# Patient Record
Sex: MALE | Race: BLACK OR AFRICAN AMERICAN | NOT HISPANIC OR LATINO | Employment: UNEMPLOYED | URBAN - METROPOLITAN AREA
[De-identification: names, ages, dates, MRNs, and addresses within clinical notes are randomized per-mention and may not be internally consistent; named-entity substitution may affect disease eponyms.]

---

## 2024-01-19 ENCOUNTER — OFFICE VISIT (OUTPATIENT)
Dept: DERMATOLOGY | Facility: CLINIC | Age: 69
End: 2024-01-19
Payer: COMMERCIAL

## 2024-01-19 ENCOUNTER — SPECIALTY PHARMACY (OUTPATIENT)
Dept: PHARMACY | Facility: CLINIC | Age: 69
End: 2024-01-19

## 2024-01-19 DIAGNOSIS — L73.2 HIDRADENITIS SUPPURATIVA: Primary | ICD-10-CM

## 2024-01-19 PROCEDURE — 99214 OFFICE O/P EST MOD 30 MIN: CPT | Performed by: DERMATOLOGY

## 2024-01-19 PROCEDURE — 1159F MED LIST DOCD IN RCRD: CPT | Performed by: DERMATOLOGY

## 2024-01-19 RX ORDER — ADALIMUMAB 40MG/0.4ML
40 KIT SUBCUTANEOUS
Qty: 4 EACH | Refills: 11 | Status: SHIPPED | OUTPATIENT
Start: 2024-01-19 | End: 2024-06-01 | Stop reason: HOSPADM

## 2024-01-19 RX ORDER — ADALIMUMAB 80MG/0.8ML
KIT SUBCUTANEOUS
Qty: 3 EACH | Refills: 0 | Status: SHIPPED | OUTPATIENT
Start: 2024-01-19 | End: 2024-03-23

## 2024-01-19 RX ORDER — MINOCYCLINE HYDROCHLORIDE 100 MG/1
100 CAPSULE ORAL 2 TIMES DAILY
Qty: 60 CAPSULE | Refills: 2 | Status: SHIPPED | OUTPATIENT
Start: 2024-01-19 | End: 2024-05-15 | Stop reason: HOSPADM

## 2024-01-19 NOTE — PROGRESS NOTES
Subjective     Brian Rodriguez is a 68 y.o. male with DM  and GERD who presents for the following: Hidradenitis Suppurativa (Lives in nursing home - they do dressing changes daily. ).     Longstanding history of HS of the buttock and scrotal area.     Wound care with facility wound nurse including rinse with normal saline, wound packing, followed by alginate dressing and mepilex. Zinc oxide butt paste for early pressure injury in perianal gluteal fold.     He saw Dr. Cordon 9/2023 who started minocycline 100mg BID for him and recommended against surgery at the time.    Today, patient reports continued drainage and active disease. He does not report new areas.     Review of Systems:  No other skin or systemic complaints other than what is documented elsewhere in the note.    The following portions of the chart were reviewed this encounter and updated as appropriate:          Skin Cancer History  No skin cancer on file.      Specialty Problems    None       Objective   Well appearing patient in no apparent distress; mood and affect are within normal limits.    A focused skin examination was performed. All findings within normal limits unless otherwise noted below.    Assessment/Plan   1. Hidradenitis suppurativa  Left buttock is indurated with sinuses draining two of which are packed with malodor and serous drainage    Hidradenitis Suppurativa of the buttock - severe and flaring despite use of systemic antibiotics (doxy and minocycline) and not a candidate for surgery per last visit with Dr. Cordon 9/2023  - Given the significant disease, we discussed considering starting Humira pending labs  - Continue minocycline 100 mg twice daily   - Obtain labs: CBC w/diff, CMP, T-spot, Hepatitis C antibody and B (surface antigen, antibody, and core antibody) antibody screening  - Continue daily wound care and dressing changes per current recs at the nursing facility  - Follow up 6 months after starting Humira  - Written  instructions provided and patient is in agreement with this plan.    minocycline 100 mg capsule  Take 1 capsule (100 mg) by mouth 2 times a day.    Related Procedures  Hepatitis C Antibody  Hepatitis B Surface Antigen  Hepatitis B Core Antibody, Total  Hepatitis B Surface Antibody  T-SPOT (Tb)  CBC and Auto Differential  Comprehensive metabolic panel  HIV 1/2 Antigen/Antibody Screen with Reflex to Confirmation    Brenda Monroy DO- PGY-4    I saw and evaluated the patient. I personally obtained the key and critical portions of the history and physical exam or was physically present for key and critical portions performed by the resident/fellow. I reviewed the resident/fellow's documentation and discussed the patient with the resident/fellow. I agree with the resident/fellow's medical decision making as documented in the note.    Shaun Pablo MD PhD

## 2024-01-19 NOTE — PATIENT INSTRUCTIONS
Continue daily wound care and dressing changes    Obtain labs: CBC w/diff, CMP, T-spot, Hepatitis C antibody and B (surface antigen, antibody, and core antibody) antibody screening    Pending labs, plan to start Humira for hidradenitis suppurativa and follow up 6 months after start    Continue minocycline 100 mg twice daily

## 2024-02-01 ENCOUNTER — TELEPHONE (OUTPATIENT)
Dept: DERMATOLOGY | Facility: CLINIC | Age: 69
End: 2024-02-01

## 2024-02-01 NOTE — TELEPHONE ENCOUNTER
I spoke with Brad at [facility] regarding patient labs prior to starting Humira. She is going to fax them to us.   Their fax number is [phone] if I need to fax her an order to get more labs.   [phone] ext [phone]

## 2024-02-12 PROCEDURE — RXMED WILLOW AMBULATORY MEDICATION CHARGE

## 2024-02-23 ENCOUNTER — SPECIALTY PHARMACY (OUTPATIENT)
Dept: PHARMACY | Facility: CLINIC | Age: 69
End: 2024-02-23

## 2024-02-24 ENCOUNTER — PHARMACY VISIT (OUTPATIENT)
Dept: PHARMACY | Facility: CLINIC | Age: 69
End: 2024-02-24
Payer: COMMERCIAL

## 2024-02-28 ENCOUNTER — SPECIALTY PHARMACY (OUTPATIENT)
Dept: PHARMACY | Facility: CLINIC | Age: 69
End: 2024-02-28

## 2024-03-21 ENCOUNTER — SPECIALTY PHARMACY (OUTPATIENT)
Dept: PHARMACY | Facility: CLINIC | Age: 69
End: 2024-03-21

## 2024-03-21 PROCEDURE — RXMED WILLOW AMBULATORY MEDICATION CHARGE

## 2024-03-28 ENCOUNTER — PHARMACY VISIT (OUTPATIENT)
Dept: PHARMACY | Facility: CLINIC | Age: 69
End: 2024-03-28
Payer: COMMERCIAL

## 2024-04-17 ENCOUNTER — APPOINTMENT (OUTPATIENT)
Dept: RADIOLOGY | Facility: HOSPITAL | Age: 69
DRG: 871 | End: 2024-04-17
Payer: MEDICARE

## 2024-04-17 ENCOUNTER — HOSPITAL ENCOUNTER (INPATIENT)
Facility: HOSPITAL | Age: 69
LOS: 28 days | Discharge: NURSING, REHAB OR LONG-TERM CARE | DRG: 871 | End: 2024-05-15
Attending: EMERGENCY MEDICINE | Admitting: INTERNAL MEDICINE
Payer: MEDICARE

## 2024-04-17 ENCOUNTER — CLINICAL SUPPORT (OUTPATIENT)
Dept: EMERGENCY MEDICINE | Facility: HOSPITAL | Age: 69
DRG: 871 | End: 2024-04-17
Payer: MEDICARE

## 2024-04-17 DIAGNOSIS — I13.0 HYPERTENSIVE HEART AND CHRONIC KIDNEY DISEASE WITH HEART FAILURE AND STAGE 1 THROUGH STAGE 4 CHRONIC KIDNEY DISEASE, OR UNSPECIFIED CHRONIC KIDNEY DISEASE (MULTI): ICD-10-CM

## 2024-04-17 DIAGNOSIS — J18.9 PNEUMONIA DUE TO INFECTIOUS ORGANISM, UNSPECIFIED LATERALITY, UNSPECIFIED PART OF LUNG: Primary | ICD-10-CM

## 2024-04-17 DIAGNOSIS — M79.89 LEG SWELLING: ICD-10-CM

## 2024-04-17 DIAGNOSIS — A52.3: ICD-10-CM

## 2024-04-17 DIAGNOSIS — M79.89 ARM SWELLING: ICD-10-CM

## 2024-04-17 DIAGNOSIS — I48.91 ATRIAL FIBRILLATION, UNSPECIFIED TYPE (MULTI): ICD-10-CM

## 2024-04-17 DIAGNOSIS — E87.0 HYPERNATREMIA: ICD-10-CM

## 2024-04-17 DIAGNOSIS — N17.9 AKI (ACUTE KIDNEY INJURY) (CMS-HCC): ICD-10-CM

## 2024-04-17 DIAGNOSIS — Z13.6 ENCOUNTER FOR SCREENING FOR CARDIOVASCULAR DISORDERS: ICD-10-CM

## 2024-04-17 DIAGNOSIS — R60.0 LOCALIZED EDEMA: ICD-10-CM

## 2024-04-17 DIAGNOSIS — L73.2 HIDRADENITIS SUPPURATIVA: ICD-10-CM

## 2024-04-17 DIAGNOSIS — R13.10 DYSPHAGIA, UNSPECIFIED TYPE: ICD-10-CM

## 2024-04-17 DIAGNOSIS — G93.40 ENCEPHALOPATHY: ICD-10-CM

## 2024-04-17 LAB
ALBUMIN SERPL BCP-MCNC: 2.6 G/DL (ref 3.4–5)
ALP SERPL-CCNC: 107 U/L (ref 33–136)
ALT SERPL W P-5'-P-CCNC: <3 U/L (ref 10–52)
AMORPH CRY #/AREA UR COMP ASSIST: ABNORMAL /HPF
ANION GAP SERPL CALC-SCNC: 18 MMOL/L (ref 10–20)
APPEARANCE UR: ABNORMAL
AST SERPL W P-5'-P-CCNC: 9 U/L (ref 9–39)
ATRIAL RATE: 92 BPM
BASOPHILS # BLD AUTO: 0.02 X10*3/UL (ref 0–0.1)
BASOPHILS NFR BLD AUTO: 0.1 %
BILIRUB SERPL-MCNC: 0.3 MG/DL (ref 0–1.2)
BILIRUB UR STRIP.AUTO-MCNC: NEGATIVE MG/DL
BUN SERPL-MCNC: 73 MG/DL (ref 6–23)
CALCIUM SERPL-MCNC: 9.2 MG/DL (ref 8.6–10.6)
CHLORIDE SERPL-SCNC: 121 MMOL/L (ref 98–107)
CO2 SERPL-SCNC: 19 MMOL/L (ref 21–32)
COLOR UR: YELLOW
CREAT SERPL-MCNC: 3.38 MG/DL (ref 0.5–1.3)
EGFRCR SERPLBLD CKD-EPI 2021: 19 ML/MIN/1.73M*2
EOSINOPHIL # BLD AUTO: 0.17 X10*3/UL (ref 0–0.4)
EOSINOPHIL NFR BLD AUTO: 0.8 %
ERYTHROCYTE [DISTWIDTH] IN BLOOD BY AUTOMATED COUNT: 19.5 % (ref 11.5–14.5)
GLUCOSE SERPL-MCNC: 289 MG/DL (ref 74–99)
GLUCOSE UR STRIP.AUTO-MCNC: NORMAL MG/DL
HCT VFR BLD AUTO: 30 % (ref 41–52)
HGB BLD-MCNC: 9 G/DL (ref 13.5–17.5)
HYALINE CASTS #/AREA URNS AUTO: ABNORMAL /LPF
IMM GRANULOCYTES # BLD AUTO: 0.19 X10*3/UL (ref 0–0.5)
IMM GRANULOCYTES NFR BLD AUTO: 0.9 % (ref 0–0.9)
KETONES UR STRIP.AUTO-MCNC: NEGATIVE MG/DL
LACTATE SERPL-SCNC: 1.2 MMOL/L (ref 0.4–2)
LEUKOCYTE ESTERASE UR QL STRIP.AUTO: NEGATIVE
LYMPHOCYTES # BLD AUTO: 1.75 X10*3/UL (ref 0.8–3)
LYMPHOCYTES NFR BLD AUTO: 8.3 %
MAGNESIUM SERPL-MCNC: 2.29 MG/DL (ref 1.6–2.4)
MCH RBC QN AUTO: 22.7 PG (ref 26–34)
MCHC RBC AUTO-ENTMCNC: 30 G/DL (ref 32–36)
MCV RBC AUTO: 76 FL (ref 80–100)
MONOCYTES # BLD AUTO: 0.94 X10*3/UL (ref 0.05–0.8)
MONOCYTES NFR BLD AUTO: 4.4 %
MRSA DNA SPEC QL NAA+PROBE: DETECTED
MUCOUS THREADS #/AREA URNS AUTO: ABNORMAL /LPF
NEUTROPHILS # BLD AUTO: 18.12 X10*3/UL (ref 1.6–5.5)
NEUTROPHILS NFR BLD AUTO: 85.5 %
NITRITE UR QL STRIP.AUTO: NEGATIVE
NRBC BLD-RTO: 0 /100 WBCS (ref 0–0)
P AXIS: 29 DEGREES
P OFFSET: 211 MS
P ONSET: 163 MS
PH UR STRIP.AUTO: 5 [PH]
PHOSPHATE SERPL-MCNC: 5.2 MG/DL (ref 2.5–4.9)
PLATELET # BLD AUTO: 363 X10*3/UL (ref 150–450)
POTASSIUM SERPL-SCNC: 4.3 MMOL/L (ref 3.5–5.3)
PR INTERVAL: 134 MS
PROT SERPL-MCNC: 9.2 G/DL (ref 6.4–8.2)
PROT UR STRIP.AUTO-MCNC: ABNORMAL MG/DL
Q ONSET: 230 MS
QRS COUNT: 15 BEATS
QRS DURATION: 78 MS
QT INTERVAL: 378 MS
QTC CALCULATION(BAZETT): 467 MS
QTC FREDERICIA: 435 MS
R AXIS: 64 DEGREES
RBC # BLD AUTO: 3.96 X10*6/UL (ref 4.5–5.9)
RBC # UR STRIP.AUTO: NEGATIVE /UL
RBC #/AREA URNS AUTO: ABNORMAL /HPF
RBC MORPH BLD: NORMAL
SODIUM SERPL-SCNC: 154 MMOL/L (ref 136–145)
SP GR UR STRIP.AUTO: 1.02
T AXIS: 84 DEGREES
T OFFSET: 419 MS
TARGETS BLD QL SMEAR: NORMAL
UROBILINOGEN UR STRIP.AUTO-MCNC: NORMAL MG/DL
VENTRICULAR RATE: 92 BPM
WBC # BLD AUTO: 21.2 X10*3/UL (ref 4.4–11.3)
WBC #/AREA URNS AUTO: ABNORMAL /HPF

## 2024-04-17 PROCEDURE — 83605 ASSAY OF LACTIC ACID: CPT | Performed by: PHYSICIAN ASSISTANT

## 2024-04-17 PROCEDURE — 71046 X-RAY EXAM CHEST 2 VIEWS: CPT

## 2024-04-17 PROCEDURE — 80053 COMPREHEN METABOLIC PANEL: CPT | Performed by: PHYSICIAN ASSISTANT

## 2024-04-17 PROCEDURE — 96365 THER/PROPH/DIAG IV INF INIT: CPT

## 2024-04-17 PROCEDURE — 87040 BLOOD CULTURE FOR BACTERIA: CPT | Performed by: PHYSICIAN ASSISTANT

## 2024-04-17 PROCEDURE — 2500000004 HC RX 250 GENERAL PHARMACY W/ HCPCS (ALT 636 FOR OP/ED): Performed by: EMERGENCY MEDICINE

## 2024-04-17 PROCEDURE — 99285 EMERGENCY DEPT VISIT HI MDM: CPT | Performed by: PHYSICIAN ASSISTANT

## 2024-04-17 PROCEDURE — 51702 INSERT TEMP BLADDER CATH: CPT

## 2024-04-17 PROCEDURE — 2500000004 HC RX 250 GENERAL PHARMACY W/ HCPCS (ALT 636 FOR OP/ED): Performed by: PHYSICIAN ASSISTANT

## 2024-04-17 PROCEDURE — 96367 TX/PROPH/DG ADDL SEQ IV INF: CPT

## 2024-04-17 PROCEDURE — 99285 EMERGENCY DEPT VISIT HI MDM: CPT | Mod: 25

## 2024-04-17 PROCEDURE — 74176 CT ABD & PELVIS W/O CONTRAST: CPT | Performed by: RADIOLOGY

## 2024-04-17 PROCEDURE — 83735 ASSAY OF MAGNESIUM: CPT | Performed by: PHYSICIAN ASSISTANT

## 2024-04-17 PROCEDURE — 93010 ELECTROCARDIOGRAM REPORT: CPT | Performed by: PHYSICIAN ASSISTANT

## 2024-04-17 PROCEDURE — 71046 X-RAY EXAM CHEST 2 VIEWS: CPT | Performed by: STUDENT IN AN ORGANIZED HEALTH CARE EDUCATION/TRAINING PROGRAM

## 2024-04-17 PROCEDURE — 36415 COLL VENOUS BLD VENIPUNCTURE: CPT | Performed by: PHYSICIAN ASSISTANT

## 2024-04-17 PROCEDURE — 93005 ELECTROCARDIOGRAM TRACING: CPT

## 2024-04-17 PROCEDURE — 96375 TX/PRO/DX INJ NEW DRUG ADDON: CPT

## 2024-04-17 PROCEDURE — 71250 CT THORAX DX C-: CPT | Performed by: RADIOLOGY

## 2024-04-17 PROCEDURE — 85025 COMPLETE CBC W/AUTO DIFF WBC: CPT | Performed by: PHYSICIAN ASSISTANT

## 2024-04-17 PROCEDURE — P9612 CATHETERIZE FOR URINE SPEC: HCPCS

## 2024-04-17 PROCEDURE — 84100 ASSAY OF PHOSPHORUS: CPT | Performed by: PHYSICIAN ASSISTANT

## 2024-04-17 PROCEDURE — 70450 CT HEAD/BRAIN W/O DYE: CPT

## 2024-04-17 PROCEDURE — 96361 HYDRATE IV INFUSION ADD-ON: CPT

## 2024-04-17 PROCEDURE — 81001 URINALYSIS AUTO W/SCOPE: CPT | Performed by: PHYSICIAN ASSISTANT

## 2024-04-17 PROCEDURE — 70450 CT HEAD/BRAIN W/O DYE: CPT | Performed by: RADIOLOGY

## 2024-04-17 PROCEDURE — 74176 CT ABD & PELVIS W/O CONTRAST: CPT

## 2024-04-17 PROCEDURE — 87641 MR-STAPH DNA AMP PROBE: CPT | Performed by: PHYSICIAN ASSISTANT

## 2024-04-17 PROCEDURE — 2500000004 HC RX 250 GENERAL PHARMACY W/ HCPCS (ALT 636 FOR OP/ED)

## 2024-04-17 PROCEDURE — 1210000001 HC SEMI-PRIVATE ROOM DAILY

## 2024-04-17 RX ORDER — MORPHINE SULFATE 4 MG/ML
INJECTION INTRAVENOUS
Status: COMPLETED
Start: 2024-04-17 | End: 2024-04-17

## 2024-04-17 RX ORDER — VANCOMYCIN HYDROCHLORIDE 750 MG/150ML
1500 INJECTION, SOLUTION INTRAVENOUS ONCE
Status: COMPLETED | OUTPATIENT
Start: 2024-04-17 | End: 2024-04-17

## 2024-04-17 RX ORDER — MORPHINE SULFATE 4 MG/ML
4 INJECTION INTRAVENOUS ONCE
Status: COMPLETED | OUTPATIENT
Start: 2024-04-17 | End: 2024-04-17

## 2024-04-17 RX ORDER — VANCOMYCIN HYDROCHLORIDE 1 G/20ML
INJECTION, POWDER, LYOPHILIZED, FOR SOLUTION INTRAVENOUS DAILY PRN
Status: DISCONTINUED | OUTPATIENT
Start: 2024-04-17 | End: 2024-04-17

## 2024-04-17 RX ADMIN — SODIUM CHLORIDE, POTASSIUM CHLORIDE, SODIUM LACTATE AND CALCIUM CHLORIDE 1000 ML: 600; 310; 30; 20 INJECTION, SOLUTION INTRAVENOUS at 17:37

## 2024-04-17 RX ADMIN — MORPHINE SULFATE 4 MG: 4 INJECTION, SOLUTION INTRAMUSCULAR; INTRAVENOUS at 17:37

## 2024-04-17 RX ADMIN — MORPHINE SULFATE 4 MG: 4 INJECTION INTRAVENOUS at 17:37

## 2024-04-17 RX ADMIN — PIPERACILLIN SODIUM AND TAZOBACTAM SODIUM 4.5 G: 4; .5 INJECTION, SOLUTION INTRAVENOUS at 18:38

## 2024-04-17 RX ADMIN — VANCOMYCIN HYDROCHLORIDE 1500 MG: 750 INJECTION, SOLUTION INTRAVENOUS at 21:15

## 2024-04-17 ASSESSMENT — PAIN SCALES - GENERAL
PAINLEVEL_OUTOF10: 0 - NO PAIN
PAINLEVEL_OUTOF10: 0 - NO PAIN

## 2024-04-17 ASSESSMENT — LIFESTYLE VARIABLES
EVER HAD A DRINK FIRST THING IN THE MORNING TO STEADY YOUR NERVES TO GET RID OF A HANGOVER: NO
TOTAL SCORE: 0
HAVE YOU EVER FELT YOU SHOULD CUT DOWN ON YOUR DRINKING: NO
EVER FELT BAD OR GUILTY ABOUT YOUR DRINKING: NO
HAVE PEOPLE ANNOYED YOU BY CRITICIZING YOUR DRINKING: NO

## 2024-04-17 ASSESSMENT — COLUMBIA-SUICIDE SEVERITY RATING SCALE - C-SSRS
6. HAVE YOU EVER DONE ANYTHING, STARTED TO DO ANYTHING, OR PREPARED TO DO ANYTHING TO END YOUR LIFE?: NO
2. HAVE YOU ACTUALLY HAD ANY THOUGHTS OF KILLING YOURSELF?: NO
1. IN THE PAST MONTH, HAVE YOU WISHED YOU WERE DEAD OR WISHED YOU COULD GO TO SLEEP AND NOT WAKE UP?: NO

## 2024-04-17 ASSESSMENT — PAIN - FUNCTIONAL ASSESSMENT: PAIN_FUNCTIONAL_ASSESSMENT: 0-10

## 2024-04-17 NOTE — ED PROVIDER NOTES
HPI   Chief Complaint   Patient presents with    Wound Check       HPI: Patient is a 69-year-old male with a history of type 2 diabetes, hypertension, CKD, hyperlipidemia, hidradenitis, bipolar who presents to the ED for altered mental status and concern of infection.  Patient presents from his nursing home where he was noted to be more altered than normal.  Nursing staff noted that he is normally A&O x 2 and somewhat forgetful at his baseline.  Noted a elevated white count in the 20s so was sent to the ED for concern of sepsis.  Also noted that he was hypernatremic this morning.  He does have a history of chronic sacral wounds.  Also history of recurrent pneumonia.  Patient currently denying any pain.  Alert and oriented x 0 in the room.  History will be limited secondary to the patient's clinical condition.    ------------------------------------------------------------------------------------------------------------------------------------------  ROS: a ten point review of systems was performed and was negative except as per HPI.  ------------------------------------------------------------------------------------------------------------------------------------------  PMH / PSH: as per HPI, otherwise reviewed   MEDS: as per HPI, otherwise reviewed in EMR  ALLERGIES: as per HPI, otherwise reviewed in EMR  SocH:  as per HPI, otherwise reviewed in EMR  FH:  as per HPI, otherwise reviewed in EMR   ------------------------------------------------------------------------------------------------------------------------------------------  Physical Exam:  VS: As documented in the triage note and EMR flowsheet from this visit was reviewed  General: Well appearing. No acute distress.   Eyes:  Extraocular movements grossly intact. No scleral icterus.   Head: Atraumatic. Normocephalic.     Neck: No meningismus. No gross masses. Full movement through range of motion  ENT: Posterior oropharynx shows no erythema, exudate or  edema.  Uvula is midline without edema.  No stridor or trismus  CV: Regular rhythm. No murmurs, rubs, gallops appreciated.   Resp: Clear to auscultation bilaterally. No respiratory distress.    GI: Nontender. Soft. No masses. No rebound, rigidity or guarding.   MSK: Symmetric muscle bulk. No gross step offs or deformities.  Chronic sacral wound noted to the sacrum on exam without any obvious signs of superimposed infection.  There are new, bleeding ulcerations to the scrotum.  Left buttock has a newly packed wound that is draining some purulence but no surrounding erythema or edema.  Skin: Warm, dry. No rashes  Neuro: CN II-VII intact. A&O x3. Speech fluent. Alert. Moving all extremities. Ambulates with normal gait  Psych: Appropriate mood and affect for situation  ------------------------------------------------------------------------------------------------------------------------------------------  Hospital Course / Medical Decision Making:   Patient seen and discussed with Dr. Milo Farfan. Patient is a 69-year-old male who presents to the ED for concern of altered mental status and sepsis.  Was noted to have elevated white count and hypernatremia on labs this morning at his facility, but these lab values and results were unavailable to us.  Was noted to be more altered than his baseline by nursing home staff.      In the room, patient has stable vitals, noted to have several chronic wounds to the sacral region on exam though none that appear obviously acutely infected at this time.  Patient started on IV fluids. CBC was elevated at 21.2.  CMP noted hypernatremia at 154.  There is also noted to be a new TERRI with creatinine of 3.38.  CT of the chest abdomen and pelvis obtained and showed concern of multifocal pneumonia, therefore patient was started on vancomycin and Zosyn.  Also showed skin thickening of the left gluteal soft tissues with resolution of subcutaneous gas/fluid with no definitive associated  focal fluid collection.  There was also noted to be a moderate to severe distention of the urinary bladder therefore Barton catheter was placed.  Nursing staff noted that they did get about 700 cc of urine out.  Discussed patient's case with the [facility] for admission of the patient for multifocal pneumonia, TERRI and hyponatremia.  They have accepted the patient to the general medicine service.  Patient will be admitted in stable condition.                            No data recorded                   Patient History   No past medical history on file.  No past surgical history on file.  No family history on file.  Social History     Tobacco Use    Smoking status: Not on file    Smokeless tobacco: Not on file   Substance Use Topics    Alcohol use: Not on file    Drug use: Not on file       Physical Exam   ED Triage Vitals   Temp Pulse Resp BP   -- -- -- --      SpO2 Temp src Heart Rate Source Patient Position   -- -- -- --      BP Location FiO2 (%)     -- --       Physical Exam    ED Course & [facility]   ED Course as of 04/17/24 2237 Wed Apr 17, 2024   1850 EKG interpreted by ED attending: Ventricular rate of 92 bpm.  Normal sinus rhythm.  Normal axis.  Normal intervals.  No ST or T wave elevations or inversions consistent with acute ischemia. [JT]      ED Course User Index  [JT] Milo Farfan MD MPH         Diagnoses as of 04/17/24 2237   Pneumonia due to infectious organism, unspecified laterality, unspecified part of lung   TERRI (acute kidney injury) (CMS-[facility])   Hypernatremia       Medical Decision Making      Procedure  Procedures     Dori Zhou PA-C  04/17/24 2240      -------------------------------------------  This patient was seen by the NILO in a shared visit. I personally saw the patient and made/approved the management plan and take responsibility for the patient management. I reviewed and edited the above documentation where necessary.     I have looked at the EKG and agree with the  interpretation.    Milo Farfan MD  EM Attending Physician      Milo Farfan MD MPH  04/18/24 0037

## 2024-04-17 NOTE — ED TRIAGE NOTES
"Pt presents to ED from nursing facility for chief complaint of a sacral wound check. Pt recently found to have a WBC > 20 and according to nursing facility pt is having change in mental status. Upon arrival to ED pt is alert to self and place (not time or situation). Pt has a nonproductive cough and long hx of pneumonia. Pt arrives with a 22\" to the right A/C.  "

## 2024-04-18 LAB
ALBUMIN SERPL BCP-MCNC: 2 G/DL (ref 3.4–5)
ALBUMIN SERPL BCP-MCNC: 2.2 G/DL (ref 3.4–5)
ALBUMIN SERPL BCP-MCNC: 2.5 G/DL (ref 3.4–5)
ALP SERPL-CCNC: 87 U/L (ref 33–136)
ALT SERPL W P-5'-P-CCNC: 4 U/L (ref 10–52)
ANION GAP BLDV CALCULATED.4IONS-SCNC: 11 MMOL/L (ref 10–25)
ANION GAP SERPL CALC-SCNC: 14 MMOL/L (ref 10–20)
ANION GAP SERPL CALC-SCNC: 15 MMOL/L (ref 10–20)
ANION GAP SERPL CALC-SCNC: 16 MMOL/L (ref 10–20)
AST SERPL W P-5'-P-CCNC: 7 U/L (ref 9–39)
BACTERIA SPEC RESP CULT: ABNORMAL
BASE EXCESS BLDV CALC-SCNC: 0.5 MMOL/L (ref -2–3)
BILIRUB SERPL-MCNC: 0.3 MG/DL (ref 0–1.2)
BODY TEMPERATURE: 37 DEGREES CELSIUS
BUN SERPL-MCNC: 53 MG/DL (ref 6–23)
BUN SERPL-MCNC: 55 MG/DL (ref 6–23)
BUN SERPL-MCNC: 64 MG/DL (ref 6–23)
CA-I BLDV-SCNC: 1.24 MMOL/L (ref 1.1–1.33)
CALCIUM SERPL-MCNC: 8.6 MG/DL (ref 8.6–10.6)
CALCIUM SERPL-MCNC: 8.7 MG/DL (ref 8.6–10.6)
CALCIUM SERPL-MCNC: 9.2 MG/DL (ref 8.6–10.6)
CHLORIDE BLDV-SCNC: 123 MMOL/L (ref 98–107)
CHLORIDE SERPL-SCNC: 120 MMOL/L (ref 98–107)
CHLORIDE SERPL-SCNC: 121 MMOL/L (ref 98–107)
CHLORIDE SERPL-SCNC: 122 MMOL/L (ref 98–107)
CHLORIDE UR-SCNC: 49 MMOL/L
CHLORIDE/CREATININE (MMOL/G) IN URINE: 64 MMOL/G CREAT (ref 23–275)
CO2 SERPL-SCNC: 21 MMOL/L (ref 21–32)
CO2 SERPL-SCNC: 23 MMOL/L (ref 21–32)
CO2 SERPL-SCNC: 24 MMOL/L (ref 21–32)
CREAT SERPL-MCNC: 2.66 MG/DL (ref 0.5–1.3)
CREAT SERPL-MCNC: 2.8 MG/DL (ref 0.5–1.3)
CREAT SERPL-MCNC: 3.12 MG/DL (ref 0.5–1.3)
CREAT UR-MCNC: 76.4 MG/DL (ref 20–370)
EGFRCR SERPLBLD CKD-EPI 2021: 21 ML/MIN/1.73M*2
EGFRCR SERPLBLD CKD-EPI 2021: 23 ML/MIN/1.73M*2
EGFRCR SERPLBLD CKD-EPI 2021: 25 ML/MIN/1.73M*2
ERYTHROCYTE [DISTWIDTH] IN BLOOD BY AUTOMATED COUNT: 19.3 % (ref 11.5–14.5)
EST. AVERAGE GLUCOSE BLD GHB EST-MCNC: 197 MG/DL
FLUAV RNA RESP QL NAA+PROBE: NOT DETECTED
FLUBV RNA RESP QL NAA+PROBE: NOT DETECTED
GLUCOSE BLD MANUAL STRIP-MCNC: 167 MG/DL (ref 74–99)
GLUCOSE BLD MANUAL STRIP-MCNC: 168 MG/DL (ref 74–99)
GLUCOSE BLD MANUAL STRIP-MCNC: 172 MG/DL (ref 74–99)
GLUCOSE BLD MANUAL STRIP-MCNC: 238 MG/DL (ref 74–99)
GLUCOSE BLD MANUAL STRIP-MCNC: 293 MG/DL (ref 74–99)
GLUCOSE BLDV-MCNC: 309 MG/DL (ref 74–99)
GLUCOSE SERPL-MCNC: 186 MG/DL (ref 74–99)
GLUCOSE SERPL-MCNC: 234 MG/DL (ref 74–99)
GLUCOSE SERPL-MCNC: 320 MG/DL (ref 74–99)
GRAM STN SPEC: ABNORMAL
HBA1C MFR BLD: 8.5 %
HBV CORE AB SER QL: NONREACTIVE
HBV SURFACE AB SER-ACNC: <3.1 MIU/ML
HBV SURFACE AG SERPL QL IA: NONREACTIVE
HCO3 BLDV-SCNC: 25.4 MMOL/L (ref 22–26)
HCT VFR BLD AUTO: 28.8 % (ref 41–52)
HCT VFR BLD EST: 25 % (ref 41–52)
HCV AB SER QL: NONREACTIVE
HGB BLD-MCNC: 9.1 G/DL (ref 13.5–17.5)
HGB BLDV-MCNC: 8.3 G/DL (ref 13.5–17.5)
HIV 1+2 AB+HIV1 P24 AG SERPL QL IA: NONREACTIVE
HOLD SPECIMEN: NORMAL
INHALED O2 CONCENTRATION: 21 %
LACTATE BLDV-SCNC: 1.4 MMOL/L (ref 0.4–2)
MCH RBC QN AUTO: 23.2 PG (ref 26–34)
MCHC RBC AUTO-ENTMCNC: 31.6 G/DL (ref 32–36)
MCV RBC AUTO: 74 FL (ref 80–100)
NRBC BLD-RTO: 0 /100 WBCS (ref 0–0)
OXYHGB MFR BLDV: 26.5 % (ref 45–75)
PCO2 BLDV: 41 MM HG (ref 41–51)
PH BLDV: 7.4 PH (ref 7.33–7.43)
PHOSPHATE SERPL-MCNC: 3.1 MG/DL (ref 2.5–4.9)
PHOSPHATE SERPL-MCNC: 4.1 MG/DL (ref 2.5–4.9)
PLATELET # BLD AUTO: 349 X10*3/UL (ref 150–450)
PO2 BLDV: 27 MM HG (ref 35–45)
POTASSIUM BLDV-SCNC: 3.6 MMOL/L (ref 3.5–5.3)
POTASSIUM SERPL-SCNC: 3.3 MMOL/L (ref 3.5–5.3)
POTASSIUM SERPL-SCNC: 3.4 MMOL/L (ref 3.5–5.3)
POTASSIUM SERPL-SCNC: 3.5 MMOL/L (ref 3.5–5.3)
POTASSIUM UR-SCNC: 40 MMOL/L
POTASSIUM/CREAT UR-RTO: 52 MMOL/G CREAT
PROCALCITONIN SERPL-MCNC: 4.48 NG/ML
PROT SERPL-MCNC: 8.1 G/DL (ref 6.4–8.2)
RBC # BLD AUTO: 3.92 X10*6/UL (ref 4.5–5.9)
SAO2 % BLDV: 27 % (ref 45–75)
SARS-COV-2 RNA RESP QL NAA+PROBE: DETECTED
SODIUM BLDV-SCNC: 156 MMOL/L (ref 136–145)
SODIUM SERPL-SCNC: 155 MMOL/L (ref 136–145)
SODIUM SERPL-SCNC: 155 MMOL/L (ref 136–145)
SODIUM SERPL-SCNC: 156 MMOL/L (ref 136–145)
SODIUM UR-SCNC: 54 MMOL/L
SODIUM/CREAT UR-RTO: 71 MMOL/G CREAT
VANCOMYCIN TROUGH SERPL-MCNC: 11.4 UG/ML (ref 5–20)
WBC # BLD AUTO: 13.9 X10*3/UL (ref 4.4–11.3)

## 2024-04-18 PROCEDURE — 36415 COLL VENOUS BLD VENIPUNCTURE: CPT

## 2024-04-18 PROCEDURE — 1100000001 HC PRIVATE ROOM DAILY

## 2024-04-18 PROCEDURE — S4991 NICOTINE PATCH NONLEGEND: HCPCS

## 2024-04-18 PROCEDURE — 84075 ASSAY ALKALINE PHOSPHATASE: CPT

## 2024-04-18 PROCEDURE — 99221 1ST HOSP IP/OBS SF/LOW 40: CPT | Performed by: STUDENT IN AN ORGANIZED HEALTH CARE EDUCATION/TRAINING PROGRAM

## 2024-04-18 PROCEDURE — 86803 HEPATITIS C AB TEST: CPT

## 2024-04-18 PROCEDURE — 84145 PROCALCITONIN (PCT): CPT

## 2024-04-18 PROCEDURE — 86481 TB AG RESPONSE T-CELL SUSP: CPT

## 2024-04-18 PROCEDURE — 2500000002 HC RX 250 W HCPCS SELF ADMINISTERED DRUGS (ALT 637 FOR MEDICARE OP, ALT 636 FOR OP/ED)

## 2024-04-18 PROCEDURE — 87632 RESP VIRUS 6-11 TARGETS: CPT

## 2024-04-18 PROCEDURE — 2500000004 HC RX 250 GENERAL PHARMACY W/ HCPCS (ALT 636 FOR OP/ED)

## 2024-04-18 PROCEDURE — 82947 ASSAY GLUCOSE BLOOD QUANT: CPT

## 2024-04-18 PROCEDURE — 87340 HEPATITIS B SURFACE AG IA: CPT

## 2024-04-18 PROCEDURE — 82436 ASSAY OF URINE CHLORIDE: CPT

## 2024-04-18 PROCEDURE — 92610 EVALUATE SWALLOWING FUNCTION: CPT | Mod: GN

## 2024-04-18 PROCEDURE — 80202 ASSAY OF VANCOMYCIN: CPT

## 2024-04-18 PROCEDURE — 87899 AGENT NOS ASSAY W/OPTIC: CPT

## 2024-04-18 PROCEDURE — 83036 HEMOGLOBIN GLYCOSYLATED A1C: CPT

## 2024-04-18 PROCEDURE — 86706 HEP B SURFACE ANTIBODY: CPT

## 2024-04-18 PROCEDURE — 86704 HEP B CORE ANTIBODY TOTAL: CPT

## 2024-04-18 PROCEDURE — 84132 ASSAY OF SERUM POTASSIUM: CPT

## 2024-04-18 PROCEDURE — 87389 HIV-1 AG W/HIV-1&-2 AB AG IA: CPT

## 2024-04-18 PROCEDURE — 87636 SARSCOV2 & INF A&B AMP PRB: CPT

## 2024-04-18 PROCEDURE — 99223 1ST HOSP IP/OBS HIGH 75: CPT

## 2024-04-18 PROCEDURE — 87070 CULTURE OTHR SPECIMN AEROBIC: CPT

## 2024-04-18 PROCEDURE — 87205 SMEAR GRAM STAIN: CPT

## 2024-04-18 PROCEDURE — 2500000001 HC RX 250 WO HCPCS SELF ADMINISTERED DRUGS (ALT 637 FOR MEDICARE OP)

## 2024-04-18 PROCEDURE — 87449 NOS EACH ORGANISM AG IA: CPT

## 2024-04-18 PROCEDURE — 85027 COMPLETE CBC AUTOMATED: CPT

## 2024-04-18 PROCEDURE — 84443 ASSAY THYROID STIM HORMONE: CPT

## 2024-04-18 RX ORDER — INSULIN LISPRO 100 [IU]/ML
INJECTION, SOLUTION INTRAVENOUS; SUBCUTANEOUS
COMMUNITY
End: 2024-06-01 | Stop reason: HOSPADM

## 2024-04-18 RX ORDER — ZINC SULFATE 50(220)MG
50 CAPSULE ORAL EVERY MORNING
COMMUNITY
End: 2024-06-01 | Stop reason: HOSPADM

## 2024-04-18 RX ORDER — GALANTAMINE 4 MG/1
4 TABLET, FILM COATED ORAL 2 TIMES DAILY
COMMUNITY
End: 2024-06-01 | Stop reason: HOSPADM

## 2024-04-18 RX ORDER — ACETAMINOPHEN 160 MG/5ML
650 SOLUTION ORAL EVERY 4 HOURS PRN
Status: DISCONTINUED | OUTPATIENT
Start: 2024-04-18 | End: 2024-05-03

## 2024-04-18 RX ORDER — ATORVASTATIN CALCIUM 10 MG/1
10 TABLET, FILM COATED ORAL NIGHTLY
COMMUNITY
End: 2024-06-01 | Stop reason: HOSPADM

## 2024-04-18 RX ORDER — NICOTINE 7MG/24HR
1 PATCH, TRANSDERMAL 24 HOURS TRANSDERMAL DAILY
Status: DISCONTINUED | OUTPATIENT
Start: 2024-06-13 | End: 2024-04-25

## 2024-04-18 RX ORDER — FOLIC ACID 1 MG/1
1 TABLET ORAL EVERY MORNING
COMMUNITY
End: 2024-06-01 | Stop reason: HOSPADM

## 2024-04-18 RX ORDER — POTASSIUM CHLORIDE 14.9 MG/ML
20 INJECTION INTRAVENOUS
Status: COMPLETED | OUTPATIENT
Start: 2024-04-18 | End: 2024-04-19

## 2024-04-18 RX ORDER — BISMUTH SUBSALICYLATE 262 MG
1 TABLET,CHEWABLE ORAL EVERY MORNING
COMMUNITY
End: 2024-06-01 | Stop reason: HOSPADM

## 2024-04-18 RX ORDER — GUAIFENESIN 100 MG/5ML
100 SOLUTION ORAL EVERY 4 HOURS PRN
Status: DISCONTINUED | OUTPATIENT
Start: 2024-04-18 | End: 2024-05-15 | Stop reason: HOSPADM

## 2024-04-18 RX ORDER — BISACODYL 10 MG/1
10 SUPPOSITORY RECTAL DAILY
Status: DISCONTINUED | OUTPATIENT
Start: 2024-04-18 | End: 2024-04-21

## 2024-04-18 RX ORDER — FERROUS SULFATE 325(65) MG
325 TABLET ORAL 3 TIMES DAILY
COMMUNITY
End: 2024-06-01 | Stop reason: HOSPADM

## 2024-04-18 RX ORDER — DEXTROSE 50 % IN WATER (D50W) INTRAVENOUS SYRINGE
25
Status: DISCONTINUED | OUTPATIENT
Start: 2024-04-18 | End: 2024-05-03

## 2024-04-18 RX ORDER — POLYETHYLENE GLYCOL 3350 17 G/17G
17 POWDER, FOR SOLUTION ORAL DAILY
Status: DISCONTINUED | OUTPATIENT
Start: 2024-04-18 | End: 2024-04-18

## 2024-04-18 RX ORDER — ATORVASTATIN CALCIUM 10 MG/1
10 TABLET, FILM COATED ORAL NIGHTLY
Status: DISCONTINUED | OUTPATIENT
Start: 2024-04-18 | End: 2024-04-26

## 2024-04-18 RX ORDER — DEXTROSE MONOHYDRATE 50 MG/ML
160 INJECTION, SOLUTION INTRAVENOUS CONTINUOUS
Status: DISCONTINUED | OUTPATIENT
Start: 2024-04-18 | End: 2024-04-19

## 2024-04-18 RX ORDER — TALC
3 POWDER (GRAM) TOPICAL NIGHTLY PRN
Status: DISCONTINUED | OUTPATIENT
Start: 2024-04-18 | End: 2024-05-07

## 2024-04-18 RX ORDER — AMOXICILLIN 250 MG
2 CAPSULE ORAL 2 TIMES DAILY
Status: DISCONTINUED | OUTPATIENT
Start: 2024-04-18 | End: 2024-04-24

## 2024-04-18 RX ORDER — DEXTROSE MONOHYDRATE AND SODIUM CHLORIDE 5; .45 G/100ML; G/100ML
3000 INJECTION, SOLUTION INTRAVENOUS
COMMUNITY
End: 2024-06-01 | Stop reason: HOSPADM

## 2024-04-18 RX ORDER — GUAIFENESIN 100 MG/5ML
100 SOLUTION ORAL EVERY 4 HOURS PRN
COMMUNITY
End: 2024-06-01 | Stop reason: HOSPADM

## 2024-04-18 RX ORDER — HEPARIN SODIUM 5000 [USP'U]/ML
5000 INJECTION, SOLUTION INTRAVENOUS; SUBCUTANEOUS EVERY 8 HOURS SCHEDULED
Status: DISCONTINUED | OUTPATIENT
Start: 2024-04-18 | End: 2024-05-02

## 2024-04-18 RX ORDER — DEXTROSE 50 % IN WATER (D50W) INTRAVENOUS SYRINGE
12.5
Status: DISCONTINUED | OUTPATIENT
Start: 2024-04-18 | End: 2024-05-03

## 2024-04-18 RX ORDER — CHOLECALCIFEROL (VITAMIN D3) 25 MCG
1000 TABLET ORAL EVERY MORNING
COMMUNITY
End: 2024-06-01 | Stop reason: HOSPADM

## 2024-04-18 RX ORDER — INSULIN GLARGINE 100 [IU]/ML
10 INJECTION, SOLUTION SUBCUTANEOUS EVERY MORNING
COMMUNITY
End: 2024-06-01 | Stop reason: HOSPADM

## 2024-04-18 RX ORDER — ASCORBIC ACID 500 MG
500 TABLET ORAL 3 TIMES DAILY
COMMUNITY
End: 2024-06-01 | Stop reason: HOSPADM

## 2024-04-18 RX ORDER — SODIUM CHLORIDE, SODIUM LACTATE, POTASSIUM CHLORIDE, CALCIUM CHLORIDE 600; 310; 30; 20 MG/100ML; MG/100ML; MG/100ML; MG/100ML
100 INJECTION, SOLUTION INTRAVENOUS CONTINUOUS
Status: DISCONTINUED | OUTPATIENT
Start: 2024-04-18 | End: 2024-04-18

## 2024-04-18 RX ORDER — CHLORHEXIDINE GLUCONATE 40 MG/ML
SOLUTION TOPICAL DAILY
Status: DISCONTINUED | OUTPATIENT
Start: 2024-04-18 | End: 2024-05-06

## 2024-04-18 RX ORDER — DEXTROSE MONOHYDRATE 50 MG/ML
80 INJECTION, SOLUTION INTRAVENOUS CONTINUOUS
Status: DISCONTINUED | OUTPATIENT
Start: 2024-04-18 | End: 2024-04-18

## 2024-04-18 RX ORDER — INSULIN GLARGINE 100 [IU]/ML
10 INJECTION, SOLUTION SUBCUTANEOUS EVERY MORNING
Status: DISCONTINUED | OUTPATIENT
Start: 2024-04-18 | End: 2024-04-21

## 2024-04-18 RX ORDER — ERTAPENEM 1 G/1
500 INJECTION, POWDER, LYOPHILIZED, FOR SOLUTION INTRAMUSCULAR; INTRAVENOUS DAILY
COMMUNITY
End: 2024-06-01 | Stop reason: HOSPADM

## 2024-04-18 RX ORDER — TRAMADOL HYDROCHLORIDE 50 MG/1
50 TABLET ORAL DAILY PRN
COMMUNITY
End: 2024-06-01 | Stop reason: HOSPADM

## 2024-04-18 RX ORDER — PANTOPRAZOLE SODIUM 40 MG/1
40 TABLET, DELAYED RELEASE ORAL EVERY MORNING
COMMUNITY
End: 2024-06-01 | Stop reason: HOSPADM

## 2024-04-18 RX ORDER — IBUPROFEN 200 MG
1 TABLET ORAL DAILY
Status: DISCONTINUED | OUTPATIENT
Start: 2024-05-30 | End: 2024-04-25

## 2024-04-18 RX ORDER — BISMUTH SUBSALICYLATE 262 MG
1 TABLET,CHEWABLE ORAL EVERY MORNING
Status: DISCONTINUED | OUTPATIENT
Start: 2024-04-18 | End: 2024-04-18

## 2024-04-18 RX ORDER — AZITHROMYCIN 500 MG/1
500 TABLET, FILM COATED ORAL
Status: DISPENSED | OUTPATIENT
Start: 2024-04-18 | End: 2024-04-21

## 2024-04-18 RX ORDER — INSULIN LISPRO 100 [IU]/ML
0-10 INJECTION, SOLUTION INTRAVENOUS; SUBCUTANEOUS
Status: DISCONTINUED | OUTPATIENT
Start: 2024-04-18 | End: 2024-04-19

## 2024-04-18 RX ORDER — ACETAMINOPHEN 325 MG/1
650 TABLET ORAL EVERY 4 HOURS PRN
Status: DISCONTINUED | OUTPATIENT
Start: 2024-04-18 | End: 2024-05-07

## 2024-04-18 RX ORDER — VANCOMYCIN HYDROCHLORIDE 1 G/20ML
INJECTION, POWDER, LYOPHILIZED, FOR SOLUTION INTRAVENOUS DAILY PRN
Status: DISCONTINUED | OUTPATIENT
Start: 2024-04-18 | End: 2024-05-01

## 2024-04-18 RX ORDER — ACETAMINOPHEN 325 MG/1
650 TABLET ORAL EVERY 4 HOURS PRN
COMMUNITY
End: 2024-06-01 | Stop reason: HOSPADM

## 2024-04-18 RX ORDER — POTASSIUM CHLORIDE 20 MEQ/1
40 TABLET, EXTENDED RELEASE ORAL ONCE
Status: DISCONTINUED | OUTPATIENT
Start: 2024-04-18 | End: 2024-04-19

## 2024-04-18 RX ORDER — MIRTAZAPINE 7.5 MG/1
7.5 TABLET, FILM COATED ORAL NIGHTLY
COMMUNITY
End: 2024-06-01 | Stop reason: HOSPADM

## 2024-04-18 RX ORDER — DIPHENHYDRAMINE HCL 25 MG
4 CAPSULE ORAL EVERY 2 HOUR PRN
Status: DISCONTINUED | OUTPATIENT
Start: 2024-04-18 | End: 2024-04-25

## 2024-04-18 RX ORDER — MIRTAZAPINE 15 MG/1
7.5 TABLET, FILM COATED ORAL NIGHTLY
Status: DISCONTINUED | OUTPATIENT
Start: 2024-04-18 | End: 2024-04-29

## 2024-04-18 RX ORDER — IBUPROFEN 200 MG
1 TABLET ORAL DAILY
Status: DISCONTINUED | OUTPATIENT
Start: 2024-04-18 | End: 2024-04-25

## 2024-04-18 RX ORDER — INSULIN LISPRO 100 [IU]/ML
5 INJECTION, SOLUTION INTRAVENOUS; SUBCUTANEOUS ONCE
Status: COMPLETED | OUTPATIENT
Start: 2024-04-18 | End: 2024-04-18

## 2024-04-18 RX ORDER — PANTOPRAZOLE SODIUM 40 MG/1
40 TABLET, DELAYED RELEASE ORAL EVERY MORNING
Status: DISCONTINUED | OUTPATIENT
Start: 2024-04-18 | End: 2024-04-22

## 2024-04-18 RX ORDER — POLYETHYLENE GLYCOL 3350 17 G/17G
17 POWDER, FOR SOLUTION ORAL 2 TIMES DAILY
Status: DISCONTINUED | OUTPATIENT
Start: 2024-04-18 | End: 2024-04-21

## 2024-04-18 RX ORDER — TRAMADOL HYDROCHLORIDE 50 MG/1
50 TABLET ORAL DAILY PRN
Status: DISCONTINUED | OUTPATIENT
Start: 2024-04-18 | End: 2024-05-07

## 2024-04-18 RX ORDER — ACETAMINOPHEN 650 MG/1
650 SUPPOSITORY RECTAL EVERY 4 HOURS PRN
Status: DISCONTINUED | OUTPATIENT
Start: 2024-04-18 | End: 2024-05-03

## 2024-04-18 RX ORDER — ACETAMINOPHEN 325 MG/1
650 TABLET ORAL EVERY 4 HOURS PRN
Status: DISCONTINUED | OUTPATIENT
Start: 2024-04-18 | End: 2024-04-26

## 2024-04-18 RX ORDER — ASCORBIC ACID 500 MG
500 TABLET ORAL 3 TIMES DAILY
Status: DISCONTINUED | OUTPATIENT
Start: 2024-04-18 | End: 2024-04-26

## 2024-04-18 RX ORDER — MULTIVIT-MIN/IRON FUM/FOLIC AC 7.5 MG-4
1 TABLET ORAL DAILY
Status: DISCONTINUED | OUTPATIENT
Start: 2024-04-18 | End: 2024-05-07

## 2024-04-18 RX ADMIN — DEXTROSE MONOHYDRATE 80 ML/HR: 50 INJECTION, SOLUTION INTRAVENOUS at 09:16

## 2024-04-18 RX ADMIN — INSULIN LISPRO 2 UNITS: 100 INJECTION, SOLUTION INTRAVENOUS; SUBCUTANEOUS at 11:26

## 2024-04-18 RX ADMIN — PIPERACILLIN SODIUM AND TAZOBACTAM SODIUM 3.38 G: 3; .375 INJECTION, SOLUTION INTRAVENOUS at 03:04

## 2024-04-18 RX ADMIN — INSULIN LISPRO 5 UNITS: 100 INJECTION, SOLUTION INTRAVENOUS; SUBCUTANEOUS at 06:19

## 2024-04-18 RX ADMIN — HEPARIN SODIUM 5000 UNITS: 5000 INJECTION INTRAVENOUS; SUBCUTANEOUS at 23:30

## 2024-04-18 RX ADMIN — NICOTINE 1 PATCH: 21 PATCH, EXTENDED RELEASE TRANSDERMAL at 15:49

## 2024-04-18 RX ADMIN — SODIUM CHLORIDE, POTASSIUM CHLORIDE, SODIUM LACTATE AND CALCIUM CHLORIDE 100 ML/HR: 600; 310; 30; 20 INJECTION, SOLUTION INTRAVENOUS at 04:27

## 2024-04-18 RX ADMIN — PIPERACILLIN SODIUM AND TAZOBACTAM SODIUM 3.38 G: 3; .375 INJECTION, SOLUTION INTRAVENOUS at 08:46

## 2024-04-18 RX ADMIN — INSULIN LISPRO 4 UNITS: 100 INJECTION, SOLUTION INTRAVENOUS; SUBCUTANEOUS at 16:43

## 2024-04-18 RX ADMIN — PIPERACILLIN SODIUM AND TAZOBACTAM SODIUM 3.38 G: 3; .375 INJECTION, SOLUTION INTRAVENOUS at 16:02

## 2024-04-18 RX ADMIN — HEPARIN SODIUM 5000 UNITS: 5000 INJECTION INTRAVENOUS; SUBCUTANEOUS at 15:48

## 2024-04-18 RX ADMIN — INSULIN LISPRO 2 UNITS: 100 INJECTION, SOLUTION INTRAVENOUS; SUBCUTANEOUS at 09:14

## 2024-04-18 RX ADMIN — BISACODYL 10 MG: 10 SUPPOSITORY RECTAL at 15:49

## 2024-04-18 RX ADMIN — HEPARIN SODIUM 5000 UNITS: 5000 INJECTION INTRAVENOUS; SUBCUTANEOUS at 06:16

## 2024-04-18 RX ADMIN — SODIUM CHLORIDE, POTASSIUM CHLORIDE, SODIUM LACTATE AND CALCIUM CHLORIDE 500 ML: 600; 310; 30; 20 INJECTION, SOLUTION INTRAVENOUS at 06:40

## 2024-04-18 RX ADMIN — DEXTROSE MONOHYDRATE 125 ML/HR: 50 INJECTION, SOLUTION INTRAVENOUS at 17:06

## 2024-04-18 SDOH — SOCIAL STABILITY: SOCIAL INSECURITY: ABUSE: ADULT

## 2024-04-18 SDOH — SOCIAL STABILITY: SOCIAL INSECURITY: ARE THERE ANY APPARENT SIGNS OF INJURIES/BEHAVIORS THAT COULD BE RELATED TO ABUSE/NEGLECT?: UNABLE TO ASSESS

## 2024-04-18 SDOH — SOCIAL STABILITY: SOCIAL INSECURITY: WERE YOU ABLE TO COMPLETE ALL THE BEHAVIORAL HEALTH SCREENINGS?: NO

## 2024-04-18 SDOH — SOCIAL STABILITY: SOCIAL INSECURITY: HAS ANYONE EVER THREATENED TO HURT YOUR FAMILY OR YOUR PETS?: UNABLE TO ASSESS

## 2024-04-18 SDOH — SOCIAL STABILITY: SOCIAL INSECURITY: ARE YOU OR HAVE YOU BEEN THREATENED OR ABUSED PHYSICALLY, EMOTIONALLY, OR SEXUALLY BY ANYONE?: UNABLE TO ASSESS

## 2024-04-18 SDOH — SOCIAL STABILITY: SOCIAL INSECURITY: DO YOU FEEL UNSAFE GOING BACK TO THE PLACE WHERE YOU ARE LIVING?: UNABLE TO ASSESS

## 2024-04-18 SDOH — SOCIAL STABILITY: SOCIAL INSECURITY: HAVE YOU HAD ANY THOUGHTS OF HARMING ANYONE ELSE?: UNABLE TO ASSESS

## 2024-04-18 SDOH — SOCIAL STABILITY: SOCIAL INSECURITY: DOES ANYONE TRY TO KEEP YOU FROM HAVING/CONTACTING OTHER FRIENDS OR DOING THINGS OUTSIDE YOUR HOME?: UNABLE TO ASSESS

## 2024-04-18 SDOH — SOCIAL STABILITY: SOCIAL INSECURITY: HAVE YOU HAD THOUGHTS OF HARMING ANYONE ELSE?: UNABLE TO ASSESS

## 2024-04-18 SDOH — SOCIAL STABILITY: SOCIAL INSECURITY: DO YOU FEEL ANYONE HAS EXPLOITED OR TAKEN ADVANTAGE OF YOU FINANCIALLY OR OF YOUR PERSONAL PROPERTY?: UNABLE TO ASSESS

## 2024-04-18 ASSESSMENT — LIFESTYLE VARIABLES
SUBSTANCE_ABUSE_PAST_12_MONTHS: NO
HOW OFTEN DO YOU HAVE A DRINK CONTAINING ALCOHOL: PATIENT UNABLE TO ANSWER
SKIP TO QUESTIONS 9-10: 0
PRESCIPTION_ABUSE_PAST_12_MONTHS: NO
HOW MANY STANDARD DRINKS CONTAINING ALCOHOL DO YOU HAVE ON A TYPICAL DAY: PATIENT UNABLE TO ANSWER
HOW OFTEN DO YOU HAVE SIX OR MORE DRINKS ON ONE OCCASION: PATIENT UNABLE TO ANSWER
AUDIT-C TOTAL SCORE: -1

## 2024-04-18 ASSESSMENT — COGNITIVE AND FUNCTIONAL STATUS - GENERAL
WALKING IN HOSPITAL ROOM: A LITTLE
PATIENT BASELINE BEDBOUND: UNABLE TO ASSESS AT THIS TIME
DRESSING REGULAR UPPER BODY CLOTHING: A LOT
TURNING FROM BACK TO SIDE WHILE IN FLAT BAD: A LITTLE
STANDING UP FROM CHAIR USING ARMS: A LITTLE
HELP NEEDED FOR BATHING: A LOT
TOILETING: A LOT
MOBILITY SCORE: 18
DAILY ACTIVITIY SCORE: 12
MOVING TO AND FROM BED TO CHAIR: A LITTLE
PERSONAL GROOMING: TOTAL
CLIMB 3 TO 5 STEPS WITH RAILING: A LOT
DRESSING REGULAR LOWER BODY CLOTHING: A LOT
EATING MEALS: A LITTLE

## 2024-04-18 ASSESSMENT — ACTIVITIES OF DAILY LIVING (ADL)
GROOMING: UNABLE TO ASSESS
WALKS IN HOME: UNABLE TO ASSESS
BATHING: UNABLE TO ASSESS
ADEQUATE_TO_COMPLETE_ADL: YES
JUDGMENT_ADEQUATE_SAFELY_COMPLETE_DAILY_ACTIVITIES: NO
HEARING - LEFT EAR: UNABLE TO ASSESS
PATIENT'S MEMORY ADEQUATE TO SAFELY COMPLETE DAILY ACTIVITIES?: UNABLE TO ASSESS
TOILETING: UNABLE TO ASSESS
LACK_OF_TRANSPORTATION: PATIENT UNABLE TO ANSWER
DRESSING YOURSELF: UNABLE TO ASSESS
HEARING - RIGHT EAR: UNABLE TO ASSESS
FEEDING YOURSELF: UNABLE TO ASSESS

## 2024-04-18 ASSESSMENT — PAIN - FUNCTIONAL ASSESSMENT
PAIN_FUNCTIONAL_ASSESSMENT: 0-10
PAIN_FUNCTIONAL_ASSESSMENT: 0-10

## 2024-04-18 ASSESSMENT — PAIN SCALES - GENERAL
PAINLEVEL_OUTOF10: 0 - NO PAIN

## 2024-04-18 ASSESSMENT — PATIENT HEALTH QUESTIONNAIRE - PHQ9
SUM OF ALL RESPONSES TO PHQ9 QUESTIONS 1 & 2: 0
2. FEELING DOWN, DEPRESSED OR HOPELESS: NOT AT ALL
1. LITTLE INTEREST OR PLEASURE IN DOING THINGS: NOT AT ALL

## 2024-04-18 NOTE — PROGRESS NOTES
"Pharmacy Medication History Review    Brian Rodriguez is a 69 y.o. male admitted for Pneumonia due to infectious organism, unspecified laterality, unspecified part of lung. Pharmacy reviewed the patient's esifn-mo-kvppohehq medications and allergies for accuracy.      PTA Medication List has been updated as appears on \"Medications\" Table from [facility] Continuity of Care Document (4/17/24).       The list below reflects the updated PTA list.   Prior to Admission Medications   Prescriptions  Facility Reported?   Lactobacillus acidophilus (ACIDOPHILUS ORAL)  Yes   Sig: Take 1 tablet by mouth 2 times a day.   0.5 mg (100 million cell) tablet   acetaminophen (Tylenol) 325 mg tablet  Yes   Sig: Take 2 tablets (650 mg) by mouth every 4 hours if needed   adalimumab (Humira,CF, Pen) 40 mg/0.4 mL pen injector kit pen-injector  Yes   Sig: Inject 1 Pen (40 mg) under the skin 1 (one) time per week.  --> ON FRIDAYS, last dose 4/12/24   amino acids/protein hydrolys (PRO-STAT AWC ORAL)  Yes   Sig: Take 30 mL by mouth 2 times a day.   ascorbic acid (Vitamin C) 500 mg tablet  Yes   Sig: Take 1 tablet (500 mg) by mouth 3 times a day.   atorvastatin (Lipitor) 10 mg tablet  Yes   Sig: Take 1 tablet (10 mg) by mouth once daily at bedtime.   cholecalciferol (Vitamin D-3) 25 MCG (1000 UT) tablet  Yes   Sig: Take 1 tablet (1,000 Units) by mouth once daily in the morning.   dextrose 5 %-0.45 % sod chlord (dextrose 5%-0.45 % sodium chloride) infusion  Yes   Sig: Infuse 3,000 mL into a venous catheter.   Every shift, Start 4/17/24 --> No last dose listed on [facility] paperwork   ertapenem (INVanz) IV  Yes   Sig: Infuse 25 mL (500 mg) into a venous catheter once daily bedtime  Start 4/17/24, End 4/21/24 --> No last dose listed on [facility] paperwork   ferrous sulfate, 325 mg ferrous sulfate, tablet  Yes   Sig: Take 1 tablet by mouth 3 times a day.   folic acid (Folvite) 1 mg tablet  Yes   Sig: Take 1 tablet (1 mg) by mouth once daily in " the morning.   galantamine (Razadyne) 4 mg tablet  Yes   Sig: Take 1 tablet (4 mg) by mouth 2 times a day.   glucagon (Glucagen) 1 mg injection  Yes   Sig: Inject under the skin 1 time if needed (every hour as needed).   guaiFENesin (Robitussin) 100 mg/5 mL syrup  Yes   Sig: Take 5 mL (100 mg) by mouth every 4 hours if needed for cough.   insulin glargine (Lantus U-100 Insulin) 100 unit/mL injection  Yes   Sig: Inject 10 Units under the skin once daily in the morning.   insulin lispro (HumaLOG) 100 unit/mL injection  Yes   Sig: Inject under the skin 3 times a day before meals.   Per Sliding Scale: 111-150=1; 151-200=3; 201-250=6;   251-300=9; 301-350=12; 351-400=15; 401 or greater call MD.   minocycline 100 mg capsule  Yes   Sig: Take 1 capsule (100 mg) by mouth 2 times a day.   mirtazapine (Remeron) 7.5 mg tablet  Yes   Sig: Take 1 tablet (7.5 mg) by mouth once daily at bedtime.   multivitamin tablet  Yes   Sig: Take 1 tablet by mouth once daily in the morning.   pantoprazole (ProtoNix) 40 mg EC tablet  Yes   Sig: Take 1 tablet (40 mg) by mouth once daily in the morning.    traMADol (Ultram) 50 mg tablet  Yes   Sig: Take 1 tablet (50 mg) by mouth once daily as needed   (30 min prior to dressing changes).   zinc sulfate (Zincate) 220 (50 Zn) MG capsule  Yes   Sig: Take 1 capsule (50 mg of elemental zinc) by mouth   once daily in the morning.      Facility-Administered Medications: None        The list below reflects the updated allergy list. Please review each documented allergy for additional clarification and justification.  Allergies  Reviewed by Luis Miguel Vidal [facility] on 4/18/2024   No Known Allergies       Additional Comments:  History of amlodipine 5 mg bedtime; end date 4/16/24 per SNF List.  Lantus decreased from 20 units to 10 units daily (start date 4/16/24)  History of sertraline 50 mg daily; end date 4/16/24 per SNF List.    ------------------------------  Luis Miguel Vidal, PharmD, [facility]  Transitions of  Care Pharmacist  Medication reconciliation complete  Please reach out via [employer] Secure Chat for questions,   or if no response call [employer] or [facility].  [facility]

## 2024-04-18 NOTE — PROGRESS NOTES
"Speech-Language Pathology  Adult Inpatient Clinical Bedside Swallow Evaluation    Patient Name: Brian Rodriguez  MRN: [id number]  Today's Date: 4/18/2024   Start Time: 1245  Stop Time: 1310  Time Calculation (min): 25 minutes    History of Present Illness:   Per EMR: \"Brian Rodriguez is a 69 y.o. male with a history of type 2 diabetes, hypertension, CKD, hyperlipidemia, hidradenitis, bipolar who presents to the ED for altered mental status and concern of infection.  Patient presents from his nursing home where he was noted to be more altered than normal.  Nursing staff noted that he is normally A&O x 2 and somewhat forgetful at his baseline.  Noted a elevated white count in the 20s so was sent to the ED for concern of sepsis.  Also noted that he was hypernatremic this morning.  He does have a history of chronic sacral wounds.  Also history of recurrent pneumonia.  Patient currently denying any pain.  Alert and oriented x 0 in the room.\"    Assessment:   Clinical bedside swallow evaluation completed. Pt received awake/alert, reclined in bed. Confused w/ nonsensical speech at times. Dysarthria noted e/b reduced articulation/slurred speech, resulting in poor speech intelligibility at times. A&O x2 - self and place. OME significant for mild lingual deviation to R as well as reduced lingual ROM and strength (R>L). Spoke w/ RN, ? If consistent w/ baseline, SLP to notify MD. Pt given and tolerated ice chips; however, c/o cold temperature on teeth. Thus, remainder of assessment, pt given room temperature water. Tolerated tsp sips of water x3. When given single straw sips of water, pt demonstrated consistent coughing/choking that was concerning for a pharyngeal dysphagia and aspiration (x2/2 trials). PO trials ceased. SLP questioned pt if known h/o dysphagia. Pt reported no coughing/choking prior to admission; however, given mentation, ? If good historian. SLP educ pt, RN and MD re: completion of MBSS to determine PO safety. " MBSS to be completed dependent upon Radiology's schedule.      Recommendations:  NPO  Frequent, aggressive oral care is strongly recommended to improve infection control as well as reduce dental plaque and bacteria on oropharyngeal surfaces which may increase the risk nosocomial infections, including pneumonia.  OK for small amounts of ice chips (3-5/hr) after aggressive oral care is completed.  MBSS to further assess oropharyngeal swallow function and determine safest oral diet    Goal:   Pt will tolerate least restrictive diet and recall/utilize safe swallow guidelines independently with no clinical s/s of aspiration 100% of time        Plan:  SLP Services Indicated: Yes  Frequency: 2x week  Discussed POC with patient  SLP - OK to Discharge    Pain:   0-10  0 = No pain.     Inpatient Education:  Extensive education provided to patient regarding current swallow function, recommendations/results, and POC.      Consultations/Referrals/Coordination of Services:   Neuro?

## 2024-04-18 NOTE — CONSULTS
"Vancomycin Dosing by Pharmacy- INITIAL    Brian Rodriguez is a 69 y.o. year old male who Pharmacy has been consulted for vancomycin dosing for pneumonia. Based on the patient's indication and renal status this patient will be dosed based on a goal trough/random level of 15-20.     Renal function is currently declining.    Visit Vitals  /75   Pulse 90   Temp 36.9 °C (98.4 °F) (Tympanic)   Resp 20        Lab Results   Component Value Date    CREATININE 3.38 (H) 04/17/2024    CREATININE 1.71 (H) 03/11/2023    CREATININE 2.19 (H) 03/10/2023    CREATININE 1.51 (H) 03/09/2023    CREATININE 1.86 (H) 03/08/2023        Patient weight is No results found for: \"PTWEIGHT\"    No results found for: \"CULTURE\"     No intake/output data recorded.  [unfilled]    Lab Results   Component Value Date    PATIENTTEMP 37.0 03/08/2023    PATIENTTEMP 37.0 12/21/2022          Assessment/Plan     Patient has already been given a loading dose of 1500 mg.  Will initiate vancomycin maintenance, once trough comes back.  Follow-up level will be ordered on 4/18 at 2000 unless clinically indicated sooner.  Will continue to monitor renal function daily while on vancomycin and order serum creatinine at least every 48 hours if not already ordered.  Follow for continued vancomycin needs, clinical response, and signs/symptoms of toxicity.       LOPEZ SMITH, PharmD       "

## 2024-04-18 NOTE — PROGRESS NOTES
"  Subjective   Mr Rodriguez states that he \"feels okay\". He denies any fever, chest pain, abdominal pain. He endorsed dysuria     Objective     Physical Exam    General: thin appearing, NAD  Eyes:  Extraocular movements grossly intact. No scleral icterus.   Head: Atraumatic. Normocephalic.     Neck: No meningismus. No gross masses. Full movement through range of motion  ENT: Posterior oropharynx shows no erythema, exudate or edema.  Uvula is midline without edema.  No stridor or trismus  CV: Regular rhythm. No murmurs, rubs, gallops appreciated.   Resp: Clear to auscultation bilaterally. No respiratory distress.    GI: Nontender. Soft. No masses. No rebound, rigidity or guarding.   MSK: Symmetric muscle bulk. No gross step offs or deformities.  Chronic sacral wound noted to the sacrum on exam without any obvious signs of superimposed infection.  There are new, bleeding ulcerations to the scrotum.  Left buttock has a newly packed wound that is draining some purulence but no surrounding erythema or edema.  Skin: chronic sacral wounds with purulent drainage  Neuro: CN II-VII intact. A&O 2. Poor Speech  Alert. Moving all extremities. Ambulates with normal gait  Psych: Appropriate mood and affect for situation       Last Recorded Vitals  Blood pressure 99/67, pulse 87, temperature 36.9 °C (98.4 °F), temperature source Tympanic, resp. rate 14, height 1.778 m (5' 10\"), weight 79.4 kg (175 lb), SpO2 96%.  Intake/Output last 3 Shifts:  I/O last 3 completed shifts:  In: - (0 mL/kg)   Out: 700 (8.8 mL/kg) [Urine:700 (0.2 mL/kg/hr)]  Dosing Weight: 79.4 kg     Relevant Results    ECG 12 lead    Result Date: 4/17/2024  Normal sinus rhythm Normal ECG When compared with ECG of 21-DEC-2022 17:06, Previous ECG has undetermined rhythm, needs review See ED provider note for full interpretation and clinical correlation Confirmed by Dori Wills ([id number]) on 4/17/2024 9:32:51 PM    CT chest abdomen pelvis wo IV contrast    Result Date: " 4/17/2024  Interpreted By:  Chester Chinchilla, STUDY: CT CHEST ABDOMEN PELVIS WO CONTRAST;  4/17/2024 8:13 pm   INDICATION: Signs/Symptoms:extend to mid thigh to rule out scrotal and sacral pathology   COMPARISON: 03/08/2023   ACCESSION NUMBER(S): [id number]   ORDERING CLINICIAN: TRAY FONTENOT   TECHNIQUE: CT of the chest, abdomen, and pelvis was performed. Contiguous axial images were obtained through the chest, abdomen and pelvis. Coronal and sagittal reconstructions were performed. No intravenous contrast.     FINDINGS: CHEST:   LOWER NECK AND CHEST WALL:  Within normal limits.   MEDIASTINUM/SUNSHINE:No lymphadenopathy. Esophagus is unremarkable.   CARDIOVASCULAR:  Cardiac chamber size within normal limits. No pericardial effusion. Ascending thoracic aortic caliber is borderline ectatic measuring 4 cm. Normal caliber of main pulmonary artery. Multivessel coronary atherosclerosis.   LUNGS, AIRWAYS, AND PLEURA:  Peribronchovascular patchy ground-glass/consolidative opacities throughout the right lung and to a lesser degree within the dependent left lower lobe there is scattered endobronchial debris most pronounced within the right lower lobe with associated bronchial wall thickening. There is debris within the distal trachea and right mainstem bronchus.   MUSCULOSKELETAL: No acute osseous abnormality or suspicious osseous lesions. Deformity of the right sternoclavicular joint may be secondary to degenerative change or remote prior trauma/fracture. Bilateral C7 cervical ribs. Minimal height loss of T8 and T10 which is unchanged from 03/08/2023.     ABDOMEN:   LIVER: Within normal limits.   BILE DUCTS: Normal caliber.   GALLBLADDER: Cholelithiasis.   PANCREAS: Coarse calcifications within region of the pancreatic head.   SPLEEN: Within normal limits.   ADRENALS: Within normal limits.   KIDNEYS, URETERS, and BLADDER: No hydronephrosis or renal calculi.Ureters are non-dilated. There is moderate to severe distention of  the urinary bladder.   REPRODUCTIVE: No pelvic masses. Regions of focal ovoid hypoattenuation within the right inferior scrotum as seen on images 236-239.   VESSELS: Moderate atherosclerosis. No abdominal aortic aneurysm.   RETROPERITONEUM and LYMPH NODES: No lymphadenopathy.   BOWEL: The stomach is unremarkable. Small bowel is non-dilated. Normal appendix. No colonic wall thickening or dilation. Large rectal stool volume.   PERITONEUM: No ascites or free air, no fluid collection.   BODY WALL: There is extensive skin thickening of the left gluteal soft tissues which appears slightly more pronounced when compared to 03/08/2023, though with resolution of subcutaneous gas/fluid seen on the prior exam. No definitive associated focal fluid collection, however evaluation is limited in the absence of IV contrast. The skin thickening/fat stranding extends to the sacrococcygeal bone surface, possibly reflecting decubitus ulceration.   MUSCULOSKELETAL: No acute osseous abnormality or suspicious osseous lesions.  Moderate multilevel spinal degenerative change. Inferior L3 endplate irregularities appears similar to 323 and are likely degenerative.         1. Peribronchovascular patchy ground-glass/consolidative opacities throughout the right lung and to a lesser degree within the dependent left lower lobe compatible with multifocal pneumonia. Scattered endobronchial debris most pronounced within the right lower lobe with associated bronchial wall thickening and debris within the distal trachea and right mainstem bronchus concerning for aspiration. 2. Extensive skin thickening of the left gluteal soft tissues which appears slightly more pronounced when compared to 03/08/2023, though with resolution of subcutaneous gas/fluid seen on the prior exam. No definitive associated focal fluid collection, however evaluation is limited in the absence of IV contrast.  The skin thickening/fat stranding extends to the sacrococcygeal bone  surface, possibly reflecting decubitus ulceration. 3. Regions of focal ovoid hypoattenuation within the right inferior scrotum as seen on images 236-239. The findings are indeterminate and may be chronic (similar findings were seen on the 03/08/2023 examination). Scrotum is suboptimally evaluated on noncontrast CT and scrotal ultrasound may be considered for further evaluation. 4. Large rectal stool volume. Please correlate for fecal impaction. 5. There is moderate to severe distention of the urinary bladder. Please correlate for urinary retention. 6. Coarse pancreatic calcifications compatible with chronic pancreatitis. 7. Bilateral C7 cervical ribs. 8. Cholelithiasis.   Signed by: Chester Chinchilla 4/17/2024 8:33 PM Dictation workstation:   [id number]    CT head wo IV contrast    Result Date: 4/17/2024  Interpreted By:  Case Elena and Hofer Lindsay STUDY: CT HEAD WO IV CONTRAST;  4/17/2024 6:13 pm   INDICATION: Signs/Symptoms:altered mental status.   COMPARISON: CT brain attack 07/08/2020   ACCESSION NUMBER(S): [id number]   ORDERING CLINICIAN: TRAY FONTENOT   TECHNIQUE: Noncontrast axial CT scan of head was performed. Angled reformats in brain and bone windows were generated. The images were reviewed in bone, brain, blood and soft tissue windows.   FINDINGS: CSF Spaces: The ventricles, sulci and basal cisterns are prominent scratch the there is no extraaxial fluid collection.   Parenchyma: The grey-white differentiation is intact. There is no mass effect or midline shift.  There is no intracranial hemorrhage.   Calvarium: The calvarium is unremarkable.   Paranasal sinuses and mastoids: Completely opacified left frontal sinus. Paranasal sinuses otherwise clear. The mastoid air cells are clear.       1. There is no evidence of acute hemorrhage, mass lesion or acute infarction.     I personally reviewed the images/study and resident's interpretation and I agree with the findings as stated by Britany  Cruz DE SOUZA (resident radiologist). This study was analyzed and interpreted at [place], [place].   MACRO: None   Signed by: Case Elena 4/17/2024 6:41 PM Dictation workstation:   [id number]    XR chest 2 views    Result Date: 4/17/2024  Interpreted By:  Nicholas Bingham and Hofer Lindsay STUDY: XR CHEST 2 VIEWS;  4/17/2024 5:59 pm   INDICATION: Signs/Symptoms:concern sepsis.   COMPARISON: Chest radiograph 02/12/2012   ACCESSION NUMBER(S): [id number]   ORDERING CLINICIAN: TRAY FONTENOT   FINDINGS: PA and lateral radiographs of the chest were provided.     CARDIOMEDIASTINAL SILHOUETTE: Cardiomediastinal silhouette is normal in size and configuration.   LUNGS: Patchy opacities throughout the right mid and lower lung zone. No pleural effusion or pneumothorax.   ABDOMEN: No remarkable upper abdominal findings.   BONES: No acute osseous abnormality.       1.  Patchy opacities throughout the right mid and lower lung zones concerning for pneumonia.   I personally reviewed the images/study and resident's interpretation and I agree with the findings as stated by Britany Arroyo MD (resident radiologist). This study was analyzed and interpreted at [place], [place].   MACRO: None   Signed by: Nicholas Bingham 4/17/2024 6:28 PM Dictation workstation:   [id number]      Scheduled medications  ascorbic acid, 500 mg, oral, TID  atorvastatin, 10 mg, oral, Nightly  azithromycin, 500 mg, oral, q24h RANDAL  bisacodyl, 10 mg, rectal, Daily  chlorhexidine, , Topical, Daily  heparin (porcine), 5,000 Units, subcutaneous, q8h RANDAL  insulin glargine, 10 Units, subcutaneous, q AM  insulin lispro, 0-10 Units, subcutaneous, TID with meals  mirtazapine, 7.5 mg, oral, Nightly  multivitamin with minerals, 1 tablet, oral, Daily  nicotine, 1 patch, transdermal, Daily   Followed by  [START ON 5/30/2024] nicotine, 1 patch, transdermal, Daily   Followed by  [START  ON 6/13/2024] nicotine, 1 patch, transdermal, Daily  pantoprazole, 40 mg, oral, q AM  piperacillin-tazobactam, 3.375 g, intravenous, q6h  polyethylene glycol, 17 g, oral, BID  sennosides-docusate sodium, 2 tablet, oral, BID      Continuous medications  dextrose 5 % in water (D5W), 80 mL/hr, Last Rate: 80 mL/hr (04/18/24 0916)      PRN medications  PRN medications: acetaminophen **OR** acetaminophen **OR** acetaminophen, acetaminophen, dextrose, dextrose, glucagon, glucagon, guaiFENesin, melatonin, nicotine polacrilex, traMADol, vancomycin                 This patient has a urinary catheter   Reason for the urinary catheter remaining today? urinary retention/bladder outlet obstruction, acute or chronic         Assessment/Plan     68 yo with CKD, DM, HTN, sacral wound, bipolar referred from nursing home with concern of altered mental status. Patient presentation could be attributed to multiple causes, infection on top with CT showing multi focal pneumonia and previous multiple admissions for pneumonia however the patient is not having any cough or fevers. Other source of infection\is the sacral wound, however the wound looks clean. Other causes of AMS include urinary retention with bladder scan showing 700cc, fecal impaction, dehydration, or hypernatremia (155 on admission)      Updates 4/18  - NA of 154 on admission, 155 at 2am following 1L LR, 155 at 3pm  - LR discontinued D5W started  - evening RFP ordered, most recent :  if Na has not changed or increased-> increase to 160mL/hour, if Na decreases by more than 4, decrease D5W to 80mL/hour, if Na decrease is between 1 and 4 at 8pm-> keep at 120 mL/hr  - fecal impaction seen on CT  - Miralax BID, bisacodyl suppository then tap water enema if no bowel movement by EOD   - Urinary retention: urinary output 700mL 4/17 and 400mL 4/18 am following bunch placement  -blood culture pending  - strep, legionella pending  - MRSA positive  - abx regimen for multifocal  pneumonia: Vanc, Zosyn, azithromycin  - purulent drainage from chronic sacral wounds from hidradenitis suppurativa  - Derm recommending humira and minocycline 100 BID, patient will need labs to confirm eligibility for these medications  - evaluated by SLP->concerning for pharyngeal dysphagia and aspiration (x2/2) trials, recommending MBBS to determine PO safety  - NPO     #Multifocal pneumonia  :: Lactate 1.2 4/17  :: MRSA positive  :: Vancomycin, Zosyn and azithromycin  :: CT: Peribronchovascular patchy ground-glass/consolidative opacities  throughout the right lung and to a lesser degree within the dependent  left lower lobe compatible with multifocal pneumonia.  Skin thickening of the left gluteal soft tissues with resolution of subcutaneous gas/fluid with no definitive associated focal fluid collection.  :: strep, legionella pending  :: Vanc, Zosyn, azithromycin (4/7-)  :: wound team following  :: evaluated by SLP->concerning for pharyngeal dysphagia and aspiration (x2/2) trials, recommending MBBS to determine PO safety  - NPO until results of MBBS    #hypernatremia  :: NA of 154 on admission, 155 at 2am following 1L LR, 155 at 3pm 4/18  :: LR discontinued D5W started  :: evening RFP ordered, most recent     - if Na has not changed or increased-> increase to 160mL/hour, if Na decreases by more than 4, decrease D5W to 80mL/hour, if Na decrease is between 1 and 4 at 8pm-> keep at 120 mL/hr      #hidradenitis suppurativa  :: purulent drainage from chronic sacral wounds from hidradenitis suppurativa  :: Derm recommending humira and minocycline 100 BID, patient will need labs to confirm eligibility for these medications  - hepatitis C antibody, hepatitis B surface antigen, hepatitis B core antibody, total, hepatitis B surface antibody, T-spot, hIV 1/2 antigen/antibody screen with reflex to confirmation ordered  - will need to follow up outpatient with dermatology within 2 weeks of discharge.  - doxycycline 100  Bid outpatient after abx course for pneumonia is completed    #Urinary retention  ::  Urinary retention: urinary output 700mL 4/17 and 400mL 4/18 am following bunch placement    # Fecal impaction  :: fecal impaction seen on CT  - Miralax BID, bisacodyl suppository then tap water enema if no bowel movement by EOD     # CKD 3  -Strict I&Os  -Avoid nephrotoxic agents  -Continue to monitor         #T2DM  #Hyperglycemia  ::Last Hba1c 9.1 March 2023  -Continue 10 units glargine and SSI  -Diabetic diet        #HTN  BP at lower side  Hold home amlodipine     #HLD  -Continue home atorvastatin 10      #Bipolar disorder  -No current medications     F RL 100ml\hr for 1L  E Hypernatremia correction  N Diabetic diet  A PIV,      DVT prophylaxis: heparin             Sha Issa

## 2024-04-18 NOTE — H&P
History Of Present Illness  Brian Rodriguez is a 69 y.o. male with a history of type 2 diabetes, hypertension, CKD, hyperlipidemia, hidradenitis, bipolar who presents to the ED for altered mental status and concern of infection.  Patient presents from his nursing home where he was noted to be more altered than normal.  Nursing staff noted that he is normally A&O x 2 and somewhat forgetful at his baseline.  Noted a elevated white count in the 20s so was sent to the ED for concern of sepsis.  Also noted that he was hypernatremic this morning.  He does have a history of chronic sacral wounds.  Also history of recurrent pneumonia.  Patient currently denying any pain.  Alert and oriented x 0 in the room.       ED Course:  VS: 110/70, 21, 80, 95% on RA, 36.7  PE: Old poorly communication, NAD  Chest: GAEB, no wheezes  LL: no edema    Labs: BUN 73, Cr 3.38, Na 154, K4.3, Bicarb 24. Mg 2.29, Glu 289, Lactate 1.2  WBC 21, HGB 9,   UA Turbid    Imging:  CT Abdomen and Pelvis: Peribronchovascular patchy ground-glass/consolidative opacities  throughout the right lung and to a lesser degree within the dependent  left lower lobe compatible with multifocal pneumonia.  Skin thickening of the left gluteal soft tissues with resolution of subcutaneous gas/fluid with no definitive associated focal fluid collection.  Large rectal stool volume. Please correlate for fecal impaction.There is moderate to severe distention of the urinary bladder.    Interventions:  Bladder scan showed 700cc, and bunch was inserted.  Started on IV fluids , Vancomycin and zosyn.    At time patient seen he was MCGRATH x3 , able to provide his name place and date, but denied any complaint (Kept on saying I am alright) although he seemed fragile. Denied any cough SOB or chest pain. Denied pain at any site. Provide poor history about what is going on.     Past Medical History  DM  CKD  HTN  HLD  Hidradinitis  Bipolar    Surgical History  No past surgical history on  file.     Social History  He has no history on file for tobacco use, alcohol use, and drug use.    Family History  No family history on file.     Allergies  Patient has no known allergies.    Review of Systems   12 points review of systems negative except above    Physical Exam   General: Old thin. No acute distress.   Eyes:  Extraocular movements grossly intact. No scleral icterus.   Head: Atraumatic. Normocephalic.     Neck: No meningismus. No gross masses. Full movement through range of motion  ENT: Posterior oropharynx shows no erythema, exudate or edema.  Uvula is midline without edema.  No stridor or trismus  CV: Regular rhythm. No murmurs, rubs, gallops appreciated.   Resp: Clear to auscultation bilaterally. No respiratory distress.    GI: Nontender. Soft. No masses. No rebound, rigidity or guarding.   MSK: Symmetric muscle bulk. No gross step offs or deformities.  Chronic sacral wound noted to the sacrum on exam without any obvious signs of superimposed infection.  There are new, bleeding ulcerations to the scrotum.  Left buttock has a newly packed wound that is draining some purulence but no surrounding erythema or edema.  Skin: Warm, dry. No rashes  Neuro: CN II-VII intact. A&O 2. Poor Speech  Alert. Moving all extremities. Ambulates with normal gait  Psych: Appropriate mood and affect for situation    Last Recorded Vitals  Blood pressure 111/64, pulse 90, temperature 36.9 °C (98.4 °F), temperature source Tympanic, resp. rate 19, weight 79.4 kg (175 lb), SpO2 94%.    Relevant Results  Scheduled medications  ascorbic acid, 500 mg, oral, TID  atorvastatin, 10 mg, oral, Nightly  heparin (porcine), 5,000 Units, subcutaneous, q8h RANDAL  insulin glargine, 10 Units, subcutaneous, q AM  mirtazapine, 7.5 mg, oral, Nightly  multivitamin, 1 tablet, oral, q AM  pantoprazole, 40 mg, oral, q AM  piperacillin-tazobactam, 3.375 g, intravenous, q6h  polyethylene glycol, 17 g, oral, Daily      Continuous medications     PRN  medications  PRN medications: acetaminophen **OR** acetaminophen **OR** acetaminophen, acetaminophen, dextrose, dextrose, glucagon, glucagon, guaiFENesin, melatonin, traMADol, vancomycin       heparin (porcine), 5,000 Units, subcutaneous, q8h RANDAL  piperacillin-tazobactam, 3.375 g, intravenous, q6h  polyethylene glycol, 17 g, oral, Daily      Continuous medications     PRN medications  PRN medications: acetaminophen **OR** acetaminophen **OR** acetaminophen, melatonin, vancomycin     Results for orders placed or performed during the hospital encounter of 04/17/24 (from the past 24 hour(s))   CBC and Auto Differential   Result Value Ref Range    WBC 21.2 (H) 4.4 - 11.3 x10*3/uL    nRBC 0.0 0.0 - 0.0 /100 WBCs    RBC 3.96 (L) 4.50 - 5.90 x10*6/uL    Hemoglobin 9.0 (L) 13.5 - 17.5 g/dL    Hematocrit 30.0 (L) 41.0 - 52.0 %    MCV 76 (L) 80 - 100 fL    MCH 22.7 (L) 26.0 - 34.0 pg    MCHC 30.0 (L) 32.0 - 36.0 g/dL    RDW 19.5 (H) 11.5 - 14.5 %    Platelets 363 150 - 450 x10*3/uL    Neutrophils % 85.5 40.0 - 80.0 %    Immature Granulocytes %, Automated 0.9 0.0 - 0.9 %    Lymphocytes % 8.3 13.0 - 44.0 %    Monocytes % 4.4 2.0 - 10.0 %    Eosinophils % 0.8 0.0 - 6.0 %    Basophils % 0.1 0.0 - 2.0 %    Neutrophils Absolute 18.12 (H) 1.60 - 5.50 x10*3/uL    Immature Granulocytes Absolute, Automated 0.19 0.00 - 0.50 x10*3/uL    Lymphocytes Absolute 1.75 0.80 - 3.00 x10*3/uL    Monocytes Absolute 0.94 (H) 0.05 - 0.80 x10*3/uL    Eosinophils Absolute 0.17 0.00 - 0.40 x10*3/uL    Basophils Absolute 0.02 0.00 - 0.10 x10*3/uL   Comprehensive Metabolic Panel   Result Value Ref Range    Glucose 289 (H) 74 - 99 mg/dL    Sodium 154 (H) 136 - 145 mmol/L    Potassium 4.3 3.5 - 5.3 mmol/L    Chloride 121 (H) 98 - 107 mmol/L    Bicarbonate 19 (L) 21 - 32 mmol/L    Anion Gap 18 10 - 20 mmol/L    Urea Nitrogen 73 (H) 6 - 23 mg/dL    Creatinine 3.38 (H) 0.50 - 1.30 mg/dL    eGFR 19 (L) >60 mL/min/1.73m*2    Calcium 9.2 8.6 - 10.6 mg/dL     Albumin 2.6 (L) 3.4 - 5.0 g/dL    Alkaline Phosphatase 107 33 - 136 U/L    Total Protein 9.2 (H) 6.4 - 8.2 g/dL    AST 9 9 - 39 U/L    Bilirubin, Total 0.3 0.0 - 1.2 mg/dL    ALT <3 (L) 10 - 52 U/L   Blood Culture    Specimen: Peripheral Venipuncture; Blood culture   Result Value Ref Range    Blood Culture Loaded on Instrument - Culture in progress    Blood Culture    Specimen: Peripheral Venipuncture; Blood culture   Result Value Ref Range    Blood Culture Loaded on Instrument - Culture in progress    Phosphorus   Result Value Ref Range    Phosphorus 5.2 (H) 2.5 - 4.9 mg/dL   Magnesium   Result Value Ref Range    Magnesium 2.29 1.60 - 2.40 mg/dL   Morphology   Result Value Ref Range    RBC Morphology See Below     Target Cells Few    MRSA Surveillance for Vancomycin De-escalation, PCR    Specimen: Anterior Nares; Swab   Result Value Ref Range    MRSA PCR Detected (A) Not Detected   ECG 12 lead   Result Value Ref Range    Ventricular Rate 92 BPM    Atrial Rate 92 BPM    MI Interval 134 ms    QRS Duration 78 ms    QT Interval 378 ms    QTC Calculation(Bazett) 467 ms    P Axis 29 degrees    R Axis 64 degrees    T Axis 84 degrees    QRS Count 15 beats    Q Onset 230 ms    P Onset 163 ms    P Offset 211 ms    T Offset 419 ms    QTC Fredericia 435 ms   Lactate   Result Value Ref Range    Lactate 1.2 0.4 - 2.0 mmol/L   Urinalysis with Reflex Culture and Microscopic   Result Value Ref Range    Color, Urine Yellow Light-Yellow, Yellow, Dark-Yellow    Appearance, Urine Turbid (N) Clear    Specific Gravity, Urine 1.020 1.005 - 1.035    pH, Urine 5.0 5.0, 5.5, 6.0, 6.5, 7.0, 7.5, 8.0    Protein, Urine 50 (1+) (A) NEGATIVE, 10 (TRACE), 20 (TRACE) mg/dL    Glucose, Urine Normal Normal mg/dL    Blood, Urine NEGATIVE NEGATIVE    Ketones, Urine NEGATIVE NEGATIVE mg/dL    Bilirubin, Urine NEGATIVE NEGATIVE    Urobilinogen, Urine Normal Normal mg/dL    Nitrite, Urine NEGATIVE NEGATIVE    Leukocyte Esterase, Urine NEGATIVE NEGATIVE    Urinalysis Microscopic   Result Value Ref Range    WBC, Urine 1-5 1-5, NONE /HPF    RBC, Urine 3-5 NONE, 1-2, 3-5 /HPF    Mucus, Urine FEW Reference range not established. /LPF    Hyaline Casts, Urine 2+ (A) NONE /LPF    Amorphous Crystals, Urine 1+ NONE, 1+, 2+ /HPF   CBC   Result Value Ref Range    WBC 13.9 (H) 4.4 - 11.3 x10*3/uL    nRBC 0.0 0.0 - 0.0 /100 WBCs    RBC 3.92 (L) 4.50 - 5.90 x10*6/uL    Hemoglobin 9.1 (L) 13.5 - 17.5 g/dL    Hematocrit 28.8 (L) 41.0 - 52.0 %    MCV 74 (L) 80 - 100 fL    MCH 23.2 (L) 26.0 - 34.0 pg    MCHC 31.6 (L) 32.0 - 36.0 g/dL    RDW 19.3 (H) 11.5 - 14.5 %    Platelets 349 150 - 450 x10*3/uL      ECG 12 lead    Result Date: 4/17/2024  Normal sinus rhythm Normal ECG When compared with ECG of 21-DEC-2022 17:06, Previous ECG has undetermined rhythm, needs review See ED provider note for full interpretation and clinical correlation Confirmed by Dori Wills ([id number]) on 4/17/2024 9:32:51 PM    CT chest abdomen pelvis wo IV contrast    Result Date: 4/17/2024  Interpreted By:  Chester Chinchilla, STUDY: CT CHEST ABDOMEN PELVIS WO CONTRAST;  4/17/2024 8:13 pm   INDICATION: Signs/Symptoms:extend to mid thigh to rule out scrotal and sacral pathology   COMPARISON: 03/08/2023   ACCESSION NUMBER(S): [id number]   ORDERING CLINICIAN: DORI FONTENOT   TECHNIQUE: CT of the chest, abdomen, and pelvis was performed. Contiguous axial images were obtained through the chest, abdomen and pelvis. Coronal and sagittal reconstructions were performed. No intravenous contrast.     FINDINGS: CHEST:   LOWER NECK AND CHEST WALL:  Within normal limits.   MEDIASTINUM/SUNSHINE:No lymphadenopathy. Esophagus is unremarkable.   CARDIOVASCULAR:  Cardiac chamber size within normal limits. No pericardial effusion. Ascending thoracic aortic caliber is borderline ectatic measuring 4 cm. Normal caliber of main pulmonary artery. Multivessel coronary atherosclerosis.   LUNGS, AIRWAYS, AND PLEURA:  Peribronchovascular  patchy ground-glass/consolidative opacities throughout the right lung and to a lesser degree within the dependent left lower lobe there is scattered endobronchial debris most pronounced within the right lower lobe with associated bronchial wall thickening. There is debris within the distal trachea and right mainstem bronchus.   MUSCULOSKELETAL: No acute osseous abnormality or suspicious osseous lesions. Deformity of the right sternoclavicular joint may be secondary to degenerative change or remote prior trauma/fracture. Bilateral C7 cervical ribs. Minimal height loss of T8 and T10 which is unchanged from 03/08/2023.     ABDOMEN:   LIVER: Within normal limits.   BILE DUCTS: Normal caliber.   GALLBLADDER: Cholelithiasis.   PANCREAS: Coarse calcifications within region of the pancreatic head.   SPLEEN: Within normal limits.   ADRENALS: Within normal limits.   KIDNEYS, URETERS, and BLADDER: No hydronephrosis or renal calculi.Ureters are non-dilated. There is moderate to severe distention of the urinary bladder.   REPRODUCTIVE: No pelvic masses. Regions of focal ovoid hypoattenuation within the right inferior scrotum as seen on images 236-239.   VESSELS: Moderate atherosclerosis. No abdominal aortic aneurysm.   RETROPERITONEUM and LYMPH NODES: No lymphadenopathy.   BOWEL: The stomach is unremarkable. Small bowel is non-dilated. Normal appendix. No colonic wall thickening or dilation. Large rectal stool volume.   PERITONEUM: No ascites or free air, no fluid collection.   BODY WALL: There is extensive skin thickening of the left gluteal soft tissues which appears slightly more pronounced when compared to 03/08/2023, though with resolution of subcutaneous gas/fluid seen on the prior exam. No definitive associated focal fluid collection, however evaluation is limited in the absence of IV contrast. The skin thickening/fat stranding extends to the sacrococcygeal bone surface, possibly reflecting decubitus ulceration.    MUSCULOSKELETAL: No acute osseous abnormality or suspicious osseous lesions.  Moderate multilevel spinal degenerative change. Inferior L3 endplate irregularities appears similar to 323 and are likely degenerative.         1. Peribronchovascular patchy ground-glass/consolidative opacities throughout the right lung and to a lesser degree within the dependent left lower lobe compatible with multifocal pneumonia. Scattered endobronchial debris most pronounced within the right lower lobe with associated bronchial wall thickening and debris within the distal trachea and right mainstem bronchus concerning for aspiration. 2. Extensive skin thickening of the left gluteal soft tissues which appears slightly more pronounced when compared to 03/08/2023, though with resolution of subcutaneous gas/fluid seen on the prior exam. No definitive associated focal fluid collection, however evaluation is limited in the absence of IV contrast.  The skin thickening/fat stranding extends to the sacrococcygeal bone surface, possibly reflecting decubitus ulceration. 3. Regions of focal ovoid hypoattenuation within the right inferior scrotum as seen on images 236-239. The findings are indeterminate and may be chronic (similar findings were seen on the 03/08/2023 examination). Scrotum is suboptimally evaluated on noncontrast CT and scrotal ultrasound may be considered for further evaluation. 4. Large rectal stool volume. Please correlate for fecal impaction. 5. There is moderate to severe distention of the urinary bladder. Please correlate for urinary retention. 6. Coarse pancreatic calcifications compatible with chronic pancreatitis. 7. Bilateral C7 cervical ribs. 8. Cholelithiasis.   Signed by: Chester Chinchilla 4/17/2024 8:33 PM Dictation workstation:   [id number]    CT head wo IV contrast    Result Date: 4/17/2024  Interpreted By:  Case Elena and Hofer Lindsay STUDY: CT HEAD WO IV CONTRAST;  4/17/2024 6:13 pm   INDICATION:  Signs/Symptoms:altered mental status.   COMPARISON: CT brain attack 07/08/2020   ACCESSION NUMBER(S): [id number]   ORDERING CLINICIAN: TRAY FONTENOT   TECHNIQUE: Noncontrast axial CT scan of head was performed. Angled reformats in brain and bone windows were generated. The images were reviewed in bone, brain, blood and soft tissue windows.   FINDINGS: CSF Spaces: The ventricles, sulci and basal cisterns are prominent scratch the there is no extraaxial fluid collection.   Parenchyma: The grey-white differentiation is intact. There is no mass effect or midline shift.  There is no intracranial hemorrhage.   Calvarium: The calvarium is unremarkable.   Paranasal sinuses and mastoids: Completely opacified left frontal sinus. Paranasal sinuses otherwise clear. The mastoid air cells are clear.       1. There is no evidence of acute hemorrhage, mass lesion or acute infarction.     I personally reviewed the images/study and resident's interpretation and I agree with the findings as stated by Britany Arroyo MD (resident radiologist). This study was analyzed and interpreted at [place], [place].   MACRO: None   Signed by: Case Elena 4/17/2024 6:41 PM Dictation workstation:   [id number]    XR chest 2 views    Result Date: 4/17/2024  Interpreted By:  Nicholas Bingham,  Maulik Garg STUDY: XR CHEST 2 VIEWS;  4/17/2024 5:59 pm   INDICATION: Signs/Symptoms:concern sepsis.   COMPARISON: Chest radiograph 02/12/2012   ACCESSION NUMBER(S): [id number]   ORDERING CLINICIAN: TRAY FONTENOT   FINDINGS: PA and lateral radiographs of the chest were provided.     CARDIOMEDIASTINAL SILHOUETTE: Cardiomediastinal silhouette is normal in size and configuration.   LUNGS: Patchy opacities throughout the right mid and lower lung zone. No pleural effusion or pneumothorax.   ABDOMEN: No remarkable upper abdominal findings.   BONES: No acute osseous abnormality.       1.  Patchy opacities  throughout the right mid and lower lung zones concerning for pneumonia.   I personally reviewed the images/study and resident's interpretation and I agree with the findings as stated by Britany Arroyo MD (resident radiologist). This study was analyzed and interpreted at [facility], [place], [place].   MACRO: None   Signed by: Nicholas Jungjennifer 4/17/2024 6:28 PM Dictation workstation:   [id number]        Assessment/Plan   Principal Problem:    Pneumonia due to infectious organism, unspecified laterality, unspecified part of lung      70 yo with CKD, DM, HTN, sacral wound, bipolar referred from nursing home with concern of altered mental status. Patient presentation could be attributed to multiple causes, infection on top with CT showing multi focal pneumonia and previous multiple admissions for pneumonia however the patient is not having any cough or fevers. Other source of infection\is the sacral wound, however the wound looks clean. Other causes of AMS include urinary retention with bladder scan showing 700cc, and fecal impaction which showed on CT.   Also electrolytes imbalance and dehydration with Hypernatremia can attribute to Altered mentation.    #Sepsis\Infection  #Multifocal pneumonia  #?Wound infection  :: Lactate 1.2  :: VS WNL , no fever  ::+MRSA  :: Started on Vancomycin and zosyn at ED  ::CT: Peribronchovascular patchy ground-glass/consolidative opacities  throughout the right lung and to a lesser degree within the dependent  left lower lobe compatible with multifocal pneumonia.  Skin thickening of the left gluteal soft tissues with resolution of subcutaneous gas/fluid with no definitive associated focal fluid collection.  - 2 sets blood culture  - Pro calcitonin  - pneumonia w\up: Legionella Ag, Respiratory viral panel  - Continue Vancomycin and Zosyn  - Start Azithromycin  - ID consult  - Wound team consult  - SLP assessment of swallowing , r\o aspiration    #Urinary  retention  # Fecal impaction  - Keep bunch  - Voiding trial  - Urology input  - Laxatives and bowel regimen, escalate till patient achieve bowel motion  - To be discharged on laxatives  - PT consult    # CKD 3  -Strict I&Os  -Avoid nephrotoxic agents  -Continue to monitor      #Hypernatremia  - 1L RL 200ml'hr  - Repeat Na level BID , target 6mEq q 24hrs    #T2DM  #Hyperglycemia  ::Last Hba1c 9.1 March 2023  -SSI  hypoglycemic protocols  - Repeat hba1c  -Continue 10 units glargine  -Diabetic diet         #HTN  BP at lower side  Hold home amlodipine     #HLD  -Continue home atorvastatin 10      #Bipolar disorder  -No current medications     F RL 100ml\hr for 1L  E Hypernatremia correction  N Diabetic diet  A PIV,     DVT prophylaxis: heparin  Heather Villalta MD

## 2024-04-18 NOTE — CONSULTS
Reason For Consult  Hidradenitis Suppurativa Management    History Of Present Illness  Brian Rodriguez is a 69 y.o. male with a history of hidradenitis (chronic sacral wounds s/p I&D for abscesses 3/2023), type 2 diabetes, hypertension, CKD, hyperlipidemia, and bipolar disorder presenting from his nursing home for altered mental status and concern of multifocal pneumonia. Dermatology was consulted for management of his hidradenitis suppurativa (HS).     Patient was seen by Dermatology (Dr. Pablo) on 1/2024 for HS and recommended to start Humira and continue minocycline 100 BID. Per chart review, it appears that patient never completed the labs to confirm eligibility. He has also been seen by General Surgery (Dr. Cordon) in 9/2023 who said that he was not a candidate for surgery.     Upon interview, patient is altered and cannot remember when he was diagnosed with HS. He states that the nurses where he lives do wash the buttock area and apply something to it daily. Patient also asked for a cigarette and stated he does smoke regularly. He is unable to recall the medications he takes daily.     ED course: Patient was started on Vancomycin, Zosyn, and Azithromycin for concern of multifocal pneumonia given elevated WBC (13.2) and CXR findings of patchy opacities throughout the right mid and lower lung zones.     Past Medical History  He has no past medical history on file.    Surgical History  He has no past surgical history on file.     Social History  He has no history on file for tobacco use, alcohol use, and drug use.    Family History  No family history on file.     Allergies  Patient has no known allergies.    Home Medications  Prior to Admission medications    Medication Sig Start Date End Date Taking? Authorizing Provider   acetaminophen (Tylenol) 325 mg tablet Take 2 tablets (650 mg) by mouth every 4 hours if needed (pain/fever).   Yes Historical Provider, MD   adalimumab (Humira,CF, Pen) 40 mg/0.4 mL pen  injector kit pen-injector Inject 1 Pen (40 mg) under the skin 1 (one) time per week. 1/19/24  Yes Brenda Monroy DO   amino acids/protein hydrolys (PRO-STAT AWC ORAL) Take 30 mL by mouth 2 times a day.   Yes Historical Provider, MD   ascorbic acid (Vitamin C) 500 mg tablet Take 1 tablet (500 mg) by mouth 3 times a day.   Yes Historical Provider, MD   atorvastatin (Lipitor) 10 mg tablet Take 1 tablet (10 mg) by mouth once daily at bedtime.   Yes Historical Provider, MD   cholecalciferol (Vitamin D-3) 25 MCG (1000 UT) tablet Take 1 tablet (1,000 Units) by mouth once daily in the morning.   Yes Historical Provider, MD   ferrous sulfate, 325 mg ferrous sulfate, tablet Take 1 tablet by mouth 3 times a day.   Yes Historical Provider, MD   folic acid (Folvite) 1 mg tablet Take 1 tablet (1 mg) by mouth once daily in the morning.   Yes Historical Provider, MD   galantamine (Razadyne) 4 mg tablet Take 1 tablet (4 mg) by mouth 2 times a day.   Yes Historical Provider, MD   insulin glargine (Lantus U-100 Insulin) 100 unit/mL injection Inject 10 Units under the skin once daily in the morning.   Yes Historical Provider, MD   insulin lispro (HumaLOG) 100 unit/mL injection Inject under the skin 3 times a day before meals. Per Sliding Scale: 111-150=1; 151-200=3; 201-250=6; 251-300=9; 301-350=12; 351-400=15; 401 or greater call MD.   Yes Historical Provider, MD   Lactobacillus acidophilus (ACIDOPHILUS ORAL) Take 1 tablet by mouth 2 times a day. 0.5 mg (100 million cell) tablet   Yes Historical Provider, MD   minocycline 100 mg capsule Take 1 capsule (100 mg) by mouth 2 times a day. 1/19/24 4/18/24 Yes Shaun Pbalo MD PhD   mirtazapine (Remeron) 7.5 mg tablet Take 1 tablet (7.5 mg) by mouth once daily at bedtime.   Yes Historical Provider, MD   multivitamin tablet Take 1 tablet by mouth once daily in the morning.   Yes Historical Provider, MD   pantoprazole (ProtoNix) 40 mg EC tablet Take 1 tablet (40 mg) by mouth once daily in the  "morning. Do not crush, chew, or split.   Yes Historical Provider, MD   zinc sulfate (Zincate) 220 (50 Zn) MG capsule Take 1 capsule (50 mg of elemental zinc) by mouth once daily in the morning.   Yes Historical Provider, MD   dextrose 5 %-0.45 % sod chlord (dextrose 5%-0.45 % sodium chloride) infusion Infuse 3,000 mL into a venous catheter. Every shift, Start 4/17/24    Historical Provider, MD   ertapenem (INVanz) IV Infuse 25 mL (500 mg) into a venous catheter once daily. Start 4/17/24, End 4/21/24    Historical Provider, MD   glucagon (Glucagen) 1 mg injection Inject under the skin 1 time if needed (every hour as needed).    Historical Provider, MD   guaiFENesin (Robitussin) 100 mg/5 mL syrup Take 5 mL (100 mg) by mouth every 4 hours if needed for cough.    Historical Provider, MD   traMADol (Ultram) 50 mg tablet Take 1 tablet (50 mg) by mouth once daily as needed (30 min prior to dressing changes).    Historical Provider, MD       Review of Systems  Denies symptoms of fever     Physical Exam  GEN: alert, altered mental status  NEURO: moving all extremities   EYES: conjunctiva and eyelids normal. No conjunctival injection or erosions appreciated  NECK: normal and symmetric.   EXTREMITIES: no distal digital clubbing, cyanosis, petechiae   SKIN:  - Multiple interconnected tunnels and draining sinus tracts present on his sacral area, left buttock, and posterior scrotal region   - Serosanguinous drainage noted on wound padding but no active purulent drainage from tracts was appreciated      Last Recorded Vitals  Blood pressure 104/59, pulse 86, temperature 36.9 °C (98.4 °F), temperature source Tympanic, resp. rate 15, height 1.778 m (5' 10\"), weight 79.4 kg (175 lb), SpO2 95%.    Relevant Results        Scheduled medications  ascorbic acid, 500 mg, oral, TID  atorvastatin, 10 mg, oral, Nightly  azithromycin, 500 mg, oral, q24h RANDAL  heparin (porcine), 5,000 Units, subcutaneous, q8h RANDAL  insulin glargine, 10 Units, " subcutaneous, q AM  insulin lispro, 0-10 Units, subcutaneous, TID with meals  mirtazapine, 7.5 mg, oral, Nightly  multivitamin with minerals, 1 tablet, oral, Daily  pantoprazole, 40 mg, oral, q AM  piperacillin-tazobactam, 3.375 g, intravenous, q6h  polyethylene glycol, 17 g, oral, BID  sennosides-docusate sodium, 2 tablet, oral, BID      Continuous medications  dextrose 5 % in water (D5W), 80 mL/hr, Last Rate: 80 mL/hr (04/18/24 0916)      PRN medications  PRN medications: acetaminophen **OR** acetaminophen **OR** acetaminophen, acetaminophen, dextrose, dextrose, glucagon, glucagon, guaiFENesin, melatonin, traMADol, vancomycin       Assessment/Plan   Assessment:   Brian Rodriguez is a 69 y.o. male with a history of hidradenitis (chronic sacral wounds s/p I&D for abscesses), type 2 diabetes, hypertension, CKD, hyperlipidemia, and bipolar presenting from his nursing home for altered mental status and concern of multifocal pneumonia. Dermatology was consulted for management of hidradenitis suppurativa (HS).     Diagnosis: Chronic Hidradenitis Suppurativa     Impression: Given the appearance of multiple interconnected tunnels and draining sinus tracts on physical exam, the patient's Hidradenitis Suppurativa (HS) is considered severe at Tsang Stage III. Per chart review, the patient has been on oral antibiotics (doxycycline, and most recently minocycline) for over 3 months and has been receiving daily topicals and wound dressing changes at the nursing home with minimal improvement. Because of the patient's severe and chronic disease refractory to the previously mentioned therapies, we recommend starting Humira (adalimumab, a TNF-alpha inhibitor) outpatient. To initiate this process we recommend obtaining labs inpatient to confirm eligibility (see below for full list).     Recommendations:  - Start Hibiclens (chlorhexidine) wash daily to involved areas   - Agree with involvement of the wound care team for dressing  changes  - Recommend nicotine replacement inpatient to be continued outpatient as cigarette use is associated with worse outcomes of HS as well as worse response to treatment  - Please obtain the following labs for Humira eligibility:  Hepatitis C Antibody  Hepatitis B Surface Antigen  Hepatitis B Core Antibody, Total  Hepatitis B Surface Antibody  T-SPOT (Tb)  HIV 1/2 Antigen/Antibody Screen with Reflex to Confirmation   - Follow up outpatient with Dermatology (Dr. Hdz) within 2 weeks of discharge   - Start doxycycline 100 BID outpatient after the antibiotic course for pneumonia is completed     The patient was seen and discussed with attending physician Dr. Hdz and resident physician Dr. Escalante. The assessment and plan was communicated to the care team.     Thank you for the consultation and for the opportunity to contribute to the care of this patient.    Letty Pierre, MS3    I have reviewed the medical student documentation and verified the findings in the note as written with additions or exceptions as stated in the body of the note.     Kaylie Escalante, DO   PGY2, Dermatology  Epic chat (preferred)  Team pager [phone]    I, or a resident under my supervision, was present with the medical student who participated in the documentation of this note.  I have personally seen and examined the patient and performed the medical decision-making components. I have reviewed the medical student documentation and/or resident documentation and verified the findings in the note as written with additions or exceptions as stated in the body of the note.    Vida Hdz MD

## 2024-04-18 NOTE — PROGRESS NOTES
Wound Care Progress Note     Visit Date: 4/18/2024      Patient Name: Brian Rodriguez         MRN: [id number]                Reason for Visit: buttocks and scrotal wounds        Wound Assessment:  Number of days: 0     Wound 04/18/24 Sacrum (Active)   Wound Image   04/18/24 1415       Wound 04/18/24 Moisture Associated Skin Damage Scrotum (Active)   Wound Image   04/18/24 1416     Wound Team Plan: Buttocks and scrotal wounds both cleansed with bath wipes and Vashe wash.  Patient has a history of hydradenitis, therefore these are chronic wounds.  Buttocks with purulent oozing from perianal openings; covered with aquacel AG and mepilex border dressings.   Triad wound ointment dressing applied to scrotal wound.  Triad can be applied directly from the tube or by using a gloved finger. Gently spread Triad evenly over the area of application to the thickness of a dime. To remove, Use pH-balanced wound cleanser to soften Triad. Gently wipe to remove without scrubbing. Triad can stay in place for 5-7 days, with higher exudate levels requiring more frequent re-applications. In the perineal area, reapply Triad after each episode of incontinence.      Change dressings daily and reapply Triad as needed.    Migdalia Cleveland RN  4/18/2024  3:52 PM

## 2024-04-18 NOTE — CARE PLAN
The patient's goals for the shift include      The clinical goals for the shift include Pt. will remain free from injury/falls throughout shift    Over the shift, the patient did not make progress toward the following goals. Barriers to progression include. Recommendations to address these barriers include.    Problem: Safety  Goal: Patient will be injury free during hospitalization  Outcome: Progressing  Goal: I will remain free of falls  Outcome: Progressing     Problem: Fall/Injury  Goal: Not fall by end of shift  Outcome: Progressing  Goal: Be free from injury by end of the shift  Outcome: Progressing  Goal: Verbalize understanding of personal risk factors for fall in the hospital  Outcome: Progressing  Goal: Verbalize understanding of risk factor reduction measures to prevent injury from fall in the home  Outcome: Progressing  Goal: Use assistive devices by end of the shift  Outcome: Progressing  Goal: Pace activities to prevent fatigue by end of the shift  Outcome: Progressing     Problem: Psychosocial Needs  Goal: Demonstrates ability to cope with hospitalization/illness  Outcome: Progressing  Goal: Collaborate with me, my family, and caregiver to identify my specific goals  Outcome: Progressing

## 2024-04-19 ENCOUNTER — APPOINTMENT (OUTPATIENT)
Dept: RADIOLOGY | Facility: HOSPITAL | Age: 69
DRG: 871 | End: 2024-04-19
Payer: MEDICARE

## 2024-04-19 LAB
ALBUMIN SERPL BCP-MCNC: 2.1 G/DL (ref 3.4–5)
ALBUMIN SERPL BCP-MCNC: 2.3 G/DL (ref 3.4–5)
ANION GAP SERPL CALC-SCNC: 17 MMOL/L (ref 10–20)
ANION GAP SERPL CALC-SCNC: 17 MMOL/L (ref 10–20)
BASOPHILS # BLD MANUAL: 0 X10*3/UL (ref 0–0.1)
BASOPHILS NFR BLD MANUAL: 0 %
BUN SERPL-MCNC: 45 MG/DL (ref 6–23)
BUN SERPL-MCNC: 46 MG/DL (ref 6–23)
BURR CELLS BLD QL SMEAR: ABNORMAL
CALCIUM SERPL-MCNC: 8.6 MG/DL (ref 8.6–10.6)
CALCIUM SERPL-MCNC: 9.2 MG/DL (ref 8.6–10.6)
CHLORIDE SERPL-SCNC: 117 MMOL/L (ref 98–107)
CHLORIDE SERPL-SCNC: 120 MMOL/L (ref 98–107)
CO2 SERPL-SCNC: 20 MMOL/L (ref 21–32)
CO2 SERPL-SCNC: 20 MMOL/L (ref 21–32)
CREAT SERPL-MCNC: 2.67 MG/DL (ref 0.5–1.3)
CREAT SERPL-MCNC: 2.74 MG/DL (ref 0.5–1.3)
EGFRCR SERPLBLD CKD-EPI 2021: 24 ML/MIN/1.73M*2
EGFRCR SERPLBLD CKD-EPI 2021: 25 ML/MIN/1.73M*2
EOSINOPHIL # BLD MANUAL: 0.12 X10*3/UL (ref 0–0.4)
EOSINOPHIL NFR BLD MANUAL: 0.8 %
ERYTHROCYTE [DISTWIDTH] IN BLOOD BY AUTOMATED COUNT: 19.9 % (ref 11.5–14.5)
GLUCOSE BLD MANUAL STRIP-MCNC: 103 MG/DL (ref 74–99)
GLUCOSE BLD MANUAL STRIP-MCNC: 167 MG/DL (ref 74–99)
GLUCOSE BLD MANUAL STRIP-MCNC: 262 MG/DL (ref 74–99)
GLUCOSE BLD MANUAL STRIP-MCNC: 300 MG/DL (ref 74–99)
GLUCOSE BLD MANUAL STRIP-MCNC: 352 MG/DL (ref 74–99)
GLUCOSE BLD MANUAL STRIP-MCNC: 412 MG/DL (ref 74–99)
GLUCOSE BLD MANUAL STRIP-MCNC: 419 MG/DL (ref 74–99)
GLUCOSE BLD MANUAL STRIP-MCNC: 97 MG/DL (ref 74–99)
GLUCOSE SERPL-MCNC: 500 MG/DL (ref 74–99)
GLUCOSE SERPL-MCNC: 77 MG/DL (ref 74–99)
HCT VFR BLD AUTO: 27 % (ref 41–52)
HGB BLD-MCNC: 7.9 G/DL (ref 13.5–17.5)
IMM GRANULOCYTES # BLD AUTO: 0.06 X10*3/UL (ref 0–0.5)
IMM GRANULOCYTES NFR BLD AUTO: 0.4 % (ref 0–0.9)
LEGIONELLA AG UR QL: NEGATIVE
LYMPHOCYTES # BLD MANUAL: 1.99 X10*3/UL (ref 0.8–3)
LYMPHOCYTES NFR BLD MANUAL: 13.8 %
MCH RBC QN AUTO: 23.2 PG (ref 26–34)
MCHC RBC AUTO-ENTMCNC: 29.3 G/DL (ref 32–36)
MCV RBC AUTO: 79 FL (ref 80–100)
MONOCYTES # BLD MANUAL: 0.13 X10*3/UL (ref 0.05–0.8)
MONOCYTES NFR BLD MANUAL: 0.9 %
NEUTROPHILS # BLD MANUAL: 12.17 X10*3/UL (ref 1.6–5.5)
NEUTS BAND # BLD MANUAL: 2.36 X10*3/UL (ref 0–0.5)
NEUTS BAND NFR BLD MANUAL: 16.4 %
NEUTS SEG # BLD MANUAL: 9.81 X10*3/UL (ref 1.6–5)
NEUTS SEG NFR BLD MANUAL: 68.1 %
NRBC BLD-RTO: 0 /100 WBCS (ref 0–0)
PHOSPHATE SERPL-MCNC: 3.3 MG/DL (ref 2.5–4.9)
PHOSPHATE SERPL-MCNC: 3.6 MG/DL (ref 2.5–4.9)
PLATELET # BLD AUTO: 369 X10*3/UL (ref 150–450)
POLYCHROMASIA BLD QL SMEAR: ABNORMAL
POTASSIUM SERPL-SCNC: 3.3 MMOL/L (ref 3.5–5.3)
POTASSIUM SERPL-SCNC: 3.6 MMOL/L (ref 3.5–5.3)
RBC # BLD AUTO: 3.41 X10*6/UL (ref 4.5–5.9)
RBC MORPH BLD: ABNORMAL
S PNEUM AG UR QL: NEGATIVE
SODIUM SERPL-SCNC: 150 MMOL/L (ref 136–145)
SODIUM SERPL-SCNC: 154 MMOL/L (ref 136–145)
TOTAL CELLS COUNTED BLD: 116
WBC # BLD AUTO: 14.4 X10*3/UL (ref 4.4–11.3)

## 2024-04-19 PROCEDURE — 36415 COLL VENOUS BLD VENIPUNCTURE: CPT

## 2024-04-19 PROCEDURE — 85027 COMPLETE CBC AUTOMATED: CPT

## 2024-04-19 PROCEDURE — 85007 BL SMEAR W/DIFF WBC COUNT: CPT

## 2024-04-19 PROCEDURE — 80069 RENAL FUNCTION PANEL: CPT

## 2024-04-19 PROCEDURE — 2500000004 HC RX 250 GENERAL PHARMACY W/ HCPCS (ALT 636 FOR OP/ED)

## 2024-04-19 PROCEDURE — 2500000005 HC RX 250 GENERAL PHARMACY W/O HCPCS: Mod: JZ

## 2024-04-19 PROCEDURE — 97530 THERAPEUTIC ACTIVITIES: CPT | Mod: GP

## 2024-04-19 PROCEDURE — 1100000001 HC PRIVATE ROOM DAILY

## 2024-04-19 PROCEDURE — 74018 RADEX ABDOMEN 1 VIEW: CPT | Performed by: RADIOLOGY

## 2024-04-19 PROCEDURE — 2500000002 HC RX 250 W HCPCS SELF ADMINISTERED DRUGS (ALT 637 FOR MEDICARE OP, ALT 636 FOR OP/ED)

## 2024-04-19 PROCEDURE — XW033E5 INTRODUCTION OF REMDESIVIR ANTI-INFECTIVE INTO PERIPHERAL VEIN, PERCUTANEOUS APPROACH, NEW TECHNOLOGY GROUP 5: ICD-10-PCS | Performed by: INTERNAL MEDICINE

## 2024-04-19 PROCEDURE — 97161 PT EVAL LOW COMPLEX 20 MIN: CPT | Mod: GP

## 2024-04-19 PROCEDURE — A4217 STERILE WATER/SALINE, 500 ML: HCPCS | Performed by: PHARMACIST

## 2024-04-19 PROCEDURE — 74230 X-RAY XM SWLNG FUNCJ C+: CPT | Performed by: RADIOLOGY

## 2024-04-19 PROCEDURE — 92611 MOTION FLUOROSCOPY/SWALLOW: CPT | Mod: GN

## 2024-04-19 PROCEDURE — 80069 RENAL FUNCTION PANEL: CPT | Mod: MUE

## 2024-04-19 PROCEDURE — 2500000001 HC RX 250 WO HCPCS SELF ADMINISTERED DRUGS (ALT 637 FOR MEDICARE OP)

## 2024-04-19 PROCEDURE — 2500000004 HC RX 250 GENERAL PHARMACY W/ HCPCS (ALT 636 FOR OP/ED): Performed by: PHARMACIST

## 2024-04-19 PROCEDURE — 99223 1ST HOSP IP/OBS HIGH 75: CPT | Performed by: STUDENT IN AN ORGANIZED HEALTH CARE EDUCATION/TRAINING PROGRAM

## 2024-04-19 PROCEDURE — 82947 ASSAY GLUCOSE BLOOD QUANT: CPT

## 2024-04-19 PROCEDURE — 74018 RADEX ABDOMEN 1 VIEW: CPT

## 2024-04-19 PROCEDURE — 2500000001 HC RX 250 WO HCPCS SELF ADMINISTERED DRUGS (ALT 637 FOR MEDICARE OP): Performed by: STUDENT IN AN ORGANIZED HEALTH CARE EDUCATION/TRAINING PROGRAM

## 2024-04-19 PROCEDURE — 74230 X-RAY XM SWLNG FUNCJ C+: CPT

## 2024-04-19 RX ORDER — INSULIN LISPRO 100 [IU]/ML
0-10 INJECTION, SOLUTION INTRAVENOUS; SUBCUTANEOUS EVERY 4 HOURS
Status: DISCONTINUED | OUTPATIENT
Start: 2024-04-19 | End: 2024-04-22

## 2024-04-19 RX ORDER — HALOPERIDOL 5 MG/ML
5 INJECTION INTRAMUSCULAR ONCE
Status: COMPLETED | OUTPATIENT
Start: 2024-04-19 | End: 2024-04-19

## 2024-04-19 RX ORDER — POTASSIUM CHLORIDE 1.5 G/1.58G
40 POWDER, FOR SOLUTION ORAL ONCE
Status: COMPLETED | OUTPATIENT
Start: 2024-04-19 | End: 2024-04-19

## 2024-04-19 RX ADMIN — PIPERACILLIN SODIUM AND TAZOBACTAM SODIUM 3.38 G: 3; .375 INJECTION, SOLUTION INTRAVENOUS at 17:18

## 2024-04-19 RX ADMIN — BARIUM SULFATE 5 ML: 0.81 POWDER, FOR SUSPENSION ORAL at 08:34

## 2024-04-19 RX ADMIN — ATORVASTATIN CALCIUM 10 MG: 20 TABLET, FILM COATED ORAL at 22:42

## 2024-04-19 RX ADMIN — PIPERACILLIN SODIUM AND TAZOBACTAM SODIUM 3.38 G: 3; .375 INJECTION, SOLUTION INTRAVENOUS at 12:01

## 2024-04-19 RX ADMIN — HEPARIN SODIUM 5000 UNITS: 5000 INJECTION INTRAVENOUS; SUBCUTANEOUS at 05:44

## 2024-04-19 RX ADMIN — HEPARIN SODIUM 5000 UNITS: 5000 INJECTION INTRAVENOUS; SUBCUTANEOUS at 14:50

## 2024-04-19 RX ADMIN — INSULIN LISPRO 10 UNITS: 100 INJECTION, SOLUTION INTRAVENOUS; SUBCUTANEOUS at 09:03

## 2024-04-19 RX ADMIN — INSULIN GLARGINE 10 UNITS: 100 INJECTION, SOLUTION SUBCUTANEOUS at 09:03

## 2024-04-19 RX ADMIN — HALOPERIDOL LACTATE 5 MG: 5 INJECTION, SOLUTION INTRAMUSCULAR at 06:50

## 2024-04-19 RX ADMIN — MELATONIN 3 MG: 3 TAB ORAL at 22:42

## 2024-04-19 RX ADMIN — HEPARIN SODIUM 5000 UNITS: 5000 INJECTION INTRAVENOUS; SUBCUTANEOUS at 22:42

## 2024-04-19 RX ADMIN — POTASSIUM CHLORIDE 40 MEQ: 1.5 POWDER, FOR SOLUTION ORAL at 23:43

## 2024-04-19 RX ADMIN — PIPERACILLIN SODIUM AND TAZOBACTAM SODIUM 3.38 G: 3; .375 INJECTION, SOLUTION INTRAVENOUS at 00:17

## 2024-04-19 RX ADMIN — OXYCODONE HYDROCHLORIDE AND ACETAMINOPHEN 500 MG: 500 TABLET ORAL at 22:42

## 2024-04-19 RX ADMIN — DEXTROSE MONOHYDRATE 160 ML/HR: 50 INJECTION, SOLUTION INTRAVENOUS at 02:52

## 2024-04-19 RX ADMIN — INSULIN LISPRO 6 UNITS: 100 INJECTION, SOLUTION INTRAVENOUS; SUBCUTANEOUS at 18:08

## 2024-04-19 RX ADMIN — REMDESIVIR 100 MG: 100 INJECTION, POWDER, LYOPHILIZED, FOR SOLUTION INTRAVENOUS at 17:18

## 2024-04-19 RX ADMIN — PIPERACILLIN SODIUM AND TAZOBACTAM SODIUM 3.38 G: 3; .375 INJECTION, SOLUTION INTRAVENOUS at 23:43

## 2024-04-19 RX ADMIN — PIPERACILLIN SODIUM AND TAZOBACTAM SODIUM 3.38 G: 3; .375 INJECTION, SOLUTION INTRAVENOUS at 05:44

## 2024-04-19 RX ADMIN — POTASSIUM CHLORIDE 20 MEQ: 14.9 INJECTION, SOLUTION INTRAVENOUS at 00:57

## 2024-04-19 RX ADMIN — REMDESIVIR 200 MG: 100 INJECTION, POWDER, LYOPHILIZED, FOR SOLUTION INTRAVENOUS at 00:50

## 2024-04-19 RX ADMIN — POTASSIUM CHLORIDE 20 MEQ: 14.9 INJECTION, SOLUTION INTRAVENOUS at 03:12

## 2024-04-19 RX ADMIN — MIRTAZAPINE 7.5 MG: 15 TABLET, FILM COATED ORAL at 22:42

## 2024-04-19 RX ADMIN — VANCOMYCIN HYDROCHLORIDE 1500 MG: 5 INJECTION, POWDER, LYOPHILIZED, FOR SOLUTION INTRAVENOUS at 14:49

## 2024-04-19 RX ADMIN — BARIUM SULFATE 5 ML: 400 SUSPENSION ORAL at 08:33

## 2024-04-19 ASSESSMENT — PAIN - FUNCTIONAL ASSESSMENT
PAIN_FUNCTIONAL_ASSESSMENT: 0-10
PAIN_FUNCTIONAL_ASSESSMENT: 0-10

## 2024-04-19 ASSESSMENT — COGNITIVE AND FUNCTIONAL STATUS - GENERAL
MOVING TO AND FROM BED TO CHAIR: A LOT
CLIMB 3 TO 5 STEPS WITH RAILING: TOTAL
TURNING FROM BACK TO SIDE WHILE IN FLAT BAD: A LOT
MOVING FROM LYING ON BACK TO SITTING ON SIDE OF FLAT BED WITH BEDRAILS: A LOT
STANDING UP FROM CHAIR USING ARMS: A LOT
WALKING IN HOSPITAL ROOM: A LOT
MOBILITY SCORE: 11

## 2024-04-19 ASSESSMENT — PAIN SCALES - GENERAL
PAINLEVEL_OUTOF10: 0 - NO PAIN
PAINLEVEL_OUTOF10: 0 - NO PAIN

## 2024-04-19 NOTE — PROGRESS NOTES
Physical Therapy    Physical Therapy Evaluation    Patient Name: Brian Rodriguez  MRN: [id number]  Today's Date: 4/19/2024   Time Calculation  Start Time: 1045  Stop Time: 1110  Time Calculation (min): 25 min    Assessment/Plan   PT Assessment  PT Assessment Results: Decreased strength, Decreased endurance, Impaired balance, Decreased mobility, Decreased coordination, Decreased cognition, Impaired judgement, Decreased safety awareness  Rehab Prognosis: Good  Barriers to Discharge: none  Evaluation/Treatment Tolerance: Treatment limited secondary to medical complications (Comment) (Impaired mentation/altered mental status)  End of Session Communication: Bedside nurse  Assessment Comment: 69 y.o. M admitted for AMS now demonstrating impaired mentation, strength, and function. Will benefit from continued PT while in house to further assess and progress mobility as pt tolerates.  End of Session Patient Position: Bed, 3 rail up, Alarm on (bilat wrist restraints donned)  IP OR SWING BED PT PLAN  Inpatient or Swing Bed: Inpatient  PT Plan  Treatment/Interventions: Bed mobility, Transfer training, Gait training, Balance training, Strengthening, Endurance training, Therapeutic exercise, Therapeutic activity  PT Plan: Skilled PT  PT Frequency: 2 times per week  PT Discharge Recommendations: Moderate intensity level of continued care (vs return to previous level of care pending improved mental status)  PT Recommended Transfer Status: Total assist, Assist x2  PT - OK to Discharge: Yes      Subjective   General Visit Information:  General  Reason for Referral: AMS  Past Medical History Relevant to Rehab: 69 y.o. male with a history of type 2 diabetes, hypertension, CKD, hyperlipidemia, hidradenitis, bipolar who presents to the ED for altered mental status and concern of infection. Pt reportedly admitted from nursing facility though unsure of level of care at facility.  Family/Caregiver Present: Yes  Caregiver Feedback: Vickie  "present outside of room as pt Covid+. Pt reportedly had been combative overnight though now has calmed though remains confused.  Prior to Session Communication: Bedside nurse  Patient Position Received: Bed, 3 rail up, Alarm on (bilat wrist restraints)  Preferred Learning Style: verbal  General Comment: Pt resting in bed upon entry. Pleasantly confused. Pt with limited command following capability due to confusion, though non-combative towards therapist. Pt receptive to therapist assist and cuing.  Home Living:  Home Living  Type of Home:  (Unsure of patient's previous level of care. Per MD notes patient from \"Nursing home\" however unsure if SNF, Long term care, memory care? Pt unable to provide subjective info.)  Home Living Comments: Unsure of patients accurate living arrangements  Prior Level of Function:  Prior Function Per Pt/Caregiver Report  Level of [place]: Unable to asses at this time  Prior Function Comments: Pt unable to provide subjective info due to impaired mentation. Per RN patient had required heavy assist of 2 from nurses to aid patient in ambulating from transport cart to bed (approximately 15ft) overnight. Pt then also reportedly became combative overnight and kicking at nurses--demonstrating good strength. Anticipate patient had been ambulatory at baseline though unsure if w assist or independently.  Precautions:  Precautions  Medical Precautions: Fall precautions, Infection precautions  Precautions Comment: Covid +    Objective   Pain:  Pain Assessment  Pain Assessment: 0-10  Pain Score: 0 - No pain  Cognition:  Cognition  Overall Cognitive Status: Impaired  Orientation Level: Disoriented to situation, Disoriented to time, Disoriented to place (Aware of name and birthday.)  Attention: Exceptions to WFL  Other (Comment): Pt appearing awake and alert though often smiling then laughing for most responses.  Insight: Severe    General Assessments:    Activity Tolerance  Endurance: Decreased " tolerance for upright activites    Strength  Strength Comments: Grossly >4-,4/5 based on functional mobility.  Strength  Strength Comments: Grossly >4-,4/5 based on functional mobility.    Perception  Perseveration:  (Pt fixated on fixing bedsheets throughout duration of visit.)    Postural Control  Postural Control: Within Functional Limits  Posture Comment: Demonstrated steady upright posture.    Static Sitting Balance  Static Sitting-Balance Support: Bilateral upper extremity supported, Feet supported  Static Sitting-Level of Assistance: Close supervision  Static Sitting-Comment/Number of Minutes: Approximately 15 minutes total.    Static Standing Balance  Static Standing-Comment/Number of Minutes: Attempted multiple times with max encouragement, verbal and tactile cues to attempt standing. However patient with no active attempts to stand. Pt with otherwise flat affect and occasional light laugh when therapist would attempt.  Functional Assessments:    Bed Mobility  Bed Mobility: Yes  Bed Mobility 1  Bed Mobility 1: Supine to sitting, Sitting to supine  Level of Assistance 1: Maximum assistance, Maximum verbal cues, Maximum tactile cues    Transfers  Transfer: No (Pt not receptive to attempts. Anticipate woudl require mod/max assist at this time.)    Ambulation/Gait Training  Ambulation/Gait Training Performed: No    Outcome Measures:  [facility] Basic Mobility  Turning from your back to your side while in a flat bed without using bedrails: A lot  Moving from lying on your back to sitting on the side of a flat bed without using bedrails: A lot  Moving to and from bed to chair (including a wheelchair): A lot  Standing up from a chair using your arms (e.g. wheelchair or bedside chair): A lot  To walk in hospital room: A lot  Climbing 3-5 steps with railing: Total  Basic Mobility - Total Score: 11    Encounter Problems       Encounter Problems (Active)       Mobility       STG - Patient will ambulate >15ft, least  restrictive device, min assist       Start:  04/19/24    Expected End:  05/03/24               PT Transfers       STG - Patient to transfer to and from sit to supine min assist       Start:  04/19/24    Expected End:  05/03/24            STG - Patient will transfer sit to and from stand min assist       Start:  04/19/24    Expected End:  05/03/24                   Education Documentation  Precautions, taught by Bill Kingston, PT at 4/19/2024 11:50 AM.  Learner: Patient  Readiness: Nonacceptance  Method: Explanation  Response: Needs Reinforcement, No Evidence of Learning    Mobility Training, taught by Bill Kingston, PT at 4/19/2024 11:50 AM.  Learner: Patient  Readiness: Nonacceptance  Method: Explanation  Response: Needs Reinforcement, No Evidence of Learning    Education Comments  No comments found.

## 2024-04-19 NOTE — PROGRESS NOTES
"Overnight events: Mr. Rodriguez ripped out his IV and bunch overnight. He was aggressive and trying to hit staff in the morning and was placed on restraints and given Haldol 5mg IV.     Subjective   Mr. Rodriguez was upset about being placed on restraints. He stated that he had to pee and endorsed some lower abdominal pain.      Objective     Physical Exam    General: thin appearing, NAD  Eyes:  Extraocular movements grossly intact. No scleral icterus.   Head: Atraumatic. Normocephalic.     Neck: No meningismus. No gross masses. Full movement through range of motion  ENT: Posterior oropharynx shows no erythema, exudate or edema.  Uvula is midline without edema.  No stridor or trismus  CV: Regular rhythm. No murmurs, rubs, gallops appreciated.   Resp: Clear to auscultation bilaterally. No respiratory distress.    GI: Nontender. Soft. No masses. No rebound, rigidity or guarding.   MSK: Symmetric muscle bulk. No gross step offs or deformities.  Chronic sacral wound noted to the sacrum on exam without any obvious signs of superimposed infection.  There are new, bleeding ulcerations to the scrotum.  Left buttock has a newly packed wound that is draining some purulence but no surrounding erythema or edema.  Skin: chronic sacral wounds with purulent drainage  Neuro: CN II-VII intact. A&O 2. Poor Speech  Alert. Moving all extremities. Ambulates with normal gait  Psych: Appropriate mood and affect for situation       Last Recorded Vitals  Blood pressure 101/60, pulse 85, temperature 36.6 °C (97.9 °F), resp. rate 18, height 1.778 m (5' 10\"), weight 55.3 kg (122 lb), SpO2 100%.  Intake/Output last 3 Shifts:  I/O last 3 completed shifts:  In: 350 (4.4 mL/kg) [IV Piggyback:350]  Out: 1950 (24.6 mL/kg) [Urine:1950 (0.7 mL/kg/hr)]  Dosing Weight: 79.4 kg     Relevant Results    ECG 12 lead    Result Date: 4/17/2024  Normal sinus rhythm Normal ECG When compared with ECG of 21-DEC-2022 17:06, Previous ECG has undetermined rhythm, needs " review See ED provider note for full interpretation and clinical correlation Confirmed by Dori Wills ([id number]) on 4/17/2024 9:32:51 PM    CT chest abdomen pelvis wo IV contrast    Result Date: 4/17/2024  Interpreted By:  Chester Chinchilla, STUDY: CT CHEST ABDOMEN PELVIS WO CONTRAST;  4/17/2024 8:13 pm   INDICATION: Signs/Symptoms:extend to mid thigh to rule out scrotal and sacral pathology   COMPARISON: 03/08/2023   ACCESSION NUMBER(S): [id number]   ORDERING CLINICIAN: DORI FONTENOT   TECHNIQUE: CT of the chest, abdomen, and pelvis was performed. Contiguous axial images were obtained through the chest, abdomen and pelvis. Coronal and sagittal reconstructions were performed. No intravenous contrast.     FINDINGS: CHEST:   LOWER NECK AND CHEST WALL:  Within normal limits.   MEDIASTINUM/SUNSHINE:No lymphadenopathy. Esophagus is unremarkable.   CARDIOVASCULAR:  Cardiac chamber size within normal limits. No pericardial effusion. Ascending thoracic aortic caliber is borderline ectatic measuring 4 cm. Normal caliber of main pulmonary artery. Multivessel coronary atherosclerosis.   LUNGS, AIRWAYS, AND PLEURA:  Peribronchovascular patchy ground-glass/consolidative opacities throughout the right lung and to a lesser degree within the dependent left lower lobe there is scattered endobronchial debris most pronounced within the right lower lobe with associated bronchial wall thickening. There is debris within the distal trachea and right mainstem bronchus.   MUSCULOSKELETAL: No acute osseous abnormality or suspicious osseous lesions. Deformity of the right sternoclavicular joint may be secondary to degenerative change or remote prior trauma/fracture. Bilateral C7 cervical ribs. Minimal height loss of T8 and T10 which is unchanged from 03/08/2023.     ABDOMEN:   LIVER: Within normal limits.   BILE DUCTS: Normal caliber.   GALLBLADDER: Cholelithiasis.   PANCREAS: Coarse calcifications within region of the pancreatic head.    SPLEEN: Within normal limits.   ADRENALS: Within normal limits.   KIDNEYS, URETERS, and BLADDER: No hydronephrosis or renal calculi.Ureters are non-dilated. There is moderate to severe distention of the urinary bladder.   REPRODUCTIVE: No pelvic masses. Regions of focal ovoid hypoattenuation within the right inferior scrotum as seen on images 236-239.   VESSELS: Moderate atherosclerosis. No abdominal aortic aneurysm.   RETROPERITONEUM and LYMPH NODES: No lymphadenopathy.   BOWEL: The stomach is unremarkable. Small bowel is non-dilated. Normal appendix. No colonic wall thickening or dilation. Large rectal stool volume.   PERITONEUM: No ascites or free air, no fluid collection.   BODY WALL: There is extensive skin thickening of the left gluteal soft tissues which appears slightly more pronounced when compared to 03/08/2023, though with resolution of subcutaneous gas/fluid seen on the prior exam. No definitive associated focal fluid collection, however evaluation is limited in the absence of IV contrast. The skin thickening/fat stranding extends to the sacrococcygeal bone surface, possibly reflecting decubitus ulceration.   MUSCULOSKELETAL: No acute osseous abnormality or suspicious osseous lesions.  Moderate multilevel spinal degenerative change. Inferior L3 endplate irregularities appears similar to 323 and are likely degenerative.         1. Peribronchovascular patchy ground-glass/consolidative opacities throughout the right lung and to a lesser degree within the dependent left lower lobe compatible with multifocal pneumonia. Scattered endobronchial debris most pronounced within the right lower lobe with associated bronchial wall thickening and debris within the distal trachea and right mainstem bronchus concerning for aspiration. 2. Extensive skin thickening of the left gluteal soft tissues which appears slightly more pronounced when compared to 03/08/2023, though with resolution of subcutaneous gas/fluid seen on  the prior exam. No definitive associated focal fluid collection, however evaluation is limited in the absence of IV contrast.  The skin thickening/fat stranding extends to the sacrococcygeal bone surface, possibly reflecting decubitus ulceration. 3. Regions of focal ovoid hypoattenuation within the right inferior scrotum as seen on images 236-239. The findings are indeterminate and may be chronic (similar findings were seen on the 03/08/2023 examination). Scrotum is suboptimally evaluated on noncontrast CT and scrotal ultrasound may be considered for further evaluation. 4. Large rectal stool volume. Please correlate for fecal impaction. 5. There is moderate to severe distention of the urinary bladder. Please correlate for urinary retention. 6. Coarse pancreatic calcifications compatible with chronic pancreatitis. 7. Bilateral C7 cervical ribs. 8. Cholelithiasis.   Signed by: Chester Chinchilla 4/17/2024 8:33 PM Dictation workstation:   [id number]    CT head wo IV contrast    Result Date: 4/17/2024  Interpreted By:  Case Elena and Hofer Lindsay STUDY: CT HEAD WO IV CONTRAST;  4/17/2024 6:13 pm   INDICATION: Signs/Symptoms:altered mental status.   COMPARISON: CT brain attack 07/08/2020   ACCESSION NUMBER(S): [id number]   ORDERING CLINICIAN: TRAY FONTENOT   TECHNIQUE: Noncontrast axial CT scan of head was performed. Angled reformats in brain and bone windows were generated. The images were reviewed in bone, brain, blood and soft tissue windows.   FINDINGS: CSF Spaces: The ventricles, sulci and basal cisterns are prominent scratch the there is no extraaxial fluid collection.   Parenchyma: The grey-white differentiation is intact. There is no mass effect or midline shift.  There is no intracranial hemorrhage.   Calvarium: The calvarium is unremarkable.   Paranasal sinuses and mastoids: Completely opacified left frontal sinus. Paranasal sinuses otherwise clear. The mastoid air cells are clear.       1. There  is no evidence of acute hemorrhage, mass lesion or acute infarction.     I personally reviewed the images/study and resident's interpretation and I agree with the findings as stated by Britany Arroyo MD (resident radiologist). This study was analyzed and interpreted at [place], [place].   MACRO: None   Signed by: Case Elena 4/17/2024 6:41 PM Dictation workstation:   [id number]    XR chest 2 views    Result Date: 4/17/2024  Interpreted By:  Nicholas Bingham and Hofer Lindsay STUDY: XR CHEST 2 VIEWS;  4/17/2024 5:59 pm   INDICATION: Signs/Symptoms:concern sepsis.   COMPARISON: Chest radiograph 02/12/2012   ACCESSION NUMBER(S): [id number]   ORDERING CLINICIAN: TRAY FONTENOT   FINDINGS: PA and lateral radiographs of the chest were provided.     CARDIOMEDIASTINAL SILHOUETTE: Cardiomediastinal silhouette is normal in size and configuration.   LUNGS: Patchy opacities throughout the right mid and lower lung zone. No pleural effusion or pneumothorax.   ABDOMEN: No remarkable upper abdominal findings.   BONES: No acute osseous abnormality.       1.  Patchy opacities throughout the right mid and lower lung zones concerning for pneumonia.   I personally reviewed the images/study and resident's interpretation and I agree with the findings as stated by Britany Arroyo MD (resident radiologist). This study was analyzed and interpreted at [place], [place].   MACRO: None   Signed by: Nicholas Bingham 4/17/2024 6:28 PM Dictation workstation:   [id number]      Scheduled medications  ascorbic acid, 500 mg, oral, TID  atorvastatin, 10 mg, oral, Nightly  azithromycin, 500 mg, oral, q24h RANDAL  bisacodyl, 10 mg, rectal, Daily  chlorhexidine, , Topical, Daily  heparin (porcine), 5,000 Units, subcutaneous, q8h RANDAL  insulin glargine, 10 Units, subcutaneous, q AM  insulin lispro, 0-10 Units, subcutaneous, q4h  mirtazapine, 7.5 mg, oral,  Nightly  multivitamin with minerals, 1 tablet, oral, Daily  nicotine, 1 patch, transdermal, Daily   Followed by  [START ON 5/30/2024] nicotine, 1 patch, transdermal, Daily   Followed by  [START ON 6/13/2024] nicotine, 1 patch, transdermal, Daily  pantoprazole, 40 mg, oral, q AM  piperacillin-tazobactam, 3.375 g, intravenous, q6h  polyethylene glycol, 17 g, oral, BID  potassium chloride CR, 40 mEq, oral, Once  remdesivir, 100 mg, intravenous, q24h  sennosides-docusate sodium, 2 tablet, oral, BID  vancomycin, 1,500 mg, intravenous, Once      Continuous medications       PRN medications  PRN medications: acetaminophen **OR** acetaminophen **OR** acetaminophen, acetaminophen, dextrose, dextrose, glucagon, glucagon, guaiFENesin, melatonin, nicotine polacrilex, traMADol, vancomycin                 This patient has a urinary catheter   Reason for the urinary catheter remaining today? urinary retention/bladder outlet obstruction, acute or chronic         Assessment/Plan     68 yo with CKD, DM, HTN, sacral wound, bipolar referred from nursing home with concern for altered mental status. Patient is A&O x2 at baseline, but nursing home staff reported that he seemed more confused and disoriented than normal. CT scan in the ED 4/17 displayed multifocal pneumonia, patient denies any cough, fevers or chest pain. Patient found to be hypernatremic (Na: 155) on admission, with urinary retention (bladder scan 700cc), and fecal impaction on CT.  Patient failed SLP on 4/18 and MBS 4/19. Strict NPO given poor swallowing mechanics, gross aspiration before and during swallow, inconsistently sensated aspiration events, and poor clearance. Dobhoff placement pending given ENT approval due to abnormal anatomy identified on MBS      Updates 4/19  - Na of 150 am 4/19, down 5 units from 155 24 hours after admission  - MBS 4/19: severe swallowing dysfunction, profoundly impaired oropharyngeal swallow, limited-no initiation in swallowing  - Blood  glucose 500 in the morning following D5W, will need to correct water deficit through Dobhoff tube  - Dobhoff to be placed, Dobhoff nurse uncomfortable placing tube without ENT approval given abnormal anatomy on barium swallow, ENT consulted and will evaluate  - Blood glucose 500 in morning, patient was uncooperative with staff and did not receive glargine 10 units dose 4/18, 10 units were given 4/19 and glucose was 352 on recheck.  - sliding scale updated from with meals to q4  - patient removed bunch overnight and it was replaced, patient reporting urinary retention and mild lower abdominal pain, bladder scan pending  - strep pneumo negative, legionella negative, urine culture negative  - abx regimen for multifocal pneumonia: Vanc, Zosyn, azithromycin  - COVID positive, remdesivir 100mg in NaCl 0.9% mL IV  - MBS 4/19: severe swallowing dysfunction, profoundly impaired oropharyngeal swallow, limited-no initiation in swallowing  - strict NPO for now      #Multifocal pneumonia  :: Lactate 1.2 4/17  :: MRSA positive, COVID positive  :: Vancomycin, Zosyn and azithromycin (4/18-)  :: CT: Peribronchovascular patchy ground-glass/consolidative opacities  throughout the right lung and to a lesser degree within the dependent  left lower lobe compatible with multifocal pneumonia.  Skin thickening of the left gluteal soft tissues with resolution of subcutaneous gas/fluid with no definitive associated focal fluid collection.  :: strep and legionella negative  :: Vanc, Zosyn, azithromycin (4/7-)  :: evaluated by SLP->concerning for pharyngeal dysphagia and aspiration (x2/2) trials, recommending MBBS to determine PO safety  :: MBS 4/19: severe swallowing dysfunction, profoundly impaired oropharyngeal swallow, limited-no initiation in swallowing      #hypernatremia  :: Na of 150 am 4/19, down 5 units from 155 24 hours after admission  :: was on 120mL/hr D5W until 8pm 4/18, sodium 156 at 8pm-> D5W increased to 160mL/hr, Na of 150 in  am 4/19  :: Blood glucose 500 in the morning following D5W, will need to correct water deficit through Dobhoff tube  :: Dobhoff to be placed, Dobhoff nurse uncomfortable placing tube without ENT approval given abnormal anatomy on barium swallow, ENT consulted and will evaluate  -evening RFP    #hidradenitis suppurativa  :: purulent drainage from chronic sacral wounds from hidradenitis suppurativa  :: Derm recommending humira and minocycline 100 BID, patient will need labs to confirm eligibility for these medications  :: hepatitis C antibody negative, hepatitis B surface antigen, hepatitis B core antibody negative, total, hepatitis B surface antibody negative, T-spot pending, hIV 1/2 antigen/antibody screen negative -> to determine Humira eligibility  :: wound team following, daily dressing changes  - will need to follow up outpatient with dermatology within 2 weeks of discharge.  - doxycycline 100 Bid outpatient after abx course for pneumonia is completed    #Urinary retention  ::  Urinary retention: urinary output 700mL 4/17 and 400mL 4/18 am following bunch placement  :: urinary output 400mL today    # Fecal impaction  :: fecal impaction seen on CT  - Miralax BID,     # CKD 3  -Strict I&Os  -Avoid nephrotoxic agents  -Continue to monitor         #T2DM  #Hyperglycemia  ::Last Hba1c 9.1 March 2023  -Continue 10 units glargine   - SSI updated from with meals to q4  -Diabetic diet        #HTN  BP at lower side  Hold home amlodipine     #HLD  -Continue home atorvastatin 10      #Bipolar disorder  -No current medications     F RL 100ml\hr for 1L  E Hypernatremia correction  N Diabetic diet  A PIV,      DVT prophylaxis: heparin             Sha Maegan

## 2024-04-19 NOTE — PROGRESS NOTES
"Speech-Language Pathology  Adult Inpatient Modified Barium Swallow Study (MBSS)    Patient Name: Brian Rodriguez  MRN: [id number]  Today's Date: 4/19/2024   Start Time: 0800  Stop Time: 0830  Time Calculation (min): 30 minutes    Initial MBSS: Yes    Respiratory Status:  Room air, WFL    History of Present Illness:   Per EMR: \"Brian Rodriguez is a 69 y.o. male with a history of type 2 diabetes, hypertension, CKD, hyperlipidemia, hidradenitis, bipolar who presents to the ED for altered mental status and concern of infection.  Patient presents from his nursing home where he was noted to be more altered than normal.  Nursing staff noted that he is normally A&O x 2 and somewhat forgetful at his baseline.  Noted a elevated white count in the 20s so was sent to the ED for concern of sepsis.  Also noted that he was hypernatremic this morning.  He does have a history of chronic sacral wounds.  Also history of recurrent pneumonia.  Patient currently denying any pain.  Alert and oriented x 0 in the room.\"     Impression:   Modified Barium Swallow Study completed. Pt received awake/alert, positioned upright in Hausted chair. Tested + for COVID - All appropriate PPE donned and doffed for session. Wrist restraints in place. Pt continued to present confused; however, participated well and followed simple, 1-step commands. When cued, pt protruded tongue w/ ongoing mild lingual deviation to R and noted continued dysarthric speech.   Informed verbal consent obtained prior to completion of exam. X2 tsp trials were given of thin liquids and nectar thick liquids. Testing D/C'd 2/2 severity of swallow function and safety. Pt w/ profoundly impaired oropharyngeal swallow as described below:  Upon fluoroing, noted ? Edematous epiglottis and arytenoids. Recommend ENT consult. Orally, pt w/ lingual pumping, reduced A-P transit w/ slowed/weakened propulsion, premature pharyngeal entry and pooling in pharynx prior to swallow onset. Delay and " limited-no initiation in swallow onset resulted in gross aspiration before and during the swallow w/ thin and nectar thick liquids. During attempts made to initiate a swallow, pt w/ limited to absent BOT retraction, hyolaryngeal elevation/excursion, epiglottic deflection, pharyngeal squeeze and UES opening. MAX amounts of pharyngeal residue remained that resulted in additional gross aspiration of thin and nectar thick liquids after the swallow attempts. Cues to provide effortful swallows were provided by SLP in attempt to clear residuals; however, not effective (in addition to biofeedback). Pt inconsistently sensated aspiration events and produced ineffective cough in clearing material as pt continued to re-aspirate expectorated material from subglottis during inhalation. Cued cough was also not productive.   Given severity of deficits, recommend STRICT NPO. ? Deficits related to brainstem/cranial nerve involvement given poor mechanics of the swallow as well as reduced sensation. Recommend NEUROLOGY CONSULT. Additionally, recommend ENT CONSULT 2/2 ? Anatomical abnormalities noted during exam. Lastly, recommend consideration of an alternate form of nutrition/hydration as well as ? Need for long-term means of nutrition/hydration pending overall etiology of dysphagia. MD notified.      Rolando:  Thin: 8 - Material enters airway, passes below the folds, no effort to eject (no cough).  Nectar: 8 - Material enters airway, passes below the folds, no effort to eject (no cough).    Recommendations:  STRICT NPO  Frequent, aggressive oral care is strongly recommended to improve infection control as well as reduce dental plaque and bacteria on oropharyngeal surfaces which may increase the risk nosocomial infections, including pneumonia.  Recommend consideration of an alternate form of nutrition/hydration.  RECOMMEND NEUROLOGY/ENT CONSULT TO DETERMINE ETIOLOGY OF DYSPHAGIA.  SLP to follow-up s/p work-up on dysphagia etiology.      Goal:   Pt will tolerate least restrictive diet and recall/utilize safe swallow guidelines independently with no clinical s/s of aspiration 100% of time        Plan:  SLP Services Indicated: Yes  Frequency: 2x week  Discussed POC with patient  SLP - OK to Discharge    Pain:   0-10  0 = No pain.     Inpatient Education:  Extensive education provided to patient regarding current swallow function, recommendations/results, and POC.      Consultations/Referrals/Coordination of Services:   Neuro and ENT consults

## 2024-04-19 NOTE — NURSING NOTE
4/19/24 [id number] IV Team contacted per Bedside RN to place feeding tube, stating that the patient failed his swallow test. Upon review of pt's chart, it was revealed to this RN that said patient has a severe swallowing dysfunction and that there is concern for the safety of this patient when swallowing. It was also revealed in patient's chart that his tongue is swollen and protruded during the MBSS, and that this patient has gross aspiration. It is also indicated in the note from Speech Therapy that they recommend an ENT consult. In order to place a Cortrak feeding tube, the patient has to be able to swallow on command to allow the tube to pass down the patient's esophagus. If the pt cannot swallow, or has difficulty swallowing, the Cortrak cannot be placed. Forcing Cortrak placement when a patient cannot swallow  or has difficulty swallowing can result in the tube entering the patients lungs, and can cause harm to the patient. The IV Team is trained to place Cortrak feeding tubes in patients who are able to swallow on command and not on patients who have extensive dysfunctions and malformations or have gross aspiration issues.

## 2024-04-19 NOTE — CARE PLAN
The patient's goals for the shift include Pt will be free from falls  use call light  bed alarm remians on    The clinical goals for the shift include Pt will remain free from falls or injury by the end of this shift.    Over the shift, the patient did make progress toward the following goals. Barriers to progression include patient being confused and requiring redirection. Recommendations to address these barriers include continue to redirect patient and provide safety from falls with sitter and bed alarm. Pt failed his swallow eval today and had a feeding tube placed. Awaiting on placement to be verified. He tolerated his IV antibiotics and Remdesivir well with no reaction noted.

## 2024-04-19 NOTE — PROGRESS NOTES
"Overnight events: Mr. Rodriguez ripped out his IV and bunch overnight. He was aggressive and trying to hit staff in the morning and was placed on restraints and given Haldol 5mg IV.     Subjective      Mr. Rodriguez was upset about being placed on restraints. He stated that he had to pee and endorsed some lower abdominal pain.      Objective     Physical Exam    General: thin appearing, NAD  Eyes:  Extraocular movements grossly intact. No scleral icterus.   Head: Atraumatic. Normocephalic.     Neck: No meningismus. No gross masses. Full movement through range of motion  ENT: Posterior oropharynx shows no erythema, exudate or edema.  Uvula is midline without edema.  No stridor or trismus  CV: Regular rhythm. No murmurs, rubs, gallops appreciated.   Resp: Clear to auscultation bilaterally. No respiratory distress.    GI: Lower abdominal tenderness to palpation. Soft. No masses.   MSK: Symmetric muscle bulk. No gross step offs or deformities.  Chronic sacral wound noted to the sacrum on exam without any obvious signs of superimposed infection.  There are new, bleeding ulcerations to the scrotum.  Left buttock has a newly packed wound that is draining some purulence but no surrounding erythema or edema.  Skin: chronic sacral wounds with purulent drainage  Neuro: CN II-VII intact. A&O 2. Poor Speech  Alert. Moving all extremities. Ambulates with normal gait  Psych: Appropriate mood and affect for situation       Last Recorded Vitals  Blood pressure 101/60, pulse 85, temperature 36.6 °C (97.9 °F), resp. rate 18, height 1.778 m (5' 10\"), weight 55.3 kg (122 lb), SpO2 100%.  Intake/Output last 3 Shifts:  I/O last 3 completed shifts:  In: 350 (4.4 mL/kg) [IV Piggyback:350]  Out: 1950 (24.6 mL/kg) [Urine:1950 (0.7 mL/kg/hr)]  Dosing Weight: 79.4 kg     Relevant Results    ECG 12 lead    Result Date: 4/17/2024  Normal sinus rhythm Normal ECG When compared with ECG of 21-DEC-2022 17:06, Previous ECG has undetermined rhythm, needs " review See ED provider note for full interpretation and clinical correlation Confirmed by Dori Wills ([id number]) on 4/17/2024 9:32:51 PM    CT chest abdomen pelvis wo IV contrast    Result Date: 4/17/2024  Interpreted By:  Chester Chinchilla, STUDY: CT CHEST ABDOMEN PELVIS WO CONTRAST;  4/17/2024 8:13 pm   INDICATION: Signs/Symptoms:extend to mid thigh to rule out scrotal and sacral pathology   COMPARISON: 03/08/2023   ACCESSION NUMBER(S): [id number]   ORDERING CLINICIAN: DORI FONTENOT   TECHNIQUE: CT of the chest, abdomen, and pelvis was performed. Contiguous axial images were obtained through the chest, abdomen and pelvis. Coronal and sagittal reconstructions were performed. No intravenous contrast.     FINDINGS: CHEST:   LOWER NECK AND CHEST WALL:  Within normal limits.   MEDIASTINUM/SUNSHINE:No lymphadenopathy. Esophagus is unremarkable.   CARDIOVASCULAR:  Cardiac chamber size within normal limits. No pericardial effusion. Ascending thoracic aortic caliber is borderline ectatic measuring 4 cm. Normal caliber of main pulmonary artery. Multivessel coronary atherosclerosis.   LUNGS, AIRWAYS, AND PLEURA:  Peribronchovascular patchy ground-glass/consolidative opacities throughout the right lung and to a lesser degree within the dependent left lower lobe there is scattered endobronchial debris most pronounced within the right lower lobe with associated bronchial wall thickening. There is debris within the distal trachea and right mainstem bronchus.   MUSCULOSKELETAL: No acute osseous abnormality or suspicious osseous lesions. Deformity of the right sternoclavicular joint may be secondary to degenerative change or remote prior trauma/fracture. Bilateral C7 cervical ribs. Minimal height loss of T8 and T10 which is unchanged from 03/08/2023.     ABDOMEN:   LIVER: Within normal limits.   BILE DUCTS: Normal caliber.   GALLBLADDER: Cholelithiasis.   PANCREAS: Coarse calcifications within region of the pancreatic head.    SPLEEN: Within normal limits.   ADRENALS: Within normal limits.   KIDNEYS, URETERS, and BLADDER: No hydronephrosis or renal calculi.Ureters are non-dilated. There is moderate to severe distention of the urinary bladder.   REPRODUCTIVE: No pelvic masses. Regions of focal ovoid hypoattenuation within the right inferior scrotum as seen on images 236-239.   VESSELS: Moderate atherosclerosis. No abdominal aortic aneurysm.   RETROPERITONEUM and LYMPH NODES: No lymphadenopathy.   BOWEL: The stomach is unremarkable. Small bowel is non-dilated. Normal appendix. No colonic wall thickening or dilation. Large rectal stool volume.   PERITONEUM: No ascites or free air, no fluid collection.   BODY WALL: There is extensive skin thickening of the left gluteal soft tissues which appears slightly more pronounced when compared to 03/08/2023, though with resolution of subcutaneous gas/fluid seen on the prior exam. No definitive associated focal fluid collection, however evaluation is limited in the absence of IV contrast. The skin thickening/fat stranding extends to the sacrococcygeal bone surface, possibly reflecting decubitus ulceration.   MUSCULOSKELETAL: No acute osseous abnormality or suspicious osseous lesions.  Moderate multilevel spinal degenerative change. Inferior L3 endplate irregularities appears similar to 323 and are likely degenerative.         1. Peribronchovascular patchy ground-glass/consolidative opacities throughout the right lung and to a lesser degree within the dependent left lower lobe compatible with multifocal pneumonia. Scattered endobronchial debris most pronounced within the right lower lobe with associated bronchial wall thickening and debris within the distal trachea and right mainstem bronchus concerning for aspiration. 2. Extensive skin thickening of the left gluteal soft tissues which appears slightly more pronounced when compared to 03/08/2023, though with resolution of subcutaneous gas/fluid seen on  the prior exam. No definitive associated focal fluid collection, however evaluation is limited in the absence of IV contrast.  The skin thickening/fat stranding extends to the sacrococcygeal bone surface, possibly reflecting decubitus ulceration. 3. Regions of focal ovoid hypoattenuation within the right inferior scrotum as seen on images 236-239. The findings are indeterminate and may be chronic (similar findings were seen on the 03/08/2023 examination). Scrotum is suboptimally evaluated on noncontrast CT and scrotal ultrasound may be considered for further evaluation. 4. Large rectal stool volume. Please correlate for fecal impaction. 5. There is moderate to severe distention of the urinary bladder. Please correlate for urinary retention. 6. Coarse pancreatic calcifications compatible with chronic pancreatitis. 7. Bilateral C7 cervical ribs. 8. Cholelithiasis.   Signed by: Chester Chinchilla 4/17/2024 8:33 PM Dictation workstation:   [id number]    CT head wo IV contrast    Result Date: 4/17/2024  Interpreted By:  Case Elena and Hofer Lindsay STUDY: CT HEAD WO IV CONTRAST;  4/17/2024 6:13 pm   INDICATION: Signs/Symptoms:altered mental status.   COMPARISON: CT brain attack 07/08/2020   ACCESSION NUMBER(S): [id number]   ORDERING CLINICIAN: TRAY FONTENOT   TECHNIQUE: Noncontrast axial CT scan of head was performed. Angled reformats in brain and bone windows were generated. The images were reviewed in bone, brain, blood and soft tissue windows.   FINDINGS: CSF Spaces: The ventricles, sulci and basal cisterns are prominent scratch the there is no extraaxial fluid collection.   Parenchyma: The grey-white differentiation is intact. There is no mass effect or midline shift.  There is no intracranial hemorrhage.   Calvarium: The calvarium is unremarkable.   Paranasal sinuses and mastoids: Completely opacified left frontal sinus. Paranasal sinuses otherwise clear. The mastoid air cells are clear.       1. There  is no evidence of acute hemorrhage, mass lesion or acute infarction.     I personally reviewed the images/study and resident's interpretation and I agree with the findings as stated by Britany Arroyo MD (resident radiologist). This study was analyzed and interpreted at [place], [place].   MACRO: None   Signed by: Case Elena 4/17/2024 6:41 PM Dictation workstation:   [id number]    XR chest 2 views    Result Date: 4/17/2024  Interpreted By:  Nicholas Bingham and Hofer Lindsay STUDY: XR CHEST 2 VIEWS;  4/17/2024 5:59 pm   INDICATION: Signs/Symptoms:concern sepsis.   COMPARISON: Chest radiograph 02/12/2012   ACCESSION NUMBER(S): [id number]   ORDERING CLINICIAN: TRAY FONTENOT   FINDINGS: PA and lateral radiographs of the chest were provided.     CARDIOMEDIASTINAL SILHOUETTE: Cardiomediastinal silhouette is normal in size and configuration.   LUNGS: Patchy opacities throughout the right mid and lower lung zone. No pleural effusion or pneumothorax.   ABDOMEN: No remarkable upper abdominal findings.   BONES: No acute osseous abnormality.       1.  Patchy opacities throughout the right mid and lower lung zones concerning for pneumonia.   I personally reviewed the images/study and resident's interpretation and I agree with the findings as stated by Britany Arroyo MD (resident radiologist). This study was analyzed and interpreted at [place], [place].   MACRO: None   Signed by: Nicholas Bingham 4/17/2024 6:28 PM Dictation workstation:   [id number]      Scheduled medications  ascorbic acid, 500 mg, oral, TID  atorvastatin, 10 mg, oral, Nightly  azithromycin, 500 mg, oral, q24h RANDAL  bisacodyl, 10 mg, rectal, Daily  chlorhexidine, , Topical, Daily  heparin (porcine), 5,000 Units, subcutaneous, q8h RANDAL  insulin glargine, 10 Units, subcutaneous, q AM  insulin lispro, 0-10 Units, subcutaneous, q4h  mirtazapine, 7.5 mg, oral,  Nightly  multivitamin with minerals, 1 tablet, oral, Daily  nicotine, 1 patch, transdermal, Daily   Followed by  [START ON 5/30/2024] nicotine, 1 patch, transdermal, Daily   Followed by  [START ON 6/13/2024] nicotine, 1 patch, transdermal, Daily  pantoprazole, 40 mg, oral, q AM  piperacillin-tazobactam, 3.375 g, intravenous, q6h  polyethylene glycol, 17 g, oral, BID  potassium chloride CR, 40 mEq, oral, Once  remdesivir, 100 mg, intravenous, q24h  sennosides-docusate sodium, 2 tablet, oral, BID  vancomycin, 1,500 mg, intravenous, Once      Continuous medications       PRN medications  PRN medications: acetaminophen **OR** acetaminophen **OR** acetaminophen, acetaminophen, dextrose, dextrose, glucagon, glucagon, guaiFENesin, melatonin, nicotine polacrilex, traMADol, vancomycin   {If you would like to pull in Medications, type .meds     If you would like to pull in Lab results for the last 24 hours, type .[id number]    If you would like to pull in Imaging results, type .imgrslt :99}    {Link to Stroke Scoring tools - Link :99}          This patient has a urinary catheter   Reason for the urinary catheter remaining today? urinary retention/bladder outlet obstruction, acute or chronic         Assessment/Plan     68 yo with CKD, DM, HTN, sacral wound, bipolar referred from nursing home with concern for altered mental status. Patient is A&O x2 at baseline, but nursing home staff reported that he seemed more confused and disoriented than normal. CT scan in the ED 4/17 displayed multifocal pneumonia, patient denies any cough, fevers or chest pain. Patient found to be hypernatremic (Na: 155) on admission, with urinary retention (bladder scan 700cc), and fecal impaction on CT.  Patient failed SLP on 4/18 and MBS 4/19. Strict NPO given poor swallowing mechanics    Patient presentation could be attributed to multiple causes, infection on top with CT showing multi focal pneumonia and previous multiple admissions for pneumonia  however the patient is not having any cough or fevers. Other source of infection\is the sacral wound, however the wound looks clean. Other causes of AMS include urinary retention with bladder scan showing 700cc, fecal impaction, dehydration, or hypernatremia (155 on admission)      Updates 4/18  - NA of 154 on admission, 155 at 2am following 1L LR, 155 at 3pm  - LR discontinued D5W started  - evening RFP ordered, most recent :  if Na has not changed or increased-> increase to 160mL/hour, if Na decreases by more than 4, decrease D5W to 80mL/hour, if Na decrease is between 1 and 4 at 8pm-> keep at 120 mL/hr  - fecal impaction seen on CT  - Miralax BID, bisacodyl suppository then tap water enema if no bowel movement by EOD   - Urinary retention: urinary output 700mL 4/17 and 400mL 4/18 am following bunch placement  -blood culture pending  - strep, legionella pending  - MRSA positive  - abx regimen for multifocal pneumonia: Vanc, Zosyn, azithromycin  - purulent drainage from chronic sacral wounds from hidradenitis suppurativa  - Derm recommending humira and minocycline 100 BID, patient will need labs to confirm eligibility for these medications  - evaluated by SLP->concerning for pharyngeal dysphagia and aspiration (x2/2) trials, recommending MBBS to determine PO safety  - NPO     #Multifocal pneumonia  :: Lactate 1.2 4/17  :: MRSA positive  :: Vancomycin, Zosyn and azithromycin  :: CT: Peribronchovascular patchy ground-glass/consolidative opacities  throughout the right lung and to a lesser degree within the dependent  left lower lobe compatible with multifocal pneumonia.  Skin thickening of the left gluteal soft tissues with resolution of subcutaneous gas/fluid with no definitive associated focal fluid collection.  :: strep, legionella pending  :: Vanc, Zosyn, azithromycin (4/7-)  :: wound team following  :: evaluated by SLP->concerning for pharyngeal dysphagia and aspiration (x2/2) trials, recommending MBBS to  determine PO safety  - NPO until results of MBBS    #hypernatremia  :: NA of 154 on admission, 155 at 2am following 1L LR, 155 at 3pm 4/18  :: LR discontinued D5W started  :: evening RFP ordered, most recent     - if Na has not changed or increased-> increase to 160mL/hour, if Na decreases by more than 4, decrease D5W to 80mL/hour, if Na decrease is between 1 and 4 at 8pm-> keep at 120 mL/hr      #hidradenitis suppurativa  :: purulent drainage from chronic sacral wounds from hidradenitis suppurativa  :: Derm recommending humira and minocycline 100 BID, patient will need labs to confirm eligibility for these medications  - hepatitis C antibody, hepatitis B surface antigen, hepatitis B core antibody, total, hepatitis B surface antibody, T-spot, hIV 1/2 antigen/antibody screen with reflex to confirmation ordered  - will need to follow up outpatient with dermatology within 2 weeks of discharge.  - doxycycline 100 Bid outpatient after abx course for pneumonia is completed    #Urinary retention  ::  Urinary retention: urinary output 700mL 4/17 and 400mL 4/18 am following bunch placement    # Fecal impaction  :: fecal impaction seen on CT  - Miralax BID, bisacodyl suppository then tap water enema if no bowel movement by EOD     # CKD 3  -Strict I&Os  -Avoid nephrotoxic agents  -Continue to monitor         #T2DM  #Hyperglycemia  ::Last Hba1c 9.1 March 2023  -Continue 10 units glargine and SSI  -Diabetic diet        #HTN  BP at lower side  Hold home amlodipine     #HLD  -Continue home atorvastatin 10      #Bipolar disorder  -No current medications     F RL 100ml\hr for 1L  E Hypernatremia correction  N Diabetic diet  A PIV,      DVT prophylaxis: heparin       {This patient does not have an ACP note on file for this encounter, please fill one out - Advance Care Planning Activity :99}      Sha Issa

## 2024-04-19 NOTE — NURSING NOTE
Small bore feeding tube placement    Small bore feeding tube placed via [employer] monitoring system.  Patient educated on use of dobhoff and procedure.  Patient attempting to follow directions with swallowing.  Tube inserted via left nares to 75cm.  No bleeding noted upon insertion.  Tube bridled.  Stylet removed.  RN updated on successful tube placement.  Awaiting KUB for confirmation prior to use.  Please contact Nutrition Support for any further questions/concerns.

## 2024-04-19 NOTE — PROGRESS NOTES
Brian Rodriguez is a 69 y.o. male on day 2 of admission presenting with Pneumonia due to infectious organism, unspecified laterality, unspecified part of lung.    Subjective   Chart reviewed; noted that patient was admitted from his LTC facility ([facility]). Return referral built and sent in [facility] and received confirmation that he is a bed hold. Patient will not require a new authorization to return. Patient is currently in restraints and has sitter at bedside; will need to be 24 hours free of both prior to returning.    Call placed to patients brothmadalyn Franklin ([phone]); he confirmed that he is in agreement for patient to return at discharge.    TCC updated.    -Jacki GUSMAN, MA, LSW  [phone] or [facility] Secure Chat  Care Transitions

## 2024-04-19 NOTE — CONSULTS
"Nutrition Initial Assessment:   Nutrition Assessment    Reason for Assessment: Admission nursing screening    Patient is a 69 y.o. male presenting from SNF with AMS, concern for infection > found to be +MRSA & +COVID. Now in droplet precautions. IVF's for hypernatremia Of note pt with chronic sacral wound (hidradentitis suppurativa). Also with urinary retention & fecal impaction on admit. Speech therapy consulted for c/f swallowing impairment.    PMHx T2DM (A1C 8.5% 4/2024), hypertension, CKD, hyperlipidemia, hidradenitis, bipolar     Nutrition History:  Food and Nutrient History: Per EMR -- pt failed MBS this morning. SLP rec's referral to ENT & Neuro services as well as ?need for NG placement for nutrition/hyrdation.  Food Allergies/Intolerances:  None  GI Symptoms: Constipation  Oral Problems: Swallowing difficulty       Anthropometrics:  Height: 177.8 cm (5' 10\")   4/17 & 4/18 wt 79.4 kgs used for calculations   BMI = 25.1   IBW/kg (Dietitian Calculated): 75.5 kg  Percent of IBW: 105 %     Date/Time Weight Dosing Weight   04/18/24 1845 55.3 kg (122 lb) --  suspect inaccurate    04/18/24 0900 79.4 kg (175 lb) --   04/17/24 1829 79.4 kg (175 lb) 79.4 kg (175 lb)     Weight History:   03/30/23 85.3 kg (188 lb)   12/22/22         80 kg via bed scale  07/22/20         75.7 kg  07/17/20         80.3 kg     Weight Change %:  Weight History / % Weight Change: 5.9 kg loss over past year  Significant Weight Loss: No    Nutrition Focused Physical Exam Findings:  defer: droplet precautions for +COVID  Edema:  Edema: none  Physical Findings:  Skin: Positive (chronic sacral wounds -- not pressure injuries)    Nutrition Significant Labs:  CBC Trend:   Results from last 7 days   Lab Units 04/19/24  0725 04/18/24  0249 04/17/24  1737   WBC AUTO x10*3/uL 14.4* 13.9* 21.2*   RBC AUTO x10*6/uL 3.41* 3.92* 3.96*   HEMOGLOBIN g/dL 7.9* 9.1* 9.0*   HEMATOCRIT % 27.0* 28.8* 30.0*   MCV fL 79* 74* 76*   PLATELETS AUTO x10*3/uL 369 349 " 363    , A1C:  Lab Results   Component Value Date    HGBA1C 8.5 (H) 04/18/2024   , BG POCT trend:   Results from last 7 days   Lab Units 04/19/24  1003 04/19/24  0736 04/19/24  0526 04/19/24  0035 04/18/24  1622   POCT GLUCOSE mg/dL 352* 412* 419* 300* 238*    , Liver Function Trend:   Results from last 7 days   Lab Units 04/18/24  0249 04/17/24  1737   ALK PHOS U/L 87 107   AST U/L 7* 9   ALT U/L 4* <3*   BILIRUBIN TOTAL mg/dL 0.3 0.3    , Renal Lab Trend:   Results from last 7 days   Lab Units 04/19/24  0726 04/18/24  1401 04/18/24  0249 04/18/24  0211 04/17/24  1737   POTASSIUM mmol/L 3.6 3.5 3.4* 3.3* 4.3   PHOSPHORUS mg/dL 3.6 3.1  --  4.1 5.2*   SODIUM mmol/L 150* 155* 155* 156* 154*   MAGNESIUM mg/dL  --   --   --   --  2.29   EGFR mL/min/1.73m*2 25* 23* 21* 25* 19*   BUN mg/dL 46* 55* 64* 53* 73*   CREATININE mg/dL 2.67* 2.80* 3.12* 2.66* 3.38*      Nutrition Specific Medications:  Scheduled medications  ascorbic acid, 500 mg, oral, TID  atorvastatin, 10 mg, oral, Nightly  azithromycin, 500 mg, oral, q24h RANDAL  bisacodyl, 10 mg, rectal, Daily  chlorhexidine, , Topical, Daily  heparin (porcine), 5,000 Units, subcutaneous, q8h RANDAL  insulin glargine, 10 Units, subcutaneous, q AM  insulin lispro, 0-10 Units, subcutaneous, TID with meals  mirtazapine, 7.5 mg, oral, Nightly  multivitamin with minerals, 1 tablet, oral, Daily  nicotine, 1 patch, transdermal, Daily   Followed by  [START ON 5/30/2024] nicotine, 1 patch, transdermal, Daily   Followed by  [START ON 6/13/2024] nicotine, 1 patch, transdermal, Daily  pantoprazole, 40 mg, oral, q AM  piperacillin-tazobactam, 3.375 g, intravenous, q6h  polyethylene glycol, 17 g, oral, BID  potassium chloride CR, 40 mEq, oral, Once  remdesivir, 100 mg, intravenous, q24h  sennosides-docusate sodium, 2 tablet, oral, BID      Continuous medications     PRN medications  PRN medications: acetaminophen **OR** acetaminophen **OR** acetaminophen, acetaminophen, dextrose, dextrose,  glucagon, glucagon, guaiFENesin, melatonin, nicotine polacrilex, traMADol, vancomycin      I/O:   Last BM Date: 04/19/24; Stool Appearance: Soft (04/19/24 0600)    Dietary Orders (From admission, onward)       Start     Ordered    04/18/24 1349  NPO Diet; Effective now  Diet effective now         04/18/24 1348                     Estimated Needs:   Total Energy Estimated Needs (kCal): 2000 kCal  Method for Estimating Needs: 25 kcals/kg using 79.4 kgs/actual BW  Total Protein Estimated Needs (g): 95 g  Method for Estimating Needs: 1.2 gm/kg using 79.4 kgs/actual BW  Total Fluid Estimated Needs (mL): 2400 mL (or per MD)  Method for Estimating Needs: 30mls/kcal/kg using 79.4 kgs/actual BW        Nutrition Diagnosis   Malnutrition Diagnosis  Patient has Malnutrition Diagnosis: No (based on available info at this point in time)    Nutrition Diagnosis  Additional Nutrition Diagnosis: Diagnosis 2  Diagnosis Status (2): New  Nutrition Diagnosis 2: Swallowing difficulity  Related to (2): acute vs chronic illness vs unknown etiology  As Evidenced by (2): failed MBS with NPO status per SLP rec's       Nutrition Interventions/Recommendations         Nutrition Prescription:  1) if in line with pt's goals of care, place small bore NG tube for meds/nutrition & hydration      2) tube feed recs as follows =  Glucerna 1.5 goal @ 55mls/hr continuous  Begin @ 15mls/hr, increase by 10mls every 6-8 hours as tolerated   Additional FWF per MD/NILO (goal TF contains ~1L/d)         Nutrition Interventions:   Interventions: Enteral intake  Goal: Glucerna 1.5@ 55 = 1980 kcals, 109gm protein, 1002mls free water         Nutrition Monitoring and Evaluation   Food/Nutrient Related History Monitoring  Monitoring and Evaluation Plan: Enteral and parenteral nutrition intake  Criteria: diet per speech &/or physician pending goals of care  Enteral and Parenteral Nutrition Intake: Enteral nutrition formula/solution  Criteria: if NG placed initiate &  titrate TF's to goal    Body Composition/Growth/Weight History  Monitoring and Evaluation Plan: Weight  Weight: Measured weight  Criteria: stable    Biochemical Data, Medical Tests and Procedures  Monitoring and Evaluation Plan: Electrolyte/renal panel, Glucose/endocrine profile  Criteria: lytes WNL  Criteria: BG control  mg/dL            Time Spent/Follow-up Reminder:   Time Spent (min): 60 minutes  Last Date of Nutrition Visit: 04/19/24  Nutrition Follow-Up Needed?: Dietitian to reassess per policy  Follow up Comment: failed MBS > TF rec's left

## 2024-04-19 NOTE — PROGRESS NOTES
"Vancomycin Dosing by Pharmacy- FOLLOW UP    Brian Rodriguez is a 69 y.o. year old male who Pharmacy has been consulted for vancomycin dosing for pneumonia. Based on the patient's indication and renal status this patient is being dosed based on a goal trough/random level of 15-20.     Renal function is currently stable.    Current vancomycin dose: 1500 mg given  4/17 @ 21:15     Most recent random level: 11.4 mcg/mL taken 4/18 @ 02:11    Visit Vitals  /63   Pulse 57   Temp 36.5 °C (97.7 °F) (Temporal)   Resp 19        Lab Results   Component Value Date    CREATININE 2.67 (H) 04/19/2024    CREATININE 2.80 (H) 04/18/2024    CREATININE 3.12 (H) 04/18/2024    CREATININE 2.66 (H) 04/18/2024        Patient weight is No results found for: \"PTWEIGHT\"    No results found for: \"CULTURE\"     I/O last 3 completed shifts:  In: 100 (1.3 mL/kg) [IV Piggyback:100]  Out: 1950 (24.6 mL/kg) [Urine:1950 (0.7 mL/kg/hr)]  Dosing Weight: 79.4 kg   [unfilled]    Lab Results   Component Value Date    PATIENTTEMP 37.0 04/18/2024    PATIENTTEMP 37.0 03/08/2023    PATIENTTEMP 37.0 12/21/2022        Assessment/Plan    Above goal random/trough level. Orders placed for new vancomycin regimen of 1500mg every once hours to begin at 4/19 @ 12:00 .      The next level will be obtained on 4/20 at 12:00. May be obtained sooner if clinically indicated.   Will continue to monitor renal function daily while on vancomycin and order serum creatinine at least every 48 hours if not already ordered.  Follow for continued vancomycin needs, clinical response, and signs/symptoms of toxicity.       Brenda Adler [facility]           "

## 2024-04-20 LAB
ADENOVIRUS RVP, VIRC: NOT DETECTED
ALBUMIN SERPL BCP-MCNC: 1.9 G/DL (ref 3.4–5)
ALBUMIN SERPL BCP-MCNC: 2.2 G/DL (ref 3.4–5)
ANION GAP SERPL CALC-SCNC: 15 MMOL/L (ref 10–20)
ANION GAP SERPL CALC-SCNC: 16 MMOL/L (ref 10–20)
BASOPHILS # BLD MANUAL: 0 X10*3/UL (ref 0–0.1)
BASOPHILS NFR BLD MANUAL: 0 %
BUN SERPL-MCNC: 41 MG/DL (ref 6–23)
BUN SERPL-MCNC: 43 MG/DL (ref 6–23)
BURR CELLS BLD QL SMEAR: ABNORMAL
CALCIUM SERPL-MCNC: 8.7 MG/DL (ref 8.6–10.6)
CALCIUM SERPL-MCNC: 9.1 MG/DL (ref 8.6–10.6)
CHLORIDE SERPL-SCNC: 121 MMOL/L (ref 98–107)
CHLORIDE SERPL-SCNC: 122 MMOL/L (ref 98–107)
CO2 SERPL-SCNC: 19 MMOL/L (ref 21–32)
CO2 SERPL-SCNC: 20 MMOL/L (ref 21–32)
CREAT SERPL-MCNC: 2.68 MG/DL (ref 0.5–1.3)
CREAT SERPL-MCNC: 2.91 MG/DL (ref 0.5–1.3)
EGFRCR SERPLBLD CKD-EPI 2021: 22 ML/MIN/1.73M*2
EGFRCR SERPLBLD CKD-EPI 2021: 25 ML/MIN/1.73M*2
ENTEROVIRUS/RHINOVIRUS RVP, VIRC: NOT DETECTED
EOSINOPHIL # BLD MANUAL: 0.43 X10*3/UL (ref 0–0.4)
EOSINOPHIL NFR BLD MANUAL: 4.4 %
ERYTHROCYTE [DISTWIDTH] IN BLOOD BY AUTOMATED COUNT: 20.3 % (ref 11.5–14.5)
GLUCOSE BLD MANUAL STRIP-MCNC: 118 MG/DL (ref 74–99)
GLUCOSE BLD MANUAL STRIP-MCNC: 135 MG/DL (ref 74–99)
GLUCOSE BLD MANUAL STRIP-MCNC: 142 MG/DL (ref 74–99)
GLUCOSE BLD MANUAL STRIP-MCNC: 155 MG/DL (ref 74–99)
GLUCOSE BLD MANUAL STRIP-MCNC: 178 MG/DL (ref 74–99)
GLUCOSE BLD MANUAL STRIP-MCNC: 192 MG/DL (ref 74–99)
GLUCOSE BLD MANUAL STRIP-MCNC: 285 MG/DL (ref 74–99)
GLUCOSE BLD MANUAL STRIP-MCNC: 71 MG/DL (ref 74–99)
GLUCOSE BLD MANUAL STRIP-MCNC: 95 MG/DL (ref 74–99)
GLUCOSE BLD MANUAL STRIP-MCNC: 97 MG/DL (ref 74–99)
GLUCOSE SERPL-MCNC: 115 MG/DL (ref 74–99)
GLUCOSE SERPL-MCNC: 222 MG/DL (ref 74–99)
HCT VFR BLD AUTO: 31.5 % (ref 41–52)
HGB BLD-MCNC: 9.2 G/DL (ref 13.5–17.5)
HUMAN BOCAVIRUS RVP, VIRC: NOT DETECTED
HUMAN CORONAVIRUS RVP, VIRC: NOT DETECTED
IMM GRANULOCYTES # BLD AUTO: 0.07 X10*3/UL (ref 0–0.5)
IMM GRANULOCYTES NFR BLD AUTO: 0.7 % (ref 0–0.9)
INFLUENZA A , VIRC: NOT DETECTED
INFLUENZA A H1N1-09 , VIRC: NOT DETECTED
INFLUENZA B PCR, VIRC: NOT DETECTED
LYMPHOCYTES # BLD MANUAL: 1.3 X10*3/UL (ref 0.8–3)
LYMPHOCYTES NFR BLD MANUAL: 13.3 %
MAGNESIUM SERPL-MCNC: 2.02 MG/DL (ref 1.6–2.4)
MCH RBC QN AUTO: 23.1 PG (ref 26–34)
MCHC RBC AUTO-ENTMCNC: 29.2 G/DL (ref 32–36)
MCV RBC AUTO: 79 FL (ref 80–100)
METAPNEUMOVIRUS , VIRC: NOT DETECTED
MONOCYTES # BLD MANUAL: 0.09 X10*3/UL (ref 0.05–0.8)
MONOCYTES NFR BLD MANUAL: 0.9 %
NEUTROPHILS # BLD MANUAL: 7.89 X10*3/UL (ref 1.6–5.5)
NEUTS BAND # BLD MANUAL: 0.52 X10*3/UL (ref 0–0.5)
NEUTS BAND NFR BLD MANUAL: 5.3 %
NEUTS SEG # BLD MANUAL: 7.37 X10*3/UL (ref 1.6–5)
NEUTS SEG NFR BLD MANUAL: 75.2 %
NIL(NEG) CONTROL SPOT COUNT: NORMAL
NRBC BLD-RTO: 0 /100 WBCS (ref 0–0)
PANEL A SPOT COUNT: 1
PANEL B SPOT COUNT: 0
PARAINFLUENZA PCR, VIRC: NOT DETECTED
PHOSPHATE SERPL-MCNC: 2.5 MG/DL (ref 2.5–4.9)
PHOSPHATE SERPL-MCNC: 3.6 MG/DL (ref 2.5–4.9)
PLATELET # BLD AUTO: 393 X10*3/UL (ref 150–450)
POS CONTROL SPOT COUNT: NORMAL
POTASSIUM SERPL-SCNC: 3 MMOL/L (ref 3.5–5.3)
POTASSIUM SERPL-SCNC: 3.8 MMOL/L (ref 3.5–5.3)
RBC # BLD AUTO: 3.99 X10*6/UL (ref 4.5–5.9)
RBC MORPH BLD: ABNORMAL
RSV PCR, RVP, VIRC: NOT DETECTED
SODIUM SERPL-SCNC: 152 MMOL/L (ref 136–145)
SODIUM SERPL-SCNC: 154 MMOL/L (ref 136–145)
T-SPOT. TB INTERPRETATION: NEGATIVE
TOTAL CELLS COUNTED BLD: 113
VANCOMYCIN SERPL-MCNC: 21 UG/ML (ref 5–20)
VARIANT LYMPHS # BLD MANUAL: 0.09 X10*3/UL (ref 0–0.3)
VARIANT LYMPHS NFR BLD: 0.9 %
WBC # BLD AUTO: 9.8 X10*3/UL (ref 4.4–11.3)

## 2024-04-20 PROCEDURE — S4991 NICOTINE PATCH NONLEGEND: HCPCS

## 2024-04-20 PROCEDURE — 2500000004 HC RX 250 GENERAL PHARMACY W/ HCPCS (ALT 636 FOR OP/ED)

## 2024-04-20 PROCEDURE — 99204 OFFICE O/P NEW MOD 45 MIN: CPT | Performed by: STUDENT IN AN ORGANIZED HEALTH CARE EDUCATION/TRAINING PROGRAM

## 2024-04-20 PROCEDURE — 36415 COLL VENOUS BLD VENIPUNCTURE: CPT | Performed by: PHARMACIST

## 2024-04-20 PROCEDURE — 99214 OFFICE O/P EST MOD 30 MIN: CPT | Performed by: STUDENT IN AN ORGANIZED HEALTH CARE EDUCATION/TRAINING PROGRAM

## 2024-04-20 PROCEDURE — 1100000001 HC PRIVATE ROOM DAILY

## 2024-04-20 PROCEDURE — 82947 ASSAY GLUCOSE BLOOD QUANT: CPT

## 2024-04-20 PROCEDURE — 80202 ASSAY OF VANCOMYCIN: CPT | Performed by: PHARMACIST

## 2024-04-20 PROCEDURE — 80069 RENAL FUNCTION PANEL: CPT | Mod: MUE

## 2024-04-20 PROCEDURE — 80069 RENAL FUNCTION PANEL: CPT

## 2024-04-20 PROCEDURE — 3E0G76Z INTRODUCTION OF NUTRITIONAL SUBSTANCE INTO UPPER GI, VIA NATURAL OR ARTIFICIAL OPENING: ICD-10-PCS | Performed by: INTERNAL MEDICINE

## 2024-04-20 PROCEDURE — 2500000005 HC RX 250 GENERAL PHARMACY W/O HCPCS: Mod: JZ

## 2024-04-20 PROCEDURE — 2500000002 HC RX 250 W HCPCS SELF ADMINISTERED DRUGS (ALT 637 FOR MEDICARE OP, ALT 636 FOR OP/ED)

## 2024-04-20 PROCEDURE — 83735 ASSAY OF MAGNESIUM: CPT

## 2024-04-20 PROCEDURE — 99223 1ST HOSP IP/OBS HIGH 75: CPT

## 2024-04-20 PROCEDURE — 3E033XZ INTRODUCTION OF VASOPRESSOR INTO PERIPHERAL VEIN, PERCUTANEOUS APPROACH: ICD-10-PCS | Performed by: INTERNAL MEDICINE

## 2024-04-20 PROCEDURE — 85007 BL SMEAR W/DIFF WBC COUNT: CPT

## 2024-04-20 PROCEDURE — 85027 COMPLETE CBC AUTOMATED: CPT

## 2024-04-20 PROCEDURE — 36415 COLL VENOUS BLD VENIPUNCTURE: CPT

## 2024-04-20 PROCEDURE — 0CJS8ZZ INSPECTION OF LARYNX, VIA NATURAL OR ARTIFICIAL OPENING ENDOSCOPIC: ICD-10-PCS | Performed by: INTERNAL MEDICINE

## 2024-04-20 PROCEDURE — 2500000001 HC RX 250 WO HCPCS SELF ADMINISTERED DRUGS (ALT 637 FOR MEDICARE OP)

## 2024-04-20 PROCEDURE — 76937 US GUIDE VASCULAR ACCESS: CPT

## 2024-04-20 PROCEDURE — 2500000005 HC RX 250 GENERAL PHARMACY W/O HCPCS

## 2024-04-20 RX ORDER — POTASSIUM CHLORIDE 14.9 MG/ML
20 INJECTION INTRAVENOUS
Status: COMPLETED | OUTPATIENT
Start: 2024-04-20 | End: 2024-04-21

## 2024-04-20 RX ORDER — DEXTROSE MONOHYDRATE 100 MG/ML
100 INJECTION, SOLUTION INTRAVENOUS CONTINUOUS
Status: DISPENSED | OUTPATIENT
Start: 2024-04-20 | End: 2024-04-20

## 2024-04-20 RX ORDER — DEXTROSE 50 % IN WATER (D50W) INTRAVENOUS SYRINGE
25 ONCE
Status: DISCONTINUED | OUTPATIENT
Start: 2024-04-20 | End: 2024-04-20

## 2024-04-20 RX ADMIN — Medication 1 TABLET: at 08:06

## 2024-04-20 RX ADMIN — HEPARIN SODIUM 5000 UNITS: 5000 INJECTION INTRAVENOUS; SUBCUTANEOUS at 14:09

## 2024-04-20 RX ADMIN — PIPERACILLIN SODIUM AND TAZOBACTAM SODIUM 3.38 G: 3; .375 INJECTION, SOLUTION INTRAVENOUS at 06:30

## 2024-04-20 RX ADMIN — POTASSIUM CHLORIDE 20 MEQ: 14.9 INJECTION, SOLUTION INTRAVENOUS at 21:11

## 2024-04-20 RX ADMIN — INSULIN LISPRO 2 UNITS: 100 INJECTION, SOLUTION INTRAVENOUS; SUBCUTANEOUS at 20:38

## 2024-04-20 RX ADMIN — DEXTROSE MONOHYDRATE 25 G: 25 INJECTION, SOLUTION INTRAVENOUS at 09:50

## 2024-04-20 RX ADMIN — MELATONIN 3 MG: 3 TAB ORAL at 20:13

## 2024-04-20 RX ADMIN — HEPARIN SODIUM 5000 UNITS: 5000 INJECTION INTRAVENOUS; SUBCUTANEOUS at 06:30

## 2024-04-20 RX ADMIN — DEXTROSE MONOHYDRATE 100 ML/HR: 100 INJECTION, SOLUTION INTRAVENOUS at 09:56

## 2024-04-20 RX ADMIN — DEXTROSE MONOHYDRATE 12.5 G: 25 INJECTION, SOLUTION INTRAVENOUS at 12:28

## 2024-04-20 RX ADMIN — POLYETHYLENE GLYCOL 3350 17 G: 17 POWDER, FOR SOLUTION ORAL at 08:06

## 2024-04-20 RX ADMIN — HEPARIN SODIUM 5000 UNITS: 5000 INJECTION INTRAVENOUS; SUBCUTANEOUS at 22:24

## 2024-04-20 RX ADMIN — OXYCODONE HYDROCHLORIDE AND ACETAMINOPHEN 500 MG: 500 TABLET ORAL at 08:05

## 2024-04-20 RX ADMIN — PIPERACILLIN SODIUM AND TAZOBACTAM SODIUM 3.38 G: 3; .375 INJECTION, SOLUTION INTRAVENOUS at 17:15

## 2024-04-20 RX ADMIN — AZITHROMYCIN DIHYDRATE 500 MG: 500 TABLET ORAL at 08:05

## 2024-04-20 RX ADMIN — NICOTINE 1 PATCH: 21 PATCH, EXTENDED RELEASE TRANSDERMAL at 08:06

## 2024-04-20 RX ADMIN — ATORVASTATIN CALCIUM 10 MG: 20 TABLET, FILM COATED ORAL at 20:07

## 2024-04-20 RX ADMIN — INSULIN LISPRO 6 UNITS: 100 INJECTION, SOLUTION INTRAVENOUS; SUBCUTANEOUS at 16:39

## 2024-04-20 RX ADMIN — MIRTAZAPINE 7.5 MG: 15 TABLET, FILM COATED ORAL at 20:07

## 2024-04-20 RX ADMIN — REMDESIVIR 100 MG: 100 INJECTION, POWDER, LYOPHILIZED, FOR SOLUTION INTRAVENOUS at 16:25

## 2024-04-20 RX ADMIN — PIPERACILLIN SODIUM AND TAZOBACTAM SODIUM 3.38 G: 3; .375 INJECTION, SOLUTION INTRAVENOUS at 12:08

## 2024-04-20 RX ADMIN — OXYCODONE HYDROCHLORIDE AND ACETAMINOPHEN 500 MG: 500 TABLET ORAL at 14:09

## 2024-04-20 RX ADMIN — POTASSIUM CHLORIDE 20 MEQ: 14.9 INJECTION, SOLUTION INTRAVENOUS at 23:08

## 2024-04-20 RX ADMIN — OXYCODONE HYDROCHLORIDE AND ACETAMINOPHEN 500 MG: 500 TABLET ORAL at 20:07

## 2024-04-20 RX ADMIN — PANTOPRAZOLE SODIUM 40 MG: 40 TABLET, DELAYED RELEASE ORAL at 08:06

## 2024-04-20 RX ADMIN — INSULIN LISPRO 10 UNITS: 100 INJECTION, SOLUTION INTRAVENOUS; SUBCUTANEOUS at 09:15

## 2024-04-20 ASSESSMENT — COGNITIVE AND FUNCTIONAL STATUS - GENERAL
WALKING IN HOSPITAL ROOM: TOTAL
MOVING FROM LYING ON BACK TO SITTING ON SIDE OF FLAT BED WITH BEDRAILS: TOTAL
CLIMB 3 TO 5 STEPS WITH RAILING: TOTAL
PERSONAL GROOMING: TOTAL
HELP NEEDED FOR BATHING: TOTAL
CLIMB 3 TO 5 STEPS WITH RAILING: TOTAL
DRESSING REGULAR LOWER BODY CLOTHING: TOTAL
MOBILITY SCORE: 6
TOILETING: TOTAL
STANDING UP FROM CHAIR USING ARMS: TOTAL
DRESSING REGULAR LOWER BODY CLOTHING: TOTAL
MOVING TO AND FROM BED TO CHAIR: TOTAL
EATING MEALS: TOTAL
DRESSING REGULAR UPPER BODY CLOTHING: TOTAL
WALKING IN HOSPITAL ROOM: TOTAL
TOILETING: TOTAL
MOVING FROM LYING ON BACK TO SITTING ON SIDE OF FLAT BED WITH BEDRAILS: TOTAL
DAILY ACTIVITIY SCORE: 6
EATING MEALS: TOTAL
DRESSING REGULAR UPPER BODY CLOTHING: TOTAL
MOBILITY SCORE: 6
HELP NEEDED FOR BATHING: TOTAL
PERSONAL GROOMING: TOTAL
DAILY ACTIVITIY SCORE: 6
TURNING FROM BACK TO SIDE WHILE IN FLAT BAD: TOTAL
TURNING FROM BACK TO SIDE WHILE IN FLAT BAD: TOTAL
STANDING UP FROM CHAIR USING ARMS: TOTAL
MOVING TO AND FROM BED TO CHAIR: TOTAL

## 2024-04-20 ASSESSMENT — PAIN SCALES - PAIN ASSESSMENT IN ADVANCED DEMENTIA (PAINAD)
BREATHING: NORMAL
CONSOLABILITY: NO NEED TO CONSOLE
TOTALSCORE: 0
BODYLANGUAGE: RELAXED
FACIALEXPRESSION: SMILING OR INEXPRESSIVE

## 2024-04-20 ASSESSMENT — PAIN SCALES - WONG BAKER: WONGBAKER_NUMERICALRESPONSE: NO HURT

## 2024-04-20 ASSESSMENT — PAIN - FUNCTIONAL ASSESSMENT: PAIN_FUNCTIONAL_ASSESSMENT: WONG-BAKER FACES

## 2024-04-20 NOTE — CONSULTS
" ENT DEPARTMENT CONSULTATION NOTE  Name: Brian Rodriguez  MRN: [id number]  : 1955  Consulting Team: Maricel Vanegas   Reason for Consult: SLP recommend ENT consultation to \"determine etiology of dysphagia\"    History of Present Illness  The patient is a 69 y.o. male with a past medical history significant for type 2 diabetes, hypertension, CKD, hyperlipidemia, hidradenitis, bipolar orginally presented on 23 secondary to ED for altered mental status and concern on infection.     ENT was consulted for:  - SLP recommend ENT consultation to \"determine etiology of dysphagia\"  - Radiologist noted enlarged arytenoids/ epiglottis   - Concerns for anatomic abnormalities preventing the [facility] nursing team from placing a DHT.    Review of Systems  14 point review of systems completed and all negative except as noted in HPI.    Past Medical History  History reviewed. No pertinent past medical history.    Past Surgical History  History reviewed. No pertinent surgical history.    Allergies  No Known Allergies    Medications    Current Facility-Administered Medications:     acetaminophen (Tylenol) tablet 650 mg, 650 mg, oral, q4h PRN **OR** acetaminophen (Tylenol) oral liquid 650 mg, 650 mg, oral, q4h PRN **OR** acetaminophen (Tylenol) suppository 650 mg, 650 mg, rectal, q4h PRN, Heather Villalta MD    acetaminophen (Tylenol) tablet 650 mg, 650 mg, oral, q4h PRN, Heather Villalta MD    ascorbic acid (Vitamin C) tablet 500 mg, 500 mg, oral, TID, Heather Villalta MD, 500 mg at 24 0805    atorvastatin (Lipitor) tablet 10 mg, 10 mg, oral, Nightly, Heather Villalta MD, 10 mg at 24 2242    azithromycin (Zithromax) tablet 500 mg, 500 mg, oral, q24h RANDAL, Heather Villalta MD, 500 mg at 24 0805    bisacodyl (Dulcolax) suppository 10 mg, 10 mg, rectal, Daily, Josh Iniguez MD, 10 mg at 24 1549    chlorhexidine (Hibiclens) 4 % liquid, , Topical, Daily, Josh Iniguez MD    [Held by provider] dextrose 10 % in water (D10W) " infusion, 100 mL/hr, intravenous, Continuous, Ernst Mcneal MD, Stopped at 04/20/24 1058    dextrose 50 % injection 12.5 g, 12.5 g, intravenous, q15 min PRN, Heather Villalta MD    dextrose 50 % injection 25 g, 25 g, intravenous, q15 min PRN, Heather Villalta MD, 25 g at 04/20/24 0950    glucagon (Glucagen) injection 1 mg, 1 mg, intramuscular, q15 min PRN, Heather Villalta MD    glucagon (Glucagen) injection 1 mg, 1 mg, intramuscular, q15 min PRN, Heather Villalta MD    guaiFENesin (Robitussin) 100 mg/5 mL syrup 100 mg, 100 mg, oral, q4h PRN, Heather Villalta MD    heparin (porcine) injection 5,000 Units, 5,000 Units, subcutaneous, q8h RANDAL, Heather Villalta MD, 5,000 Units at 04/20/24 0630    insulin glargine (Lantus) injection 10 Units, 10 Units, subcutaneous, q AM, Heather Villalta MD, 10 Units at 04/19/24 0903    insulin lispro (HumaLOG) injection 0-10 Units, 0-10 Units, subcutaneous, q4h, Josh Iniguez MD, 10 Units at 04/20/24 0915    melatonin tablet 3 mg, 3 mg, oral, Nightly PRN, Heather Villalta MD, 3 mg at 04/19/24 2242    mirtazapine (Remeron) tablet 7.5 mg, 7.5 mg, oral, Nightly, Heather Villalta MD, 7.5 mg at 04/19/24 2242    multivitamin with minerals 1 tablet, 1 tablet, oral, Daily, Heather Villalta MD, 1 tablet at 04/20/24 0806    nicotine (Nicoderm CQ) 21 mg/24 hr patch 1 patch, 1 patch, transdermal, Daily, 1 patch at 04/20/24 0806 **FOLLOWED BY** [START ON 5/30/2024] nicotine (Nicoderm CQ) 14 mg/24 hr patch 1 patch, 1 patch, transdermal, Daily **FOLLOWED BY** [START ON 6/13/2024] nicotine (Nicoderm CQ) 7 mg/24 hr patch 1 patch, 1 patch, transdermal, Daily, Josh Iniguez MD    nicotine polacrilex (Nicorette) gum 4 mg, 4 mg, Mouth/Throat, q2h PRN, Josh Iniguez MD    pantoprazole (ProtoNix) EC tablet 40 mg, 40 mg, oral, q AM, Heather Villalta MD, 40 mg at 04/20/24 0806    piperacillin-tazobactam-dextrose (Zosyn) IV 3.375 g, 3.375 g, intravenous, q6h, Heather Villalta MD, Stopped at 04/20/24 0700    polyethylene glycol (Glycolax, Miralax)  packet 17 g, 17 g, oral, BID, Josh Iniguez MD, 17 g at 04/20/24 0806    [COMPLETED] remdesivir (Veklury) 200 mg in sodium chloride 0.9% 250 mL IV, 200 mg, intravenous, Once, Stopped at 04/19/24 0120 **FOLLOWED BY** remdesivir (Veklury) 100 mg in sodium chloride 0.9% 250 mL IV, 100 mg, intravenous, q24h, Josh Iniguez MD, Stopped at 04/19/24 1748    sennosides-docusate sodium (Annia-Colace) 8.6-50 mg per tablet 2 tablet, 2 tablet, oral, BID, Josh Iniguez MD    traMADol (Ultram) tablet 50 mg, 50 mg, oral, Daily PRN, Heather Villalta MD    vancomycin (Vancocin) pharmacy to dose - pharmacy monitoring, , miscellaneous, Daily PRN, Heather Villalta MD    Family History  No family history on file.    Social History  Social History     Socioeconomic History    Marital status: Single     Spouse name: Not on file    Number of children: Not on file    Years of education: Not on file    Highest education level: Not on file   Occupational History    Not on file   Tobacco Use    Smoking status: Never     Passive exposure: Never    Smokeless tobacco: Not on file   Vaping Use    Vaping status: Unknown   Substance and Sexual Activity    Alcohol use: Not Currently    Drug use: Defer    Sexual activity: Defer   Other Topics Concern    Not on file   Social History Narrative    Not on file     Social Determinants of Health     Financial Resource Strain: Patient Unable To Answer (4/18/2024)    Overall Financial Resource Strain (CARDIA)     Difficulty of Paying Living Expenses: Patient unable to answer   Food Insecurity: Unknown (7/13/2020)    Received from [facility]    Hunger Vital Sign     Worried About Running Out of Food in the Last Year: Patient declined     Ran Out of Food in the Last Year: Patient declined   Transportation Needs: Patient Unable To Answer (4/18/2024)    PRAPARE - Transportation     Lack of Transportation (Medical): Patient unable to answer     Lack of Transportation (Non-Medical): Patient unable to answer    Physical Activity: Not on file   Stress: Not on file   Social Connections: Not on file   Intimate Partner Violence: Not on file   Housing Stability: Patient Unable To Answer (4/18/2024)    Housing Stability Vital Sign     Unable to Pay for Housing in the Last Year: Patient unable to answer     Number of Places Lived in the Last Year: 1     Unstable Housing in the Last Year: Patient unable to answer       Vital Signs  Vitals:    04/20/24 0416   BP: 120/73   Pulse: 88   Resp: 18   Temp: 36.1 °C (97 °F)   SpO2: 97%       Physical Examination  GEN: in no acute distress.  In restraints.  RESP: Unlabored on room air with no audible stertor or stridor  CV: Clinically well perfused   EYES: EOM grossly intact with no scleral icterus  HEAD: Scalp is normocephalic and atraumatic  FACE: No abrasions or lacerations, no maxillary or mandibular stepoffs  EARS: Normal external ears  NOSE: External nose appears normal  OC: Dry mucosal membranes   OP: Poor dentition, normal pharyngeal walls, normal soft palate  NECK: Trachea midline, no significant lymphadenopathy    Laboratory and Data  Results for orders placed or performed during the hospital encounter of 04/17/24 (from the past 24 hour(s))   POCT GLUCOSE   Result Value Ref Range    POCT Glucose 167 (H) 74 - 99 mg/dL   POCT GLUCOSE   Result Value Ref Range    POCT Glucose 262 (H) 74 - 99 mg/dL   POCT GLUCOSE   Result Value Ref Range    POCT Glucose 97 74 - 99 mg/dL   Renal function panel   Result Value Ref Range    Glucose 77 74 - 99 mg/dL    Sodium 154 (H) 136 - 145 mmol/L    Potassium 3.3 (L) 3.5 - 5.3 mmol/L    Chloride 120 (H) 98 - 107 mmol/L    Bicarbonate 20 (L) 21 - 32 mmol/L    Anion Gap 17 10 - 20 mmol/L    Urea Nitrogen 45 (H) 6 - 23 mg/dL    Creatinine 2.74 (H) 0.50 - 1.30 mg/dL    eGFR 24 (L) >60 mL/min/1.73m*2    Calcium 9.2 8.6 - 10.6 mg/dL    Phosphorus 3.3 2.5 - 4.9 mg/dL    Albumin 2.3 (L) 3.4 - 5.0 g/dL   POCT GLUCOSE   Result Value Ref Range    POCT Glucose  103 (H) 74 - 99 mg/dL   POCT GLUCOSE   Result Value Ref Range    POCT Glucose 97 74 - 99 mg/dL   POCT GLUCOSE   Result Value Ref Range    POCT Glucose 155 (H) 74 - 99 mg/dL   CBC and Auto Differential   Result Value Ref Range    WBC 9.8 4.4 - 11.3 x10*3/uL    nRBC 0.0 0.0 - 0.0 /100 WBCs    RBC 3.99 (L) 4.50 - 5.90 x10*6/uL    Hemoglobin 9.2 (L) 13.5 - 17.5 g/dL    Hematocrit 31.5 (L) 41.0 - 52.0 %    MCV 79 (L) 80 - 100 fL    MCH 23.1 (L) 26.0 - 34.0 pg    MCHC 29.2 (L) 32.0 - 36.0 g/dL    RDW 20.3 (H) 11.5 - 14.5 %    Platelets 393 150 - 450 x10*3/uL    Immature Granulocytes %, Automated 0.7 0.0 - 0.9 %    Immature Granulocytes Absolute, Automated 0.07 0.00 - 0.50 x10*3/uL   Renal function panel   Result Value Ref Range    Glucose 115 (H) 74 - 99 mg/dL    Sodium 154 (H) 136 - 145 mmol/L    Potassium 3.8 3.5 - 5.3 mmol/L    Chloride 122 (H) 98 - 107 mmol/L    Bicarbonate 20 (L) 21 - 32 mmol/L    Anion Gap 16 10 - 20 mmol/L    Urea Nitrogen 43 (H) 6 - 23 mg/dL    Creatinine 2.91 (H) 0.50 - 1.30 mg/dL    eGFR 22 (L) >60 mL/min/1.73m*2    Calcium 9.1 8.6 - 10.6 mg/dL    Phosphorus 3.6 2.5 - 4.9 mg/dL    Albumin 2.2 (L) 3.4 - 5.0 g/dL   Manual Differential   Result Value Ref Range    Neutrophils %, Manual 75.2 40.0 - 80.0 %    Bands %, Manual 5.3 0.0 - 5.0 %    Lymphocytes %, Manual 13.3 13.0 - 44.0 %    Monocytes %, Manual 0.9 2.0 - 10.0 %    Eosinophils %, Manual 4.4 0.0 - 6.0 %    Basophils %, Manual 0.0 0.0 - 2.0 %    Atypical Lymphocytes %, Manual 0.9 0.0 - 2.0 %    Seg Neutrophils Absolute, Manual 7.37 (H) 1.60 - 5.00 x10*3/uL    Bands Absolute, Manual 0.52 (H) 0.00 - 0.50 x10*3/uL    Lymphocytes Absolute, Manual 1.30 0.80 - 3.00 x10*3/uL    Monocytes Absolute, Manual 0.09 0.05 - 0.80 x10*3/uL    Eosinophils Absolute, Manual 0.43 (H) 0.00 - 0.40 x10*3/uL    Basophils Absolute, Manual 0.00 0.00 - 0.10 x10*3/uL    Atypical Lymphs Absolute, Manual 0.09 0.00 - 0.30 x10*3/uL    Total Cells Counted 113     Neutrophils  "Absolute, Manual 7.89 (H) 1.60 - 5.50 x10*3/uL    RBC Morphology See Below     Funmilayo Cells Few    POCT GLUCOSE   Result Value Ref Range    POCT Glucose 118 (H) 74 - 99 mg/dL   POCT GLUCOSE   Result Value Ref Range    POCT Glucose 95 74 - 99 mg/dL   POCT GLUCOSE   Result Value Ref Range    POCT Glucose 178 (H) 74 - 99 mg/dL   POCT GLUCOSE   Result Value Ref Range    POCT Glucose 135 (H) 74 - 99 mg/dL       Radiology Reviewed  I have personally reviewed the Modified barium swallow        PROCEDURE NOTE:  Nasopharyngolaryngoscopy    For better visualization because of concerns of a large epiglottis and arytenoids on modified barium swallow, a flexible fiberoptic nasopharyngolaryngoscopy was performed. Patient was correctly identified and verbal consent obtained.  After topical anesthesia with atomized 4% Lidocaine with Afrin, the flexible scope was introduced into the left naris.    The following areas were visualized:  Nasal septum, turbinates, nasopharynx, oropharynx, hypopharynx, soft palate, base of tongue, epiglottis, and larynx.    These structures were found to be normal with the following notable findings:     -No masses, ulcers, lesions noted  - No evidence of any airway edema   -No evidence of abnormalities noted in the epiglottis  - No evidence of abnormalities noted from the arytenoids  - No evidence of any airway anatomic abnormality  - Bilateral vocal cord mobility  - Evidence of food particles in bilateral vallecula suspected be from MBS performed prior day    Assessment  Brian Rodriguez is a 69 y.o. male who is ENT was consulted on after SLP recommend ENT consultation to \"determine etiology of dysphagia\", Radiologist noted enlarged arytenoids/ epiglottis and concerns for anatomic abnormalities preventing the [facility] nursing team from placing a DHT.  Bedside nasopharyngeal laryngoscopy was performed.  See procedure note above.    Recommendations  - No ENT intervention  - No anatomic defects noted to be " the cause of dysphagia  - Further SLP clinical evaluation  - If primary team pursues DHT placement, no contraindications to being placed by the nursing team.          Tylor Swenson, DO PGY-2  Otolaryngology - Head & Neck Surgery  [facility]  ENT Consult pager: [phone]  Please Page if Urgent       Staffing Update 4/20:  Patient seen with Dr. Sal. Agree with plan above.

## 2024-04-20 NOTE — CARE PLAN
The patient's goals for the shift include Pt will be free from falls  use call light  bed alarm remians on    The clinical goals for the shift include Patient will remain free from falls, by the end of this shift.    Patient remained free from injuring during this shift.     Patient is NPO, in bed, call light within reach.    Plan of care ongoing.

## 2024-04-20 NOTE — PROGRESS NOTES
Shaylee Rodas PharmD 4/20/2024 16:24      Vancomycin Dosing by Pharmacy- FOLLOW UP     Brian Rodriguez is a 69 y.o. year old male who Pharmacy has been consulted for vancomycin dosing for pneumonia. Based on the patient's indication and renal status this patient is being dosed based on a goal trough/random level of 15-20.      Renal function is currently unstable/declining.      Current vancomycin dose: Dose by level - Last dose of 1500mg given 4/19 @1500.     Most recent random level: 21 mcg/mL     Visit Vitals  /64 (Patient Position: Lying)   Pulse 87   Temp 36.2 °C (97.2 °F) (Temporal)   Resp 17               Lab Results   Component Value Date     CREATININE 2.91 (H) 04/20/2024     CREATININE 2.74 (H) 04/19/2024     CREATININE 2.67 (H) 04/19/2024     CREATININE 2.80 (H) 04/18/2024               Lab Results   Component Value Date     PATIENTTEMP 37.0 04/18/2024     PATIENTTEMP 37.0 03/08/2023     PATIENTTEMP 37.0 12/21/2022         Assessment/Plan     Most recent level is 21, above goal of 15-20. Level went from 11.4 to 21.0, after one dose of 1500mg. Holding dose, repeating level for 4/21 @0100.  Will continue to monitor renal function daily while on vancomycin and order serum creatinine at least every 48 hours if not already ordered.  Follow for continued vancomycin needs, clinical response, and signs/symptoms of toxicity.         Shaylee Rodas, AdriánD

## 2024-04-20 NOTE — PROGRESS NOTES
"    Subjective   Got dobhoff placed last night. Aox1-2 today.      Objective     Physical Exam    General: thin appearing, NAD  Eyes:  Extraocular movements grossly intact. No scleral icterus.   Head: Atraumatic. Normocephalic.     Neck: No meningismus. No gross masses. Full movement through range of motion  ENT: Posterior oropharynx shows no erythema, exudate or edema.  Uvula is midline without edema.  No stridor or trismus  CV: Regular rhythm. No murmurs, rubs, gallops appreciated.   Resp: Clear to auscultation bilaterally. No respiratory distress.    GI: Nontender. Soft. No masses. No rebound, rigidity or guarding.   MSK: Chronic sacral wound noted to the sacrum   Skin: chronic sacral wounds with purulent drainage  Neuro: A&O 1-2. Poor Speech  Alert. Moving all extremities.   Psych: Appropriate mood and affect for situation       Last Recorded Vitals  Blood pressure 101/64, pulse 87, temperature 36.2 °C (97.2 °F), temperature source Temporal, resp. rate 17, height 1.778 m (5' 10\"), weight 55.3 kg (122 lb), SpO2 96%.  Intake/Output last 3 Shifts:  I/O last 3 completed shifts:  In: 750 (9.4 mL/kg) [IV Piggyback:750]  Out: 1625 (20.5 mL/kg) [Urine:1625 (0.6 mL/kg/hr)]  Dosing Weight: 79.4 kg     Scheduled medications  ascorbic acid, 500 mg, oral, TID  atorvastatin, 10 mg, oral, Nightly  azithromycin, 500 mg, oral, q24h RANDAL  bisacodyl, 10 mg, rectal, Daily  chlorhexidine, , Topical, Daily  heparin (porcine), 5,000 Units, subcutaneous, q8h RANDAL  insulin glargine, 10 Units, subcutaneous, q AM  insulin lispro, 0-10 Units, subcutaneous, q4h  mirtazapine, 7.5 mg, oral, Nightly  multivitamin with minerals, 1 tablet, oral, Daily  nicotine, 1 patch, transdermal, Daily   Followed by  [START ON 5/30/2024] nicotine, 1 patch, transdermal, Daily   Followed by  [START ON 6/13/2024] nicotine, 1 patch, transdermal, Daily  pantoprazole, 40 mg, oral, q AM  piperacillin-tazobactam, 3.375 g, intravenous, q6h  polyethylene glycol, 17 g, " oral, BID  remdesivir, 100 mg, intravenous, q24h  sennosides-docusate sodium, 2 tablet, oral, BID      Continuous medications       PRN medications  PRN medications: acetaminophen **OR** acetaminophen **OR** acetaminophen, acetaminophen, dextrose, dextrose, glucagon, glucagon, guaiFENesin, melatonin, nicotine polacrilex, traMADol, vancomycin                 This patient has a urinary catheter   Reason for the urinary catheter remaining today? urinary retention/bladder outlet obstruction, acute or chronic         Assessment/Plan     68 yo with CKD, DM, HTN, sacral wound, bipolar referred from nursing home with concern for altered mental status. Patient is A&O x2 at baseline, but nursing home staff reported that he seemed more confused and disoriented than normal. CT scan in the ED 4/17 displayed multifocal pneumonia, patient denies any cough, fevers or chest pain. Patient found to be hypernatremic (Na: 155) on admission, with urinary retention (bladder scan 700cc), and fecal impaction on CT.  Patient failed SLP on 4/18 and MBS 4/19. Strict NPO given poor swallowing mechanics, gross aspiration before and during swallow, inconsistently sensated aspiration events, and poor clearance.     Updates 4/20  -started tube feeds  -T spot negative  -increased FWF to 400 q6h  -given 10u lispro instead of 10u lantus, blood glucoses serially monitored and was given 25 g dextrose, followed by 12.5 dextrose as well as 250 ml of d10 gtt at 100cc/hr. Lantus held for today. Will check q4 POCT and administer sliding scale    #Multifocal pneumonia  #COVID pneumonia  :: MRSA positive, COVID positive  :: Vancomycin, Zosyn and azithromycin (4/18-)  :: CT: Peribronchovascular patchy ground-glass/consolidative opacitiesthroughout the right lung and to a lesser degree within the dependent left lower lobe compatible with multifocal pneumonia.  Skin thickening of the left gluteal soft tissues with resolution of subcutaneous gas/fluid with no  definitive associated focal fluid collection.  :: strep and legionella negative  :: evaluated by SLP->concerning for pharyngeal dysphagia and aspiration (x2/2) trials, recommending MBBS to determine PO safety  :: MBS 4/19: severe swallowing dysfunction, profoundly impaired oropharyngeal swallow, limited-no initiation in swallowing  -continuing abx and remdesivir. Veronica completed    #hypernatremia  :: Na of 150 am 4/19, down 5 units from 155 24 hours after admission  :: was on 120mL/hr D5W until 8pm 4/18, sodium 156 at 8pm-> D5W increased to 160mL/hr, Na of 150 in am 4/19  -FWF    #hidradenitis suppurativa  :: purulent drainage from chronic sacral wounds from hidradenitis suppurativa  :: Derm recommending humira and minocycline 100 BID, patient will need labs to confirm eligibility for these medications  :: hepatitis C antibody negative, hepatitis B surface antigen, hepatitis B core antibody negative, total, hepatitis B surface antibody negative, T-spot pending, hIV 1/2 antigen/antibody screen negative -> to determine Humira eligibility  :: wound team following, daily dressing changes  - will need to follow up outpatient with dermatology within 2 weeks of discharge.  - doxycycline 100 Bid outpatient after abx course for pneumonia is completed    #Urinary retention  ::  Urinary retention: urinary output 700mL 4/17 and 400mL 4/18 am following bunch placement  -continuing bunch  [ ] pending TOV and nasrin recommended tamsulosin if fails TOV    # Fecal impaction  :: fecal impaction seen on CT  - bowel regimen     # CKD 3  -Strict I&Os  -Avoid nephrotoxic agents  -Continue to monitor     #T2DM  #Hyperglycemia  ::Last Hba1c 9.1 March 2023  -Continue 10 units glargine   - SSI updated from with meals to q4  -Diabetic diet     #HTN  BP at lower side  Hold home amlodipine     #HLD  -Continue home atorvastatin 10      #Bipolar disorder  -No current medications     F FWF for hyperNa  E Hypernatremia correction  N Diabetic  diet  A PIV     DVT prophylaxis: heparin             Ernst Mcneal MD

## 2024-04-20 NOTE — CARE PLAN
Problem: Safety - Medical Restraint  Goal: Remains free of injury from restraints (Restraint for Interference with Medical Device)  Outcome: Progressing  Goal: Free from restraint(s) (Restraint for Interference with Medical Device)  Outcome: Progressing   The patient's goals for the shift include Pt will be free from falls  use call light  bed alarm remians on    The clinical goals for the shift include Pt will remain free from falls or injury by the end of this shift.    Over the shift, the patient did not make progress toward the following goals.

## 2024-04-21 ENCOUNTER — APPOINTMENT (OUTPATIENT)
Dept: CARDIOLOGY | Facility: HOSPITAL | Age: 69
DRG: 871 | End: 2024-04-21
Payer: MEDICARE

## 2024-04-21 ENCOUNTER — APPOINTMENT (OUTPATIENT)
Dept: RADIOLOGY | Facility: HOSPITAL | Age: 69
DRG: 871 | End: 2024-04-21
Payer: MEDICARE

## 2024-04-21 LAB
ALBUMIN SERPL BCP-MCNC: 1.8 G/DL (ref 3.4–5)
ALBUMIN SERPL BCP-MCNC: 1.9 G/DL (ref 3.4–5)
ANION GAP BLDA CALCULATED.4IONS-SCNC: 13 MMO/L (ref 10–25)
ANION GAP SERPL CALC-SCNC: 14 MMOL/L (ref 10–20)
ANION GAP SERPL CALC-SCNC: 16 MMOL/L (ref 10–20)
ARTERIAL PATENCY WRIST A: NEGATIVE
BACTERIA BLD CULT: NORMAL
BACTERIA BLD CULT: NORMAL
BASE EXCESS BLDA CALC-SCNC: -6.7 MMOL/L (ref -2–3)
BASOPHILS # BLD MANUAL: 0 X10*3/UL (ref 0–0.1)
BASOPHILS NFR BLD MANUAL: 0 %
BODY TEMPERATURE: 37 DEGREES CELSIUS
BUN SERPL-MCNC: 44 MG/DL (ref 6–23)
BUN SERPL-MCNC: 46 MG/DL (ref 6–23)
BURR CELLS BLD QL SMEAR: ABNORMAL
CA-I BLDA-SCNC: 1.32 MMOL/L (ref 1.1–1.33)
CALCIUM SERPL-MCNC: 8.6 MG/DL (ref 8.6–10.6)
CALCIUM SERPL-MCNC: 8.7 MG/DL (ref 8.6–10.6)
CHLORIDE BLDA-SCNC: 122 MMOL/L (ref 98–107)
CHLORIDE SERPL-SCNC: 118 MMOL/L (ref 98–107)
CHLORIDE SERPL-SCNC: 119 MMOL/L (ref 98–107)
CO2 SERPL-SCNC: 18 MMOL/L (ref 21–32)
CO2 SERPL-SCNC: 19 MMOL/L (ref 21–32)
CREAT SERPL-MCNC: 2.84 MG/DL (ref 0.5–1.3)
CREAT SERPL-MCNC: 2.88 MG/DL (ref 0.5–1.3)
EGFRCR SERPLBLD CKD-EPI 2021: 23 ML/MIN/1.73M*2
EGFRCR SERPLBLD CKD-EPI 2021: 23 ML/MIN/1.73M*2
EOSINOPHIL # BLD MANUAL: 0.26 X10*3/UL (ref 0–0.4)
EOSINOPHIL NFR BLD MANUAL: 1.8 %
ERYTHROCYTE [DISTWIDTH] IN BLOOD BY AUTOMATED COUNT: 19.7 % (ref 11.5–14.5)
GLUCOSE BLD MANUAL STRIP-MCNC: 225 MG/DL (ref 74–99)
GLUCOSE BLD MANUAL STRIP-MCNC: 229 MG/DL (ref 74–99)
GLUCOSE BLD MANUAL STRIP-MCNC: 242 MG/DL (ref 74–99)
GLUCOSE BLD MANUAL STRIP-MCNC: 276 MG/DL (ref 74–99)
GLUCOSE BLD MANUAL STRIP-MCNC: 281 MG/DL (ref 74–99)
GLUCOSE BLD MANUAL STRIP-MCNC: 306 MG/DL (ref 74–99)
GLUCOSE BLDA-MCNC: 357 MG/DL (ref 74–99)
GLUCOSE SERPL-MCNC: 254 MG/DL (ref 74–99)
GLUCOSE SERPL-MCNC: 263 MG/DL (ref 74–99)
HCO3 BLDA-SCNC: 16.3 MMOL/L (ref 22–26)
HCT VFR BLD AUTO: 25.3 % (ref 41–52)
HCT VFR BLD EST: 28 % (ref 41–52)
HGB BLD-MCNC: 8.2 G/DL (ref 13.5–17.5)
HGB BLDA-MCNC: 9.3 G/DL (ref 13.5–17.5)
IMM GRANULOCYTES # BLD AUTO: 0.17 X10*3/UL (ref 0–0.5)
IMM GRANULOCYTES NFR BLD AUTO: 1.2 % (ref 0–0.9)
INHALED O2 CONCENTRATION: 60 %
LACTATE BLDA-SCNC: 1.8 MMOL/L (ref 0.4–2)
LYMPHOCYTES # BLD MANUAL: 1.54 X10*3/UL (ref 0.8–3)
LYMPHOCYTES NFR BLD MANUAL: 10.6 %
MAGNESIUM SERPL-MCNC: 2.23 MG/DL (ref 1.6–2.4)
MCH RBC QN AUTO: 23.3 PG (ref 26–34)
MCHC RBC AUTO-ENTMCNC: 32.4 G/DL (ref 32–36)
MCV RBC AUTO: 72 FL (ref 80–100)
MONOCYTES # BLD MANUAL: 0.77 X10*3/UL (ref 0.05–0.8)
MONOCYTES NFR BLD MANUAL: 5.3 %
NEUTS SEG # BLD MANUAL: 11.3 X10*3/UL (ref 1.6–5)
NEUTS SEG NFR BLD MANUAL: 77.9 %
NRBC BLD-RTO: 0 /100 WBCS (ref 0–0)
OXYHGB MFR BLDA: 91.5 % (ref 94–98)
PCO2 BLDA: 24 MM HG (ref 38–42)
PH BLDA: 7.44 PH (ref 7.38–7.42)
PHOSPHATE SERPL-MCNC: 2.6 MG/DL (ref 2.5–4.9)
PHOSPHATE SERPL-MCNC: 3.2 MG/DL (ref 2.5–4.9)
PLATELET # BLD AUTO: 343 X10*3/UL (ref 150–450)
PO2 BLDA: 68 MM HG (ref 85–95)
POTASSIUM BLDA-SCNC: 3 MMOL/L (ref 3.5–5.3)
POTASSIUM SERPL-SCNC: 3.2 MMOL/L (ref 3.5–5.3)
POTASSIUM SERPL-SCNC: 3.3 MMOL/L (ref 3.5–5.3)
RBC # BLD AUTO: 3.52 X10*6/UL (ref 4.5–5.9)
RBC MORPH BLD: ABNORMAL
SAO2 % BLDA: 94 % (ref 94–100)
SODIUM BLDA-SCNC: 148 MMOL/L (ref 136–145)
SODIUM SERPL-SCNC: 149 MMOL/L (ref 136–145)
SODIUM SERPL-SCNC: 149 MMOL/L (ref 136–145)
SPECIMEN DRAWN FROM PATIENT: ABNORMAL
TOTAL CELLS COUNTED BLD: 113
VANCOMYCIN TROUGH SERPL-MCNC: 17.7 UG/ML (ref 5–20)
VARIANT LYMPHS # BLD MANUAL: 0.64 X10*3/UL (ref 0–0.3)
VARIANT LYMPHS NFR BLD: 4.4 %
WBC # BLD AUTO: 14.5 X10*3/UL (ref 4.4–11.3)

## 2024-04-21 PROCEDURE — S4991 NICOTINE PATCH NONLEGEND: HCPCS

## 2024-04-21 PROCEDURE — 2500000002 HC RX 250 W HCPCS SELF ADMINISTERED DRUGS (ALT 637 FOR MEDICARE OP, ALT 636 FOR OP/ED): Mod: MUE

## 2024-04-21 PROCEDURE — 2500000005 HC RX 250 GENERAL PHARMACY W/O HCPCS

## 2024-04-21 PROCEDURE — 2500000002 HC RX 250 W HCPCS SELF ADMINISTERED DRUGS (ALT 637 FOR MEDICARE OP, ALT 636 FOR OP/ED)

## 2024-04-21 PROCEDURE — 84145 PROCALCITONIN (PCT): CPT

## 2024-04-21 PROCEDURE — 2500000005 HC RX 250 GENERAL PHARMACY W/O HCPCS: Performed by: STUDENT IN AN ORGANIZED HEALTH CARE EDUCATION/TRAINING PROGRAM

## 2024-04-21 PROCEDURE — 93010 ELECTROCARDIOGRAM REPORT: CPT | Performed by: INTERNAL MEDICINE

## 2024-04-21 PROCEDURE — 85007 BL SMEAR W/DIFF WBC COUNT: CPT

## 2024-04-21 PROCEDURE — 36415 COLL VENOUS BLD VENIPUNCTURE: CPT

## 2024-04-21 PROCEDURE — 2500000004 HC RX 250 GENERAL PHARMACY W/ HCPCS (ALT 636 FOR OP/ED)

## 2024-04-21 PROCEDURE — 71045 X-RAY EXAM CHEST 1 VIEW: CPT | Performed by: RADIOLOGY

## 2024-04-21 PROCEDURE — 80202 ASSAY OF VANCOMYCIN: CPT

## 2024-04-21 PROCEDURE — 80069 RENAL FUNCTION PANEL: CPT

## 2024-04-21 PROCEDURE — 84132 ASSAY OF SERUM POTASSIUM: CPT

## 2024-04-21 PROCEDURE — 93005 ELECTROCARDIOGRAM TRACING: CPT

## 2024-04-21 PROCEDURE — 1100000001 HC PRIVATE ROOM DAILY

## 2024-04-21 PROCEDURE — 85027 COMPLETE CBC AUTOMATED: CPT

## 2024-04-21 PROCEDURE — 2500000004 HC RX 250 GENERAL PHARMACY W/ HCPCS (ALT 636 FOR OP/ED): Performed by: PATHOLOGY

## 2024-04-21 PROCEDURE — 94640 AIRWAY INHALATION TREATMENT: CPT

## 2024-04-21 PROCEDURE — 82947 ASSAY GLUCOSE BLOOD QUANT: CPT

## 2024-04-21 PROCEDURE — 2500000005 HC RX 250 GENERAL PHARMACY W/O HCPCS: Mod: JZ

## 2024-04-21 PROCEDURE — 2500000001 HC RX 250 WO HCPCS SELF ADMINISTERED DRUGS (ALT 637 FOR MEDICARE OP)

## 2024-04-21 PROCEDURE — 71045 X-RAY EXAM CHEST 1 VIEW: CPT

## 2024-04-21 PROCEDURE — 82947 ASSAY GLUCOSE BLOOD QUANT: CPT | Mod: MUE

## 2024-04-21 PROCEDURE — 36600 WITHDRAWAL OF ARTERIAL BLOOD: CPT

## 2024-04-21 PROCEDURE — 83735 ASSAY OF MAGNESIUM: CPT

## 2024-04-21 PROCEDURE — 99223 1ST HOSP IP/OBS HIGH 75: CPT | Performed by: STUDENT IN AN ORGANIZED HEALTH CARE EDUCATION/TRAINING PROGRAM

## 2024-04-21 RX ORDER — POLYETHYLENE GLYCOL 3350 17 G/17G
17 POWDER, FOR SOLUTION ORAL DAILY PRN
Status: DISCONTINUED | OUTPATIENT
Start: 2024-04-21 | End: 2024-05-07

## 2024-04-21 RX ORDER — POTASSIUM CHLORIDE 1.5 G/1.58G
40 POWDER, FOR SOLUTION ORAL ONCE
Status: COMPLETED | OUTPATIENT
Start: 2024-04-21 | End: 2024-04-21

## 2024-04-21 RX ORDER — POTASSIUM CHLORIDE 1.5 G/1.58G
40 POWDER, FOR SOLUTION ORAL ONCE
Status: DISCONTINUED | OUTPATIENT
Start: 2024-04-21 | End: 2024-04-23

## 2024-04-21 RX ORDER — INSULIN GLARGINE 100 [IU]/ML
15 INJECTION, SOLUTION SUBCUTANEOUS EVERY MORNING
Status: DISCONTINUED | OUTPATIENT
Start: 2024-04-22 | End: 2024-04-26

## 2024-04-21 RX ORDER — MAGNESIUM SULFATE HEPTAHYDRATE 40 MG/ML
2 INJECTION, SOLUTION INTRAVENOUS ONCE
Status: COMPLETED | OUTPATIENT
Start: 2024-04-21 | End: 2024-04-21

## 2024-04-21 RX ORDER — POTASSIUM CHLORIDE 20 MEQ/1
40 TABLET, EXTENDED RELEASE ORAL ONCE
Status: DISCONTINUED | OUTPATIENT
Start: 2024-04-21 | End: 2024-04-21

## 2024-04-21 RX ORDER — VANCOMYCIN HYDROCHLORIDE 500 MG/100ML
500 INJECTION, SOLUTION INTRAVENOUS ONCE
Status: COMPLETED | OUTPATIENT
Start: 2024-04-21 | End: 2024-04-21

## 2024-04-21 RX ORDER — IPRATROPIUM BROMIDE AND ALBUTEROL SULFATE 2.5; .5 MG/3ML; MG/3ML
3 SOLUTION RESPIRATORY (INHALATION)
Status: DISCONTINUED | OUTPATIENT
Start: 2024-04-21 | End: 2024-04-22

## 2024-04-21 RX ORDER — DEXAMETHASONE 6 MG/1
6 TABLET ORAL DAILY
Status: DISCONTINUED | OUTPATIENT
Start: 2024-04-21 | End: 2024-04-23

## 2024-04-21 RX ADMIN — Medication 1 TABLET: at 09:12

## 2024-04-21 RX ADMIN — Medication 45 L/MIN: at 21:35

## 2024-04-21 RX ADMIN — INSULIN LISPRO 4 UNITS: 100 INJECTION, SOLUTION INTRAVENOUS; SUBCUTANEOUS at 00:48

## 2024-04-21 RX ADMIN — REMDESIVIR 100 MG: 100 INJECTION, POWDER, LYOPHILIZED, FOR SOLUTION INTRAVENOUS at 15:08

## 2024-04-21 RX ADMIN — PIPERACILLIN SODIUM AND TAZOBACTAM SODIUM 3.38 G: 3; .375 INJECTION, SOLUTION INTRAVENOUS at 13:19

## 2024-04-21 RX ADMIN — PIPERACILLIN SODIUM AND TAZOBACTAM SODIUM 3.38 G: 3; .375 INJECTION, SOLUTION INTRAVENOUS at 00:43

## 2024-04-21 RX ADMIN — HEPARIN SODIUM 5000 UNITS: 5000 INJECTION INTRAVENOUS; SUBCUTANEOUS at 06:24

## 2024-04-21 RX ADMIN — Medication 10 L/MIN: at 21:00

## 2024-04-21 RX ADMIN — OXYCODONE HYDROCHLORIDE AND ACETAMINOPHEN 500 MG: 500 TABLET ORAL at 15:08

## 2024-04-21 RX ADMIN — DEXAMETHASONE 6 MG: 6 TABLET ORAL at 22:24

## 2024-04-21 RX ADMIN — HEPARIN SODIUM 5000 UNITS: 5000 INJECTION INTRAVENOUS; SUBCUTANEOUS at 13:19

## 2024-04-21 RX ADMIN — PIPERACILLIN SODIUM AND TAZOBACTAM SODIUM 3.38 G: 3; .375 INJECTION, SOLUTION INTRAVENOUS at 06:24

## 2024-04-21 RX ADMIN — SENNOSIDES AND DOCUSATE SODIUM 2 TABLET: 8.6; 5 TABLET ORAL at 09:12

## 2024-04-21 RX ADMIN — NICOTINE 1 PATCH: 21 PATCH, EXTENDED RELEASE TRANSDERMAL at 09:12

## 2024-04-21 RX ADMIN — PIPERACILLIN SODIUM AND TAZOBACTAM SODIUM 3.38 G: 3; .375 INJECTION, SOLUTION INTRAVENOUS at 17:00

## 2024-04-21 RX ADMIN — INSULIN LISPRO 6 UNITS: 100 INJECTION, SOLUTION INTRAVENOUS; SUBCUTANEOUS at 09:13

## 2024-04-21 RX ADMIN — INSULIN LISPRO 6 UNITS: 100 INJECTION, SOLUTION INTRAVENOUS; SUBCUTANEOUS at 13:19

## 2024-04-21 RX ADMIN — OXYCODONE HYDROCHLORIDE AND ACETAMINOPHEN 500 MG: 500 TABLET ORAL at 09:13

## 2024-04-21 RX ADMIN — HEPARIN SODIUM 5000 UNITS: 5000 INJECTION INTRAVENOUS; SUBCUTANEOUS at 21:33

## 2024-04-21 RX ADMIN — POTASSIUM PHOSPHATE, MONOBASIC POTASSIUM PHOSPHATE, DIBASIC 21 MMOL: 224; 236 INJECTION, SOLUTION, CONCENTRATE INTRAVENOUS at 22:24

## 2024-04-21 RX ADMIN — VANCOMYCIN HYDROCHLORIDE 500 MG: 500 INJECTION, SOLUTION INTRAVENOUS at 08:45

## 2024-04-21 RX ADMIN — INSULIN LISPRO 4 UNITS: 100 INJECTION, SOLUTION INTRAVENOUS; SUBCUTANEOUS at 17:00

## 2024-04-21 RX ADMIN — POTASSIUM CHLORIDE 40 MEQ: 1.5 POWDER, FOR SOLUTION ORAL at 09:12

## 2024-04-21 RX ADMIN — PANTOPRAZOLE SODIUM 40 MG: 40 TABLET, DELAYED RELEASE ORAL at 09:12

## 2024-04-21 RX ADMIN — INSULIN LISPRO 8 UNITS: 100 INJECTION, SOLUTION INTRAVENOUS; SUBCUTANEOUS at 20:33

## 2024-04-21 RX ADMIN — IPRATROPIUM BROMIDE AND ALBUTEROL SULFATE 3 ML: .5; 3 SOLUTION RESPIRATORY (INHALATION) at 20:30

## 2024-04-21 RX ADMIN — INSULIN LISPRO 4 UNITS: 100 INJECTION, SOLUTION INTRAVENOUS; SUBCUTANEOUS at 04:32

## 2024-04-21 RX ADMIN — MAGNESIUM SULFATE HEPTAHYDRATE 2 G: 40 INJECTION, SOLUTION INTRAVENOUS at 02:21

## 2024-04-21 RX ADMIN — INSULIN GLARGINE 10 UNITS: 100 INJECTION, SOLUTION SUBCUTANEOUS at 09:13

## 2024-04-21 ASSESSMENT — COGNITIVE AND FUNCTIONAL STATUS - GENERAL
DRESSING REGULAR UPPER BODY CLOTHING: TOTAL
PERSONAL GROOMING: TOTAL
CLIMB 3 TO 5 STEPS WITH RAILING: TOTAL
MOBILITY SCORE: 6
MOVING TO AND FROM BED TO CHAIR: TOTAL
DAILY ACTIVITIY SCORE: 6
STANDING UP FROM CHAIR USING ARMS: TOTAL
DRESSING REGULAR LOWER BODY CLOTHING: TOTAL
MOVING FROM LYING ON BACK TO SITTING ON SIDE OF FLAT BED WITH BEDRAILS: TOTAL
WALKING IN HOSPITAL ROOM: TOTAL
TURNING FROM BACK TO SIDE WHILE IN FLAT BAD: TOTAL
TOILETING: TOTAL
EATING MEALS: TOTAL
HELP NEEDED FOR BATHING: TOTAL

## 2024-04-21 ASSESSMENT — PAIN SCALES - GENERAL: PAINLEVEL_OUTOF10: 0 - NO PAIN

## 2024-04-21 NOTE — PROGRESS NOTES
"    Subjective   AxO x 1 today. Denying any dysuria or abdominal pain     Objective     Physical Exam    General: thin appearing, NAD  Eyes:  Extraocular movements grossly intact. No scleral icterus.   Head: Atraumatic. Normocephalic.     Neck: No meningismus. No gross masses. Full movement through range of motion  ENT: Posterior oropharynx shows no erythema, exudate or edema.  Uvula is midline without edema.  No stridor or trismus  CV: Regular rhythm. No murmurs, rubs, gallops appreciated.   Resp: Clear to auscultation bilaterally. No respiratory distress.    GI: Nontender. Soft. No masses. No rebound, rigidity or guarding.   MSK: Chronic sacral wound noted to the sacrum   Skin: chronic sacral wounds with purulent drainage  Neuro: A&O 1-2. Poor Speech  Alert. Moving all extremities.   Psych: Appropriate mood and affect for situation       Last Recorded Vitals  Blood pressure 100/53, pulse 89, temperature 36.2 °C (97.2 °F), temperature source Tympanic, resp. rate 20, height 1.778 m (5' 10\"), weight 55.3 kg (122 lb), SpO2 97%.  Intake/Output last 3 Shifts:  I/O last 3 completed shifts:  In: 2120 (26.7 mL/kg) [I.V.:340 (4.3 mL/kg); NG/GT:1680; IV Piggyback:100]  Out: 1600 (20.2 mL/kg) [Urine:1600 (0.6 mL/kg/hr)]  Dosing Weight: 79.4 kg     Scheduled medications  ascorbic acid, 500 mg, oral, TID  atorvastatin, 10 mg, oral, Nightly  chlorhexidine, , Topical, Daily  heparin (porcine), 5,000 Units, subcutaneous, q8h RANDAL  insulin glargine, 10 Units, subcutaneous, q AM  insulin lispro, 0-10 Units, subcutaneous, q4h  mirtazapine, 7.5 mg, oral, Nightly  multivitamin with minerals, 1 tablet, oral, Daily  nicotine, 1 patch, transdermal, Daily   Followed by  [START ON 5/30/2024] nicotine, 1 patch, transdermal, Daily   Followed by  [START ON 6/13/2024] nicotine, 1 patch, transdermal, Daily  pantoprazole, 40 mg, oral, q AM  piperacillin-tazobactam, 3.375 g, intravenous, q6h  remdesivir, 100 mg, intravenous, " q24h  sennosides-docusate sodium, 2 tablet, oral, BID      Continuous medications       PRN medications  PRN medications: acetaminophen **OR** acetaminophen **OR** acetaminophen, acetaminophen, dextrose, dextrose, glucagon, glucagon, guaiFENesin, melatonin, nicotine polacrilex, polyethylene glycol, traMADol, vancomycin                 This patient has a urinary catheter   Reason for the urinary catheter remaining today? urinary retention/bladder outlet obstruction, acute or chronic         Assessment/Plan     68 yo with CKD, DM, HTN, sacral wound, bipolar referred from nursing home with concern for altered mental status. Patient is A&O x2 at baseline, but nursing home staff reported that he seemed more confused and disoriented than normal. CT scan in the ED 4/17 displayed multifocal pneumonia, patient denies any cough, fevers or chest pain. Patient found to be hypernatremic (Na: 155) on admission, with urinary retention (bladder scan 700cc), and fecal impaction on CT.  Patient failed SLP on 4/18 and MBS 4/19. Strict NPO given poor swallowing mechanics, gross aspiration before and during swallow, inconsistently sensated aspiration events, and poor clearance.     Updates 4/21  -tube feeds at 45/hour, goal is 55/hour  - patient had congestion with rhonchi on exam, consult to Respiratory care for bronchopulmonary hygiene placed  - Free water flush 400 q6h  - Na trending down to 149 today, 152 4/20  - blood sugars 254, 222 most recently, sliding scale updated to moderate  - azithromycin 3 day course completed, continuing Vanc and Zosyn    #Multifocal pneumonia  #COVID pneumonia  :: MRSA positive, COVID positive  :: Vancomycin, Zosyn (4/18) and azithromycin (4/18-4/20)  :: CT: Peribronchovascular patchy ground-glass/consolidative opacities throughout the right lung and to a lesser degree within the dependent left lower lobe compatible with multifocal pneumonia.  Skin thickening of the left gluteal soft tissues with  resolution of subcutaneous gas/fluid with no definitive associated focal fluid collection.  :: strep and legionella negative  :: evaluated by SLP->concerning for pharyngeal dysphagia and aspiration (x2/2) trials, recommending MBBS to determine PO safety  :: MBS 4/19: severe swallowing dysfunction, profoundly impaired oropharyngeal swallow, limited-no initiation in swallowing  :: tube feeds at 45/hour, goal is 55/hour  - continuing Vanc, Zosyn and remdesivir. Azithro course completed 4/20  - patient had congestion with rhonchi on exam, consult to Respiratory care for bronchopulmonary hygiene placed    #hypernatremia  :: Na trending down to 149 today, 152 4/20  -Free water flush 400 q6h    #hidradenitis suppurativa  :: purulent drainage from chronic sacral wounds from hidradenitis suppurativa  :: Derm recommending humira and minocycline 100 BID, patient will need labs to confirm eligibility for these medications  :: hepatitis C antibody negative, hepatitis B surface antigen, hepatitis B core antibody negative, total, hepatitis B surface antibody negative, T-spot pending, hIV 1/2 antigen/antibody screen negative -> to determine Humira eligibility  :: wound team following, daily dressing changes  - will need to follow up outpatient with dermatology within 2 weeks of discharge.  - doxycycline 100 Bid outpatient after abx course for pneumonia is completed    #Urinary retention  ::  Urinary retention: urinary output 700mL 4/17 and 400mL 4/18 am following bunch placement  -continuing bunch  [ ] pending TOV and nasrin recommended tamsulosin if fails TOV    # Fecal impaction  :: fecal impaction seen on CT  - bowel regimen     # CKD 3  -Strict I&Os  -Avoid nephrotoxic agents  -Continue to monitor     #T2DM  #Hyperglycemia  ::Last Hba1c 9.1 March 2023  -Continue 10 units glargine   - SSI updated from with meals to q4  -Diabetic diet     #HTN  BP at lower side  Hold home amlodipine     #HLD  -Continue home atorvastatin 10       #Bipolar disorder  -No current medications     F [facility] for hyperNa  E Hypernatremia correction  N Diabetic diet  A PIV     DVT prophylaxis: heparin             Sha Issa

## 2024-04-21 NOTE — PROGRESS NOTES
"Vancomycin Dosing by Pharmacy- FOLLOW UP    Brian Rodriguez is a 69 y.o. year old male who Pharmacy has been consulted for vancomycin dosing for pneumonia. Based on the patient's indication and renal status this patient is being dosed based on a goal trough/random level of 15-20.     Renal function is currently Unstable declining. Dosing by Levels for now.     Current vancomycin dose: Dosing by Levels  last dose given 4/19.   Will re-dose with 500mg. based on today's level = 17.7    Most recent random level: 17.7 mcg/mL    Visit Vitals  /53 (Patient Position: Lying)   Pulse 89   Temp 36.2 °C (97.2 °F) (Tympanic)   Resp 20        Lab Results   Component Value Date    CREATININE 2.68 (H) 04/20/2024    CREATININE 2.91 (H) 04/20/2024    CREATININE 2.74 (H) 04/19/2024    CREATININE 2.67 (H) 04/19/2024        Patient weight is No results found for: \"PTWEIGHT\"    No results found for: \"CULTURE\"     I/O last 3 completed shifts:  In: 1790 (22.6 mL/kg) [I.V.:340 (4.3 mL/kg); NG/GT:800; IV Piggyback:650]  Out: 1725 (21.7 mL/kg) [Urine:1725 (0.6 mL/kg/hr)]  Dosing Weight: 79.4 kg   [unfilled]    Lab Results   Component Value Date    PATIENTTEMP 37.0 04/18/2024    PATIENTTEMP 37.0 03/08/2023    PATIENTTEMP 37.0 12/21/2022        Assessment/Plan    Within goal random/trough level (17.7) will re-dose with 500 mg X1 and follow up tomorrow AM.      The next level will be obtained on 4/22 at am lab draw. May be obtained sooner if clinically indicated.   Will continue to monitor renal function daily while on vancomycin and order serum creatinine at least every 48 hours if not already ordered.  Follow for continued vancomycin needs, clinical response, and signs/symptoms of toxicity.       Quintin Campbell, PharmD           "

## 2024-04-21 NOTE — CARE PLAN
The patient's goals for the shift include Pt will be free from falls  use call light  bed alarm remians on    The clinical goals for the shift include pt will remain safe during shift      Problem: Pain  Goal: My pain/discomfort is manageable  Outcome: Progressing     Problem: Safety  Goal: Patient will be injury free during hospitalization  Outcome: Progressing  Goal: I will remain free of falls  Outcome: Progressing     Problem: Daily Care  Goal: Daily care needs are met  Outcome: Progressing     Problem: Psychosocial Needs  Goal: Demonstrates ability to cope with hospitalization/illness  Outcome: Progressing  Goal: Collaborate with me, my family, and caregiver to identify my specific goals  Outcome: Progressing     Problem: Discharge Barriers  Goal: My discharge needs are met  Outcome: Progressing     Problem: Fall/Injury  Goal: Not fall by end of shift  Outcome: Progressing  Goal: Be free from injury by end of the shift  Outcome: Progressing  Goal: Verbalize understanding of personal risk factors for fall in the hospital  Outcome: Progressing  Goal: Verbalize understanding of risk factor reduction measures to prevent injury from fall in the home  Outcome: Progressing  Goal: Use assistive devices by end of the shift  Outcome: Progressing  Goal: Pace activities to prevent fatigue by end of the shift  Outcome: Progressing     Problem: Pain  Goal: Takes deep breaths with improved pain control throughout the shift  Outcome: Progressing  Goal: Turns in bed with improved pain control throughout the shift  Outcome: Progressing  Goal: Walks with improved pain control throughout the shift  Outcome: Progressing  Goal: Performs ADL's with improved pain control throughout shift  Outcome: Progressing  Goal: Participates in PT with improved pain control throughout the shift  Outcome: Progressing  Goal: Free from opioid side effects throughout the shift  Outcome: Progressing  Goal: Free from acute confusion related to pain meds  throughout the shift  Outcome: Progressing     Problem: Safety - Medical Restraint  Goal: Remains free of injury from restraints (Restraint for Interference with Medical Device)  Outcome: Progressing  Goal: Free from restraint(s) (Restraint for Interference with Medical Device)  Outcome: Progressing     Problem: Skin  Goal: Decreased wound size/increased tissue granulation at next dressing change  Outcome: Progressing  Goal: Participates in plan/prevention/treatment measures  Outcome: Progressing  Goal: Prevent/manage excess moisture  Outcome: Progressing  Goal: Prevent/minimize sheer/friction injuries  Outcome: Progressing  Goal: Promote/optimize nutrition  Outcome: Progressing  Goal: Promote skin healing  Outcome: Progressing

## 2024-04-22 ENCOUNTER — APPOINTMENT (OUTPATIENT)
Dept: RADIOLOGY | Facility: HOSPITAL | Age: 69
DRG: 871 | End: 2024-04-22
Payer: MEDICARE

## 2024-04-22 ENCOUNTER — APPOINTMENT (OUTPATIENT)
Dept: CARDIOLOGY | Facility: HOSPITAL | Age: 69
DRG: 871 | End: 2024-04-22
Payer: MEDICARE

## 2024-04-22 LAB
ACANTHOCYTES BLD QL SMEAR: ABNORMAL
ALBUMIN SERPL BCP-MCNC: 1.5 G/DL (ref 3.4–5)
ALBUMIN SERPL BCP-MCNC: <1.5 G/DL (ref 3.4–5)
ALP SERPL-CCNC: 81 U/L (ref 33–136)
ALT SERPL W P-5'-P-CCNC: 7 U/L (ref 10–52)
ANION GAP BLDA CALCULATED.4IONS-SCNC: 11 MMO/L (ref 10–25)
ANION GAP BLDV CALCULATED.4IONS-SCNC: 13 MMOL/L (ref 10–25)
ANION GAP SERPL CALC-SCNC: 12 MMOL/L (ref 10–20)
ANION GAP SERPL CALC-SCNC: 15 MMOL/L (ref 10–20)
APPEARANCE UR: ABNORMAL
AST SERPL W P-5'-P-CCNC: 9 U/L (ref 9–39)
ATRIAL RATE: 109 BPM
BACTERIA SPEC RESP CULT: ABNORMAL
BASE EXCESS BLDA CALC-SCNC: -6 MMOL/L (ref -2–3)
BASE EXCESS BLDV CALC-SCNC: -6.2 MMOL/L (ref -2–3)
BASOPHILS # BLD MANUAL: 0.13 X10*3/UL (ref 0–0.1)
BASOPHILS NFR BLD MANUAL: 0.8 %
BILIRUB SERPL-MCNC: 0.3 MG/DL (ref 0–1.2)
BILIRUB UR STRIP.AUTO-MCNC: NEGATIVE MG/DL
BNP SERPL-MCNC: 95 PG/ML (ref 0–99)
BODY TEMPERATURE: 37 DEGREES CELSIUS
BODY TEMPERATURE: 37 DEGREES CELSIUS
BUN SERPL-MCNC: 38 MG/DL (ref 6–23)
BUN SERPL-MCNC: 45 MG/DL (ref 6–23)
CA-I BLDA-SCNC: 1.36 MMOL/L (ref 1.1–1.33)
CA-I BLDV-SCNC: 1.29 MMOL/L (ref 1.1–1.33)
CALCIUM SERPL-MCNC: 7.4 MG/DL (ref 8.6–10.6)
CALCIUM SERPL-MCNC: 8.1 MG/DL (ref 8.6–10.6)
CHLORIDE BLDA-SCNC: 122 MMOL/L (ref 98–107)
CHLORIDE BLDV-SCNC: 122 MMOL/L (ref 98–107)
CHLORIDE SERPL-SCNC: 116 MMOL/L (ref 98–107)
CHLORIDE SERPL-SCNC: 120 MMOL/L (ref 98–107)
CO2 SERPL-SCNC: 20 MMOL/L (ref 21–32)
CO2 SERPL-SCNC: 20 MMOL/L (ref 21–32)
COLOR UR: YELLOW
CREAT SERPL-MCNC: 2.71 MG/DL (ref 0.5–1.3)
CREAT SERPL-MCNC: 2.88 MG/DL (ref 0.5–1.3)
EGFRCR SERPLBLD CKD-EPI 2021: 23 ML/MIN/1.73M*2
EGFRCR SERPLBLD CKD-EPI 2021: 24 ML/MIN/1.73M*2
EOSINOPHIL # BLD MANUAL: 0.28 X10*3/UL (ref 0–0.4)
EOSINOPHIL NFR BLD MANUAL: 1.7 %
ERYTHROCYTE [DISTWIDTH] IN BLOOD BY AUTOMATED COUNT: 20.5 % (ref 11.5–14.5)
GLUCOSE BLD MANUAL STRIP-MCNC: 153 MG/DL (ref 74–99)
GLUCOSE BLD MANUAL STRIP-MCNC: 189 MG/DL (ref 74–99)
GLUCOSE BLD MANUAL STRIP-MCNC: 195 MG/DL (ref 74–99)
GLUCOSE BLD MANUAL STRIP-MCNC: 236 MG/DL (ref 74–99)
GLUCOSE BLD MANUAL STRIP-MCNC: 260 MG/DL (ref 74–99)
GLUCOSE BLD MANUAL STRIP-MCNC: 272 MG/DL (ref 74–99)
GLUCOSE BLD MANUAL STRIP-MCNC: 301 MG/DL (ref 74–99)
GLUCOSE BLD MANUAL STRIP-MCNC: 307 MG/DL (ref 74–99)
GLUCOSE BLDA-MCNC: 268 MG/DL (ref 74–99)
GLUCOSE BLDV-MCNC: 251 MG/DL (ref 74–99)
GLUCOSE SERPL-MCNC: 153 MG/DL (ref 74–99)
GLUCOSE SERPL-MCNC: 235 MG/DL (ref 74–99)
GLUCOSE UR STRIP.AUTO-MCNC: NORMAL MG/DL
GRAM STN SPEC: ABNORMAL
HCO3 BLDA-SCNC: 16.7 MMOL/L (ref 22–26)
HCO3 BLDV-SCNC: 17.7 MMOL/L (ref 22–26)
HCT VFR BLD AUTO: 24.4 % (ref 41–52)
HCT VFR BLD EST: 23 % (ref 41–52)
HCT VFR BLD EST: 24 % (ref 41–52)
HGB BLD-MCNC: 7.4 G/DL (ref 13.5–17.5)
HGB BLDA-MCNC: 7.9 G/DL (ref 13.5–17.5)
HGB BLDV-MCNC: 7.8 G/DL (ref 13.5–17.5)
HOLD SPECIMEN: NORMAL
IMM GRANULOCYTES # BLD AUTO: 0.12 X10*3/UL (ref 0–0.5)
IMM GRANULOCYTES NFR BLD AUTO: 0.7 % (ref 0–0.9)
INHALED O2 CONCENTRATION: 55 %
INHALED O2 CONCENTRATION: 58 %
KETONES UR STRIP.AUTO-MCNC: ABNORMAL MG/DL
LACTATE BLDA-SCNC: 2.6 MMOL/L (ref 0.4–2)
LACTATE BLDV-SCNC: 3 MMOL/L (ref 0.4–2)
LACTATE BLDV-SCNC: 3 MMOL/L (ref 0.4–2)
LACTATE SERPL-SCNC: 2.2 MMOL/L (ref 0.4–2)
LEUKOCYTE ESTERASE UR QL STRIP.AUTO: NEGATIVE
LYMPHOCYTES # BLD MANUAL: 0.68 X10*3/UL (ref 0.8–3)
LYMPHOCYTES NFR BLD MANUAL: 4.1 %
MAGNESIUM SERPL-MCNC: 1.93 MG/DL (ref 1.6–2.4)
MAGNESIUM SERPL-MCNC: 2.26 MG/DL (ref 1.6–2.4)
MCH RBC QN AUTO: 22.9 PG (ref 26–34)
MCHC RBC AUTO-ENTMCNC: 30.3 G/DL (ref 32–36)
MCV RBC AUTO: 76 FL (ref 80–100)
MONOCYTES # BLD MANUAL: 0.42 X10*3/UL (ref 0.05–0.8)
MONOCYTES NFR BLD MANUAL: 2.5 %
NEUTROPHILS # BLD MANUAL: 14.96 X10*3/UL (ref 1.6–5.5)
NEUTS BAND # BLD MANUAL: 2.89 X10*3/UL (ref 0–0.5)
NEUTS BAND NFR BLD MANUAL: 17.4 %
NEUTS SEG # BLD MANUAL: 12.07 X10*3/UL (ref 1.6–5)
NEUTS SEG NFR BLD MANUAL: 72.7 %
NITRITE UR QL STRIP.AUTO: NEGATIVE
NRBC BLD-RTO: 0.1 /100 WBCS (ref 0–0)
OXYHGB MFR BLDA: 96.8 % (ref 94–98)
OXYHGB MFR BLDV: 84 % (ref 45–75)
PCO2 BLDA: 23 MM HG (ref 38–42)
PCO2 BLDV: 28 MM HG (ref 41–51)
PH BLDA: 7.47 PH (ref 7.38–7.42)
PH BLDV: 7.41 PH (ref 7.33–7.43)
PH UR STRIP.AUTO: 5.5 [PH]
PHOSPHATE SERPL-MCNC: 3.4 MG/DL (ref 2.5–4.9)
PHOSPHATE SERPL-MCNC: 4.4 MG/DL (ref 2.5–4.9)
PLATELET # BLD AUTO: 347 X10*3/UL (ref 150–450)
PO2 BLDA: 84 MM HG (ref 85–95)
PO2 BLDV: 57 MM HG (ref 35–45)
POTASSIUM BLDA-SCNC: 3.2 MMOL/L (ref 3.5–5.3)
POTASSIUM BLDV-SCNC: 3.7 MMOL/L (ref 3.5–5.3)
POTASSIUM SERPL-SCNC: 3.1 MMOL/L (ref 3.5–5.3)
POTASSIUM SERPL-SCNC: 3.6 MMOL/L (ref 3.5–5.3)
PROCALCITONIN SERPL-MCNC: 4.13 NG/ML
PROT SERPL-MCNC: 5.9 G/DL (ref 6.4–8.2)
PROT UR STRIP.AUTO-MCNC: ABNORMAL MG/DL
Q ONSET: 223 MS
QRS COUNT: 17 BEATS
QRS DURATION: 72 MS
QT INTERVAL: 456 MS
QTC CALCULATION(BAZETT): 614 MS
QTC FREDERICIA: 556 MS
R AXIS: 54 DEGREES
RBC # BLD AUTO: 3.23 X10*6/UL (ref 4.5–5.9)
RBC # UR STRIP.AUTO: ABNORMAL /UL
RBC #/AREA URNS AUTO: >20 /HPF
RBC MORPH BLD: ABNORMAL
SAO2 % BLDA: 99 % (ref 94–100)
SAO2 % BLDV: 86 % (ref 45–75)
SODIUM BLDA-SCNC: 146 MMOL/L (ref 136–145)
SODIUM BLDV-SCNC: 149 MMOL/L (ref 136–145)
SODIUM SERPL-SCNC: 145 MMOL/L (ref 136–145)
SODIUM SERPL-SCNC: 151 MMOL/L (ref 136–145)
SP GR UR STRIP.AUTO: 1.02
T AXIS: 76 DEGREES
T OFFSET: 451 MS
TOTAL CELLS COUNTED BLD: 121
URATE CRY #/AREA UR COMP ASSIST: ABNORMAL /HPF
UROBILINOGEN UR STRIP.AUTO-MCNC: NORMAL MG/DL
VANCOMYCIN SERPL-MCNC: 16.7 UG/ML (ref 5–20)
VARIANT LYMPHS # BLD MANUAL: 0.13 X10*3/UL (ref 0–0.3)
VARIANT LYMPHS NFR BLD: 0.8 %
VENTRICULAR RATE: 109 BPM
WBC # BLD AUTO: 16.6 X10*3/UL (ref 4.4–11.3)
WBC #/AREA URNS AUTO: ABNORMAL /HPF
YEAST BUDDING #/AREA UR COMP ASSIST: PRESENT /HPF

## 2024-04-22 PROCEDURE — 2500000004 HC RX 250 GENERAL PHARMACY W/ HCPCS (ALT 636 FOR OP/ED)

## 2024-04-22 PROCEDURE — 94667 MNPJ CHEST WALL 1ST: CPT

## 2024-04-22 PROCEDURE — 81001 URINALYSIS AUTO W/SCOPE: CPT

## 2024-04-22 PROCEDURE — 82947 ASSAY GLUCOSE BLOOD QUANT: CPT

## 2024-04-22 PROCEDURE — 80069 RENAL FUNCTION PANEL: CPT | Mod: CCI

## 2024-04-22 PROCEDURE — 82607 VITAMIN B-12: CPT

## 2024-04-22 PROCEDURE — 36415 COLL VENOUS BLD VENIPUNCTURE: CPT

## 2024-04-22 PROCEDURE — 36415 COLL VENOUS BLD VENIPUNCTURE: CPT | Performed by: STUDENT IN AN ORGANIZED HEALTH CARE EDUCATION/TRAINING PROGRAM

## 2024-04-22 PROCEDURE — 94640 AIRWAY INHALATION TREATMENT: CPT

## 2024-04-22 PROCEDURE — 83540 ASSAY OF IRON: CPT

## 2024-04-22 PROCEDURE — 80202 ASSAY OF VANCOMYCIN: CPT | Performed by: PATHOLOGY

## 2024-04-22 PROCEDURE — 2020000001 HC ICU ROOM DAILY

## 2024-04-22 PROCEDURE — 84132 ASSAY OF SERUM POTASSIUM: CPT

## 2024-04-22 PROCEDURE — 2500000001 HC RX 250 WO HCPCS SELF ADMINISTERED DRUGS (ALT 637 FOR MEDICARE OP)

## 2024-04-22 PROCEDURE — 74018 RADEX ABDOMEN 1 VIEW: CPT | Performed by: RADIOLOGY

## 2024-04-22 PROCEDURE — S4991 NICOTINE PATCH NONLEGEND: HCPCS

## 2024-04-22 PROCEDURE — 83735 ASSAY OF MAGNESIUM: CPT

## 2024-04-22 PROCEDURE — 94660 CPAP INITIATION&MGMT: CPT

## 2024-04-22 PROCEDURE — 83605 ASSAY OF LACTIC ACID: CPT | Mod: MUE

## 2024-04-22 PROCEDURE — 85007 BL SMEAR W/DIFF WBC COUNT: CPT

## 2024-04-22 PROCEDURE — 93005 ELECTROCARDIOGRAM TRACING: CPT

## 2024-04-22 PROCEDURE — 2500000002 HC RX 250 W HCPCS SELF ADMINISTERED DRUGS (ALT 637 FOR MEDICARE OP, ALT 636 FOR OP/ED)

## 2024-04-22 PROCEDURE — C9113 INJ PANTOPRAZOLE SODIUM, VIA: HCPCS

## 2024-04-22 PROCEDURE — 31720 CLEARANCE OF AIRWAYS: CPT

## 2024-04-22 PROCEDURE — 93306 TTE W/DOPPLER COMPLETE: CPT

## 2024-04-22 PROCEDURE — 82947 ASSAY GLUCOSE BLOOD QUANT: CPT | Mod: MUE

## 2024-04-22 PROCEDURE — 2500000002 HC RX 250 W HCPCS SELF ADMINISTERED DRUGS (ALT 637 FOR MEDICARE OP, ALT 636 FOR OP/ED): Mod: MUE

## 2024-04-22 PROCEDURE — 82746 ASSAY OF FOLIC ACID SERUM: CPT

## 2024-04-22 PROCEDURE — 97166 OT EVAL MOD COMPLEX 45 MIN: CPT | Mod: GO

## 2024-04-22 PROCEDURE — 84100 ASSAY OF PHOSPHORUS: CPT

## 2024-04-22 PROCEDURE — 2500000005 HC RX 250 GENERAL PHARMACY W/O HCPCS: Performed by: INTERNAL MEDICINE

## 2024-04-22 PROCEDURE — 83880 ASSAY OF NATRIURETIC PEPTIDE: CPT

## 2024-04-22 PROCEDURE — 99291 CRITICAL CARE FIRST HOUR: CPT

## 2024-04-22 PROCEDURE — 84132 ASSAY OF SERUM POTASSIUM: CPT | Performed by: STUDENT IN AN ORGANIZED HEALTH CARE EDUCATION/TRAINING PROGRAM

## 2024-04-22 PROCEDURE — 87205 SMEAR GRAM STAIN: CPT

## 2024-04-22 PROCEDURE — 74018 RADEX ABDOMEN 1 VIEW: CPT

## 2024-04-22 PROCEDURE — 82436 ASSAY OF URINE CHLORIDE: CPT

## 2024-04-22 PROCEDURE — 87040 BLOOD CULTURE FOR BACTERIA: CPT

## 2024-04-22 PROCEDURE — 2500000004 HC RX 250 GENERAL PHARMACY W/ HCPCS (ALT 636 FOR OP/ED): Performed by: STUDENT IN AN ORGANIZED HEALTH CARE EDUCATION/TRAINING PROGRAM

## 2024-04-22 PROCEDURE — 83605 ASSAY OF LACTIC ACID: CPT | Mod: MUE | Performed by: STUDENT IN AN ORGANIZED HEALTH CARE EDUCATION/TRAINING PROGRAM

## 2024-04-22 PROCEDURE — 97530 THERAPEUTIC ACTIVITIES: CPT | Mod: GO

## 2024-04-22 PROCEDURE — 85027 COMPLETE CBC AUTOMATED: CPT

## 2024-04-22 PROCEDURE — 36600 WITHDRAWAL OF ARTERIAL BLOOD: CPT

## 2024-04-22 PROCEDURE — 2500000005 HC RX 250 GENERAL PHARMACY W/O HCPCS: Mod: JZ

## 2024-04-22 PROCEDURE — 93306 TTE W/DOPPLER COMPLETE: CPT | Performed by: INTERNAL MEDICINE

## 2024-04-22 RX ORDER — IPRATROPIUM BROMIDE AND ALBUTEROL SULFATE 2.5; .5 MG/3ML; MG/3ML
3 SOLUTION RESPIRATORY (INHALATION) EVERY 6 HOURS PRN
Status: DISCONTINUED | OUTPATIENT
Start: 2024-04-22 | End: 2024-04-26

## 2024-04-22 RX ORDER — SODIUM BICARBONATE 650 MG/1
650 TABLET ORAL 2 TIMES DAILY
Status: DISCONTINUED | OUTPATIENT
Start: 2024-04-22 | End: 2024-05-07

## 2024-04-22 RX ORDER — SODIUM BICARBONATE 1 MEQ/ML
50 SYRINGE (ML) INTRAVENOUS ONCE
Status: DISCONTINUED | OUTPATIENT
Start: 2024-04-22 | End: 2024-04-22

## 2024-04-22 RX ORDER — INSULIN LISPRO 100 [IU]/ML
0-20 INJECTION, SOLUTION INTRAVENOUS; SUBCUTANEOUS EVERY 4 HOURS
Status: DISCONTINUED | OUTPATIENT
Start: 2024-04-22 | End: 2024-04-25

## 2024-04-22 RX ORDER — PANTOPRAZOLE SODIUM 40 MG/10ML
40 INJECTION, POWDER, LYOPHILIZED, FOR SOLUTION INTRAVENOUS DAILY
Status: DISCONTINUED | OUTPATIENT
Start: 2024-04-22 | End: 2024-05-09

## 2024-04-22 RX ORDER — VANCOMYCIN HYDROCHLORIDE 500 MG/100ML
500 INJECTION, SOLUTION INTRAVENOUS ONCE
Status: COMPLETED | OUTPATIENT
Start: 2024-04-22 | End: 2024-04-22

## 2024-04-22 RX ORDER — DEXTROSE MONOHYDRATE 50 MG/ML
160 INJECTION, SOLUTION INTRAVENOUS CONTINUOUS
Status: DISCONTINUED | OUTPATIENT
Start: 2024-04-22 | End: 2024-04-22

## 2024-04-22 RX ORDER — POLYETHYLENE GLYCOL 3350 17 G/17G
17 POWDER, FOR SOLUTION ORAL 2 TIMES DAILY
Status: DISCONTINUED | OUTPATIENT
Start: 2024-04-22 | End: 2024-04-24

## 2024-04-22 RX ORDER — POTASSIUM CHLORIDE 14.9 MG/ML
20 INJECTION INTRAVENOUS ONCE
Status: COMPLETED | OUTPATIENT
Start: 2024-04-22 | End: 2024-04-22

## 2024-04-22 RX ADMIN — OXYCODONE HYDROCHLORIDE AND ACETAMINOPHEN 500 MG: 500 TABLET ORAL at 14:18

## 2024-04-22 RX ADMIN — Medication: at 08:00

## 2024-04-22 RX ADMIN — IPRATROPIUM BROMIDE AND ALBUTEROL SULFATE 3 ML: .5; 3 SOLUTION RESPIRATORY (INHALATION) at 01:22

## 2024-04-22 RX ADMIN — SENNOSIDES AND DOCUSATE SODIUM 2 TABLET: 8.6; 5 TABLET ORAL at 09:02

## 2024-04-22 RX ADMIN — DEXTROSE MONOHYDRATE 160 ML/HR: 50 INJECTION, SOLUTION INTRAVENOUS at 06:00

## 2024-04-22 RX ADMIN — INSULIN LISPRO 12 UNITS: 100 INJECTION, SOLUTION INTRAVENOUS; SUBCUTANEOUS at 11:45

## 2024-04-22 RX ADMIN — MIRTAZAPINE 7.5 MG: 15 TABLET, FILM COATED ORAL at 21:00

## 2024-04-22 RX ADMIN — INSULIN LISPRO 16 UNITS: 100 INJECTION, SOLUTION INTRAVENOUS; SUBCUTANEOUS at 08:57

## 2024-04-22 RX ADMIN — HEPARIN SODIUM 5000 UNITS: 5000 INJECTION INTRAVENOUS; SUBCUTANEOUS at 06:00

## 2024-04-22 RX ADMIN — PIPERACILLIN SODIUM AND TAZOBACTAM SODIUM 3.38 G: 3; .375 INJECTION, SOLUTION INTRAVENOUS at 12:00

## 2024-04-22 RX ADMIN — INSULIN LISPRO 8 UNITS: 100 INJECTION, SOLUTION INTRAVENOUS; SUBCUTANEOUS at 00:02

## 2024-04-22 RX ADMIN — SODIUM BICARBONATE 650 MG: 650 TABLET ORAL at 03:29

## 2024-04-22 RX ADMIN — POTASSIUM CHLORIDE 20 MEQ: 14.9 INJECTION, SOLUTION INTRAVENOUS at 18:12

## 2024-04-22 RX ADMIN — SODIUM CHLORIDE, POTASSIUM CHLORIDE, SODIUM LACTATE AND CALCIUM CHLORIDE 1000 ML: 600; 310; 30; 20 INJECTION, SOLUTION INTRAVENOUS at 21:26

## 2024-04-22 RX ADMIN — INSULIN LISPRO 4 UNITS: 100 INJECTION, SOLUTION INTRAVENOUS; SUBCUTANEOUS at 03:04

## 2024-04-22 RX ADMIN — PIPERACILLIN SODIUM AND TAZOBACTAM SODIUM 3.38 G: 3; .375 INJECTION, SOLUTION INTRAVENOUS at 23:58

## 2024-04-22 RX ADMIN — OXYCODONE HYDROCHLORIDE AND ACETAMINOPHEN 500 MG: 500 TABLET ORAL at 21:00

## 2024-04-22 RX ADMIN — ATORVASTATIN CALCIUM 10 MG: 20 TABLET, FILM COATED ORAL at 21:26

## 2024-04-22 RX ADMIN — REMDESIVIR 100 MG: 100 INJECTION, POWDER, LYOPHILIZED, FOR SOLUTION INTRAVENOUS at 15:43

## 2024-04-22 RX ADMIN — SODIUM CHLORIDE, SODIUM LACTATE, POTASSIUM CHLORIDE, AND CALCIUM CHLORIDE 1000 ML: 600; 310; 30; 20 INJECTION, SOLUTION INTRAVENOUS at 04:00

## 2024-04-22 RX ADMIN — Medication 1 TABLET: at 08:57

## 2024-04-22 RX ADMIN — PIPERACILLIN SODIUM AND TAZOBACTAM SODIUM 3.38 G: 3; .375 INJECTION, SOLUTION INTRAVENOUS at 06:00

## 2024-04-22 RX ADMIN — SODIUM CHLORIDE, POTASSIUM CHLORIDE, SODIUM LACTATE AND CALCIUM CHLORIDE 500 ML: 600; 310; 30; 20 INJECTION, SOLUTION INTRAVENOUS at 00:57

## 2024-04-22 RX ADMIN — PANTOPRAZOLE SODIUM 40 MG: 40 INJECTION, POWDER, FOR SOLUTION INTRAVENOUS at 09:00

## 2024-04-22 RX ADMIN — SODIUM BICARBONATE 650 MG: 650 TABLET ORAL at 08:57

## 2024-04-22 RX ADMIN — SODIUM CHLORIDE, POTASSIUM CHLORIDE, SODIUM LACTATE AND CALCIUM CHLORIDE 1000 ML: 600; 310; 30; 20 INJECTION, SOLUTION INTRAVENOUS at 09:52

## 2024-04-22 RX ADMIN — POLYETHYLENE GLYCOL 3350 17 G: 17 POWDER, FOR SOLUTION ORAL at 08:57

## 2024-04-22 RX ADMIN — OXYCODONE HYDROCHLORIDE AND ACETAMINOPHEN 500 MG: 500 TABLET ORAL at 09:01

## 2024-04-22 RX ADMIN — PIPERACILLIN SODIUM AND TAZOBACTAM SODIUM 3.38 G: 3; .375 INJECTION, SOLUTION INTRAVENOUS at 00:01

## 2024-04-22 RX ADMIN — INSULIN LISPRO 4 UNITS: 100 INJECTION, SOLUTION INTRAVENOUS; SUBCUTANEOUS at 15:49

## 2024-04-22 RX ADMIN — INSULIN LISPRO 4 UNITS: 100 INJECTION, SOLUTION INTRAVENOUS; SUBCUTANEOUS at 23:59

## 2024-04-22 RX ADMIN — PIPERACILLIN SODIUM AND TAZOBACTAM SODIUM 3.38 G: 3; .375 INJECTION, SOLUTION INTRAVENOUS at 17:33

## 2024-04-22 RX ADMIN — IPRATROPIUM BROMIDE AND ALBUTEROL SULFATE 3 ML: .5; 3 SOLUTION RESPIRATORY (INHALATION) at 08:15

## 2024-04-22 RX ADMIN — SODIUM BICARBONATE 650 MG: 650 TABLET ORAL at 21:00

## 2024-04-22 RX ADMIN — INSULIN LISPRO 4 UNITS: 100 INJECTION, SOLUTION INTRAVENOUS; SUBCUTANEOUS at 21:27

## 2024-04-22 RX ADMIN — VANCOMYCIN HYDROCHLORIDE 500 MG: 500 INJECTION, SOLUTION INTRAVENOUS at 08:56

## 2024-04-22 RX ADMIN — HEPARIN SODIUM 5000 UNITS: 5000 INJECTION INTRAVENOUS; SUBCUTANEOUS at 21:00

## 2024-04-22 RX ADMIN — NICOTINE 1 PATCH: 21 PATCH, EXTENDED RELEASE TRANSDERMAL at 08:57

## 2024-04-22 RX ADMIN — INSULIN GLARGINE 15 UNITS: 100 INJECTION, SOLUTION SUBCUTANEOUS at 08:58

## 2024-04-22 RX ADMIN — HEPARIN SODIUM 5000 UNITS: 5000 INJECTION INTRAVENOUS; SUBCUTANEOUS at 14:18

## 2024-04-22 ASSESSMENT — PAIN - FUNCTIONAL ASSESSMENT
PAIN_FUNCTIONAL_ASSESSMENT: CPOT (CRITICAL CARE PAIN OBSERVATION TOOL)
PAIN_FUNCTIONAL_ASSESSMENT: 0-10
PAIN_FUNCTIONAL_ASSESSMENT: CPOT (CRITICAL CARE PAIN OBSERVATION TOOL)
PAIN_FUNCTIONAL_ASSESSMENT: PAINAD (PAIN ASSESSMENT IN ADVANCED DEMENTIA SCALE)

## 2024-04-22 ASSESSMENT — PAIN SCALES - PAIN ASSESSMENT IN ADVANCED DEMENTIA (PAINAD)
NEGVOCALIZATION: OCCASIONAL MOAN/GROAN, LOW SPEECH, NEGATIVE/DISAPPROVING QUALITY
BREATHING: NORMAL
BODYLANGUAGE: RELAXED
FACIALEXPRESSION: SMILING OR INEXPRESSIVE
TOTALSCORE: 1
CONSOLABILITY: NO NEED TO CONSOLE

## 2024-04-22 ASSESSMENT — COGNITIVE AND FUNCTIONAL STATUS - GENERAL
PERSONAL GROOMING: A LOT
DRESSING REGULAR LOWER BODY CLOTHING: TOTAL
EATING MEALS: A LOT
DAILY ACTIVITIY SCORE: 10
DRESSING REGULAR UPPER BODY CLOTHING: A LOT
TOILETING: TOTAL
HELP NEEDED FOR BATHING: A LOT

## 2024-04-22 ASSESSMENT — ACTIVITIES OF DAILY LIVING (ADL): BATHING_ASSISTANCE: MAXIMAL

## 2024-04-22 NOTE — PROGRESS NOTES
"Vancomycin Dosing by Pharmacy- FOLLOW UP    Brian Rodriguez is a 69 y.o. year old male who Pharmacy has been consulted for vancomycin dosing for pneumonia. Based on the patient's indication and renal status this patient is being dosed based on a goal trough/random level of 15-20.     Renal function is currently declining.    Current vancomycin dose: 500 mg given every 24 hours x 1 dose    Most recent trough level: 16.7 mcg/mL    Visit Vitals  /63   Pulse (!) 121   Temp 37.5 °C (99.5 °F) (Temporal)   Resp 17        Lab Results   Component Value Date    CREATININE 2.88 (H) 04/22/2024    CREATININE 2.84 (H) 04/21/2024    CREATININE 2.88 (H) 04/21/2024    CREATININE 2.68 (H) 04/20/2024        Patient weight is No results found for: \"PTWEIGHT\"    No results found for: \"CULTURE\"     I/O last 3 completed shifts:  In: 3087 (38.9 mL/kg) [NG/GT:1280; IV Piggyback:1807]  Out: 525 (6.6 mL/kg) [Urine:525 (0.2 mL/kg/hr)]  Dosing Weight: 79.4 kg   [unfilled]    Lab Results   Component Value Date    PATIENTTEMP 37.0 04/22/2024    PATIENTTEMP 37.0 04/22/2024    PATIENTTEMP 37.0 04/21/2024        Assessment/Plan    Within goal random/trough level    This dosing regimen is predicted by [employer] to result in the following pharmacokinetic parameters:  Pt has lorena/ckd dosing by levels  .Will order 500mg x1 today    The next level will be obtained on 4/23 at 0500. May be obtained sooner if clinically indicated.   Will continue to monitor renal function daily while on vancomycin and order serum creatinine at least every 48 hours if not already ordered.  Follow for continued vancomycin needs, clinical response, and signs/symptoms of toxicity.       Maisha Cordova, PharmD           "

## 2024-04-22 NOTE — PROGRESS NOTES
Occupational Therapy    Evaluation/Treatment    Patient Name: Brian Rodriguez  MRN: [id number]  : 1955  Today's Date: 24  Time Calculation  Start Time: 1122  Stop Time: 1150  Time Calculation (min): 28 min       Assessment:  OT Assessment: impaired ADLs/transfers  Prognosis: Fair  End of Session Communication: Bedside nurse  End of Session Patient Position: Bed, 3 rail up, Alarm on  OT Assessment Results: Decreased ADL status, Decreased upper extremity strength, Decreased cognition, Decreased endurance, Decreased functional mobility  Prognosis: Fair  Plan:  Treatment Interventions: ADL retraining, Functional transfer training, UE strengthening/ROM, Endurance training, Cognitive reorientation, Patient/family training, Equipment evaluation/education, Neuromuscular reeducation, Compensatory technique education  OT Frequency: 2 times per week  OT Discharge Recommendations: Moderate intensity level of continued care  OT - OK to Discharge: Yes  Treatment Interventions: ADL retraining, Functional transfer training, UE strengthening/ROM, Endurance training, Cognitive reorientation, Patient/family training, Equipment evaluation/education, Neuromuscular reeducation, Compensatory technique education    Subjective   General:   OT Received On: 24  General  Reason for Referral: AMS, to MICU for hypoxic RF and sepsis; Covid (+)(  Past Medical History Relevant to Rehab: type 2 diabetes, hypertension, CKD, hyperlipidemia, hidradenitis, bipolar  Family/Caregiver Present: No  Prior to Session Communication: Bedside nurse  Patient Position Received: Bed, 3 rail up, Alarm on  General Comment: patient awake and alert, confused however cooperative, unintelligible speech throughout session; tele, dobhoff, AirVo 40L/45%, bunch  Precautions:  Hearing/Visual Limitations: difficult to formally assess as patient with altered mental status, appears grossly WFL  Medical Precautions: Fall precautions, Oxygen therapy device and  "L/min, Infection precautions  Precautions Comment: all appropriate PPE donned/doffed for session  Vital Signs:  Heart Rate:  (pre 106, post 112)  SpO2:  (pre 97%, post 99%)  BP:  (pre 96/57, post 106/50)  MAP (mmHg):  (>65 throughout session)  Pain:  Pain Assessment  Pain Assessment: 0-10  Pain Score:  (patient stated \"no\" when asked if he had pain)    Objective   Cognition:  Overall Cognitive Status: Impaired  Arousal/Alertness: Delayed responses to stimuli  Orientation Level:  (oriented to his name and month/day of birthday, unintelligble response to choices for place and month; oriented to year with choices)  Following Commands:  (followed <25% of one step commands with increased time and repetition)     Home Living:  Home Living Comments: patient unable to provide information; per EMR patient is from LTC at [facility]  Prior Function:  Prior Function Comments: Patient unable to provide PLOF information; anticipate patient requires assistance with ADLs/transfers at LTC facility     ADL:  Eating Assistance: Moderate  Eating Deficit:  (anticipated)  Grooming Assistance: Moderate  Grooming Deficit:  (patient is able to reach up to face however did not follow commands for using yankauer)  Bathing Assistance: Maximal  Bathing Deficit:  (anticipated)  UE Dressing Assistance: Maximal  UE Dressing Deficit:  (to manage gown)  LE Dressing Assistance: Total  LE Dressing Deficit: Don/doff R sock, Don/doff L sock  Toileting Assistance with Device: Total  Toileting Deficit:  (anticipated)    Activity Tolerance:  Early Mobility/Exercise Safety Screen: Proceed with mobilization - No exclusion criteria met     Bed Mobility/Transfers: Bed Mobility  Bed Mobility: Yes  Bed Mobility 1  Bed Mobility 1: Supine to sitting, Sitting to supine  Level of Assistance 1: Maximum assistance, Maximum verbal cues, Moderate tactile cues  Bed Mobility Comments 1: HOB elevated for supine to sit; use of draw sheet    Transfers  Transfer: No " "(patient's HR elevated to 130s-140s sitting EOB, patient with limited command following and began attempting to return to self to supine after sitting)    Sitting Balance:  Static Sitting Balance  Static Sitting-Comment/Number of Minutes: CGA-SBA  Dynamic Sitting Balance  Dynamic Sitting-Comments: min A     Therapy/Activity: Therapeutic Activity  Therapeutic Activity Performed: Yes  Therapeutic Activity 1: Patient sat EOB x10 minutes with mostly CGA-SBA for balance. Patient using x1-2 UE support on bed for balance. Min A when engaging in dynamic activities. While patient sitting EOB, provided various one step motor commands, patient followed < 25% of commands, was able to clearly state \"yes\" when asked if he wanted to get back in to bed. Mod-max A for rolling (B) in bed for hygiene/linen change.     Vision:Vision - Basic Assessment  Current Vision:  (patient did not report if he uses corrective lenses)  Sensation:  Sensation Comment: patient did not report  Strength:  Strength Comments: patient with difficulty following commands for MMT; (B)  grossly 3+/5, (B) shoulders/elbows >/= 3-/5     Extremities: RUE   RUE :  (AA/PROM WFL) and NABOR CHACONE:  (AA/PROM WFL)    Outcome Measures: [facility] Daily Activity  Putting on and taking off regular lower body clothing: Total  Bathing (including washing, rinsing, drying): A lot  Putting on and taking off regular upper body clothing: A lot  Toileting, which includes using toilet, bedpan or urinal: Total  Taking care of personal grooming such as brushing teeth: A lot  Eating Meals: A lot  Daily Activity - Total Score: 10    , Confusion Assessment Method-ICU (CAM-ICU)  Feature 1: Acute Onset or Fluctuating Course: Positive  Feature 2: Inattention: Positive  Feature 4: Disorganized Thinking: Positive  Overall CAM-ICU: Positive  , and E = Exercise and Early Mobility  Early Mobility/Exercise Safety Screen: Proceed with mobilization - No exclusion criteria met  Current Activity: " Sitting at edge of bed    Education Documentation  Body Mechanics, taught by Madison Delgado OT at 4/22/2024  2:57 PM.  Learner: Patient  Readiness: Nonacceptance  Method: Explanation  Response: No Evidence of Learning, Needs Reinforcement    ADL Training, taught by Madison Delgado OT at 4/22/2024  2:57 PM.  Learner: Patient  Readiness: Nonacceptance  Method: Explanation  Response: No Evidence of Learning, Needs Reinforcement    Education Comments  No comments found.       Goals:  Encounter Problems       Encounter Problems (Active)       ADLs       Patient with complete upper body dressing with minimal assist  level of assistance donning and doffing all UE clothes with PRN adaptive equipment. (Progressing)       Start:  04/22/24    Expected End:  05/06/24            Patient will complete daily grooming tasks with stand by assist level of assistance and PRN adaptive equipment. (Progressing)       Start:  04/22/24    Expected End:  05/06/24            Patient will complete toileting including hygiene clothing management/hygiene with moderate assist level of assistance. (Progressing)       Start:  04/22/24    Expected End:  05/06/24               BALANCE       Pt will maintain dynamic sitting balance during ADL task with stand by assist level of assistance in order to demonstrate decreased risk of falling and improved postural control. (Progressing)       Start:  04/22/24    Expected End:  05/06/24               COGNITION/SAFETY       Patient will follow >/= 50% Simple commands to allow improved ADL performance. (Progressing)       Start:  04/22/24    Expected End:  05/06/24               EXERCISE/STRENGTHENING       Patient will complete BUE exercises in order to improve strength and activity tolerance for ADL performance.  (Progressing)       Start:  04/22/24    Expected End:  05/06/24               TRANSFERS       Patient will perform bed mobility moderate assist level of assistance in order to improve safety  and independence with mobility (Progressing)       Start:  04/22/24    Expected End:  05/06/24            Patient will complete functional transfers with least restrictive device with moderate assist x2 level of assistance. (Progressing)       Start:  04/22/24    Expected End:  05/06/24               Madison Delgado OTR/L  Inpatient Occupational Therapist   Rehab Office: [phone]

## 2024-04-22 NOTE — H&P
History Of Present Illness     68 yo male with CKD, DM, HTN, sacral wound, bipolar disorder initially admitted from a nursing home for altered mental status, and now transferred to the MICU form the floor for hypoxic respiratory failure and sepsis.      Pt first presented to the floor with confusion and multifocal pneumonia on 4/17. Patient was found porfirio hypernatremic, with severe swallowing dysfunction and found to be COVID positive. Patient was stated on Vanc, Zosyn, and azithromycin for a pneumonia and remdesevir for COVID and free water flushes for hypernatremia.     Two rapid responses were called on 4/21 pm and 4/22 am. Intital rapid for increase O2 requirement to 12 L. CXR with worsening RLL consolidation. Pt placedon Airvo at the time. ABG notable for pH 7.44, pO2 68. Dexamethasone started. Follow up ABG 1 hour later with pH 7.47/CO2 23, lactate 2.9 on Airvo 55%, 50 L. Thought to be 2/2 compensation for metabolic acidosis. Pt also bladder scanned, with only 22 cc residual, given 500 cc fluids and started on NaHCO3 tabs.     Rapid called again at 1:30 am for BP 86/46. Pt given additional 500 cc fluid for total of 1 L and patient transferred to MICU.     On arrival to the MICU, patient is A and O times one, states that he is short of breath. POCUSED and IVC collapsible, given additional 1 L stat.      Past Medical History  History reviewed. No pertinent past medical history.    Surgical History  History reviewed. No pertinent surgical history.     Social History  He reports that he has never smoked. He has never been exposed to tobacco smoke. He does not have any smokeless tobacco history on file. He reports that he does not currently use alcohol. Drug use questions deferred to the physician.    Family History  No family history on file.     Allergies  Patient has no known allergies.    Review of Systems     Physical Exam  General; Lying in bed in NAD  Cardiac: RRR, no murmurs  Respiratory: CTAB  Abdomen:  "Soft and nondistended  Neurological: Alert and oriented times one. Not answering questions. Moving all extremities.    Last Recorded Vitals  Blood pressure 103/56, pulse 107, temperature 37.5 °C (99.5 °F), temperature source Temporal, resp. rate 26, height 1.778 m (5' 10\"), weight 59.2 kg (130 lb 8.2 oz), SpO2 100%.    Relevant Results             Assessment/Plan   Principal Problem:    Pneumonia due to infectious organism, unspecified laterality, unspecified part of lung      Assessment & Plan  70 yo male with CKD, DM, HTN, sacral wound, bipolar, here with sepsis, likely 2/2 aspiration PNA. Will continue with fluid resuscitation giving additional liter to get 30 ml/kg. Will continue with broad spectrum abx for sepsis and Airvo for respiratory support.       NEUROLOGIC  #AMS  - Suspect 2/2 sepsis and hypernatremia  - Continue with fluid resuscitation for sepsis  - D5W for hypernatremia    #Depression  - Mirtazapine 7.5 mg nightly    CARDIOVASCULAR  #HTN  - Home home amlodipine in setting of sepsis    #HLD  - Continue home Atorvastatin 10 mg    #Sepsis  - IVC collapsible, given additional 1 L  - s/p 2 L fluids total    PULMONARY  #Multifocal PNA  #Aspiratoin PNA  #COVID PNA  - s/p Azithromycin (4/18-4/20)  - CT with GGO throughout right lung and to lesser degree within dependent left lower lobe  - Continue with Vanc and Zosyn  - Urine strep/legionella negative, MRSA nares negative  - Continue with Remdesevir (last dose 4/22)  - Continue dexamethasone 6 mg daily for ten day course (4/22 - )  - Frequent suctioning for BP hygiene  - Continue with Airvo    RENAL/  #CKD Stage III  - New baseline appears to be 2.5    #Hypernatremia  - Free water deficit approx 2.5 L, running D5W at 160 ml/hr over 12 hours    GASTROINTESTINAL  #Fecal impaction on CT  - Doc/senna and Miralax BID ordered  - Enema ordered    #Nutrition  - NPO while concern for aspiration      ENDOCRINE  #T2DM  #Hyperglycemia  ::Last Hba1c 9.1 March " 2023  -Continue 10 units glargine and SSI  - Q4 POCT checks while NPO         HEME/ONC  #No active issues    INFECTIOUS DISEASE  #Hidranetitis uppurative  - purulent drainage from chronic sacral wounds from hidradenitis suppurativa  :: Derm recommending humira and minocycline 100 BID, patient will need labs to confirm eligibility for these medications  :: hepatitis C antibody negative, hepatitis B surface antigen, hepatitis B core antibody negative, total, hepatitis B surface antibody negative, T-spot pending, hIV 1/2 antigen/antibody screen negative -> to determine Humira eligibility  :: wound team following, daily dressing changes  - will need to follow up outpatient with dermatology within 2 weeks of discharge.  - doxycycline 100 Bid outpatient after abx course for pneumonia is completed    #Sacral wound  - Wound care consult    F: replete as needed  E: Replete PRN  N: NPO, aspiratoin risk and increased O2 requirment  A: PIV x 2  DVT: Heparin subq  GI: Pantoprazole  Surrogate Decision Maker: Jeff Meléndez (brother) [phone]  Code Status: Full Code (Pt altered and unable to get a hold of NOK listed in chart), will assume full code till able to get a hold of family           Andie Dennis MD

## 2024-04-22 NOTE — SIGNIFICANT EVENT
Rapid Response RN Note     04/21/24 2033   Onset Documentation   Rapid Response Initiated By Radar auto page   Location/Room [facility]   Pager Time 2033   Arrival Time 2040   Event End Time 2145   Level II Called Yes   Primary Reason for Call Radar auto page  (Score 8)       Rapid response RN at bedside for RADAR score 8 due to the following VS: T 36.3 °Celsius;  ; RR 32; /58; SPO2 92%.     Reviewed above VS with bedside RN. Patient desaturating to 88% on room air. Patient initially started on 2 L NC but quickly increased to 10 L high flow nasal cannula by respiratory therapist. Breathing treatment administered with little improvement. Primary team Dr. Cooper and NACR Dr. Alvarez at bedside.  No interventions by rapid response team indicated at this time. Chest x-ray ordered and completed. ABG ordered and sent. EKG done. Patient placed on airvo 50% FiO2. MICU fellow called and notified of patient situation. Collaborative plan between primary team, NACR and MICU to recheck ABG in one hour after placed on airvo.    Staff to page rapid response for any concerns or acute change in condition/VS.

## 2024-04-22 NOTE — SIGNIFICANT EVENT
Rapid Response RN Note     04/22/24 0042   Onset Documentation   Rapid Response Initiated By Radar auto page   Location/Room [facility]   Pager Time 0042   Arrival Time 0042   Event End Time 0115   Level II Called No   Primary Reason for Call Radar auto page  (Score 6)       Rapid response RN at bedside for RADAR score 6 due to the following VS: T 36.8 °Celsius;  ; RR 30; /48; SPO2 94%.     Reviewed above VS with bedside RN. Primary team Dr. Cooper and NACR Dr. Alvarez notified and aware of most recent vitals. MICU fellow notified of new ABG results as well. 500 ml bolus of LR ordered.  Bladder scan and UA ordered. Plan to repeat ABG in 4 hours. No interventions by rapid response team indicated at this time.      Staff to page rapid response for any concerns or acute change in condition/VS.

## 2024-04-22 NOTE — PROGRESS NOTES
SOCIAL WORK NOTE   Patient not currently able to participate. Per notes, pateint is from [facility]. VM left for brother. Social work to follow. Brother returned call, agreeable to return.     UPDATE: Updates provided to Pito German NP and attached in [facility].   RYLEY Pena, LISW-S ([id number])

## 2024-04-22 NOTE — PROGRESS NOTES
Subjective   Brian Rodriguez is a 69 y.o. male presenting for hypotension concern for shock. Responding to fluid resuscitation. Pt tracks, does not respond to commands. Does not identify pain.    Objective   Physical Exam  Constitutional:       General: He is not in acute distress.     Appearance: He is cachectic. He is ill-appearing.   HENT:      Head: Normocephalic and atraumatic.      Nose: Nose normal.      Mouth/Throat:      Mouth: Mucous membranes are dry.   Cardiovascular:      Rate and Rhythm: Regular rhythm. Tachycardia present.      Heart sounds: Normal heart sounds.   Pulmonary:      Effort: Pulmonary effort is normal.      Breath sounds: Normal breath sounds.   Abdominal:      General: Abdomen is flat. Bowel sounds are normal.      Palpations: Abdomen is soft.   Musculoskeletal:         General: Normal range of motion.      Right lower leg: No edema.      Left lower leg: No edema.   Skin:     General: Skin is warm and dry.      Capillary Refill: Capillary refill takes 2 to 3 seconds.   Neurological:      Mental Status: He is lethargic and disoriented.      Cranial Nerves: No cranial nerve deficit.      Motor: Weakness present.          Temp:  [36.8 °C (98.2 °F)-37.7 °C (99.9 °F)] 37.7 °C (99.9 °F)  Heart Rate:  [] 109  Resp:  [16-38] 23  BP: ()/(46-64) 95/48  FiO2 (%):  [40 %-60 %] 40 %      Intake/Output Summary (Last 24 hours) at 4/22/2024 1747  Last data filed at 4/22/2024 1613  Gross per 24 hour   Intake 4967 ml   Output 900 ml   Net 4067 ml       Vitals:    04/22/24 0258   Weight: 59.2 kg (130 lb 8.2 oz)       FiO2 (%):  [40 %-60 %] 40 %     I/Os    Intake/Output Summary (Last 24 hours) at 4/22/2024 1747  Last data filed at 4/22/2024 1613  Gross per 24 hour   Intake 4967 ml   Output 900 ml   Net 4067 ml       Labs:   Results from last 72 hours   Lab Units 04/22/24  1543 04/22/24  0312 04/21/24  1627   SODIUM mmol/L 145 151* 149*   POTASSIUM mmol/L 3.1* 3.6 3.3*   CHLORIDE mmol/L 116*  "120* 119*   CO2 mmol/L 20* 20* 19*   BUN mg/dL 38* 45* 46*   CREATININE mg/dL 2.71* 2.88* 2.84*   GLUCOSE mg/dL 153* 235* 263*   CALCIUM mg/dL 7.4* 8.1* 8.7   ANION GAP mmol/L 12 15 14   EGFR mL/min/1.73m*2 24* 23* 23*   PHOSPHORUS mg/dL 3.4 4.4 2.6      Results from last 72 hours   Lab Units 04/22/24  0312 04/21/24  0808 04/20/24  0916   WBC AUTO x10*3/uL 16.6* 14.5* 9.8   HEMOGLOBIN g/dL 7.4* 8.2* 9.2*   HEMATOCRIT % 24.4* 25.3* 31.5*   PLATELETS AUTO x10*3/uL 347 343 393   LYMPHO PCT MAN % 4.1 10.6 13.3   MONO PCT MAN % 2.5 5.3 0.9   EOSINO PCT MAN % 1.7 1.8 4.4      Results from last 72 hours   Lab Units 04/22/24 0312   POCT PH, VENOUS pH 7.41   POCT PCO2, VENOUS mm Hg 28*   POCT SO2, VENOUS % 86*   POCT OXY HEMOGLOBIN, VENOUS % 84.0*   POCT HEMATOCRIT CALCULATED, VENOUS % 23.0*   POCT SODIUM, VENOUS mmol/L 149*   POCT POTASSIUM, VENOUS mmol/L 3.7   POCT CHLORIDE, VENOUS mmol/L 122*   POCT IONIZED CALCIUM, VENOUS mmol/L 1.29   POCT GLUCOSE, VENOUS mg/dL 251*   POCT LACTATE, VENOUS mmol/L 3.0*  3.0*   POCT BASE EXCESS, VENOUS mmol/L -6.2*   POCT HCO3 CALCULATED, VENOUS mmol/L 17.7*   POCT HEMOGLOBIN, VENOUS g/dL 7.8*   FIO2 % 58      Lab Results   Component Value Date    CALCIUM 7.4 (L) 04/22/2024    PHOS 3.4 04/22/2024      No results found for: \"CRP\"   [unfilled]     Micro/ID:   No results found for the last 90 days.    Results from last 7 days   Lab Units 04/18/24  1401   HIV 1X2  Nonreactive   HEP B C TOTAL AB  Nonreactive   HEPATITIS B S AB mIU/mL <3.1   HEP B S AG  Nonreactive   HEP C AB  Nonreactive   T-SPOT. TB INTERPRETATION  Negative   PANEL A SPOT COUNT  1   PANEL B SPOT COUNT  0   NIL(NEG) CONTROL SPOT COUNT  Passed   POS CONTROL SPOT COUNT  Passed                No lab exists for component: \"AGALPCRNB\"   Lab Results   Component Value Date    BLOODCULT Loaded on Instrument - Culture in progress 04/22/2024    BLOODCULT Loaded on Instrument - Culture in progress 04/22/2024 "       Images:  Electrocardiogram, 12-lead PRN ACS symptoms  Atrial fibrillation with rapid ventricular response  Prolonged QT  Abnormal ECG  When compared with ECG of 17-APR-2024 18:48,  Atrial fibrillation has replaced Sinus rhythm  ST now depressed in Anterior leads  QT has lengthened  Confirmed by Fermin Corado ([id number]) on 4/22/2024 10:29:28 AM  XR abdomen 1 view  Narrative: Interpreted By:  Harish Welch,   STUDY:  XR ABDOMEN 1 VIEW;  4/22/2024 8:10 am      INDICATION:  Signs/Symptoms:dobhoff retracted and advanced.      COMPARISON:  Exam dated 04/19/2024      ACCESSION NUMBER(S):  [id number]      ORDERING CLINICIAN:  JHONY SY      FINDINGS:  Enteric tube has been repositioned with the tip now projecting over  the midline, likely in the distal gastric body/gastric antrum.  Visualized bowel-gas pattern is nonobstructive. No definite free air  is seen below the diaphragm on this semi-upright radiograph..  Relative lucency projecting over the right lower quadrant is favored  to be artifactual on this semi upright image.      Streaky bibasilar opacities.      Osseous structures demonstrate no acute bony changes.      Impression: 1.  Interval repositioning of the enteric tube with the tip now  projecting over the distal gastric body/gastric antrum.  2. Nonobstructive bowel-gas pattern.      MACRO:  None      Signed by: Harish Welch 4/22/2024 9:38 AM  Dictation workstation:   [id number]  XR chest 1 view  Narrative: Interpreted By:  Harish Welch and Barbat Antonio   STUDY:  XR CHEST 1 VIEW;  4/21/2024 11:06 pm      INDICATION:  Signs/Symptoms:Eval tachypnea.      COMPARISON:  Chest radiograph dated 04/17/2024.      ACCESSION NUMBER(S):  [id number]      ORDERING CLINICIAN:  ESAU SILVA      FINDINGS:  AP radiograph of the chest was provided.      Enteric tube is visualized with the distal tip curving and projecting  over the expected region of the gastric fundus.      CARDIOMEDIASTINAL  SILHOUETTE:  Cardiomediastinal silhouette is stable in size and configuration.      LUNGS:  Mild central pulmonary vascular congestion. Slight interval worsening  of the previously described multifocal patchy airspace opacities in  the right lung with interval increased confluent opacification of the  right lower lung. No pleural effusion. No pneumothorax.      ABDOMEN:  No remarkable upper abdominal findings.      BONES:  No acute osseous changes.      Impression: 1. Mild central pulmonary vascular congestion.  2. Slight interval worsening of the previously described multifocal  patchy airspace opacities in the right lung with interval increase in  confluent opacification of the right lower lung. These findings may  be seen in the setting of pulmonary edema, however unable to exclude  an atypical infectious process or aspiration pneumonia/pneumonitis,  in the correct clinical context.  3.  Enteric tube is visualized with the distal tip curving and  projecting over the expected region of the gastric fundus. Consider  retraction followed by advancement, as clinically indicated.      The above findings were communicated to ESAU SILVA by  Karthik Guzman MD on 04/22/2024 at about 4:18 a.m. via EPIC secure  chat with read receipt by the provider.      I personally reviewed the images/study and I agree with the findings  as stated by Karthik Guzman MD. This study was interpreted at  [facility], [place], [place]      MACRO:  None      Signed by: Harish Welch 4/22/2024 9:35 AM  Dictation workstation:   [id number]       Meds:  Scheduled medications  ascorbic acid, 500 mg, oral, TID  atorvastatin, 10 mg, oral, Nightly  chlorhexidine, , Topical, Daily  dexAMETHasone, 6 mg, oral, Daily  heparin (porcine), 5,000 Units, subcutaneous, q8h RANDAL  insulin glargine, 15 Units, subcutaneous, q AM  insulin lispro, 0-20 Units, subcutaneous, q4h  lactated Ringer's, 500 mL, intravenous,  Once  mirtazapine, 7.5 mg, oral, Nightly  multivitamin with minerals, 1 tablet, oral, Daily  nicotine, 1 patch, transdermal, Daily   Followed by  [START ON 5/30/2024] nicotine, 1 patch, transdermal, Daily   Followed by  [START ON 6/13/2024] nicotine, 1 patch, transdermal, Daily  pantoprazole, 40 mg, intravenous, Daily  piperacillin-tazobactam, 3.375 g, intravenous, q6h  polyethylene glycol, 17 g, oral, BID  potassium chloride, 40 mEq, oral, Once  sennosides-docusate sodium, 2 tablet, oral, BID  sodium bicarbonate, 650 mg, oral, BID      Continuous medications  dextrose 5 % in water (D5W), 160 mL/hr, Last Rate: 160 mL/hr (04/22/24 1600)  oxygen,       PRN medications  PRN medications: acetaminophen **OR** acetaminophen **OR** acetaminophen, acetaminophen, dextrose, dextrose, glucagon, glucagon, guaiFENesin, ipratropium-albuteroL, melatonin, nicotine polacrilex, oxygen, polyethylene glycol, traMADol, vancomycin     Assessment   Brian Rodriguez is a 69 y.o. male presenting for with CKD, DM, HTN, sacral wound, bipolar, here with sepsis, likely 2/2 aspiration PNA. Responding to fluid resuscitation, continue pending pressure stabilization. Will continue with broad spectrum abx for sepsis and Airvo for respiratory support.     Neuro  #AMS  :: Baseline oriented x2  - Suspect 2/2 sepsis and hypernatremia  - Continue with fluid resuscitation for sepsis  - D5W for hypernatremia     #Depression  - Mirtazapine 7.5 mg nightly    Pulm  #Multifocal PNA  #Aspiratoin PNA  #COVID PNA  - s/p Azithromycin (4/18-4/20)  - CT with GGO throughout right lung and to lesser degree within dependent left lower lobe  - Continue with Vanc and Zosyn  - Urine strep/legionella negative, MRSA nares negative  - Continue with Remdesevir (last dose 4/22)  - Continue dexamethasone 6 mg daily for ten day course (4/22 - )  - Frequent suctioning for BP hygiene  - Continue with Airvo     Cardiovascular  #HTN  - Home home amlodipine in setting of sepsis      #HLD  - Continue home Atorvastatin 10 mg     #Sepsis  - IVC collapsible, given additional 2 L  - s/p 3.5 L fluids total    GI  #Fecal impaction on CT  - Doc/senna and Miralax BID ordered  - Enema ordered     #Nutrition  - NPO while concern for aspiration  - Trickle tube feeds  - Diet consulted    Renal/  # TERRI on CKD Stage III  :: Suspect 2/2 hypovolemia  - Baseline Cr 1.7  - Monitor BNP and RFP    # Barton with low output and bloody return  - Consult urology    #Hypernatremia - improved  - Free water deficit approx 2.5 L, dc D5W at 160 ml/hr    Endo  #T2DM  #Hyperglycemia  ::Last Hba1c 9.1 March 2023  -Continue 15 units glargine and SSI  - Q4 POCT checks while NPO    ID  :: purulent drainage from chronic sacral wounds from hidradenitis suppurativa  :: Derm recommending humira and minocycline 100 BID, patient will need labs to confirm eligibility for these medications  :: hepatitis C antibody negative, hepatitis B surface antigen, hepatitis B core antibody negative, total, hepatitis B surface antibody negative, T-spot pending, hIV 1/2 antigen/antibody screen negative -> to determine Humira eligibility  :: wound team following, daily dressing changes  - will need to follow up outpatient with dermatology within 2 weeks of discharge.  - doxycycline 100 Bid outpatient after abx course for pneumonia is completed     #Sacral wound  - Wound care consult  Heme  No acute    MSK/Rheum  No acute issues    Derm  No acute issues    Psych  No acute issues    F Fluid bolus  E Replete as needed  N Trickle feeds tube NPO  G PEG 17g  DVT: SubQ heparin  Abx: Zosyn  Drips: None  Pressors: None  Code status: Full Code  NoK:  Primary Emergency Contact: DEEPAK RANGEL, Home Phone: [phone]    Dispo: 69M from LTC with PNA and encephalopathy. MICU for hypotension c/f sepsis. Not requiring pressors. OK for floors pending stability with fluid resuscitation.

## 2024-04-22 NOTE — SIGNIFICANT EVENT
Rapid Response    A RR was called by Nursing for low desat 85%, was subsequently put on NC and increased to 12L  just to maintain a saturation of 92%. Rest of vitals were:  , RR 32, /59, Temp 36. Lung on exam sounded rhonhus, which was not new in comparison to Day team's assessment. Patient was being treated for PNA and Covid and was on Vanc/Zosyn and Remdesivir.  Repeat CXR showed worsening consolidation on the RLL,  when compared to prior CXR.    ABG: pH 7.44, pCO2 24, pO2 68 - on 12L of Oxygen high flow. Lactate 1.8 on ABG.   MICU was alerted on the patient and it was decided to put the patient on Airvo for now and recheck ABG after 1hr    Interval  Intervention: Added Dex 6mg, and increased Lantus to 15units. Was placed on Airvo    ABG after 1hr: pH 7.47, pCO2 23, pO2 73, lactate 2.9  - on Airvo FiO2 55% 40L  Vitals: Temp 36.8, RR 30, /48, Spo2 94-95%     Suspicion of  compensatory Respiratory alkalosis 2/2 to Metabolic acidosis (bicarb 19, 18, 19), as a byproduct of ongoing TERRI (2.84). Lungs seems to be overcompensating with this Resp Alk, is most lilky due to the  ongoing PNA and Covid infection.      Interval  Intervention: Bladder scanned - 22cc residual. Ordered UA and urine lytes. Gave 500cc Fluids, and Sodium bicarb 650mg BID.  Repeating ABG after fluids.    0130am: Rapid Response called for BP 86/46 before fluids was given.   Another 500cc of fluids was ordered.   Repeat ABG : pH 7.47, pCO2 23, pO2 84, Lac 2.6.  Contacted the MICU about patient's clinical status, and MICU accepted the patient was tranfer.

## 2024-04-22 NOTE — SIGNIFICANT EVENT
Urology Note:    Urology service was called for bunch catheter not draining for few hours and flushing with return of blood.     Upon checking the catheter, it was mostly out of the meatus likely balloon inflated in the urethra. Attempted to deflate the balloon to hub the catheter but it was resistant so bunch was removed. There was minimum blood around the meatus likely traumatic insertion.    The urethra was prepped and draped in the usual sterile fashion.  5cc of lubricating jelly was injected into the urethra. Due to the concern of false passage, a sensor wire was placed into the bladder without resistance. An open ended 5 fr catheter was placed over the wire with return of urine confirming the location in the bladder. The wire was placed through the catheter and 16fr [place] tip catheter was placed over the wire easily with return of cloudy dark yellow urine. Patient tolerated the procedure well. Bulb inflated with 10cc's of sterile water. Catheter connected to sterile tubing and collection bag and positioned to over side gravity drainage.     Urology Recommendation:   - Would recommend keeping bunch for 72h to allow time for urethral trauma healing.   - After that, a trial of void may be performed at the discretion of primary team   - Please ensure post-void bladder scan is performed after first void        Rivera Marcelino MD  Urology PGY-2  Pager: [phone]

## 2024-04-22 NOTE — NURSING NOTE
On the beginning of the shfit pt was having oxygen desaturation at 85% AND RR was in 30s  on room air. This nurse and the charge nurse put the pt on oxygen. Pt still desaturated. RT was called to the bedside. RT put pt on HI-flow NC. Dr was called to the bedside. Pt was put in an airvo. Pt in airvo was saturating around 94-95% but RR was still around 30-35. Around 1 am pt's BP went down to 88/48. This nurse called rapid. 1L bolus was given. BP came back to 114/70. Pt was transported to ICU

## 2024-04-22 NOTE — SIGNIFICANT EVENT
Rapid Response RN Note     04/22/24 0129   Onset Documentation   Rapid Response Initiated By RN   Location/Room [facility]   Pager Time 0129   Arrival Time 0135   Event End Time 0245   Level II Called Yes   Primary Reason for Call SBP less than or equal to 90 mmHg       Rapid response called for hypotension, 86/54 (65). Patient still presenting with increased work of breathing, RR 30 (see previous event note). Airvo 55% FiO2.  NACR Dr. Alvarez and primary team Dr. Cooper at bedside. 500 ml bolus of LR initiated. NT suctioning preformed. Moderate to large amount of thick white secretions removed. Patient tolerated well. ABG drawn. Lactate trending upward on ABG. Second 500 ml bolus of LR initiated. MICU fellow Dr. Palaicos notified and accepted patient. Rapid response assisted with ICU transfer to MICU bed 22.     Staff to page rapid response for any concerns or acute change in condition/VS.

## 2024-04-23 ENCOUNTER — APPOINTMENT (OUTPATIENT)
Dept: RADIOLOGY | Facility: HOSPITAL | Age: 69
DRG: 871 | End: 2024-04-23
Payer: MEDICARE

## 2024-04-23 LAB
ABO GROUP (TYPE) IN BLOOD: NORMAL
ABO GROUP (TYPE) IN BLOOD: NORMAL
ALBUMIN SERPL BCP-MCNC: 1.5 G/DL (ref 3.4–5)
AMMONIA PLAS-SCNC: 30 UMOL/L (ref 16–53)
ANION GAP SERPL CALC-SCNC: 15 MMOL/L (ref 10–20)
ANTIBODY SCREEN: NORMAL
AORTIC VALVE PEAK VELOCITY: 1.11 M/S
APTT PPP: 57 SECONDS (ref 27–38)
AV PEAK GRADIENT: 4.9 MMHG
AVA (PEAK VEL): 2.83 CM2
BASOPHILS # BLD MANUAL: 0 X10*3/UL (ref 0–0.1)
BASOPHILS NFR BLD MANUAL: 0 %
BLOOD EXPIRATION DATE: NORMAL
BUN SERPL-MCNC: 41 MG/DL (ref 6–23)
BURR CELLS BLD QL SMEAR: ABNORMAL
CALCIUM SERPL-MCNC: 7.8 MG/DL (ref 8.6–10.6)
CHLORIDE SERPL-SCNC: 115 MMOL/L (ref 98–107)
CHLORIDE UR-SCNC: 21 MMOL/L
CHLORIDE/CREATININE (MMOL/G) IN URINE: 25 MMOL/G CREAT (ref 23–275)
CO2 SERPL-SCNC: 19 MMOL/L (ref 21–32)
CREAT SERPL-MCNC: 2.71 MG/DL (ref 0.5–1.3)
CREAT UR-MCNC: 83.8 MG/DL (ref 20–370)
DISPENSE STATUS: NORMAL
EGFRCR SERPLBLD CKD-EPI 2021: 24 ML/MIN/1.73M*2
EJECTION FRACTION APICAL 4 CHAMBER: 81
EOSINOPHIL # BLD MANUAL: 0.8 X10*3/UL (ref 0–0.4)
EOSINOPHIL NFR BLD MANUAL: 4.3 %
ERYTHROCYTE [DISTWIDTH] IN BLOOD BY AUTOMATED COUNT: 20.9 % (ref 11.5–14.5)
FERRITIN SERPL-MCNC: 352 NG/ML (ref 20–300)
FIBRINOGEN PPP-MCNC: 706 MG/DL (ref 200–400)
FOLATE SERPL-MCNC: 11.8 NG/ML
GLUCOSE BLD MANUAL STRIP-MCNC: 197 MG/DL (ref 74–99)
GLUCOSE BLD MANUAL STRIP-MCNC: 225 MG/DL (ref 74–99)
GLUCOSE BLD MANUAL STRIP-MCNC: 235 MG/DL (ref 74–99)
GLUCOSE BLD MANUAL STRIP-MCNC: 246 MG/DL (ref 74–99)
GLUCOSE BLD MANUAL STRIP-MCNC: 317 MG/DL (ref 74–99)
GLUCOSE SERPL-MCNC: 214 MG/DL (ref 74–99)
HCT VFR BLD AUTO: 22.9 % (ref 41–52)
HGB BLD-MCNC: 7.9 G/DL (ref 13.5–17.5)
HYPOCHROMIA BLD QL SMEAR: ABNORMAL
INR PPP: 1.7 (ref 0.9–1.1)
IRON SATN MFR SERPL: ABNORMAL %
IRON SERPL-MCNC: <10 UG/DL (ref 35–150)
LACTATE SERPL-SCNC: 1.6 MMOL/L (ref 0.4–2)
LEFT VENTRICLE INTERNAL DIMENSION DIASTOLE: 5 CM (ref 3.5–6)
LEFT VENTRICULAR OUTFLOW TRACT DIAMETER: 2.1 CM
LV EJECTION FRACTION BIPLANE: 76 %
LYMPHOCYTES # BLD MANUAL: 2.09 X10*3/UL (ref 0.8–3)
LYMPHOCYTES NFR BLD MANUAL: 11.3 %
MAGNESIUM SERPL-MCNC: 1.89 MG/DL (ref 1.6–2.4)
MCH RBC QN AUTO: 24.5 PG (ref 26–34)
MCHC RBC AUTO-ENTMCNC: 34.5 G/DL (ref 32–36)
MCV RBC AUTO: 71 FL (ref 80–100)
MITRAL VALVE E/A RATIO: 1.12
MITRAL VALVE E/E' RATIO: 6.97
MONOCYTES # BLD MANUAL: 0 X10*3/UL (ref 0.05–0.8)
MONOCYTES NFR BLD MANUAL: 0 %
NEUTS SEG # BLD MANUAL: 15.61 X10*3/UL (ref 1.6–5)
NEUTS SEG NFR BLD MANUAL: 84.4 %
NRBC BLD-RTO: 0.1 /100 WBCS (ref 0–0)
OVALOCYTES BLD QL SMEAR: ABNORMAL
PHOSPHATE SERPL-MCNC: 3.2 MG/DL (ref 2.5–4.9)
PLATELET # BLD AUTO: 237 X10*3/UL (ref 150–450)
POTASSIUM SERPL-SCNC: 3.4 MMOL/L (ref 3.5–5.3)
POTASSIUM UR-SCNC: 71 MMOL/L
POTASSIUM/CREAT UR-RTO: 85 MMOL/G CREAT
PRODUCT BLOOD TYPE: 5100
PRODUCT CODE: NORMAL
PROTHROMBIN TIME: 19.4 SECONDS (ref 9.8–12.8)
RBC # BLD AUTO: 3.23 X10*6/UL (ref 4.5–5.9)
RBC MORPH BLD: ABNORMAL
RH FACTOR (ANTIGEN D): NORMAL
RH FACTOR (ANTIGEN D): NORMAL
RIGHT VENTRICLE FREE WALL PEAK S': 14.3 CM/S
SODIUM SERPL-SCNC: 146 MMOL/L (ref 136–145)
SODIUM UR-SCNC: 24 MMOL/L
SODIUM/CREAT UR-RTO: 29 MMOL/G CREAT
TARGETS BLD QL SMEAR: ABNORMAL
TIBC SERPL-MCNC: ABNORMAL UG/DL
TOTAL CELLS COUNTED BLD: 115
TRICUSPID ANNULAR PLANE SYSTOLIC EXCURSION: 2.1 CM
TSH SERPL-ACNC: 0.5 MIU/L (ref 0.44–3.98)
TSH SERPL-ACNC: 1.09 MIU/L (ref 0.44–3.98)
UIBC SERPL-MCNC: 95 UG/DL (ref 110–370)
UNIT ABO: NORMAL
UNIT NUMBER: NORMAL
UNIT RH: NORMAL
UNIT VOLUME: 350
VANCOMYCIN SERPL-MCNC: 17.3 UG/ML (ref 5–20)
VIT B12 SERPL-MCNC: 1895 PG/ML (ref 211–911)
WBC # BLD AUTO: 17.4 X10*3/UL (ref 4.4–11.3)
XM INTEP: NORMAL

## 2024-04-23 PROCEDURE — 2500000004 HC RX 250 GENERAL PHARMACY W/ HCPCS (ALT 636 FOR OP/ED)

## 2024-04-23 PROCEDURE — 2500000001 HC RX 250 WO HCPCS SELF ADMINISTERED DRUGS (ALT 637 FOR MEDICARE OP)

## 2024-04-23 PROCEDURE — 36415 COLL VENOUS BLD VENIPUNCTURE: CPT

## 2024-04-23 PROCEDURE — P9016 RBC LEUKOCYTES REDUCED: HCPCS

## 2024-04-23 PROCEDURE — 86900 BLOOD TYPING SEROLOGIC ABO: CPT | Mod: 91

## 2024-04-23 PROCEDURE — 85610 PROTHROMBIN TIME: CPT

## 2024-04-23 PROCEDURE — 1100000001 HC PRIVATE ROOM DAILY

## 2024-04-23 PROCEDURE — 31720 CLEARANCE OF AIRWAYS: CPT

## 2024-04-23 PROCEDURE — 82947 ASSAY GLUCOSE BLOOD QUANT: CPT | Mod: 91

## 2024-04-23 PROCEDURE — 2500000002 HC RX 250 W HCPCS SELF ADMINISTERED DRUGS (ALT 637 FOR MEDICARE OP, ALT 636 FOR OP/ED)

## 2024-04-23 PROCEDURE — 85027 COMPLETE CBC AUTOMATED: CPT

## 2024-04-23 PROCEDURE — P9045 ALBUMIN (HUMAN), 5%, 250 ML: HCPCS | Mod: JZ

## 2024-04-23 PROCEDURE — 80069 RENAL FUNCTION PANEL: CPT

## 2024-04-23 PROCEDURE — 86923 COMPATIBILITY TEST ELECTRIC: CPT

## 2024-04-23 PROCEDURE — 85060 BLOOD SMEAR INTERPRETATION: CPT | Performed by: PATHOLOGY

## 2024-04-23 PROCEDURE — 83735 ASSAY OF MAGNESIUM: CPT

## 2024-04-23 PROCEDURE — 99222 1ST HOSP IP/OBS MODERATE 55: CPT

## 2024-04-23 PROCEDURE — 82728 ASSAY OF FERRITIN: CPT

## 2024-04-23 PROCEDURE — 85007 BL SMEAR W/DIFF WBC COUNT: CPT

## 2024-04-23 PROCEDURE — S4991 NICOTINE PATCH NONLEGEND: HCPCS

## 2024-04-23 PROCEDURE — 74018 RADEX ABDOMEN 1 VIEW: CPT | Performed by: RADIOLOGY

## 2024-04-23 PROCEDURE — 84443 ASSAY THYROID STIM HORMONE: CPT

## 2024-04-23 PROCEDURE — 85384 FIBRINOGEN ACTIVITY: CPT

## 2024-04-23 PROCEDURE — 36430 TRANSFUSION BLD/BLD COMPNT: CPT

## 2024-04-23 PROCEDURE — 74018 RADEX ABDOMEN 1 VIEW: CPT

## 2024-04-23 PROCEDURE — 99291 CRITICAL CARE FIRST HOUR: CPT

## 2024-04-23 PROCEDURE — 80202 ASSAY OF VANCOMYCIN: CPT

## 2024-04-23 PROCEDURE — 82140 ASSAY OF AMMONIA: CPT

## 2024-04-23 PROCEDURE — C9113 INJ PANTOPRAZOLE SODIUM, VIA: HCPCS

## 2024-04-23 PROCEDURE — 82947 ASSAY GLUCOSE BLOOD QUANT: CPT

## 2024-04-23 PROCEDURE — 2500000004 HC RX 250 GENERAL PHARMACY W/ HCPCS (ALT 636 FOR OP/ED): Mod: JZ

## 2024-04-23 PROCEDURE — 83605 ASSAY OF LACTIC ACID: CPT

## 2024-04-23 PROCEDURE — 85730 THROMBOPLASTIN TIME PARTIAL: CPT

## 2024-04-23 PROCEDURE — 86900 BLOOD TYPING SEROLOGIC ABO: CPT

## 2024-04-23 PROCEDURE — 86920 COMPATIBILITY TEST SPIN: CPT

## 2024-04-23 RX ORDER — POTASSIUM CHLORIDE 1.5 G/1.58G
40 POWDER, FOR SOLUTION ORAL ONCE
Status: COMPLETED | OUTPATIENT
Start: 2024-04-23 | End: 2024-04-23

## 2024-04-23 RX ORDER — MAGNESIUM SULFATE HEPTAHYDRATE 40 MG/ML
2 INJECTION, SOLUTION INTRAVENOUS ONCE
Status: COMPLETED | OUTPATIENT
Start: 2024-04-23 | End: 2024-04-23

## 2024-04-23 RX ORDER — VANCOMYCIN HYDROCHLORIDE 500 MG/100ML
500 INJECTION, SOLUTION INTRAVENOUS ONCE
Status: COMPLETED | OUTPATIENT
Start: 2024-04-23 | End: 2024-04-23

## 2024-04-23 RX ORDER — ALBUMIN HUMAN 250 G/1000ML
SOLUTION INTRAVENOUS
Status: DISPENSED
Start: 2024-04-23 | End: 2024-04-23

## 2024-04-23 RX ORDER — ALBUMIN HUMAN 50 G/1000ML
25 SOLUTION INTRAVENOUS ONCE
Status: COMPLETED | OUTPATIENT
Start: 2024-04-23 | End: 2024-04-23

## 2024-04-23 RX ADMIN — VANCOMYCIN HYDROCHLORIDE 500 MG: 500 INJECTION, SOLUTION INTRAVENOUS at 08:53

## 2024-04-23 RX ADMIN — NICOTINE 1 PATCH: 21 PATCH, EXTENDED RELEASE TRANSDERMAL at 09:00

## 2024-04-23 RX ADMIN — MAGNESIUM SULFATE HEPTAHYDRATE 2 G: 40 INJECTION, SOLUTION INTRAVENOUS at 08:53

## 2024-04-23 RX ADMIN — HEPARIN SODIUM 5000 UNITS: 5000 INJECTION INTRAVENOUS; SUBCUTANEOUS at 15:16

## 2024-04-23 RX ADMIN — PIPERACILLIN SODIUM AND TAZOBACTAM SODIUM 3.38 G: 3; .375 INJECTION, SOLUTION INTRAVENOUS at 12:00

## 2024-04-23 RX ADMIN — INSULIN GLARGINE 15 UNITS: 100 INJECTION, SOLUTION SUBCUTANEOUS at 09:14

## 2024-04-23 RX ADMIN — INSULIN LISPRO 8 UNITS: 100 INJECTION, SOLUTION INTRAVENOUS; SUBCUTANEOUS at 11:52

## 2024-04-23 RX ADMIN — OXYCODONE HYDROCHLORIDE AND ACETAMINOPHEN 500 MG: 500 TABLET ORAL at 15:16

## 2024-04-23 RX ADMIN — PIPERACILLIN SODIUM AND TAZOBACTAM SODIUM 3.38 G: 3; .375 INJECTION, SOLUTION INTRAVENOUS at 18:01

## 2024-04-23 RX ADMIN — SODIUM BICARBONATE 650 MG: 650 TABLET ORAL at 08:53

## 2024-04-23 RX ADMIN — ALBUMIN (HUMAN) 25 G: 12.5 SOLUTION INTRAVENOUS at 02:45

## 2024-04-23 RX ADMIN — DEXAMETHASONE 6 MG: 6 TABLET ORAL at 09:00

## 2024-04-23 RX ADMIN — ACETAMINOPHEN 650 MG: 650 SOLUTION ORAL at 22:42

## 2024-04-23 RX ADMIN — Medication 1 TABLET: at 08:53

## 2024-04-23 RX ADMIN — Medication: at 09:00

## 2024-04-23 RX ADMIN — POTASSIUM CHLORIDE 40 MEQ: 1.5 POWDER, FOR SOLUTION ORAL at 08:53

## 2024-04-23 RX ADMIN — OXYCODONE HYDROCHLORIDE AND ACETAMINOPHEN 500 MG: 500 TABLET ORAL at 08:53

## 2024-04-23 RX ADMIN — INSULIN LISPRO 8 UNITS: 100 INJECTION, SOLUTION INTRAVENOUS; SUBCUTANEOUS at 16:45

## 2024-04-23 RX ADMIN — PANTOPRAZOLE SODIUM 40 MG: 40 INJECTION, POWDER, FOR SOLUTION INTRAVENOUS at 08:53

## 2024-04-23 RX ADMIN — HEPARIN SODIUM 5000 UNITS: 5000 INJECTION INTRAVENOUS; SUBCUTANEOUS at 05:46

## 2024-04-23 RX ADMIN — PIPERACILLIN SODIUM AND TAZOBACTAM SODIUM 3.38 G: 3; .375 INJECTION, SOLUTION INTRAVENOUS at 05:46

## 2024-04-23 RX ADMIN — INSULIN LISPRO 4 UNITS: 100 INJECTION, SOLUTION INTRAVENOUS; SUBCUTANEOUS at 09:13

## 2024-04-23 RX ADMIN — GUAIFENESIN 100 MG: 200 SOLUTION ORAL at 22:42

## 2024-04-23 RX ADMIN — INSULIN LISPRO 8 UNITS: 100 INJECTION, SOLUTION INTRAVENOUS; SUBCUTANEOUS at 04:07

## 2024-04-23 ASSESSMENT — PAIN SCALES - PAIN ASSESSMENT IN ADVANCED DEMENTIA (PAINAD)
BODYLANGUAGE: RELAXED
FACIALEXPRESSION: SMILING OR INEXPRESSIVE
NEGVOCALIZATION: OCCASIONAL MOAN/GROAN, LOW SPEECH, NEGATIVE/DISAPPROVING QUALITY
CONSOLABILITY: NO NEED TO CONSOLE
CONSOLABILITY: NO NEED TO CONSOLE
FACIALEXPRESSION: SMILING OR INEXPRESSIVE
TOTALSCORE: 0
TOTALSCORE: 1
TOTALSCORE: 1
BODYLANGUAGE: RELAXED
FACIALEXPRESSION: SMILING OR INEXPRESSIVE
NEGVOCALIZATION: OCCASIONAL MOAN/GROAN, LOW SPEECH, NEGATIVE/DISAPPROVING QUALITY
BREATHING: NORMAL
FACIALEXPRESSION: SMILING OR INEXPRESSIVE
BODYLANGUAGE: RELAXED
BODYLANGUAGE: RELAXED
CONSOLABILITY: NO NEED TO CONSOLE
BREATHING: NORMAL
TOTALSCORE: 0
CONSOLABILITY: NO NEED TO CONSOLE

## 2024-04-23 ASSESSMENT — PAIN - FUNCTIONAL ASSESSMENT
PAIN_FUNCTIONAL_ASSESSMENT: CPOT (CRITICAL CARE PAIN OBSERVATION TOOL)
PAIN_FUNCTIONAL_ASSESSMENT: PAINAD (PAIN ASSESSMENT IN ADVANCED DEMENTIA SCALE)

## 2024-04-23 NOTE — PROGRESS NOTES
Subjective   Brian Rodriguez is a 69 y.o. male presenting for hypotension concern for shock. Responding to fluid resuscitation, but requiring multiple boluses for support. Pt tracks, does responds to some commands. States that he has sacral pain.    Objective   Physical Exam  Constitutional:       General: He is not in acute distress.     Appearance: He is cachectic. He is ill-appearing.   HENT:      Head: Normocephalic and atraumatic.      Nose: Nose normal.      Mouth/Throat:      Mouth: Mucous membranes are dry.   Cardiovascular:      Rate and Rhythm: Regular rhythm. Tachycardia present.      Heart sounds: Normal heart sounds.   Pulmonary:      Effort: Pulmonary effort is normal.      Breath sounds: Wheezing and rhonchi present. No rales.   Abdominal:      General: Abdomen is flat. Bowel sounds are normal.      Palpations: Abdomen is soft.   Musculoskeletal:         General: Normal range of motion.      Right lower leg: No edema.      Left lower leg: No edema.   Skin:     General: Skin is warm and dry.      Capillary Refill: Capillary refill takes 2 to 3 seconds.      Findings: Bruising present.   Neurological:      Mental Status: He is alert. He is disoriented.      Motor: Weakness present.      Comments: Global weakness with notable right upper extremity more than others  Sensation diminished RUE  Questionable if patient was selectively following commands vs unable          Temp:  [36.8 °C (98.2 °F)-37.6 °C (99.7 °F)] 36.8 °C (98.2 °F)  Heart Rate:  [] 84  Resp:  [15-30] 18  BP: ()/(40-64) 98/54      Intake/Output Summary (Last 24 hours) at 4/23/2024 1508  Last data filed at 4/23/2024 1315  Gross per 24 hour   Intake 3635 ml   Output 1770 ml   Net 1865 ml     Vitals:    04/23/24 0000   Weight: 63.6 kg (140 lb 3.4 oz)     I/Os    Intake/Output Summary (Last 24 hours) at 4/23/2024 1508  Last data filed at 4/23/2024 1315  Gross per 24 hour   Intake 3635 ml   Output 1770 ml   Net 1865 ml     Labs:    Results from last 72 hours   Lab Units 04/23/24  0429 04/22/24  1543 04/22/24  0312   SODIUM mmol/L 146* 145 151*   POTASSIUM mmol/L 3.4* 3.1* 3.6   CHLORIDE mmol/L 115* 116* 120*   CO2 mmol/L 19* 20* 20*   BUN mg/dL 41* 38* 45*   CREATININE mg/dL 2.71* 2.71* 2.88*   GLUCOSE mg/dL 214* 153* 235*   CALCIUM mg/dL 7.8* 7.4* 8.1*   ANION GAP mmol/L 15 12 15   EGFR mL/min/1.73m*2 24* 24* 23*   PHOSPHORUS mg/dL 3.2 3.4 4.4      Results from last 72 hours   Lab Units 04/23/24  0429 04/22/24 0312 04/21/24  0808   WBC AUTO x10*3/uL 18.5* 16.6* 14.5*   HEMOGLOBIN g/dL 6.1* 7.4* 8.2*   HEMATOCRIT % 18.4* 24.4* 25.3*   PLATELETS AUTO x10*3/uL 243 347 343   LYMPHO PCT MAN % 11.3 4.1 10.6   MONO PCT MAN % 0.0 2.5 5.3   EOSINO PCT MAN % 4.3 1.7 1.8      Results from last 72 hours   Lab Units 04/22/24 0312   POCT PH, VENOUS pH 7.41   POCT PCO2, VENOUS mm Hg 28*   POCT SO2, VENOUS % 86*   POCT OXY HEMOGLOBIN, VENOUS % 84.0*   POCT HEMATOCRIT CALCULATED, VENOUS % 23.0*   POCT SODIUM, VENOUS mmol/L 149*   POCT POTASSIUM, VENOUS mmol/L 3.7   POCT CHLORIDE, VENOUS mmol/L 122*   POCT IONIZED CALCIUM, VENOUS mmol/L 1.29   POCT GLUCOSE, VENOUS mg/dL 251*   POCT LACTATE, VENOUS mmol/L 3.0*  3.0*   POCT BASE EXCESS, VENOUS mmol/L -6.2*   POCT HCO3 CALCULATED, VENOUS mmol/L 17.7*   POCT HEMOGLOBIN, VENOUS g/dL 7.8*   FIO2 % 58      Lab Results   Component Value Date    CALCIUM 7.8 (L) 04/23/2024    PHOS 3.2 04/23/2024      Micro/ID:   No results found for the last 90 days.    Results from last 7 days   Lab Units 04/18/24  1401   HIV 1X2  Nonreactive   HEP B C TOTAL AB  Nonreactive   HEPATITIS B S AB mIU/mL <3.1   HEP B S AG  Nonreactive   HEP C AB  Nonreactive   T-SPOT. TB INTERPRETATION  Negative   PANEL A SPOT COUNT  1   PANEL B SPOT COUNT  0   NIL(NEG) CONTROL SPOT COUNT  Passed   POS CONTROL SPOT COUNT  Passed     Lab Results   Component Value Date    BLOODCULT No growth at 1 day 04/22/2024    BLOODCULT No growth at 1 day 04/22/2024      Images:  Transthoracic Echo (TTE) Complete     [facility], [address] [place] [address]                 Tel [phone] and Fax [phone]    TRANSTHORACIC ECHOCARDIOGRAM REPORT       Patient Name:      JARRED RANGEL     [place] Physician:    [id number] Pino Richards MD  Study Date:        4/22/2024            Ordering Provider:    [id number] VETO VILLAGRAN  MRN/PID:           [id number]             Fellow:  Accession#:        [id number]         Nurse:  Date of Birth/Age: 1955 / 69 years Sonographer:          RALPH Mckeon RDCS  Gender:            M                    Additional Staff:  Height:            177.80 cm            Admit Date:           4/17/2024  Weight:            58.97 kg             Admission Status:     Inpatient -                                                                Routine  BSA / BMI:         1.74 m2 / 18.65      Encounter#:           [id number]                     kg/m2                                          Department Location:  [place] [address] MICU  Blood Pressure: 95 /48 mmHg    Study Type:    TRANSTHORACIC ECHO (TTE) COMPLETE  Diagnosis/ICD: Encounter for screening for cardiovascular disorders-Z13.6  Indication:    Fluid Status  CPT Code:      Echo Complete w Full Doppler-98248    Patient History:  Diabetes:         Yes  Pertinent         HTN. PNA, CKD, AMS, bipolar, hypoxic respiratory failure,  History:          sepsis.    Study Detail: The following Echo studies were performed: 2D, M-Mode, Doppler and                color flow. Technically challenging study due to patient lying in                supine position, body habitus and prominent lung artifact.       PHYSICIAN INTERPRETATION:  Left Ventricle: The left ventricular systolic function is normal, with an  estimated ejection fraction of 65-70%. There are no regional wall motion abnormalities. The left ventricular cavity size is normal. Left ventricular diastolic filling was indeterminate.  Left Atrium: The left atrium is normal in size.  Right Ventricle: The right ventricle is normal in size. There is normal right ventricular global systolic function.  Right Atrium: The right atrium is normal in size.  Aortic Valve: The aortic valve is trileaflet. There is minimal aortic valve cusp calcification. There is no evidence of aortic valve regurgitation. The peak instantaneous gradient of the aortic valve is 4.9 mmHg.  Mitral Valve: The mitral valve is normal in structure. There is mild mitral annular calcification. There is no evidence of mitral valve regurgitation.  Tricuspid Valve: The tricuspid valve was not well visualized. No evidence of tricuspid regurgitation. The right ventricular systolic pressure is unable to be estimated.  Pulmonic Valve: The pulmonic valve is not well visualized. There is physiologic pulmonic valve regurgitation.  Pericardium: There is no pericardial effusion noted.  Aorta: The aortic root is normal. The ascending aorta was not well visualized. The aortic root appears normal in size and measures 3.70 cm.  Systemic Veins: The inferior vena cava appears to be of normal size. There is IVC inspiratory collapse greater than 50%.  In comparison to the previous echocardiogram(s): There are no prior studies on this patient for comparison purposes.       CONCLUSIONS:   1. Poorly visualized anatomical structures due to suboptimal image quality.   2. Left ventricular systolic function is normal with a 65-70% estimated ejection fraction.   3. Normal aortic root.    QUANTITATIVE DATA SUMMARY:  2D MEASUREMENTS:                           Normal Ranges:  Ao Root d:     3.70 cm   (2.0-3.7cm)  LAs:           3.30 cm   (2.7-4.0cm)  IVSd:          0.80 cm   (0.6-1.1cm)  LVPWd:         0.90 cm   (0.6-1.1cm)  LVIDd:          5.00 cm   (3.9-5.9cm)  LVIDs:         4.40 cm  LV Mass Index: 84.5 g/m2  LV % FS        12.0 %    LV SYSTOLIC FUNCTION BY 2D PLANIMETRY (MOD):                      Normal Ranges:  EF-A4C View: 81.0 % (>=55%)  EF-A2C View: 68.7 %  EF-Biplane:  76.1 %    LV DIASTOLIC FUNCTION:                         Normal Ranges:  MV Peak E:    0.80 m/s (0.7-1.2 m/s)  MV Peak A:    0.72 m/s (0.42-0.7 m/s)  E/A Ratio:    1.12     (1.0-2.2)  MV e'         0.12 m/s (>8.0)  MV lateral e' 0.14 m/s  MV medial e'  0.09 m/s  E/e' Ratio:   6.97     (<8.0)  MV DT:        219 msec (150-240 msec)    MITRAL VALVE:                  Normal Ranges:  MV DT: 219 msec (150-240msec)    AORTIC VALVE:                          Normal Ranges:  AoV Vmax:      1.11 m/s (<=1.7m/s)  AoV Peak P.9 mmHg (<20mmHg)  LVOT Max Yandel:  0.91 m/s (<=1.1m/s)  LVOT VTI:      17.40 cm  LVOT Diameter: 2.10 cm  (1.8-2.4cm)  AoV Area,Vmax: 2.83 cm2 (2.5-4.5cm2)       RIGHT VENTRICLE:  TAPSE: 20.9 mm  RV s'  0.14 m/s    TRICUSPID VALVE/RVSP:                    Normal Ranges:  IVC Diam: 1.97 cm       [id number] Pino Richards MD  Electronically signed on 2024 at 8:39:17 AM       ** Final **     Meds:  Scheduled medications  ascorbic acid, 500 mg, oral, TID  atorvastatin, 10 mg, oral, Nightly  chlorhexidine, , Topical, Daily  dexAMETHasone, 6 mg, oral, Daily  heparin (porcine), 5,000 Units, subcutaneous, q8h RANDAL  insulin glargine, 15 Units, subcutaneous, q AM  insulin lispro, 0-20 Units, subcutaneous, q4h  lactated Ringer's, 500 mL, intravenous, Once  mirtazapine, 7.5 mg, oral, Nightly  multivitamin with minerals, 1 tablet, oral, Daily  nicotine, 1 patch, transdermal, Daily   Followed by  [START ON 2024] nicotine, 1 patch, transdermal, Daily   Followed by  [START ON 2024] nicotine, 1 patch, transdermal, Daily  pantoprazole, 40 mg, intravenous, Daily  piperacillin-tazobactam, 3.375 g, intravenous, q6h  polyethylene glycol, 17 g, oral, BID  sennosides-docusate  sodium, 2 tablet, oral, BID  sodium bicarbonate, 650 mg, oral, BID    Continuous medications  oxygen, , Last Rate: 3 L/min (04/23/24 0000)    PRN medications  PRN medications: acetaminophen **OR** acetaminophen **OR** acetaminophen, acetaminophen, dextrose, dextrose, glucagon, glucagon, guaiFENesin, ipratropium-albuteroL, melatonin, nicotine polacrilex, oxygen, polyethylene glycol, traMADol, vancomycin     Assessment   Brian Rodriguez is a 69 y.o. male presenting for with CKD, DM, HTN, sacral wound, bipolar, here with sepsis, likely 2/2 aspiration PNA. Responding to fluid resuscitation, continue pending pressure stabilization. Required albumin infusion. Unclear if patient is hypovolemic from dehydration vs bacteremia/sepsis.     Neuro  #AMS  :: Baseline oriented x2  - Suspect 2/2 sepsis and hypernatremia  - Continue with fluid resuscitation  - TSH  - Ammonia  - DC steroids     #Depression  - Mirtazapine 7.5 mg nightly    # Dysphagia  :: ENT previously consulted, no anatomic cause  - Concern for central cause raised 4/18 (?stroke)  - Consult neurology    Pulm  # Multifocal PNA  # Aspiratoin PNA  # COVID PNA  - s/p Azithromycin (4/18-4/20)  - CT with GGO throughout right lung and to lesser degree within dependent left lower lobe  - Continue with Vanc and Zosyn  - Urine strep/legionella negative, MRSA nares negative  - Continue with Remdesevir (last dose 4/22)  - DC dexamethasone 6 mg  - Frequent suctioning for BP hygiene  - Patient on NC     Cardiovascular  # HTN  - Home home amlodipine in setting of sepsis     # HLD  - Continue home Atorvastatin 10 mg     # Hypotension  :: ?Sepsis  - IVC collapsible, given additional 3 L  - s/p 4.5 L fluids total  - Required 25g overnight    GI  # Fecal impaction on CT - improvimg  - Doc/senna and Miralax BID ordered  - S/p Enema  - No melena    # Nutrition  - NPO while concern for aspiration  - Diet consulted appreciate recs  - Tube feeds running    Renal/  # TERRI on CKD Stage  III  :: Suspect 2/2 hypovolemia  - Baseline Cr 1.7  - Monitor BNP and RFP    # Bunch with low output and bloody return - resolved  - Urology replaced bunch    # Hypernatremia - Resolved  - Free water deficit approx 2.5 L, dc D5W at 160 ml/hr    Endo  # T2DM  # Hyperglycemia  :: Last Hba1c 9.1 March 2023  - Continue 15 units glargine and SSI  - Q4 POCT checks while NPO    ID  :: purulent drainage from chronic sacral wounds from hidradenitis suppurativa  :: Derm recommending humira and minocycline 100 BID, patient will need labs to confirm eligibility for these medications  :: hepatitis C antibody negative, hepatitis B surface antigen, hepatitis B core antibody negative, total, hepatitis B surface antibody negative, T-spot pending, hIV 1/2 antigen/antibody screen negative -> to determine Humira eligibility  :: wound team following, daily dressing changes  - will need to follow up outpatient with dermatology within 2 weeks of discharge.  - doxycycline 100 Bid outpatient after abx course for pneumonia is completed     #Sacral wound  - Wound care consult    Heme  # Acute on chronic anemia  :: Dilutional vs acute blood loss (?urological)  - Iron studies  - Folate  - B12  - Give 1 unit  - CBC 2 hours after transfusion    MSK/Rheum  No acute issues    Derm  No acute issues    Psych  No acute issues    F Fluid bolus  E Replete as needed  N Trickle feeds tube NPO  G PEG 17g  DVT: SubQ heparin  Abx: Zosyn  Drips: None  Pressors: None  Code status: Full Code  NoK:  Primary Emergency Contact: RANGELDEEPAK, Home Phone: [phone]    Dispo: 69M from LTC with PNA and encephalopathy. MICU for hypotension c/f sepsis. Not requiring pressors. OK for floors pending stability with fluid resuscitation.

## 2024-04-23 NOTE — PROGRESS NOTES
"Vancomycin Dosing by Pharmacy- FOLLOW UP    Brian Rodriguez is a 69 y.o. year old male who Pharmacy has been consulted for vancomycin dosing for pneumonia. Based on the patient's indication and renal status this patient is being dosed based on a goal trough/random level of 15-20.     Renal function is currently improving.    Current vancomycin dose: 500 mg given every 24 hours x 1 dose given 4/22 @0900    Most recent trough level: 17.3 mcg/mL     Visit Vitals  /56   Pulse (!) 119   Temp 37.1 °C (98.8 °F) (Temporal)   Resp 21        Lab Results   Component Value Date    CREATININE 2.71 (H) 04/23/2024    CREATININE 2.71 (H) 04/22/2024    CREATININE 2.88 (H) 04/22/2024    CREATININE 2.84 (H) 04/21/2024        Patient weight is No results found for: \"PTWEIGHT\"    No results found for: \"CULTURE\"     I/O last 3 completed shifts:  In: 7187 (90.5 mL/kg) [I.V.:1600 (20.2 mL/kg); Blood:100; NG/GT:980; IV Piggyback:4507]  Out: 2050 (25.8 mL/kg) [Urine:2050 (0.7 mL/kg/hr)]  Dosing Weight: 79.4 kg   [unfilled]    Lab Results   Component Value Date    PATIENTTEMP 37.0 04/22/2024    PATIENTTEMP 37.0 04/22/2024    PATIENTTEMP 37.0 04/21/2024        Assessment/Plan    Within goal random/trough level    This dosing regimen is predicted by [employer] to result in the following pharmacokinetic parameters:  Pt has TERRI  dosing by levels; will order 500mg x 1 again today    The next level will be obtained on 4/24 at 0500. May be obtained sooner if clinically indicated.   Will continue to monitor renal function daily while on vancomycin and order serum creatinine at least every 48 hours if not already ordered.  Follow for continued vancomycin needs, clinical response, and signs/symptoms of toxicity.       Maisha Cordova, PharmD           "

## 2024-04-23 NOTE — SIGNIFICANT EVENT
[facility] to [facility] Transfer Summary     I:  ICU Admission Reason & Brief ICU Course:    Patient was admitted to ICU for hypotension concerning for sepsis. On arrival to the ICU, the patient was bolused with LR. In total, the patient received 5.5L fluid from the time a rapid was called for hypotension to the time the patient left the MICU. His pressures dipped on several occasions, and each time responded to fluid resuscitation. He was on airvo 50/50 when admitted and weaned to room air at the time of transfer. There are concerns for his ability to swallow, neurology has been consulted, ENT did not identify any anatomic etiologies.        C: Code Status/DPOA Info/Goals of Care/ACP Note    Full Code  DPOA/Contact Number: Rigoberto Rodriguez (brother, guardian) [phone]    U: Unprescribing & Pertinent High-Risk Medications    Changes to home meds: Holding home amlodipine 10mg in the setting of hypotension     Anticoagulation: Yes - [facility]    Antibiotics:   [] N/A - no current planned antimicrobioals  []  Vancomycin/zosyn indication aspiration pna start date 4/17/2024 planned duration 10-14 days    P: Pending Tests at the Time of Transfer   MRI brain      A: Active consultants, including Rehab:   [x]  Subspecialty Consultants: neurology  [x]  PT  [x]  OT  [x]  SLP  []  Wound Care    U: Uncertainty Measure/Diagnostic Pause:    Working diagnosis at the time of transfer aspiration pneumonia 2/2 dysphagia/disarthria, though ddx includes neurological causes of dysphagia     Diagnosis Degree of Certainty: 2. Some uncertainty about the clinical diagnosis.     S: Summary of Major Problems and To-Dos:   #Dysphagia workup  #PNA treatment and narrow antibiotics    To-do list prior to transfer:  [x]Inform guardian        E: Exam, including Lines/Drains/Airways & Data Review:   Physical Exam  Constitutional:       General: He is not in acute distress.     Appearance: He is ill-appearing.   HENT:      Head: Normocephalic and atraumatic.       Nose: Nose normal.      Mouth/Throat:      Mouth: Mucous membranes are dry.   Eyes:      Extraocular Movements: Extraocular movements intact.      Pupils: Pupils are equal, round, and reactive to light.   Cardiovascular:      Rate and Rhythm: Regular rhythm. Tachycardia present.      Heart sounds: Normal heart sounds.   Pulmonary:      Effort: Pulmonary effort is normal. No respiratory distress.      Breath sounds: Rhonchi present. No wheezing or rales.   Abdominal:      General: Abdomen is flat. Bowel sounds are normal.      Palpations: Abdomen is soft.   Musculoskeletal:      Right lower leg: No edema.      Left lower leg: No edema.   Skin:     General: Skin is warm and dry.      Capillary Refill: Capillary refill takes 2 to 3 seconds.   Neurological:      Mental Status: He is alert. Mental status is at baseline. He is disoriented.      Motor: Weakness present.        Difficult airway? No  Lines/drains assessed for removal? Yes, describe: pt has peripheral lines which should remain    Within 30 minutes of the patient physically leaving the floor, a Floor Readiness Note needs to be placed with updated vitals.

## 2024-04-23 NOTE — PROGRESS NOTES
Brian Rodriguez is a 69 y.o. male on day 6 of admission presenting with Pneumonia due to infectious organism, unspecified laterality, unspecified part of lung.    Subjective   Hospital Course    68 yo male with CKD, DM, HTN, sacral wound, bipolar disorder initially admitted from a nursing home for altered mental status, and now transferred to the MICU form the floor for hypoxic respiratory failure and sepsis.       Pt first presented to the floor with confusion and multifocal pneumonia on 4/17. Patient was found porfirio hypernatremic, with severe swallowing dysfunction and found to be COVID positive. Patient was stated on Vanc, Zosyn, and azithromycin for a pneumonia and remdesevir for COVID and free water flushes for hypernatremia.      Two rapid responses were called on 4/21 pm and 4/22 am. Intital rapid for increase O2 requirement to 12 L. CXR with worsening RLL consolidation. Pt placedon Airvo at the time. ABG notable for pH 7.44, pO2 68. Dexamethasone started. Follow up ABG 1 hour later with pH 7.47/CO2 23, lactate 2.9 on Airvo 55%, 50 L. Thought to be 2/2 compensation for metabolic acidosis. Pt also bladder scanned, with only 22 cc residual, given 500 cc fluids and started on NaHCO3 tabs.      Rapid called again at 1:30 am for BP 86/46. Pt given additional 500 cc fluid for total of 1 L and patient transferred to MICU.      Patient was admitted to ICU for hypotension concerning for sepsis. On arrival to the ICU, the patient was bolused with LR. In total, the patient received 5.5L fluid from the time a rapid was called for hypotension to the time the patient left the MICU. His pressures dipped on several occasions, and each time responded to fluid resuscitation. He was on airvo 50/50 when admitted and now weaned to room air at the time of transfer. There are concerns for his ability to swallow, neurology has been consulted, ENT did not identify any anatomic etiologies.      On examination, patient confused AAOx1.  "Saturating well on RA and HDS.        Objective   Physical Exam  Constitutional:       General: He is not in acute distress.     Appearance: He is ill-appearing.   HENT:      Head: Normocephalic and atraumatic.      Nose: Nose normal.      Mouth/Throat:      Mouth: Mucous membranes are dry.   Eyes:      Extraocular Movements: Extraocular movements intact.      Pupils: Pupils are equal, round, and reactive to light.   Cardiovascular:      Rate and Rhythm: Regular rhythm. Tachycardia present.      Heart sounds: Normal heart sounds.   Pulmonary:      Effort: Pulmonary effort is normal. No respiratory distress.      Breath sounds: Rhonchi present. No wheezing or rales.   Abdominal:      General: Abdomen is flat. Bowel sounds are normal.      Palpations: Abdomen is soft.   Musculoskeletal:      Right lower leg: No edema.      Left lower leg: No edema.   Skin:     General: Skin is warm and dry.      Capillary Refill: Capillary refill takes 2 to 3 seconds.   Neurological:      Mental Status: He is alert. Mental status is at baseline. He is disoriented.      Motor: Weakness presen    Last Recorded Vitals  Blood pressure 98/63, pulse 93, temperature 37.5 °C (99.5 °F), resp. rate 24, height 1.778 m (5' 10\"), weight 63.6 kg (140 lb 3.4 oz), SpO2 96%.  Intake/Output last 3 Shifts:  I/O last 3 completed shifts:  In: 7187 (90.5 mL/kg) [I.V.:1600 (20.2 mL/kg); Blood:100; NG/GT:980; IV Piggyback:4507]  Out: 2050 (25.8 mL/kg) [Urine:2050 (0.7 mL/kg/hr)]  Dosing Weight: 79.4 kg     Relevant Results                This patient has a urinary catheter   Reason for the urinary catheter remaining today? incontinent patients with an open sacral wound or perineal wounds      Assessment/Plan   Principal Problem:    Pneumonia due to infectious organism, unspecified laterality, unspecified part of lung    Brian Rodriguez is a 69 y.o. male presenting for with CKD, DM, HTN, sacral wound, bipolar, here with sepsis, likely 2/2 aspiration PNA. " Responding to fluid resuscitation, continue pending pressure stabilization. Required albumin infusion. Unclear if patient is hypovolemic from dehydration vs bacteremia/sepsis. Course c/b persistent dysphagia with concern for neurological cause, neuro on board recommending MRI.     Updates 04/23  - Neuro recs for dysphagia workup; MRI Brain, ammonia level  - dexamethasone discontinued     Neuro  #AMS  :: Baseline oriented x2  :: TSH 0.5  - Suspect 2/2 sepsis and hypernatremia  - Continue with fluid resuscitation    - Ammonia ordered  - DC steroids     #Depression  - Mirtazapine 7.5 mg nightly, considered holding in AM if persistent AMS     # Dysphagia  :: ENT previously consulted, no anatomic cause  - Concern for central cause raised 4/18 (?stroke)  - Neuro recs for dysphagia workup; MRI Brain, ammonia level     Pulm  # Multifocal PNA  # Aspiratoin PNA  # COVID PNA  - s/p Azithromycin (4/18-4/20)  - CT with GGO throughout right lung and to lesser degree within dependent left lower lobe  - Continue with Vanc and Zosyn  - Urine strep/legionella negative, MRSA nares negative  - Continue with Remdesevir (last dose 4/22)  - DC dexamethasone 6 mg  - Frequent suctioning for BP hygiene  - Patient on RA     Cardiovascular  # HTN  - Home home amlodipine in setting of sepsis     # HLD  - Continue home Atorvastatin 10 mg     # Hypotension  :: ?Sepsis  - IVC collapsible, given additional 3 L  - s/p 4.5 L fluids total  - Required 25g overnight  -ordered AM cortisol     GI  # Fecal impaction on CT - improvimg  - Doc/senna and Miralax BID ordered  - S/p Enema  - No melena     # Nutrition  - NPO while concern for aspiration  - Diet consulted appreciate recs  - Tube feeds running     Renal/  # TERRI on CKD Stage III  :: Suspect 2/2 hypovolemia  - Baseline Cr 1.7  - Monitor BNP and RFP     # Bunch with low output and bloody return - resolved  - Urology replaced bunch     # Hypernatremia - Resolved  - Free water deficit approx 2.5 L, dc  D5W at 160 ml/hr     Endo  # T2DM  # Hyperglycemia  :: Last Hba1c 9.1 March 2023  - Continue 15 units glargine and SSI  - Q4 POCT checks while NPO     ID  :: purulent drainage from chronic sacral wounds from hidradenitis suppurativa  :: Derm recommending humira and minocycline 100 BID, patient will need labs to confirm eligibility for these medications  :: hepatitis C antibody negative, hepatitis B surface antigen, hepatitis B core antibody negative, total, hepatitis B surface antibody negative, T-spot pending, hIV 1/2 antigen/antibody screen negative -> to determine Humira eligibility  :: wound team following, daily dressing changes  - will need to follow up outpatient with dermatology within 2 weeks of discharge.  - doxycycline 100 Bid outpatient after abx course for pneumonia is completed     #Sacral wound  - Wound care consult     Heme  # Acute on chronic anemia  :: Dilutional vs acute blood loss (?urological)  - Iron studies  - Folate  - B12  - Give 1 unit  - CBC 2 hours after transfusion     MSK/Rheum  No acute issues     Derm  No acute issues     Psych  No acute issues     F Fluid bolus  E Replete as needed  N Trickle feeds tube NPO  G PEG 17g  DVT: SubQ heparin  Abx: Zosyn  Drips: None  Pressors: None  Code status: Full Code  NoK:  Primary Emergency Contact: CLAIREDEEPAK, Home Phone: [phone]     Dispo: 69M from LTC with PNA and encephalopathy. MICU for hypotension c/f sepsis. Not requiring pressors. OK for floors pending stability with fluid resuscitation.           Clementina Alvarez MD

## 2024-04-23 NOTE — CONSULTS
"History Of Present Illness  Brian Rodriguez is a 69 y.o. male with a history of T2DM, HTN, CKD (b/l 1.7-2.2?), HLD, chronic sacral wound due to hydradenitis, bipolar, dementia presenting with altered mental status with concern for infection. General neurology has been consulted for dysphagia.    Patient presented from nursing home on 4/17/2024 due to change in behavior. Reportedly his baseline is A&O x2 and forgetful. Reportedly the morning of presentation his labs were remarkable for leukocytosis and hypernatremia; these labs are not available for review. Hx notable for recurrent episodes of pneumonia. On presentation, Na 154, Scr 3.38, WBC 21.2; CT abdomen with evidence of fecal impaction and multifocal pneumonia; Covid positive. He was started on empiric abx and bunch was placed due to concern for urinary retention. Dobbhoff subsequently placed due to aspiration risk. Remdesivir started for covid infection (stopped on 4/22). On 4/22 rapid was called due to increased O2 requirement to maintain adequate O2 saturation. Dexamethasone was added and pt was transferred to MICU with admitting diagnoses of hypoxic respiratory failure and sepsis.    Neurology was consulted for evaluation of dysphagia. On 4/18 patient underwent MBS and SLP recommended strict NPO with neuro and ENT consults to assess degree of oropharyngeal dysphagia. \"Deficits related to brainstem/cranial nerve involvement given poor mechanics of the swallow as well as reduced sensation. Recommend NEUROLOGY CONSULT.\"    Review of Systems:  14 point review of systems conducted and negative other than noted in HPI.    Past Medical History  History reviewed. No pertinent past medical history.  Surgical History  History reviewed. No pertinent surgical history.  Medications  Medications Prior to Admission   Medication Sig Dispense Refill Last Dose    acetaminophen (Tylenol) 325 mg tablet Take 2 tablets (650 mg) by mouth every 4 hours if needed (pain/fever).   " 2024    adalimumab (Humira,CF, Pen) 40 mg/0.4 mL pen injector kit pen-injector Inject 1 Pen (40 mg) under the skin 1 (one) time per week. 4 each 11 Past Week    amino acids/protein hydrolys (PRO-STAT AWC ORAL) Take 30 mL by mouth 2 times a day.   2024    ascorbic acid (Vitamin C) 500 mg tablet Take 1 tablet (500 mg) by mouth 3 times a day.   2024    atorvastatin (Lipitor) 10 mg tablet Take 1 tablet (10 mg) by mouth once daily at bedtime.   Past Week    cholecalciferol (Vitamin D-3) 25 MCG (1000 UT) tablet Take 1 tablet (1,000 Units) by mouth once daily in the morning.   2024    ferrous sulfate, 325 mg ferrous sulfate, tablet Take 1 tablet by mouth 3 times a day.   2024    folic acid (Folvite) 1 mg tablet Take 1 tablet (1 mg) by mouth once daily in the morning.   2024    galantamine (Razadyne) 4 mg tablet Take 1 tablet (4 mg) by mouth 2 times a day.   2024    insulin glargine (Lantus U-100 Insulin) 100 unit/mL injection Inject 10 Units under the skin once daily in the morning.   2024    insulin lispro (HumaLOG) 100 unit/mL injection Inject under the skin 3 times a day before meals. Per Sliding Scale: 111-150=1; 151-200=3; 201-250=6; 251-300=9; 301-350=12; 351-400=15; 401 or greater call MD.   Past Week    Lactobacillus acidophilus (ACIDOPHILUS ORAL) Take 1 tablet by mouth 2 times a day. 0.5 mg (100 million cell) tablet   2024    [] minocycline 100 mg capsule Take 1 capsule (100 mg) by mouth 2 times a day. 60 capsule 2 2024    mirtazapine (Remeron) 7.5 mg tablet Take 1 tablet (7.5 mg) by mouth once daily at bedtime.   Past Week    multivitamin tablet Take 1 tablet by mouth once daily in the morning.   2024    pantoprazole (ProtoNix) 40 mg EC tablet Take 1 tablet (40 mg) by mouth once daily in the morning. Do not crush, chew, or split.   2024    zinc sulfate (Zincate) 220 (50 Zn) MG capsule Take 1 capsule (50 mg of elemental zinc) by mouth once daily in  the morning.   4/17/2024    dextrose 5 %-0.45 % sod chlord (dextrose 5%-0.45 % sodium chloride) infusion Infuse 3,000 mL into a venous catheter. Every shift, Start 4/17/24   Unknown    ertapenem (INVanz) IV Infuse 25 mL (500 mg) into a venous catheter once daily. Start 4/17/24, End 4/21/24   Unknown    glucagon (Glucagen) 1 mg injection Inject under the skin 1 time if needed (every hour as needed).   Unknown    guaiFENesin (Robitussin) 100 mg/5 mL syrup Take 5 mL (100 mg) by mouth every 4 hours if needed for cough.   Unknown    traMADol (Ultram) 50 mg tablet Take 1 tablet (50 mg) by mouth once daily as needed (30 min prior to dressing changes).   Unknown       Current Facility-Administered Medications:     acetaminophen (Tylenol) tablet 650 mg, 650 mg, oral, q4h PRN **OR** acetaminophen (Tylenol) oral liquid 650 mg, 650 mg, oral, q4h PRN **OR** acetaminophen (Tylenol) suppository 650 mg, 650 mg, rectal, q4h PRN, Heather Villalta MD    acetaminophen (Tylenol) tablet 650 mg, 650 mg, oral, q4h PRN, Heather Villalta MD    albumin human solution  - Omnicell Override Pull, , , , , Rate Verify at 04/23/24 0245    ascorbic acid (Vitamin C) tablet 500 mg, 500 mg, oral, TID, Heather Villalta MD, 500 mg at 04/23/24 0853    atorvastatin (Lipitor) tablet 10 mg, 10 mg, oral, Nightly, Heather Villalta MD, 10 mg at 04/22/24 2126    chlorhexidine (Hibiclens) 4 % liquid, , Topical, Daily, Josh Iniguez MD, Given at 04/23/24 0900    dexAMETHasone (Decadron) tablet 6 mg, 6 mg, oral, Daily, Fransisco Cooper MD, 6 mg at 04/23/24 0900    dextrose 50 % injection 12.5 g, 12.5 g, intravenous, q15 min PRN, Heather Villalta MD, 12.5 g at 04/20/24 1228    dextrose 50 % injection 25 g, 25 g, intravenous, q15 min PRN, Heather Villalta MD, 25 g at 04/20/24 0950    glucagon (Glucagen) injection 1 mg, 1 mg, intramuscular, q15 min PRN, Heather Villalta MD    glucagon (Glucagen) injection 1 mg, 1 mg, intramuscular, q15 min PRN, Heather Villalta MD    guaiFENesin (Robitussin) 100  mg/5 mL syrup 100 mg, 100 mg, oral, q4h PRN, Heather Villalta MD    heparin (porcine) injection 5,000 Units, 5,000 Units, subcutaneous, q8h RANDAL, Heather Villalta MD, 5,000 Units at 04/23/24 0546    insulin glargine (Lantus) injection 15 Units, 15 Units, subcutaneous, q AM, Fransisco Cooper MD, 15 Units at 04/23/24 0914    insulin lispro (HumaLOG) injection 0-20 Units, 0-20 Units, subcutaneous, q4h, Emery Berry MD, 8 Units at 04/23/24 1152    ipratropium-albuteroL (Duo-Neb) 0.5-2.5 mg/3 mL nebulizer solution 3 mL, 3 mL, nebulization, q6h PRN, Loyda Rehman MD    lactated Ringer's bolus 500 mL, 500 mL, intravenous, Once, Fransisco Cooper MD    melatonin tablet 3 mg, 3 mg, oral, Nightly PRN, Heather Villalta MD, 3 mg at 04/20/24 2013    mirtazapine (Remeron) tablet 7.5 mg, 7.5 mg, oral, Nightly, Heather Villalta MD, 7.5 mg at 04/22/24 2100    multivitamin with minerals 1 tablet, 1 tablet, oral, Daily, Heather Villalta MD, 1 tablet at 04/23/24 0853    nicotine (Nicoderm CQ) 21 mg/24 hr patch 1 patch, 1 patch, transdermal, Daily, 1 patch at 04/23/24 0900 **FOLLOWED BY** [START ON 5/30/2024] nicotine (Nicoderm CQ) 14 mg/24 hr patch 1 patch, 1 patch, transdermal, Daily **FOLLOWED BY** [START ON 6/13/2024] nicotine (Nicoderm CQ) 7 mg/24 hr patch 1 patch, 1 patch, transdermal, Daily, Josh Iniguez MD    nicotine polacrilex (Nicorette) gum 4 mg, 4 mg, Mouth/Throat, q2h PRN, Josh Iniguez MD    oxygen (O2) therapy, , inhalation, Continuous PRN, Loyda Rehman MD, Last Rate: 180,000 mL/hr at 04/23/24 0000, 3 L/min at 04/23/24 0000    pantoprazole (ProtoNix) injection 40 mg, 40 mg, intravenous, Daily, Emery Berry MD, 40 mg at 04/23/24 0853    piperacillin-tazobactam-dextrose (Zosyn) IV 3.375 g, 3.375 g, intravenous, q6h, Heather Villalta MD, Stopped at 04/23/24 1230    polyethylene glycol (Glycolax, Miralax) packet 17 g, 17 g, oral, Daily PRN, Josh Iniguez MD    polyethylene glycol (Glycolax, Miralax) packet 17 g, 17 g, oral,  "BID, Andie Dennis MD, 17 g at 04/22/24 0857    sennosides-docusate sodium (Annia-Colace) 8.6-50 mg per tablet 2 tablet, 2 tablet, oral, BID, Josh Iniguez MD, 2 tablet at 04/22/24 0902    sodium bicarbonate tablet 650 mg, 650 mg, oral, BID, Fransisco Cooper MD, 650 mg at 04/23/24 0853    traMADol (Ultram) tablet 50 mg, 50 mg, oral, Daily PRN, Heather Villalta MD    vancomycin (Vancocin) pharmacy to dose - pharmacy monitoring, , miscellaneous, Daily PRN, Heather Villalta MD  Social History  Social History     Tobacco Use    Smoking status: Never     Passive exposure: Never   Vaping Use    Vaping status: Unknown   Substance Use Topics    Alcohol use: Not Currently    Drug use: Defer     Allergies  Patient has no known allergies.    Last Recorded Vitals  Blood pressure 101/64, pulse 93, temperature 37 °C (98.6 °F), temperature source Tympanic, resp. rate 24, height 1.778 m (5' 10\"), weight 63.6 kg (140 lb 3.4 oz), SpO2 99%.    NEUROLOGIC EXAM:    MENTAL STATUS:  - He is alert and oriented to person only with prompting  - He did not produce any discernable speech, occasionally grunting  - He was able to make a thumbs up and move extremities when prompted.  - Spontaneously moves all 4 extremities     CRANIAL NERVES: exam limited by poor effort due to encephalopathy  - CN I: Not Assessed  - CN II: Pupils constrict to light bilaterally 3->2 mm  - CN III, IV, VI: Extraocular movements intact without nystagmus  - CN V: unable to assess  - CN VII: No facial droop, patient does not smile or raise eyebrows with prompting which makes exam difficult  - CN VIII: He responds to simple commands, grossly normal  - CN IX, X: unable to visualize tongue or soft palate when patient opens mouth, additionally he was not cooperative with tongue depressor   - CN XII: He protrudes tongue and can move left to right, although his tongue was only protruded to a limited extent which confounds exam    MOTOR:  - No tremors, significant degree of " muscle atrophy (bitemporal and thenar wasting)  - Strength-  strength symmetrically diminished    REFLEXES:   L                R  Biceps            +3               +3  Brachioradialis+2               +2  Patellar            +2               +2  Achilles +2               +2  Plantar           Down            Down  Ankle Clonus   Absent          Absent    COORDINATION: Unable to assess  SENSATION: Unable to assess  GAIT: Deferred due to patient acuity    REASSESSMENT PM 4/23  Patient was more able to participate in exam  Speech with nasal quality  Very limited movement of soft palate and tongue  Was able to follow simple commands    Relevant Results  Results for orders placed or performed during the hospital encounter of 04/17/24 (from the past 24 hour(s))   Lactate   Result Value Ref Range    Lactate 2.2 (H) 0.4 - 2.0 mmol/L   Renal Function Panel   Result Value Ref Range    Glucose 153 (H) 74 - 99 mg/dL    Sodium 145 136 - 145 mmol/L    Potassium 3.1 (L) 3.5 - 5.3 mmol/L    Chloride 116 (H) 98 - 107 mmol/L    Bicarbonate 20 (L) 21 - 32 mmol/L    Anion Gap 12 10 - 20 mmol/L    Urea Nitrogen 38 (H) 6 - 23 mg/dL    Creatinine 2.71 (H) 0.50 - 1.30 mg/dL    eGFR 24 (L) >60 mL/min/1.73m*2    Calcium 7.4 (L) 8.6 - 10.6 mg/dL    Phosphorus 3.4 2.5 - 4.9 mg/dL    Albumin <1.5 (L) 3.4 - 5.0 g/dL   Magnesium   Result Value Ref Range    Magnesium 1.93 1.60 - 2.40 mg/dL   Iron and TIBC   Result Value Ref Range    Iron <10 (L) 35 - 150 ug/dL    UIBC 95 (L) 110 - 370 ug/dL    TIBC      % Saturation     POCT GLUCOSE   Result Value Ref Range    POCT Glucose 153 (H) 74 - 99 mg/dL   Transthoracic Echo (TTE) Complete   Result Value Ref Range    AV pk maya 1.11 m/s    LVOT diam 2.10 cm    LV Biplane EF 76 %    MV avg E/e' ratio 6.97     MV E/A ratio 1.12     Tricuspid annular plane systolic excursion 2.1 cm    RV free wall pk S' 14.30 cm/s    LVIDd 5.00 cm    Aortic Valve Area by Continuity of Peak Velocity 2.83 cm2    AV pk grad  4.9 mmHg    LV A4C EF 81.0    POCT GLUCOSE   Result Value Ref Range    POCT Glucose 189 (H) 74 - 99 mg/dL   POCT GLUCOSE   Result Value Ref Range    POCT Glucose 195 (H) 74 - 99 mg/dL   POCT GLUCOSE   Result Value Ref Range    POCT Glucose 225 (H) 74 - 99 mg/dL   CBC and Auto Differential   Result Value Ref Range    WBC 18.5 (H) 4.4 - 11.3 x10*3/uL    nRBC 0.1 (H) 0.0 - 0.0 /100 WBCs    RBC 2.62 (L) 4.50 - 5.90 x10*6/uL    Hemoglobin 6.1 (LL) 13.5 - 17.5 g/dL    Hematocrit 18.4 (L) 41.0 - 52.0 %    MCV 70 (L) 80 - 100 fL    MCH 23.3 (L) 26.0 - 34.0 pg    MCHC 33.2 32.0 - 36.0 g/dL    RDW 20.0 (H) 11.5 - 14.5 %    Platelets 243 150 - 450 x10*3/uL    Immature Granulocytes %, Automated 0.8 0.0 - 0.9 %    Immature Granulocytes Absolute, Automated 0.15 0.00 - 0.50 x10*3/uL   Renal function panel   Result Value Ref Range    Glucose 214 (H) 74 - 99 mg/dL    Sodium 146 (H) 136 - 145 mmol/L    Potassium 3.4 (L) 3.5 - 5.3 mmol/L    Chloride 115 (H) 98 - 107 mmol/L    Bicarbonate 19 (L) 21 - 32 mmol/L    Anion Gap 15 10 - 20 mmol/L    Urea Nitrogen 41 (H) 6 - 23 mg/dL    Creatinine 2.71 (H) 0.50 - 1.30 mg/dL    eGFR 24 (L) >60 mL/min/1.73m*2    Calcium 7.8 (L) 8.6 - 10.6 mg/dL    Phosphorus 3.2 2.5 - 4.9 mg/dL    Albumin 1.5 (L) 3.4 - 5.0 g/dL   Magnesium   Result Value Ref Range    Magnesium 1.89 1.60 - 2.40 mg/dL   Vancomycin   Result Value Ref Range    Vancomycin 17.3 5.0 - 20.0 ug/mL   Manual Differential   Result Value Ref Range    Neutrophils %, Manual 84.4 40.0 - 80.0 %    Lymphocytes %, Manual 11.3 13.0 - 44.0 %    Monocytes %, Manual 0.0 2.0 - 10.0 %    Eosinophils %, Manual 4.3 0.0 - 6.0 %    Basophils %, Manual 0.0 0.0 - 2.0 %    Seg Neutrophils Absolute, Manual 15.61 (H) 1.60 - 5.00 x10*3/uL    Lymphocytes Absolute, Manual 2.09 0.80 - 3.00 x10*3/uL    Monocytes Absolute, Manual 0.00 (L) 0.05 - 0.80 x10*3/uL    Eosinophils Absolute, Manual 0.80 (H) 0.00 - 0.40 x10*3/uL    Basophils Absolute, Manual 0.00 0.00 - 0.10  x10*3/uL    Total Cells Counted 115     RBC Morphology See Below     Hypochromia Mild     Target Cells Many     Ovalocytes Few     [place] Cells Few    Ferritin   Result Value Ref Range    Ferritin 352 (H) 20 - 300 ng/mL   Prepare RBC: 1 Units   Result Value Ref Range    PRODUCT CODE [id number]     Unit Number [id number]     Unit ABO O     Unit RH POS     XM INTEP COMP     Dispense Status IS     Blood Expiration Date May 15, 2024 23:59 EDT     PRODUCT BLOOD TYPE 5100     UNIT VOLUME 350    Type and screen   Result Value Ref Range    ABO TYPE O     Rh TYPE POS     ANTIBODY SCREEN NEG    Fibrinogen   Result Value Ref Range    Fibrinogen 706 (H) 200 - 400 mg/dL   aPTT   Result Value Ref Range    aPTT 57 (H) 27 - 38 seconds   Protime-INR   Result Value Ref Range    Protime 19.4 (H) 9.8 - 12.8 seconds    INR 1.7 (H) 0.9 - 1.1   POCT GLUCOSE   Result Value Ref Range    POCT Glucose 197 (H) 74 - 99 mg/dL   Lactate   Result Value Ref Range    Lactate 1.6 0.4 - 2.0 mmol/L   ABO/Rh   Result Value Ref Range    ABO TYPE O     Rh TYPE POS    POCT GLUCOSE   Result Value Ref Range    POCT Glucose 246 (H) 74 - 99 mg/dL         I have personally reviewed the following imaging results:   CT Head without IV contrast 4/17/2024:  COMPARISON:  CT brain attack 07/08/2020  TECHNIQUE:  Noncontrast axial CT scan of head was performed. Angled reformats in  brain and bone windows were generated. The images were reviewed in  bone, brain, blood and soft tissue windows.  FINDINGS:  CSF Spaces: The ventricles, sulci and basal cisterns are prominent  scratch the there is no extraaxial fluid collection.  Parenchyma: The grey-white differentiation is intact. There is no  mass effect or midline shift.  There is no intracranial hemorrhage.  Calvarium: The calvarium is unremarkable.  Paranasal sinuses and mastoids: Completely opacified left frontal  sinus. Paranasal sinuses otherwise clear. The mastoid air cells are  clear.  IMPRESSION:  1. There is no  evidence of acute hemorrhage, mass lesion or acute  infarction.     Assessment  Brian Rodriguez is a 69 y.o. male  with a T2DM, HTN, CKD (b/l 1.7-2.2?), HLD, chronic sacral wound due to hydradenitis, bipolar, dementia presenting with altered mental status with concern for infection. General neurology has been consulted for dysphagia. Neurologic exam is confounded by ongoing encephalopathy; however on  reassessment he was more able to participate in exam and there was global weakness, limited palate elevation, and . Work-up is notable for. CT head is without acute abnormality. Could consider MRI brain to assess for new infarct as explanation for dysphagia. Highest on the differential diagnosis is dysphagia due to underlying encephalopathy from metabolic or infectious etiology.     From chart review, it appears patient had a similar event of dysphagia in the setting of symptomatic covid infection, although SLP notes are not available for review, it appears that his dysphagia historically improved with resolution of COVID infection. His brother also noted that when he spoke to the patient about a month ago his vocal timbre was at baseline, but then last week when they spoke on the phone the patient's voice was very nasal sounding.     Impression:   Concern for primary motor neuron disease vs dysphagia due to encephalopathy/ recrudescence of prior stroke     Recommendations  - MRI brain without contrast  - Agree with ammonia  - Remainder of infectious w/up and mgmt of metabolic derangements by primary    General Neurology: [id number]  Masood Moody DO, PGY1    Patient seen and discussed with Dr. Castro who agrees with aforementioned assessment and plan      -----------------------------------------      I personally saw, examined, and discussed the patient above. I have reviewed and agree with the excellent note above. All edits or corrections have been made directly into the note, including the physical exam and  assessment/plan.    Dori Thomason MD  Neurology Resident PGY3

## 2024-04-23 NOTE — CARE PLAN
Problem: Skin  Goal: Prevent/minimize sheer/friction injuries  Flowsheets (Taken 4/22/2024 2158)  Prevent/minimize sheer/friction injuries:   Complete micro-shifts as needed if patient unable. Adjust patient position to relieve pressure points, not a full turn   HOB 30 degrees or less   Turn/reposition every 2 hours/use positioning/transfer devices   Use pull sheet   Utilize specialty bed per algorithm   The patient's goals for the shift include Pt will be free from falls  use call light  bed alarm remians on    The clinical goals for the shift include Pt will have a decrease in bleeding from penis    Over the shift, the patient did not make progress toward the following goals. Barriers to progression include pt unable to turn self. Recommendations to address these barriers include assist with turn and reposition.

## 2024-04-24 ENCOUNTER — APPOINTMENT (OUTPATIENT)
Dept: RADIOLOGY | Facility: HOSPITAL | Age: 69
DRG: 871 | End: 2024-04-24
Payer: MEDICARE

## 2024-04-24 LAB
ALBUMIN SERPL BCP-MCNC: 1.5 G/DL (ref 3.4–5)
ALBUMIN SERPL BCP-MCNC: <1.5 G/DL (ref 3.4–5)
ALP SERPL-CCNC: 78 U/L (ref 33–136)
ALT SERPL W P-5'-P-CCNC: 8 U/L (ref 10–52)
ANION GAP BLDA CALCULATED.4IONS-SCNC: 11 MMO/L (ref 10–25)
ANION GAP SERPL CALC-SCNC: 12 MMOL/L (ref 10–20)
ANION GAP SERPL CALC-SCNC: 14 MMOL/L (ref 10–20)
AST SERPL W P-5'-P-CCNC: 10 U/L (ref 9–39)
BASE EXCESS BLDA CALC-SCNC: -0.8 MMOL/L (ref -2–3)
BASOPHILS # BLD MANUAL: 0 X10*3/UL (ref 0–0.1)
BASOPHILS NFR BLD MANUAL: 0 %
BILIRUB SERPL-MCNC: 0.4 MG/DL (ref 0–1.2)
BODY TEMPERATURE: 37 DEGREES CELSIUS
BUN SERPL-MCNC: 54 MG/DL (ref 6–23)
BUN SERPL-MCNC: 57 MG/DL (ref 6–23)
CA-I BLDA-SCNC: 1.32 MMOL/L (ref 1.1–1.33)
CALCIUM SERPL-MCNC: 8 MG/DL (ref 8.6–10.6)
CALCIUM SERPL-MCNC: 8.4 MG/DL (ref 8.6–10.6)
CHLORIDE BLDA-SCNC: 116 MMOL/L (ref 98–107)
CHLORIDE SERPL-SCNC: 117 MMOL/L (ref 98–107)
CHLORIDE SERPL-SCNC: 119 MMOL/L (ref 98–107)
CO2 SERPL-SCNC: 22 MMOL/L (ref 21–32)
CO2 SERPL-SCNC: 24 MMOL/L (ref 21–32)
CORTIS AM PEAK SERPL-MSCNC: 18 UG/DL (ref 5–20)
CREAT SERPL-MCNC: 2.86 MG/DL (ref 0.5–1.3)
CREAT SERPL-MCNC: 3.04 MG/DL (ref 0.5–1.3)
CRP SERPL-MCNC: 20.03 MG/DL
EGFRCR SERPLBLD CKD-EPI 2021: 21 ML/MIN/1.73M*2
EGFRCR SERPLBLD CKD-EPI 2021: 23 ML/MIN/1.73M*2
EOSINOPHIL # BLD MANUAL: 0 X10*3/UL (ref 0–0.4)
EOSINOPHIL NFR BLD MANUAL: 0 %
ERYTHROCYTE [DISTWIDTH] IN BLOOD BY AUTOMATED COUNT: 20 % (ref 11.5–14.5)
ERYTHROCYTE [DISTWIDTH] IN BLOOD BY AUTOMATED COUNT: 20.5 % (ref 11.5–14.5)
ERYTHROCYTE [SEDIMENTATION RATE] IN BLOOD BY WESTERGREN METHOD: >130 MM/H (ref 0–20)
GLUCOSE BLD MANUAL STRIP-MCNC: 140 MG/DL (ref 74–99)
GLUCOSE BLD MANUAL STRIP-MCNC: 148 MG/DL (ref 74–99)
GLUCOSE BLD MANUAL STRIP-MCNC: 178 MG/DL (ref 74–99)
GLUCOSE BLD MANUAL STRIP-MCNC: 189 MG/DL (ref 74–99)
GLUCOSE BLD MANUAL STRIP-MCNC: 191 MG/DL (ref 74–99)
GLUCOSE BLD MANUAL STRIP-MCNC: 323 MG/DL (ref 74–99)
GLUCOSE BLD MANUAL STRIP-MCNC: 382 MG/DL (ref 74–99)
GLUCOSE BLDA-MCNC: 178 MG/DL (ref 74–99)
GLUCOSE SERPL-MCNC: 165 MG/DL (ref 74–99)
GLUCOSE SERPL-MCNC: 180 MG/DL (ref 74–99)
HCO3 BLDA-SCNC: 23.1 MMOL/L (ref 22–26)
HCT VFR BLD AUTO: 18.4 % (ref 41–52)
HCT VFR BLD AUTO: 25.4 % (ref 41–52)
HCT VFR BLD EST: 28 % (ref 41–52)
HGB BLD-MCNC: 6.1 G/DL (ref 13.5–17.5)
HGB BLD-MCNC: 8.2 G/DL (ref 13.5–17.5)
HGB BLDA-MCNC: 9.2 G/DL (ref 13.5–17.5)
HOLD SPECIMEN: NORMAL
HOLD SPECIMEN: NORMAL
IMM GRANULOCYTES # BLD AUTO: 0.15 X10*3/UL (ref 0–0.5)
IMM GRANULOCYTES # BLD AUTO: 0.31 X10*3/UL (ref 0–0.5)
IMM GRANULOCYTES NFR BLD AUTO: 0.8 % (ref 0–0.9)
IMM GRANULOCYTES NFR BLD AUTO: 1.3 % (ref 0–0.9)
INHALED O2 CONCENTRATION: 40 %
LACTATE BLDA-SCNC: 1.6 MMOL/L (ref 0.4–2)
LACTATE SERPL-SCNC: 1.8 MMOL/L (ref 0.4–2)
LYMPHOCYTES # BLD MANUAL: 2.5 X10*3/UL (ref 0.8–3)
LYMPHOCYTES NFR BLD MANUAL: 10.3 %
MAGNESIUM SERPL-MCNC: 2.43 MG/DL (ref 1.6–2.4)
MAGNESIUM SERPL-MCNC: 2.52 MG/DL (ref 1.6–2.4)
MCH RBC QN AUTO: 23.3 PG (ref 26–34)
MCH RBC QN AUTO: 24.1 PG (ref 26–34)
MCHC RBC AUTO-ENTMCNC: 32.3 G/DL (ref 32–36)
MCHC RBC AUTO-ENTMCNC: 33.2 G/DL (ref 32–36)
MCV RBC AUTO: 70 FL (ref 80–100)
MCV RBC AUTO: 75 FL (ref 80–100)
MONOCYTES # BLD MANUAL: 0 X10*3/UL (ref 0.05–0.8)
MONOCYTES NFR BLD MANUAL: 0 %
NEUTROPHILS # BLD MANUAL: 21.8 X10*3/UL (ref 1.6–5.5)
NEUTS BAND # BLD MANUAL: 1.68 X10*3/UL (ref 0–0.5)
NEUTS BAND NFR BLD MANUAL: 6.9 %
NEUTS SEG # BLD MANUAL: 20.12 X10*3/UL (ref 1.6–5)
NEUTS SEG NFR BLD MANUAL: 82.8 %
NRBC BLD-RTO: 0.1 /100 WBCS (ref 0–0)
NRBC BLD-RTO: 0.2 /100 WBCS (ref 0–0)
OXYHGB MFR BLDA: 90.7 % (ref 94–98)
PATH REVIEW-CBC DIFFERENTIAL: NORMAL
PCO2 BLDA: 34 MM HG (ref 38–42)
PH BLDA: 7.44 PH (ref 7.38–7.42)
PHOSPHATE SERPL-MCNC: 3.8 MG/DL (ref 2.5–4.9)
PHOSPHATE SERPL-MCNC: 4 MG/DL (ref 2.5–4.9)
PLATELET # BLD AUTO: 233 X10*3/UL (ref 150–450)
PLATELET # BLD AUTO: 243 X10*3/UL (ref 150–450)
PO2 BLDA: 66 MM HG (ref 85–95)
POLYCHROMASIA BLD QL SMEAR: ABNORMAL
POTASSIUM BLDA-SCNC: 3.3 MMOL/L (ref 3.5–5.3)
POTASSIUM SERPL-SCNC: 3.4 MMOL/L (ref 3.5–5.3)
POTASSIUM SERPL-SCNC: 3.5 MMOL/L (ref 3.5–5.3)
PROT SERPL-MCNC: 5.7 G/DL (ref 6.4–8.2)
RBC # BLD AUTO: 2.62 X10*6/UL (ref 4.5–5.9)
RBC # BLD AUTO: 3.4 X10*6/UL (ref 4.5–5.9)
RBC MORPH BLD: ABNORMAL
SAO2 % BLDA: 93 % (ref 94–100)
SODIUM BLDA-SCNC: 147 MMOL/L (ref 136–145)
SODIUM SERPL-SCNC: 150 MMOL/L (ref 136–145)
SODIUM SERPL-SCNC: 151 MMOL/L (ref 136–145)
TARGETS BLD QL SMEAR: ABNORMAL
TOTAL CELLS COUNTED BLD: 116
VANCOMYCIN SERPL-MCNC: 15.9 UG/ML (ref 5–20)
WBC # BLD AUTO: 18.5 X10*3/UL (ref 4.4–11.3)
WBC # BLD AUTO: 24.3 X10*3/UL (ref 4.4–11.3)

## 2024-04-24 PROCEDURE — 71045 X-RAY EXAM CHEST 1 VIEW: CPT | Performed by: RADIOLOGY

## 2024-04-24 PROCEDURE — 2500000004 HC RX 250 GENERAL PHARMACY W/ HCPCS (ALT 636 FOR OP/ED): Performed by: INTERNAL MEDICINE

## 2024-04-24 PROCEDURE — 85652 RBC SED RATE AUTOMATED: CPT | Performed by: INTERNAL MEDICINE

## 2024-04-24 PROCEDURE — 2500000005 HC RX 250 GENERAL PHARMACY W/O HCPCS: Performed by: STUDENT IN AN ORGANIZED HEALTH CARE EDUCATION/TRAINING PROGRAM

## 2024-04-24 PROCEDURE — 2500000004 HC RX 250 GENERAL PHARMACY W/ HCPCS (ALT 636 FOR OP/ED)

## 2024-04-24 PROCEDURE — 85007 BL SMEAR W/DIFF WBC COUNT: CPT

## 2024-04-24 PROCEDURE — 36415 COLL VENOUS BLD VENIPUNCTURE: CPT | Performed by: STUDENT IN AN ORGANIZED HEALTH CARE EDUCATION/TRAINING PROGRAM

## 2024-04-24 PROCEDURE — 86140 C-REACTIVE PROTEIN: CPT | Performed by: INTERNAL MEDICINE

## 2024-04-24 PROCEDURE — 2500000002 HC RX 250 W HCPCS SELF ADMINISTERED DRUGS (ALT 637 FOR MEDICARE OP, ALT 636 FOR OP/ED): Performed by: STUDENT IN AN ORGANIZED HEALTH CARE EDUCATION/TRAINING PROGRAM

## 2024-04-24 PROCEDURE — 83605 ASSAY OF LACTIC ACID: CPT

## 2024-04-24 PROCEDURE — 31720 CLEARANCE OF AIRWAYS: CPT

## 2024-04-24 PROCEDURE — 71045 X-RAY EXAM CHEST 1 VIEW: CPT

## 2024-04-24 PROCEDURE — 82947 ASSAY GLUCOSE BLOOD QUANT: CPT

## 2024-04-24 PROCEDURE — 2500000002 HC RX 250 W HCPCS SELF ADMINISTERED DRUGS (ALT 637 FOR MEDICARE OP, ALT 636 FOR OP/ED)

## 2024-04-24 PROCEDURE — 84132 ASSAY OF SERUM POTASSIUM: CPT

## 2024-04-24 PROCEDURE — 85027 COMPLETE CBC AUTOMATED: CPT

## 2024-04-24 PROCEDURE — 84132 ASSAY OF SERUM POTASSIUM: CPT | Performed by: STUDENT IN AN ORGANIZED HEALTH CARE EDUCATION/TRAINING PROGRAM

## 2024-04-24 PROCEDURE — 83735 ASSAY OF MAGNESIUM: CPT

## 2024-04-24 PROCEDURE — 2500000001 HC RX 250 WO HCPCS SELF ADMINISTERED DRUGS (ALT 637 FOR MEDICARE OP)

## 2024-04-24 PROCEDURE — 36600 WITHDRAWAL OF ARTERIAL BLOOD: CPT

## 2024-04-24 PROCEDURE — 1100000001 HC PRIVATE ROOM DAILY

## 2024-04-24 PROCEDURE — 80202 ASSAY OF VANCOMYCIN: CPT | Performed by: STUDENT IN AN ORGANIZED HEALTH CARE EDUCATION/TRAINING PROGRAM

## 2024-04-24 PROCEDURE — 36415 COLL VENOUS BLD VENIPUNCTURE: CPT

## 2024-04-24 PROCEDURE — S4991 NICOTINE PATCH NONLEGEND: HCPCS

## 2024-04-24 PROCEDURE — 2500000004 HC RX 250 GENERAL PHARMACY W/ HCPCS (ALT 636 FOR OP/ED): Performed by: STUDENT IN AN ORGANIZED HEALTH CARE EDUCATION/TRAINING PROGRAM

## 2024-04-24 PROCEDURE — C9113 INJ PANTOPRAZOLE SODIUM, VIA: HCPCS | Performed by: STUDENT IN AN ORGANIZED HEALTH CARE EDUCATION/TRAINING PROGRAM

## 2024-04-24 PROCEDURE — 99231 SBSQ HOSP IP/OBS SF/LOW 25: CPT

## 2024-04-24 PROCEDURE — 84100 ASSAY OF PHOSPHORUS: CPT

## 2024-04-24 PROCEDURE — 99233 SBSQ HOSP IP/OBS HIGH 50: CPT

## 2024-04-24 PROCEDURE — 82533 TOTAL CORTISOL: CPT

## 2024-04-24 RX ORDER — POTASSIUM CHLORIDE 14.9 MG/ML
20 INJECTION INTRAVENOUS ONCE
Status: COMPLETED | OUTPATIENT
Start: 2024-04-24 | End: 2024-04-24

## 2024-04-24 RX ORDER — VANCOMYCIN HYDROCHLORIDE 500 MG/100ML
500 INJECTION, SOLUTION INTRAVENOUS ONCE
Status: DISCONTINUED | OUTPATIENT
Start: 2024-04-24 | End: 2024-04-24

## 2024-04-24 RX ORDER — DEXTROSE, SODIUM CHLORIDE, SODIUM LACTATE, POTASSIUM CHLORIDE, AND CALCIUM CHLORIDE 5; .6; .31; .03; .02 G/100ML; G/100ML; G/100ML; G/100ML; G/100ML
75 INJECTION, SOLUTION INTRAVENOUS CONTINUOUS
Status: DISCONTINUED | OUTPATIENT
Start: 2024-04-24 | End: 2024-04-25

## 2024-04-24 RX ORDER — FUROSEMIDE 10 MG/ML
40 INJECTION INTRAMUSCULAR; INTRAVENOUS ONCE
Status: DISCONTINUED | OUTPATIENT
Start: 2024-04-24 | End: 2024-04-24

## 2024-04-24 RX ORDER — VANCOMYCIN HYDROCHLORIDE 500 MG/100ML
500 INJECTION, SOLUTION INTRAVENOUS ONCE
Status: COMPLETED | OUTPATIENT
Start: 2024-04-24 | End: 2024-04-24

## 2024-04-24 RX ORDER — AMOXICILLIN 250 MG
2 CAPSULE ORAL NIGHTLY PRN
Status: DISCONTINUED | OUTPATIENT
Start: 2024-04-24 | End: 2024-05-07

## 2024-04-24 RX ADMIN — Medication 1 TABLET: at 09:31

## 2024-04-24 RX ADMIN — GUAIFENESIN 100 MG: 200 SOLUTION ORAL at 20:24

## 2024-04-24 RX ADMIN — SODIUM CHLORIDE, SODIUM LACTATE, POTASSIUM CHLORIDE, CALCIUM CHLORIDE AND DEXTROSE MONOHYDRATE 75 ML/HR: 5; 600; 310; 30; 20 INJECTION, SOLUTION INTRAVENOUS at 20:24

## 2024-04-24 RX ADMIN — SODIUM CHLORIDE 500 ML: 9 INJECTION, SOLUTION INTRAVENOUS at 10:15

## 2024-04-24 RX ADMIN — PIPERACILLIN SODIUM AND TAZOBACTAM SODIUM 3.38 G: 3; .375 INJECTION, SOLUTION INTRAVENOUS at 05:16

## 2024-04-24 RX ADMIN — HEPARIN SODIUM 5000 UNITS: 5000 INJECTION INTRAVENOUS; SUBCUTANEOUS at 05:15

## 2024-04-24 RX ADMIN — INSULIN LISPRO 20 UNITS: 100 INJECTION, SOLUTION INTRAVENOUS; SUBCUTANEOUS at 01:25

## 2024-04-24 RX ADMIN — Medication 2 L/MIN: at 05:10

## 2024-04-24 RX ADMIN — PIPERACILLIN SODIUM AND TAZOBACTAM SODIUM 3.38 G: 3; .375 INJECTION, SOLUTION INTRAVENOUS at 00:36

## 2024-04-24 RX ADMIN — HEPARIN SODIUM 5000 UNITS: 5000 INJECTION INTRAVENOUS; SUBCUTANEOUS at 23:16

## 2024-04-24 RX ADMIN — ATORVASTATIN CALCIUM 10 MG: 20 TABLET, FILM COATED ORAL at 20:24

## 2024-04-24 RX ADMIN — Medication: at 09:00

## 2024-04-24 RX ADMIN — SODIUM CHLORIDE, POTASSIUM CHLORIDE, SODIUM LACTATE AND CALCIUM CHLORIDE 500 ML: 600; 310; 30; 20 INJECTION, SOLUTION INTRAVENOUS at 21:39

## 2024-04-24 RX ADMIN — OXYCODONE HYDROCHLORIDE AND ACETAMINOPHEN 500 MG: 500 TABLET ORAL at 20:24

## 2024-04-24 RX ADMIN — SODIUM BICARBONATE 650 MG: 650 TABLET ORAL at 09:31

## 2024-04-24 RX ADMIN — PANTOPRAZOLE SODIUM 40 MG: 40 INJECTION, POWDER, FOR SOLUTION INTRAVENOUS at 09:32

## 2024-04-24 RX ADMIN — INSULIN LISPRO 16 UNITS: 100 INJECTION, SOLUTION INTRAVENOUS; SUBCUTANEOUS at 05:15

## 2024-04-24 RX ADMIN — OXYCODONE HYDROCHLORIDE AND ACETAMINOPHEN 500 MG: 500 TABLET ORAL at 17:35

## 2024-04-24 RX ADMIN — HEPARIN SODIUM 5000 UNITS: 5000 INJECTION INTRAVENOUS; SUBCUTANEOUS at 13:34

## 2024-04-24 RX ADMIN — PIPERACILLIN SODIUM AND TAZOBACTAM SODIUM 3.38 G: 3; .375 INJECTION, SOLUTION INTRAVENOUS at 13:34

## 2024-04-24 RX ADMIN — NICOTINE 1 PATCH: 21 PATCH, EXTENDED RELEASE TRANSDERMAL at 09:32

## 2024-04-24 RX ADMIN — MELATONIN 3 MG: 3 TAB ORAL at 20:25

## 2024-04-24 RX ADMIN — SODIUM BICARBONATE 650 MG: 650 TABLET ORAL at 20:25

## 2024-04-24 RX ADMIN — VANCOMYCIN HYDROCHLORIDE 500 MG: 500 INJECTION, SOLUTION INTRAVENOUS at 12:06

## 2024-04-24 RX ADMIN — POTASSIUM CHLORIDE 20 MEQ: 14.9 INJECTION, SOLUTION INTRAVENOUS at 15:27

## 2024-04-24 RX ADMIN — ACETAMINOPHEN 650 MG: 650 SOLUTION ORAL at 20:24

## 2024-04-24 RX ADMIN — OXYCODONE HYDROCHLORIDE AND ACETAMINOPHEN 500 MG: 500 TABLET ORAL at 09:31

## 2024-04-24 ASSESSMENT — COGNITIVE AND FUNCTIONAL STATUS - GENERAL
MOVING FROM LYING ON BACK TO SITTING ON SIDE OF FLAT BED WITH BEDRAILS: TOTAL
DRESSING REGULAR UPPER BODY CLOTHING: TOTAL
HELP NEEDED FOR BATHING: TOTAL
TOILETING: TOTAL
EATING MEALS: TOTAL
STANDING UP FROM CHAIR USING ARMS: TOTAL
CLIMB 3 TO 5 STEPS WITH RAILING: TOTAL
DRESSING REGULAR LOWER BODY CLOTHING: TOTAL
MOBILITY SCORE: 6
MOVING TO AND FROM BED TO CHAIR: TOTAL
PERSONAL GROOMING: TOTAL
WALKING IN HOSPITAL ROOM: TOTAL
DAILY ACTIVITIY SCORE: 6
TURNING FROM BACK TO SIDE WHILE IN FLAT BAD: TOTAL

## 2024-04-24 ASSESSMENT — PAIN SCALES - WONG BAKER: WONGBAKER_NUMERICALRESPONSE: HURTS LITTLE BIT

## 2024-04-24 NOTE — PROGRESS NOTES
Physical Therapy                 Therapy Communication Note    Patient Name: Brian Rodriguez  MRN: [id number]  Today's Date: 4/24/2024     Discipline: Physical Therapy    Missed Visit Reason: Missed Visit Reason: Patient placed on medical hold (Pt with a code white at this time. Will re-attempt as able and appropriate)    Missed Time: Attempt

## 2024-04-24 NOTE — PROGRESS NOTES
Brian Rodriguez is a 69 y.o. male on day 7 of admission presenting with Pneumonia due to infectious organism, unspecified laterality, unspecified part of lung.    Subjective   Hospital Course:  70 yo male with CKD, DM, HTN, sacral wound, bipolar disorder initially admitted from a nursing home for altered mental status, and now transferred to the MICU form the floor for hypoxic respiratory failure and sepsis.       Pt first presented to the floor with confusion and multifocal pneumonia on 4/17. Patient was found porfirio hypernatremic, with severe swallowing dysfunction and found to be COVID positive. Patient was stated on Vanc, Zosyn, and azithromycin for a pneumonia and remdesevir for COVID and free water flushes for hypernatremia.      Two rapid responses were called on 4/21 pm and 4/22 am. Intital rapid for increase O2 requirement to 12 L. CXR with worsening RLL consolidation. Pt placedon Airvo at the time. ABG notable for pH 7.44, pO2 68. Dexamethasone started. Follow up ABG 1 hour later with pH 7.47/CO2 23, lactate 2.9 on Airvo 55%, 50 L. Thought to be 2/2 compensation for metabolic acidosis. Pt also bladder scanned, with only 22 cc residual, given 500 cc fluids and started on NaHCO3 tabs.      Rapid called again at 1:30 am for BP 86/46. Pt given additional 500 cc fluid for total of 1 L and patient transferred to MICU.      Patient was admitted to ICU for hypotension concerning for sepsis. On arrival to the ICU, the patient was bolused with LR. In total, the patient received 5.5L fluid from the time a rapid was called for hypotension to the time the patient left the MICU. His pressures dipped on several occasions, and each time responded to fluid resuscitation. He was on airvo 50/50 when admitted and now weaned to room air at the time of transfer. There are concerns for his ability to swallow, neurology has been consulted, ENT did not identify any anatomic etiologies.      On examination, patient confused AAOx1.  "Saturating well on RA and HDS.     This morning: Patient examined in floor bed, laying flat, tube feeds running, active gurgling and weakly coughing. Patient responding to commands (squeeze fingers) but not answering questions. Rapid response was called for AMS and concern for aspiration PNA based on exam.    500ml NS given  CBC/CMP/Mg/ABG collected --> hypoxic, alkalotic, worsening leukocytosis, hbg stable, hypernatremic, hypokalemic  CXR --> worsening L-sided aspiration pneumonitis vs pneumonia  Small volumes of thick TF fluid intermittently suctioned from posterior oral pharynx    Patient elevated in bed, TF turned off, insulin held given NPO, fluid responsive hypotension and stable on 3L NC.     Objective   Physical Exam  Physical Exam  Vitals reviewed.   Constitutional:       General: He is in acute distress.      Appearance: He is ill-appearing.   HENT:      Head: Normocephalic and atraumatic.   Eyes:      General: No scleral icterus.     Extraocular Movements: Extraocular movements intact.      Pupils: Pupils are equal, round, and reactive to light.   Cardiovascular:      Rate and Rhythm: Regular rhythm. Tachycardia present.      Pulses: Normal pulses.      Heart sounds: Normal heart sounds.   Pulmonary:      Effort: Tachypnea present.      Breath sounds: Rhonchi (diffuse) present.   Musculoskeletal:      Right lower leg: No edema.      Left lower leg: No edema.   Skin:     General: Skin is warm and dry.      Capillary Refill: Capillary refill takes less than 2 seconds.   Neurological:      Mental Status: He is alert. He is disoriented.      Comments: Oriented only to self       Last Recorded Vitals  Blood pressure 92/54, pulse 88, temperature 37.3 °C (99.1 °F), resp. rate 18, height 1.778 m (5' 10\"), weight 63.6 kg (140 lb 3.4 oz), SpO2 98%.  Intake/Output last 3 Shifts:  I/O last 3 completed shifts:  In: 3880 (48.9 mL/kg) [Blood:380; NG/GT:2250; IV Piggyback:1250]  Out: 2770 (34.9 mL/kg) [Urine:2770 (1 " mL/kg/hr)]  Dosing Weight: 79.4 kg     Relevant Results  This patient has a urinary catheter   Reason for the urinary catheter remaining today? incontinent patients with an open sacral wound or perineal wounds    XR abdomen 1 view    Result Date: 4/24/2024  Interpreted By:  Harish Welch,  and Alex Monzon STUDY: XR ABDOMEN 1 VIEW;  4/23/2024 11:23 pm   INDICATION: Signs/Symptoms:NG / Feeding Tube Placement Verification.   COMPARISON: Abdominal radiograph 04/22/2024 8:10 p.m.   ACCESSION NUMBER(S): [id number]   ORDERING CLINICIAN: FAM TERRY   FINDINGS: Enteric tube is visualized coursing beneath the diaphragm with the tip overlying the gastric pylorus.   Nonobstructive bowel gas pattern. Limited evaluation of pneumoperitoneum on supine imaging, however no gross evidence of free air is noted.   Partially visualized nonspecific right basilar opacities. No left pleural effusion. Left retrocardiac bandlike opacities likely reflecting atelectasis.   Osseous structures demonstrate no acute bony changes.       1.  Enteric tube with tip overlying the gastric pylorus versus proximal duodenum. Consider slight advancement if post pyloric positioning is desired. 2. Nonobstructive bowel-gas pattern. 3. Partially visualized nonspecific right basilar opacities. Findings may be represent consolidation versus atelectasis. 4. Left retrocardiac bandlike opacities likely reflecting atelectasis.   I personally reviewed the images/study and I agree with the findings as stated by Na Johnson MD. This study was interpreted at [place], [place].   MACRO: None   Signed by: Harish Welch 4/24/2024 10:05 AM Dictation workstation:   [id number]    Transthoracic Echo (TTE) Complete    Result Date: 4/23/2024   [facility], [address] [place] [address]                Tel [phone] and Fax [phone] TRANSTHORACIC ECHOCARDIOGRAM REPORT  Patient Name:       JARRED Briceño Physician:    [id number] Pino Richards MD Study Date:        4/22/2024            Ordering Provider:    [id number] VETO VILLAGRAN MRN/PID:           [id number]             Fellow: Accession#:        [id number]         Nurse: Date of Birth/Age: 1955 / 69 years Sonographer:          RALPH Mckeon RDCS Gender:            M                    Additional Staff: Height:            177.80 cm            Admit Date:           4/17/2024 Weight:            58.97 kg             Admission Status:     Inpatient -                                                               Routine BSA / BMI:         1.74 m2 / 18.65      Encounter#:           [id number]                    kg/m2                                         [address] Pressure: 95 /48 mmHg Study Type:    TRANSTHORACIC ECHO (TTE) COMPLETE Diagnosis/ICD: Encounter for screening for cardiovascular disorders-Z13.6 Indication:    Fluid Status CPT Code:      Echo Complete w Full [id number] Patient History: Diabetes:         Yes Pertinent         HTN. PNA, CKD, AMS, bipolar, hypoxic respiratory failure, History:          sepsis. Study Detail: The following Echo studies were performed: 2D, M-Mode, Doppler and               color flow. Technically challenging study due to patient lying in               supine position, body habitus and prominent lung artifact.  PHYSICIAN INTERPRETATION: Left Ventricle: The left ventricular systolic function is normal, with an estimated ejection fraction of 65-70%. There are no regional wall motion abnormalities. The left ventricular cavity size is normal. Left ventricular diastolic filling was indeterminate. Left Atrium: The left atrium is normal in size. Right Ventricle: The right ventricle is  normal in size. There is normal right ventricular global systolic function. Right Atrium: The right atrium is normal in size. Aortic Valve: The aortic valve is trileaflet. There is minimal aortic valve cusp calcification. There is no evidence of aortic valve regurgitation. The peak instantaneous gradient of the aortic valve is 4.9 mmHg. Mitral Valve: The mitral valve is normal in structure. There is mild mitral annular calcification. There is no evidence of mitral valve regurgitation. Tricuspid Valve: The tricuspid valve was not well visualized. No evidence of tricuspid regurgitation. The right ventricular systolic pressure is unable to be estimated. Pulmonic Valve: The pulmonic valve is not well visualized. There is physiologic pulmonic valve regurgitation. Pericardium: There is no pericardial effusion noted. Aorta: The aortic root is normal. The ascending aorta was not well visualized. The aortic root appears normal in size and measures 3.70 cm. Systemic Veins: The inferior vena cava appears to be of normal size. There is IVC inspiratory collapse greater than 50%. In comparison to the previous echocardiogram(s): There are no prior studies on this patient for comparison purposes.  CONCLUSIONS:  1. Poorly visualized anatomical structures due to suboptimal image quality.  2. Left ventricular systolic function is normal with a 65-70% estimated ejection fraction.  3. Normal aortic root. QUANTITATIVE DATA SUMMARY: 2D MEASUREMENTS:                          Normal Ranges: Ao Root d:     3.70 cm   (2.0-3.7cm) LAs:           3.30 cm   (2.7-4.0cm) IVSd:          0.80 cm   (0.6-1.1cm) LVPWd:         0.90 cm   (0.6-1.1cm) LVIDd:         5.00 cm   (3.9-5.9cm) LVIDs:         4.40 cm LV Mass Index: 84.5 g/m2 LV % FS        12.0 % LV SYSTOLIC FUNCTION BY 2D PLANIMETRY (MOD):                     Normal Ranges: EF-A4C View: 81.0 % (>=55%) EF-A2C View: 68.7 % EF-Biplane:  76.1 % LV DIASTOLIC FUNCTION:                        Normal  Ranges: MV Peak E:    0.80 m/s (0.7-1.2 m/s) MV Peak A:    0.72 m/s (0.42-0.7 m/s) E/A Ratio:    1.12     (1.0-2.2) MV e'         0.12 m/s (>8.0) MV lateral e' 0.14 m/s MV medial e'  0.09 m/s E/e' Ratio:   6.97     (<8.0) MV DT:        219 msec (150-240 msec) MITRAL VALVE:                 Normal Ranges: MV DT: 219 msec (150-240msec) AORTIC VALVE:                         Normal Ranges: AoV Vmax:      1.11 m/s (<=1.7m/s) AoV Peak P.9 mmHg (<20mmHg) LVOT Max Yandel:  0.91 m/s (<=1.1m/s) LVOT VTI:      17.40 cm LVOT Diameter: 2.10 cm  (1.8-2.4cm) AoV Area,Vmax: 2.83 cm2 (2.5-4.5cm2)  RIGHT VENTRICLE: TAPSE: 20.9 mm RV s'  0.14 m/s TRICUSPID VALVE/RVSP:                   Normal Ranges: IVC Diam: 1.97 cm  [id number] Pino Richards MD Electronically signed on 2024 at 8:39:17 AM  ** Final **      Results for orders placed or performed during the hospital encounter of 24 (from the past 24 hour(s))   POCT GLUCOSE   Result Value Ref Range    POCT Glucose 246 (H) 74 - 99 mg/dL   CBC   Result Value Ref Range    WBC 17.4 (H) 4.4 - 11.3 x10*3/uL    nRBC 0.1 (H) 0.0 - 0.0 /100 WBCs    RBC 3.23 (L) 4.50 - 5.90 x10*6/uL    Hemoglobin 7.9 (L) 13.5 - 17.5 g/dL    Hematocrit 22.9 (L) 41.0 - 52.0 %    MCV 71 (L) 80 - 100 fL    MCH 24.5 (L) 26.0 - 34.0 pg    MCHC 34.5 32.0 - 36.0 g/dL    RDW 20.9 (H) 11.5 - 14.5 %    Platelets 237 150 - 450 x10*3/uL   POCT GLUCOSE   Result Value Ref Range    POCT Glucose 235 (H) 74 - 99 mg/dL   Ammonia   Result Value Ref Range    Ammonia 30 16 - 53 umol/L   POCT GLUCOSE   Result Value Ref Range    POCT Glucose 317 (H) 74 - 99 mg/dL   POCT GLUCOSE   Result Value Ref Range    POCT Glucose 382 (H) 74 - 99 mg/dL   POCT GLUCOSE   Result Value Ref Range    POCT Glucose 323 (H) 74 - 99 mg/dL   POCT GLUCOSE   Result Value Ref Range    POCT Glucose 191 (H) 74 - 99 mg/dL   Prepare RBC: 1 Units   Result Value Ref Range    PRODUCT CODE R4103O15     Unit Number [id number]     Unit ABO O     Unit RH POS      XM INTEP COMP     Dispense Status XM     Blood Expiration Date May 21, 2024 23:59 EDT     PRODUCT BLOOD TYPE 5100     UNIT VOLUME 278    CBC and Auto Differential   Result Value Ref Range    WBC 24.3 (H) 4.4 - 11.3 x10*3/uL    nRBC 0.2 (H) 0.0 - 0.0 /100 WBCs    RBC 3.40 (L) 4.50 - 5.90 x10*6/uL    Hemoglobin 8.2 (L) 13.5 - 17.5 g/dL    Hematocrit 25.4 (L) 41.0 - 52.0 %    MCV 75 (L) 80 - 100 fL    MCH 24.1 (L) 26.0 - 34.0 pg    MCHC 32.3 32.0 - 36.0 g/dL    RDW 20.5 (H) 11.5 - 14.5 %    Platelets 233 150 - 450 x10*3/uL    Immature Granulocytes %, Automated 1.3 (H) 0.0 - 0.9 %    Immature Granulocytes Absolute, Automated 0.31 0.00 - 0.50 x10*3/uL   Magnesium   Result Value Ref Range    Magnesium 2.52 (H) 1.60 - 2.40 mg/dL   Vancomycin   Result Value Ref Range    Vancomycin 15.9 5.0 - 20.0 ug/mL   Comprehensive metabolic panel   Result Value Ref Range    Glucose 180 (H) 74 - 99 mg/dL    Sodium 150 (H) 136 - 145 mmol/L    Potassium 3.4 (L) 3.5 - 5.3 mmol/L    Chloride 117 (H) 98 - 107 mmol/L    Bicarbonate 24 21 - 32 mmol/L    Anion Gap 12 10 - 20 mmol/L    Urea Nitrogen 57 (H) 6 - 23 mg/dL    Creatinine 3.04 (H) 0.50 - 1.30 mg/dL    eGFR 21 (L) >60 mL/min/1.73m*2    Calcium 8.4 (L) 8.6 - 10.6 mg/dL    Albumin 1.5 (L) 3.4 - 5.0 g/dL    Alkaline Phosphatase 78 33 - 136 U/L    Total Protein 5.7 (L) 6.4 - 8.2 g/dL    AST 10 9 - 39 U/L    Bilirubin, Total 0.4 0.0 - 1.2 mg/dL    ALT 8 (L) 10 - 52 U/L   Phosphorus   Result Value Ref Range    Phosphorus 3.8 2.5 - 4.9 mg/dL   Lactate   Result Value Ref Range    Lactate 1.8 0.4 - 2.0 mmol/L   PST Top   Result Value Ref Range    Extra Tube Hold for add-ons.    Manual Differential   Result Value Ref Range    Neutrophils %, Manual 82.8 40.0 - 80.0 %    Bands %, Manual 6.9 0.0 - 5.0 %    Lymphocytes %, Manual 10.3 13.0 - 44.0 %    Monocytes %, Manual 0.0 2.0 - 10.0 %    Eosinophils %, Manual 0.0 0.0 - 6.0 %    Basophils %, Manual 0.0 0.0 - 2.0 %    Seg Neutrophils Absolute,  Manual 20.12 (H) 1.60 - 5.00 x10*3/uL    Bands Absolute, Manual 1.68 (H) 0.00 - 0.50 x10*3/uL    Lymphocytes Absolute, Manual 2.50 0.80 - 3.00 x10*3/uL    Monocytes Absolute, Manual 0.00 (L) 0.05 - 0.80 x10*3/uL    Eosinophils Absolute, Manual 0.00 0.00 - 0.40 x10*3/uL    Basophils Absolute, Manual 0.00 0.00 - 0.10 x10*3/uL    Total Cells Counted 116     Neutrophils Absolute, Manual 21.80 (H) 1.60 - 5.50 x10*3/uL    RBC Morphology See Below     Polychromasia Mild     Target Cells Few    Light Blue Top   Result Value Ref Range    Extra Tube Hold for add-ons.    Blood Gas Arterial Full Panel   Result Value Ref Range    POCT pH, Arterial 7.44 (H) 7.38 - 7.42 pH    POCT pCO2, Arterial 34 (L) 38 - 42 mm Hg    POCT pO2, Arterial 66 (L) 85 - 95 mm Hg    POCT SO2, Arterial 93 (L) 94 - 100 %    POCT Oxy Hemoglobin, Arterial 90.7 (L) 94.0 - 98.0 %    POCT Hematocrit Calculated, Arterial 28.0 (L) 41.0 - 52.0 %    POCT Sodium, Arterial 147 (H) 136 - 145 mmol/L    POCT Potassium, Arterial 3.3 (L) 3.5 - 5.3 mmol/L    POCT Chloride, Arterial 116 (H) 98 - 107 mmol/L    POCT Ionized Calcium, Arterial 1.32 1.10 - 1.33 mmol/L    POCT Glucose, Arterial 178 (H) 74 - 99 mg/dL    POCT Lactate, Arterial 1.6 0.4 - 2.0 mmol/L    POCT Base Excess, Arterial -0.8 -2.0 - 3.0 mmol/L    POCT HCO3 Calculated, Arterial 23.1 22.0 - 26.0 mmol/L    POCT Hemoglobin, Arterial 9.2 (L) 13.5 - 17.5 g/dL    POCT Anion Gap, Arterial 11 10 - 25 mmo/L    Patient Temperature 37.0 degrees Celsius    FiO2 40 %     Assessment/Plan   Principal Problem:    Pneumonia due to infectious organism, unspecified laterality, unspecified part of lung    Brian Rodriguez is a 69 y.o. male presenting for with CKD, DM, HTN, sacral wound, bipolar, here with sepsis, likely 2/2 aspiration PNA. Responding to fluid resuscitation, continue pending pressure stabilization. Required albumin infusion. Unclear if patient is hypovolemic from dehydration vs bacteremia/sepsis. Course c/b  persistent dysphagia with concern for neurological cause, neuro on board recommending MRI.     Updates 04/24  - pending MRI Brain  - code white called this am as above, stable on 3L NC s/p 500ml NS  - Engaged family this afternoon regarding GOC and MRI. MRI safety sheet completed, family aware of code white, still ok for ICU escalation/full code.     #AMS, worsening  :: Per MICU team, baseline oriented x2  :: TSH 0.5  :: Suspect 2/2 sepsis and hypernatremia  Plan:  - Continue with fluid resuscitation  - Ammonia ordered  - pending MRI Brain     #Multifocal PNA  #Aspiration PNA, worsening  #COVID PNA  ::s/p Azithromycin (4/18-4/20)  ::CT with GGO throughout right lung and to lesser degree within dependent left lower lobe  ::Urine strep/legionella negative, MRSA nares positive  :: s/p 5 days Remdesevir (last dose 4/22)   ::s/p 2 doses of decadron  Plan:  - Continue with Vanc and Zosyn  - Frequent suctioning for BP hygiene, RT on floor engaged  - Patient on 3LNC    #acute Hypotension  #chronic HTN  ::fluid responsive, active resuscitation in micu  ::s/p 500ml this am  ::AM cortisol normal  Plan:  - Home home amlodipine in setting of sepsis  - Fluid bolus as appropriate I/s/o dehydration and ongoing diarrhea      #Dysphagia  :: ENT previously consulted, no anatomic cause  ::Concern for central cause raised 4/18 (?stroke)  Plan:  - Neuro recs for dysphagia workup; MRI Brain, ammonia level     #purulent drainage from chronic sacral wounds  #hidradenitis suppurativa  ::Derm recommending humira and minocycline 100 BID, patient will need labs to confirm eligibility for these medications  ::hepatitis C antibody negative, hepatitis B surface antigen, hepatitis B core antibody negative, total, hepatitis B surface antibody negative, T-spot pending, hIV 1/2 antigen/antibody screen negative -> to determine Humira eligibility  ::wound team following, daily dressing changes  Plan:  -Will need to follow up outpatient with dermatology  within 2 weeks of discharge.  -Doxycycline 100 Bid outpatient after abx course for pneumonia is completed  -Wound care consult     #Acute on chronic anemia  :: Acute illness/inflammatory vs Dilutional vs acute blood loss (?urological)  ::Iron <10, TIBC incalculable, UIBC 95  ::Folate wnl  ::B12 supratherapeutic  ::s/p 1U PRBC 4/23  Plan:  - CTM for s/sx of bleeding    # TERRI on CKD Stage III  # Hypernatremia  ::Free water deficit approx 1.5L  ::s/p 500ml this am  :: Suspect 2/2 hypovolemia with component of sepsis ATN  - Baseline Cr 1.7  - Monitor RFP BID for now     # T2DM  # Hyperglycemia  :: Last Hba1c 9.1 March 2023  Plan:  - hold 15 units glargine and SSI while NPO and TF held, off steroids  - Q4 POCT checks while NPO     # Fecal impaction on CT - improving?  # diarrhea  ::new diarrhea reported overnight, persistent  ::hx of fecal impaction, may need repeat KUB pending GOC discussion  Plan:  - Doc/senna and Miralax from BID to PRN I/s/o new, persistent diarrhea  - S/p Enema  - No melena     #Nutrition  - NPO while concern for aspiration  - Diet consulted appreciate recs  - Tube feeds held  - Head of bed elevated, frequent suctioning, oral hygiene.    #Depression  - Mirtazapine 7.5 mg nightly, holding d/t persistent AMS  # HLD  - Continue home Atorvastatin 10 mg     F Fluid bolus  E Replete as needed  N Trickle feeds tube NPO  G PEG 17g  DVT: SubQ heparin  Abx: Zosyn  Drips: None  Pressors: None  Code status: Full Code  NoK: DEEPAK RANGEL, Home Phone: [phone]    Eliecer Serrano,   Internal Medicine-Genetics, PGY-1

## 2024-04-24 NOTE — PROGRESS NOTES
Transitional Care Coordination Progress Note:  PLAN: Admitted with AMS and pneumonia from SNF. Rapid called today for worsening mentation. Neurology following and doesn't believe it was a stroke.     [facility]    DISPO: Return to [facility] - bed hold and can return per facility. ADOD 4/26-4/27.     SUPPORT/CONTACT: Jeff Villalta, [phone]    Shena Cardenas RN, TCC

## 2024-04-24 NOTE — SIGNIFICANT EVENT
04/24/24 1018   Onset Documentation   Rapid Response Initiated By RN   Location/Room [facility]  ()   Pager Time [phone]   Arrival Time 0840   Event End Time 0933   Level II Called No   Primary Reason for Call Staff concern;SpO2 less than 90%     Rapid Response called for acute O2 desaturation on 2L to 81% and increased lethargy. Rapid Response arrival to bedside--nursing placed patient on 5L via NC to maintain saturation> 92% and TF via NG enteral access was stopped. Vitals on arrival: T 99.0 F; HR 88; RR 24; /53; SpO2 92% 5L; blood sugar WNL. Patient awake, eyes open and he would grimace and guard to painful stimuli however remained nonverbal. No facial droop or drooling. Audible upper airway secretions--Rapid RN NT suctioned patient three times with large amount of thin, clear secretions and respiratory status improved. Patient was able to be weaned to 2L and remained 98%. CXR and ABG was obtained during event (see results). Patient given 1L NS bolus as oral mucosa dry and BP soft 94/52. Patient incontinent of stool--perineal care and linens changed during event. AM Labs obtained and sent. Plan is for patient to remain on current division. MD team will notify nursing when okay to resume TF.

## 2024-04-24 NOTE — PROGRESS NOTES
"Vancomycin Dosing by Pharmacy- FOLLOW UP    Brian Rodriguez is a 69 y.o. year old male who Pharmacy has been consulted for vancomycin dosing for pneumonia. Based on the patient's indication and renal status this patient is being dosed based on a goal trough/random level of 15-20.     Renal function is currently declining.    Current vancomycin dose: 500 mg given once yesterday     Most recent trough level: 15.9 mcg/mL    Visit Vitals  /53   Pulse 96   Temp 37.1 °C (98.8 °F)   Resp 17        Lab Results   Component Value Date    CREATININE 3.04 (H) 04/24/2024    CREATININE 2.71 (H) 04/23/2024    CREATININE 2.71 (H) 04/22/2024    CREATININE 2.88 (H) 04/22/2024        Patient weight is No results found for: \"PTWEIGHT\"    No results found for: \"CULTURE\"     I/O last 3 completed shifts:  In: 3880 (48.9 mL/kg) [Blood:380; NG/GT:2250; IV Piggyback:1250]  Out: 2770 (34.9 mL/kg) [Urine:2770 (1 mL/kg/hr)]  Dosing Weight: 79.4 kg   [unfilled]    Lab Results   Component Value Date    PATIENTTEMP 37.0 04/24/2024    PATIENTTEMP 37.0 04/22/2024    PATIENTTEMP 37.0 04/22/2024        Assessment/Plan    Within goal random/trough level. Will re-dose 500mg x1 again today     This dosing regimen is predicted by [employer] to result in the following pharmacokinetic parameters:  N/a    The next level will be obtained on 4/25 at 1000. May be obtained sooner if clinically indicated.   Will continue to monitor renal function daily while on vancomycin and order serum creatinine at least every 48 hours if not already ordered.  Follow for continued vancomycin needs, clinical response, and signs/symptoms of toxicity.       Peace Mohr, PharmD   Kenzie Davis 3 Pharmacist          "

## 2024-04-24 NOTE — CONSULTS
Wound Care Consult Follow-Up     Visit Date: 4/24/2024      Patient Name: Brian Rodriguez         MRN: [id number]           YOB: 1955     Reason for Consult: sacral and scrotal breakdown      Wound History: known hx of hidradenitis suppurativa    Wound Assessment:  Wound 04/18/24 Sacrum (Active)   Wound Image   04/22/24 0245   Shape geo, covered with dressing 04/23/24 0800       Wound 04/18/24 Moisture Associated Skin Damage Scrotum (Active)   Wound Image   04/18/24 1416   Shape oozing 04/23/24 0800     Wound Team Summary Assessment: Re-consulted for pt's sacral and scrotal breakdown. Pt admitted on 4/17 from facility for PNA. Known hx of hidradenitis suppurativa (HS). Pt was evaluated by Dermatology on admission who recommended daily Hibiclens/CHG washing to affected areas and the appropriate labwork to start patient on Humira. Wound Care saw pt on 4/18 to provide recommendations for daily dressing changes - Aquacel Ag and Mepilex bordered foam to open, draining areas and Triad barrier cream to scrotum. Reviewed pt's orders and saw no such wound care instructions. Spoke with Provider who will place orders accordingly.      Wound Team Recs: Turn pt q2 hours to offload sacrum/buttocks and promote air flow to wounded skin. Consider EHOB waffle mattress overlay. Gentle incontinence care PRN using warm purple bath wipes. Daily dressing changes to HS-affected buttocks and sacrum using CHG/hibiclens. Pat dry, then dress open/draining wounds using Aquacel Ag secured with Mepilex foam(s). May apply Triad barrier cream to scrotum and moist perianal/perineal skin.     Provider, please review.      Shannon Henry RN, CWOJN  4/24/2024  2:15 PM

## 2024-04-24 NOTE — CARE PLAN
The patient's goals for the shift include Pt will be free from falls  use call light  bed alarm remians on    The clinical goals for the shift include vitals WDL        Problem: Safety  Goal: Patient will be injury free during hospitalization  Outcome: Progressing     Problem: Safety  Goal: I will remain free of falls  Outcome: Progressing     Problem: Daily Care  Goal: Daily care needs are met  Outcome: Progressing     Problem: Fall/Injury  Goal: Be free from injury by end of the shift  Outcome: Progressing     Problem: Fall/Injury  Goal: Be free from injury by end of the shift  Outcome: Progressing

## 2024-04-24 NOTE — CARE PLAN
The patient's goals for the shift include Pt will be free from falls  use call light  bed alarm remians on    The clinical goals for the shift include Pt will have a decrease in bleeding from penis

## 2024-04-24 NOTE — PROGRESS NOTES
"Brian Rodriguez is a 69 y.o. male on day 7 of admission presenting with Pneumonia due to infectious organism, unspecified laterality, unspecified part of lung.      Subjective   Seen this morning at bedside, very somnolent compared to yesterday's exam.         Objective     Last Recorded Vitals  Blood pressure 104/55, pulse 77, temperature 36.6 °C (97.9 °F), temperature source Temporal, resp. rate 18, height 1.778 m (5' 10\"), weight 63.6 kg (140 lb 3.4 oz), SpO2 94%.    Physical Exam  Neurological Exam    Somnolent, opens eyes to voice.   He was not following any commands.   Breathing was shallow and fast.     Relevant Results  Results from last 72 hours   Lab Units 04/24/24  0902 04/23/24  1515 04/23/24  0429   WBC AUTO x10*3/uL 24.3* 17.4* 18.5*   HEMOGLOBIN g/dL 8.2* 7.9* 6.1*   HEMATOCRIT % 25.4* 22.9* 18.4*   PLATELETS AUTO x10*3/uL 233 237 243        Results from last 72 hours   Lab Units 04/24/24  0902 04/23/24  0429 04/22/24  1543   SODIUM mmol/L 150* 146* 145   POTASSIUM mmol/L 3.4* 3.4* 3.1*   CHLORIDE mmol/L 117* 115* 116*   CO2 mmol/L 24 19* 20*   BUN mg/dL 57* 41* 38*   CREATININE mg/dL 3.04* 2.71* 2.71*   MAGNESIUM mg/dL 2.52* 1.89 1.93   PHOSPHORUS mg/dL 3.8 3.2 3.4      Medications:   Scheduled medications  ascorbic acid, 500 mg, oral, TID  atorvastatin, 10 mg, oral, Nightly  chlorhexidine, , Topical, Daily  heparin (porcine), 5,000 Units, subcutaneous, q8h RANDAL  [Held by provider] insulin glargine, 15 Units, subcutaneous, q AM  [Held by provider] insulin lispro, 0-20 Units, subcutaneous, q4h  [Held by provider] mirtazapine, 7.5 mg, oral, Nightly  multivitamin with minerals, 1 tablet, oral, Daily  nicotine, 1 patch, transdermal, Daily   Followed by  [START ON 5/30/2024] nicotine, 1 patch, transdermal, Daily   Followed by  [START ON 6/13/2024] nicotine, 1 patch, transdermal, Daily  pantoprazole, 40 mg, intravenous, Daily  piperacillin-tazobactam, 3.375 g, intravenous, q6h  potassium chloride, 20 mEq, " intravenous, Once  sodium bicarbonate, 650 mg, oral, BID      Continuous medications  dextrose 5 % and lactated Ringer's, 75 mL/hr      PRN medications  PRN medications: acetaminophen **OR** acetaminophen **OR** acetaminophen, acetaminophen, dextrose, dextrose, glucagon, glucagon, guaiFENesin, ipratropium-albuteroL, melatonin, nicotine polacrilex, polyethylene glycol, sennosides-docusate sodium, traMADol, vancomycin        Assessment/Plan      Principal Problem:    Pneumonia due to infectious organism, unspecified laterality, unspecified part of lung      Brian Rodriguez is a 69 y.o. male  with a T2DM, HTN, CKD (b/l 1.7-2.2?), HLD, chronic sacral wound due to hydradenitis, bipolar, dementia presenting with altered mental status with concern for infection. General neurology has been consulted for dysphagia.     Neurologic exam is confounded by ongoing encephalopathy; however on  reassessment he was more able to participate in exam and there was global weakness, limited palate elevation. CT head is without acute abnormality. Could consider MRI brain to assess for new infarct as explanation for dysphagia. Highest on the differential diagnosis is dysphagia due to underlying encephalopathy from metabolic or infectious. However, in the setting of diffuse weakness and muscle atrophy, underlying MND is of concern.      Impression:   Concern for primary motor neuron disease vs dysphagia due to encephalopathy/ recrudescence of prior stroke.     Recommendations  - MRI brain without contrast  - Will consider EMG to rule out underlying motor neuron disease     Please page with any further questions or concerns.   General neurology team pager: [phone]     Dori Thomason MD  Neurology Resident PGY3

## 2024-04-25 LAB
ALBUMIN SERPL BCP-MCNC: 1.5 G/DL (ref 3.4–5)
ALBUMIN SERPL BCP-MCNC: 1.8 G/DL (ref 3.4–5)
ANION GAP SERPL CALC-SCNC: 16 MMOL/L (ref 10–20)
ANION GAP SERPL CALC-SCNC: 16 MMOL/L (ref 10–20)
BASOPHILS # BLD AUTO: 0.04 X10*3/UL (ref 0–0.1)
BASOPHILS NFR BLD AUTO: 0.2 %
BUN SERPL-MCNC: 41 MG/DL (ref 6–23)
BUN SERPL-MCNC: 47 MG/DL (ref 6–23)
BURR CELLS BLD QL SMEAR: NORMAL
CALCIUM SERPL-MCNC: 7.9 MG/DL (ref 8.6–10.6)
CALCIUM SERPL-MCNC: 8.5 MG/DL (ref 8.6–10.6)
CHLORIDE SERPL-SCNC: 116 MMOL/L (ref 98–107)
CHLORIDE SERPL-SCNC: 117 MMOL/L (ref 98–107)
CO2 SERPL-SCNC: 21 MMOL/L (ref 21–32)
CO2 SERPL-SCNC: 22 MMOL/L (ref 21–32)
CREAT SERPL-MCNC: 2.5 MG/DL (ref 0.5–1.3)
CREAT SERPL-MCNC: 2.59 MG/DL (ref 0.5–1.3)
EGFRCR SERPLBLD CKD-EPI 2021: 26 ML/MIN/1.73M*2
EGFRCR SERPLBLD CKD-EPI 2021: 27 ML/MIN/1.73M*2
EOSINOPHIL # BLD AUTO: 0.69 X10*3/UL (ref 0–0.4)
EOSINOPHIL NFR BLD AUTO: 4.3 %
ERYTHROCYTE [DISTWIDTH] IN BLOOD BY AUTOMATED COUNT: 21.8 % (ref 11.5–14.5)
GLUCOSE BLD MANUAL STRIP-MCNC: 213 MG/DL (ref 74–99)
GLUCOSE BLD MANUAL STRIP-MCNC: 219 MG/DL (ref 74–99)
GLUCOSE BLD MANUAL STRIP-MCNC: 276 MG/DL (ref 74–99)
GLUCOSE BLD MANUAL STRIP-MCNC: 287 MG/DL (ref 74–99)
GLUCOSE BLD MANUAL STRIP-MCNC: 290 MG/DL (ref 74–99)
GLUCOSE BLD MANUAL STRIP-MCNC: 292 MG/DL (ref 74–99)
GLUCOSE BLD MANUAL STRIP-MCNC: 318 MG/DL (ref 74–99)
GLUCOSE BLD MANUAL STRIP-MCNC: 349 MG/DL (ref 74–99)
GLUCOSE SERPL-MCNC: 265 MG/DL (ref 74–99)
GLUCOSE SERPL-MCNC: 333 MG/DL (ref 74–99)
HCT VFR BLD AUTO: 27.2 % (ref 41–52)
HGB BLD-MCNC: 8.3 G/DL (ref 13.5–17.5)
IMM GRANULOCYTES # BLD AUTO: 0.1 X10*3/UL (ref 0–0.5)
IMM GRANULOCYTES NFR BLD AUTO: 0.6 % (ref 0–0.9)
LACTATE SERPL-SCNC: 1.6 MMOL/L (ref 0.4–2)
LYMPHOCYTES # BLD AUTO: 1.88 X10*3/UL (ref 0.8–3)
LYMPHOCYTES NFR BLD AUTO: 11.6 %
MAGNESIUM SERPL-MCNC: 2.06 MG/DL (ref 1.6–2.4)
MAGNESIUM SERPL-MCNC: 2.3 MG/DL (ref 1.6–2.4)
MCH RBC QN AUTO: 23.6 PG (ref 26–34)
MCHC RBC AUTO-ENTMCNC: 30.5 G/DL (ref 32–36)
MCV RBC AUTO: 78 FL (ref 80–100)
MONOCYTES # BLD AUTO: 0.32 X10*3/UL (ref 0.05–0.8)
MONOCYTES NFR BLD AUTO: 2 %
NEUTROPHILS # BLD AUTO: 13.13 X10*3/UL (ref 1.6–5.5)
NEUTROPHILS NFR BLD AUTO: 81.3 %
NRBC BLD-RTO: 0.3 /100 WBCS (ref 0–0)
PHOSPHATE SERPL-MCNC: 3.3 MG/DL (ref 2.5–4.9)
PHOSPHATE SERPL-MCNC: 4 MG/DL (ref 2.5–4.9)
PLATELET # BLD AUTO: 255 X10*3/UL (ref 150–450)
POLYCHROMASIA BLD QL SMEAR: NORMAL
POTASSIUM SERPL-SCNC: 3.1 MMOL/L (ref 3.5–5.3)
POTASSIUM SERPL-SCNC: 3.9 MMOL/L (ref 3.5–5.3)
RBC # BLD AUTO: 3.51 X10*6/UL (ref 4.5–5.9)
RBC MORPH BLD: NORMAL
SODIUM SERPL-SCNC: 150 MMOL/L (ref 136–145)
SODIUM SERPL-SCNC: 151 MMOL/L (ref 136–145)
TARGETS BLD QL SMEAR: NORMAL
VANCOMYCIN SERPL-MCNC: 13.4 UG/ML (ref 5–20)
WBC # BLD AUTO: 16.2 X10*3/UL (ref 4.4–11.3)

## 2024-04-25 PROCEDURE — 85025 COMPLETE CBC W/AUTO DIFF WBC: CPT

## 2024-04-25 PROCEDURE — 2500000004 HC RX 250 GENERAL PHARMACY W/ HCPCS (ALT 636 FOR OP/ED): Performed by: INTERNAL MEDICINE

## 2024-04-25 PROCEDURE — 1200000002 HC GENERAL ROOM WITH TELEMETRY DAILY

## 2024-04-25 PROCEDURE — 82947 ASSAY GLUCOSE BLOOD QUANT: CPT

## 2024-04-25 PROCEDURE — 83605 ASSAY OF LACTIC ACID: CPT

## 2024-04-25 PROCEDURE — 83735 ASSAY OF MAGNESIUM: CPT

## 2024-04-25 PROCEDURE — 80069 RENAL FUNCTION PANEL: CPT

## 2024-04-25 PROCEDURE — 2500000004 HC RX 250 GENERAL PHARMACY W/ HCPCS (ALT 636 FOR OP/ED): Performed by: STUDENT IN AN ORGANIZED HEALTH CARE EDUCATION/TRAINING PROGRAM

## 2024-04-25 PROCEDURE — 80202 ASSAY OF VANCOMYCIN: CPT

## 2024-04-25 PROCEDURE — 36415 COLL VENOUS BLD VENIPUNCTURE: CPT

## 2024-04-25 PROCEDURE — 2500000004 HC RX 250 GENERAL PHARMACY W/ HCPCS (ALT 636 FOR OP/ED)

## 2024-04-25 PROCEDURE — 99233 SBSQ HOSP IP/OBS HIGH 50: CPT

## 2024-04-25 PROCEDURE — 80069 RENAL FUNCTION PANEL: CPT | Mod: MUE

## 2024-04-25 PROCEDURE — 2500000001 HC RX 250 WO HCPCS SELF ADMINISTERED DRUGS (ALT 637 FOR MEDICARE OP)

## 2024-04-25 PROCEDURE — C9113 INJ PANTOPRAZOLE SODIUM, VIA: HCPCS | Performed by: STUDENT IN AN ORGANIZED HEALTH CARE EDUCATION/TRAINING PROGRAM

## 2024-04-25 PROCEDURE — 97530 THERAPEUTIC ACTIVITIES: CPT | Mod: GP

## 2024-04-25 PROCEDURE — 2500000002 HC RX 250 W HCPCS SELF ADMINISTERED DRUGS (ALT 637 FOR MEDICARE OP, ALT 636 FOR OP/ED): Performed by: INTERNAL MEDICINE

## 2024-04-25 RX ORDER — POTASSIUM CHLORIDE 14.9 MG/ML
20 INJECTION INTRAVENOUS
Status: COMPLETED | OUTPATIENT
Start: 2024-04-25 | End: 2024-04-26

## 2024-04-25 RX ORDER — POTASSIUM CHLORIDE 1.5 G/1.58G
40 POWDER, FOR SOLUTION ORAL ONCE
Status: DISCONTINUED | OUTPATIENT
Start: 2024-04-25 | End: 2024-04-26

## 2024-04-25 RX ORDER — INSULIN LISPRO 100 [IU]/ML
0-5 INJECTION, SOLUTION INTRAVENOUS; SUBCUTANEOUS EVERY 6 HOURS
Status: DISCONTINUED | OUTPATIENT
Start: 2024-04-25 | End: 2024-04-25

## 2024-04-25 RX ORDER — DEXTROSE MONOHYDRATE 50 MG/ML
150 INJECTION, SOLUTION INTRAVENOUS CONTINUOUS
Status: DISCONTINUED | OUTPATIENT
Start: 2024-04-25 | End: 2024-04-27

## 2024-04-25 RX ORDER — INSULIN LISPRO 100 [IU]/ML
0-5 INJECTION, SOLUTION INTRAVENOUS; SUBCUTANEOUS EVERY 4 HOURS
Status: DISCONTINUED | OUTPATIENT
Start: 2024-04-25 | End: 2024-04-26

## 2024-04-25 RX ORDER — VANCOMYCIN HYDROCHLORIDE 750 MG/150ML
750 INJECTION, SOLUTION INTRAVENOUS ONCE
Status: COMPLETED | OUTPATIENT
Start: 2024-04-25 | End: 2024-04-25

## 2024-04-25 RX ADMIN — PIPERACILLIN SODIUM AND TAZOBACTAM SODIUM 3.38 G: 3; .375 INJECTION, SOLUTION INTRAVENOUS at 06:33

## 2024-04-25 RX ADMIN — OXYCODONE HYDROCHLORIDE AND ACETAMINOPHEN 500 MG: 500 TABLET ORAL at 14:31

## 2024-04-25 RX ADMIN — PANTOPRAZOLE SODIUM 40 MG: 40 INJECTION, POWDER, FOR SOLUTION INTRAVENOUS at 09:00

## 2024-04-25 RX ADMIN — PIPERACILLIN SODIUM AND TAZOBACTAM SODIUM 3.38 G: 3; .375 INJECTION, SOLUTION INTRAVENOUS at 12:21

## 2024-04-25 RX ADMIN — OXYCODONE HYDROCHLORIDE AND ACETAMINOPHEN 500 MG: 500 TABLET ORAL at 08:49

## 2024-04-25 RX ADMIN — VANCOMYCIN HYDROCHLORIDE 750 MG: 750 INJECTION, SOLUTION INTRAVENOUS at 14:31

## 2024-04-25 RX ADMIN — INSULIN LISPRO 5 UNITS: 100 INJECTION, SOLUTION INTRAVENOUS; SUBCUTANEOUS at 13:06

## 2024-04-25 RX ADMIN — HEPARIN SODIUM 5000 UNITS: 5000 INJECTION INTRAVENOUS; SUBCUTANEOUS at 14:31

## 2024-04-25 RX ADMIN — Medication: at 12:00

## 2024-04-25 RX ADMIN — INSULIN LISPRO 4 UNITS: 100 INJECTION, SOLUTION INTRAVENOUS; SUBCUTANEOUS at 23:10

## 2024-04-25 RX ADMIN — Medication 1 TABLET: at 08:50

## 2024-04-25 RX ADMIN — INSULIN LISPRO 3 UNITS: 100 INJECTION, SOLUTION INTRAVENOUS; SUBCUTANEOUS at 17:19

## 2024-04-25 RX ADMIN — PIPERACILLIN SODIUM AND TAZOBACTAM SODIUM 3.38 G: 3; .375 INJECTION, SOLUTION INTRAVENOUS at 01:01

## 2024-04-25 RX ADMIN — HEPARIN SODIUM 5000 UNITS: 5000 INJECTION INTRAVENOUS; SUBCUTANEOUS at 23:10

## 2024-04-25 RX ADMIN — SODIUM BICARBONATE 650 MG: 650 TABLET ORAL at 08:50

## 2024-04-25 RX ADMIN — HEPARIN SODIUM 5000 UNITS: 5000 INJECTION INTRAVENOUS; SUBCUTANEOUS at 06:28

## 2024-04-25 ASSESSMENT — COGNITIVE AND FUNCTIONAL STATUS - GENERAL
MOVING TO AND FROM BED TO CHAIR: TOTAL
WALKING IN HOSPITAL ROOM: TOTAL
TURNING FROM BACK TO SIDE WHILE IN FLAT BAD: A LOT
MOBILITY SCORE: 8
CLIMB 3 TO 5 STEPS WITH RAILING: TOTAL
MOVING FROM LYING ON BACK TO SITTING ON SIDE OF FLAT BED WITH BEDRAILS: A LOT
STANDING UP FROM CHAIR USING ARMS: TOTAL

## 2024-04-25 ASSESSMENT — PAIN SCALES - WONG BAKER: WONGBAKER_NUMERICALRESPONSE: NO HURT

## 2024-04-25 NOTE — NURSING NOTE
9011 Messaged Primary: You wanted to make sure I messaged you: Rapid called for: Pts blood pressure 86/50, Map of 62, pts other vitals stable, sating 93% on 3L NC. Pt remains only alert to self, which he's been. Does seem a bit more lethargic, We can add on a bolus for him? Please advise. Thank you.

## 2024-04-25 NOTE — SIGNIFICANT EVENT
04/25/24 1300   Onset Documentation   Rapid Response Initiated By Radar auto page   [place] [facility]  ( [id number])   Pager Time [phone]   Arrival Time 1300   Event End Time 1305   Level II Called No   Primary Reason for Call Radar auto page     Rapid Response Note    Radar auto-page received for radar score of 9 with the following vital signs:  36.6, 103, 30, 107/66, 93%.  Vital signs were confirmed and reviewed with primary RN.  RN concerned about the level of care that he requires.  Currently awaiting transfer to telemetry bed with continuous SpO2.  RN to contact Rapid Response with any future concerns or signs of clinical decompensation.

## 2024-04-25 NOTE — PROGRESS NOTES
"Vancomycin Dosing by Pharmacy- FOLLOW UP    Brian Rodriguez is a 69 y.o. year old male who Pharmacy has been consulted for vancomycin dosing for pneumonia. Based on the patient's indication and renal status this patient is being dosed based on a goal trough/random level of 15-20.     Renal function is currently improving.    Current vancomycin dose: 500 mg x1    Most recent trough level: 13.4 mcg/mL    Visit Vitals  /66   Pulse 103   Temp 36.6 °C (97.9 °F) (Temporal)   Resp (!) 30        Lab Results   Component Value Date    CREATININE 2.59 (H) 04/25/2024    CREATININE 2.86 (H) 04/24/2024    CREATININE 3.04 (H) 04/24/2024    CREATININE 2.71 (H) 04/23/2024        Patient weight is No results found for: \"PTWEIGHT\"    No results found for: \"CULTURE\"     I/O last 3 completed shifts:  In: 1335 (16.8 mL/kg) [NG/GT:585; IV Piggyback:750]  Out: 2350 (29.6 mL/kg) [Urine:2350 (0.8 mL/kg/hr)]  Dosing Weight: 79.4 kg   [unfilled]    Lab Results   Component Value Date    PATIENTTEMP 37.0 04/24/2024    PATIENTTEMP 37.0 04/22/2024    PATIENTTEMP 37.0 04/22/2024        Assessment/Plan    Below goal random/trough level. Orders placed for new vancomycin regimen of 750 every once hours to begin at 1300 .    This dosing regimen is predicted by [employer] to result in the following pharmacokinetic parameters:      The next level will be obtained on 4/26 at 1000. May be obtained sooner if clinically indicated.   Will continue to monitor renal function daily while on vancomycin and order serum creatinine at least every 48 hours if not already ordered.  Follow for continued vancomycin needs, clinical response, and signs/symptoms of toxicity.       Peace Mohr, PharmD           "

## 2024-04-25 NOTE — SIGNIFICANT EVENT
Rapid Response RN Note    Rapid response RN at bedside for RADAR score 7 due to the following VS: T 36.8 °Celsius; HR 69 ; RR 18; BP 86/50; SPO2 93%.     Rapid Response paged shortly before arrival to bedside. On arrival pt assessed to be asymptomatic. Mentation at baseline alert to self and place. Pt denies pain and difficulty breathing. Plan to administer 500cc LR bolus for hypotension and hypernatremia. Repeat vitals improved. Primary team and NACR at bedside.     Visit Vitals  /52   Pulse 78   Temp 36.8 °C (98.2 °F) (Temporal)   Resp 18         Primary RN encouraged to page rapid response for any concerns or acute change in condition/VS.

## 2024-04-25 NOTE — CARE PLAN
The patient's goals for the shift include Pt will be free from falls  use call light  bed alarm remians on    The clinical goals for the shift include patient will maintain 02 sats above 92

## 2024-04-25 NOTE — SIGNIFICANT EVENT
Rapid Response    A RR was called for patient having hypotension 86/50. All other vitals were stable, on 93% on 3L. Patient  is non-communicative at baseline, so it was difficult to assess for symptoms such lightheadedness, dizziness, CP, SOB.  He was given 500cc LR bolus, which also served to treat patient's known hypernatremia (151).  Patient BP improved to 103/52.

## 2024-04-25 NOTE — CARE PLAN
Problem: Safety  Goal: Patient will be injury free during hospitalization  Outcome: Progressing     Problem: Fall/Injury  Goal: Not fall by end of shift  Outcome: Progressing   The patient's goals for the shift include Pt will be free from falls  use call light  bed alarm remians on    The clinical goals for the shift include patient will maintain 02 sats above 92

## 2024-04-25 NOTE — PROGRESS NOTES
Brian Rodriguez is a 69 y.o. male on day 8 of admission presenting with Pneumonia due to infectious organism, unspecified laterality, unspecified part of lung.    Subjective   Overnight, rapid response for hypotension that was fluid responsive. Otherwise stable.    This morning: Patient examined in floor bed, active gurgling and weakly coughing. Patient denies acute changes of symptoms, following commands, but otherwise non-verbal.    Objective   Physical Exam  Physical Exam  Vitals reviewed.   Constitutional:       General: He is not in acute distress.     Appearance: He is ill-appearing.   HENT:      Head: Normocephalic and atraumatic.   Eyes:      General: No scleral icterus.     Extraocular Movements: Extraocular movements intact.      Pupils: Pupils are equal, round, and reactive to light.   Cardiovascular:      Rate and Rhythm: Regular rhythm. Tachycardia present.      Pulses: Normal pulses.      Heart sounds: Normal heart sounds.   Pulmonary:      Effort: Tachypnea present.      Breath sounds: Rhonchi (diffuse) and rales present.   Musculoskeletal:      Right lower leg: No edema.      Left lower leg: No edema.   Skin:     General: Skin is warm and dry.      Capillary Refill: Capillary refill takes less than 2 seconds.   Neurological:      General: No focal deficit present.      Mental Status: He is alert. He is disoriented.      Comments: Oriented only to self     Last Recorded Vitals  Vitals:    04/25/24 0417 04/25/24 0900 04/25/24 1251 04/25/24 1445   BP: 108/56 122/58 107/66 107/65   Pulse: 80 96 103 89   Resp: 18 (!) 30 (!) 30 18   Temp: 36.7 °C (98.1 °F) 36.8 °C (98.2 °F) 36.6 °C (97.9 °F)    TempSrc: Temporal Temporal Temporal    SpO2: 93% 93% 93% 96%   Weight:       Height:         Intake/Output last 3 Shifts:  I/O last 3 completed shifts:  In: 1335 (16.8 mL/kg) [NG/GT:585; IV Piggyback:750]  Out: 2350 (29.6 mL/kg) [Urine:2350 (0.8 mL/kg/hr)]  Dosing Weight: 79.4 kg     Relevant Results  This patient  has a urinary catheter   Reason for the urinary catheter remaining today? incontinent patients with an open sacral wound or perineal wounds    XR chest 1 view    Result Date: 4/24/2024  Interpreted By:  Yuri, Harish,  and Ailyn Tierra STUDY: XR CHEST 1 VIEW;  4/24/2024 8:28 am   INDICATION: Signs/Symptoms:New o2 requirement, altered mentation, on tube feeds and initially in MICU for aspiration PNA.   COMPARISON: 04/21/2024 chest radiograph   ACCESSION NUMBER(S): [id number]   ORDERING CLINICIAN: CRESENCIO AZUL   FINDINGS: AP radiograph of the chest was provided.   Enteric tube courses over the mid-thorax and below the diaphragm with tip terminating inferiorly beyond the field of view.   CARDIOMEDIASTINAL SILHOUETTE: Cardiomediastinal silhouette is stable in size and configuration.   LUNGS: Increased patchy airspace and interstitial opacities overlying the left lower lung. Similar to slightly increased appearance of patchy airspace and interstitial opacities of the right mid and lower lung. Mild blunting of the right costophrenic angle. No pneumothorax.   ABDOMEN: No remarkable upper abdominal findings.   BONES: No acute osseous abnormality.       1. Interval worsening of patchy alveolar and interstitial opacities throughout the right lung with new development of similar appearing opacities throughout the left mid to lower lung concerning for progression of multifocal pneumonia/pneumonitis possibly related to aspiration. 2. Possible trace right pleural effusion.   I personally reviewed the image(s)/study and resident interpretation as stated by Dr. Tierra Robertson MD. I agree with the findings as stated. This study was interpreted at [facility], [place], [place].   MACRO: None   Signed by: Harish Welch 4/24/2024 12:40 PM Dictation workstation:   [id number]    XR abdomen 1 view    Result Date: 4/24/2024  Interpreted By:  Harish Welch and Tippareddy Charit STUDY: XR ABDOMEN 1  VIEW;  4/23/2024 11:23 pm   INDICATION: Signs/Symptoms:NG / Feeding Tube Placement Verification.   COMPARISON: Abdominal radiograph 04/22/2024 8:10 p.m.   ACCESSION NUMBER(S): [id number]   ORDERING CLINICIAN: FAM TERRY   FINDINGS: Enteric tube is visualized coursing beneath the diaphragm with the tip overlying the gastric pylorus.   Nonobstructive bowel gas pattern. Limited evaluation of pneumoperitoneum on supine imaging, however no gross evidence of free air is noted.   Partially visualized nonspecific right basilar opacities. No left pleural effusion. Left retrocardiac bandlike opacities likely reflecting atelectasis.   Osseous structures demonstrate no acute bony changes.       1.  Enteric tube with tip overlying the gastric pylorus versus proximal duodenum. Consider slight advancement if post pyloric positioning is desired. 2. Nonobstructive bowel-gas pattern. 3. Partially visualized nonspecific right basilar opacities. Findings may be represent consolidation versus atelectasis. 4. Left retrocardiac bandlike opacities likely reflecting atelectasis.   I personally reviewed the images/study and I agree with the findings as stated by Na Johnson MD. This study was interpreted at [place], [place].   MACRO: None   Signed by: Harish Welch 4/24/2024 10:05 AM Dictation workstation:   [id number]     Results for orders placed or performed during the hospital encounter of 04/17/24 (from the past 24 hour(s))   POCT GLUCOSE   Result Value Ref Range    POCT Glucose 140 (H) 74 - 99 mg/dL   Cortisol AM   Result Value Ref Range    Cortisol  A.M. 18.0 5.0 - 20.0 ug/dL   Renal Function Panel   Result Value Ref Range    Glucose 165 (H) 74 - 99 mg/dL    Sodium 151 (H) 136 - 145 mmol/L    Potassium 3.5 3.5 - 5.3 mmol/L    Chloride 119 (H) 98 - 107 mmol/L    Bicarbonate 22 21 - 32 mmol/L    Anion Gap 14 10 - 20 mmol/L    Urea Nitrogen 54 (H) 6 - 23 mg/dL    Creatinine 2.86  (H) 0.50 - 1.30 mg/dL    eGFR 23 (L) >60 mL/min/1.73m*2    Calcium 8.0 (L) 8.6 - 10.6 mg/dL    Phosphorus 4.0 2.5 - 4.9 mg/dL    Albumin <1.5 (L) 3.4 - 5.0 g/dL   Magnesium   Result Value Ref Range    Magnesium 2.43 (H) 1.60 - 2.40 mg/dL   POCT GLUCOSE   Result Value Ref Range    POCT Glucose 148 (H) 74 - 99 mg/dL   POCT GLUCOSE   Result Value Ref Range    POCT Glucose 178 (H) 74 - 99 mg/dL   POCT GLUCOSE   Result Value Ref Range    POCT Glucose 189 (H) 74 - 99 mg/dL   POCT GLUCOSE   Result Value Ref Range    POCT Glucose 213 (H) 74 - 99 mg/dL   POCT GLUCOSE   Result Value Ref Range    POCT Glucose 219 (H) 74 - 99 mg/dL   POCT GLUCOSE   Result Value Ref Range    POCT Glucose 276 (H) 74 - 99 mg/dL   CBC and Auto Differential   Result Value Ref Range    WBC 16.2 (H) 4.4 - 11.3 x10*3/uL    nRBC 0.3 (H) 0.0 - 0.0 /100 WBCs    RBC 3.51 (L) 4.50 - 5.90 x10*6/uL    Hemoglobin 8.3 (L) 13.5 - 17.5 g/dL    Hematocrit 27.2 (L) 41.0 - 52.0 %    MCV 78 (L) 80 - 100 fL    MCH 23.6 (L) 26.0 - 34.0 pg    MCHC 30.5 (L) 32.0 - 36.0 g/dL    RDW 21.8 (H) 11.5 - 14.5 %    Platelets 255 150 - 450 x10*3/uL    Neutrophils % 81.3 40.0 - 80.0 %    Immature Granulocytes %, Automated 0.6 0.0 - 0.9 %    Lymphocytes % 11.6 13.0 - 44.0 %    Monocytes % 2.0 2.0 - 10.0 %    Eosinophils % 4.3 0.0 - 6.0 %    Basophils % 0.2 0.0 - 2.0 %    Neutrophils Absolute 13.13 (H) 1.60 - 5.50 x10*3/uL    Immature Granulocytes Absolute, Automated 0.10 0.00 - 0.50 x10*3/uL    Lymphocytes Absolute 1.88 0.80 - 3.00 x10*3/uL    Monocytes Absolute 0.32 0.05 - 0.80 x10*3/uL    Eosinophils Absolute 0.69 (H) 0.00 - 0.40 x10*3/uL    Basophils Absolute 0.04 0.00 - 0.10 x10*3/uL   Morphology   Result Value Ref Range    RBC Morphology See Below     Polychromasia Mild     Target Cells Few     [place] Cells Few      Assessment/Plan   Principal Problem:    Pneumonia due to infectious organism, unspecified laterality, unspecified part of lung    Brian Rodriguez is a 69 y.o. male  presenting for with CKD, DM, HTN, sacral wound, bipolar, here with sepsis, likely 2/2 aspiration PNA. Responding to fluid resuscitation, continue pending pressure stabilization. Required albumin infusion. Unclear if patient is hypovolemic from dehydration vs bacteremia/sepsis. Course c/b persistent dysphagia with concern for neurological cause, neuro on board recommending MRI.     Updates 04/25  - pending MRI Brain  - transferring to floor with continuous pulse ox  - slightly improved lethargy/mentation; repeatedly laying flat in bed with MD assistance needed to raise up.  - Monitor RFP BID for now  - D5W @ 125ml/hr, Free water deficit 2L this am     #AMS, worsening  :: Per MICU team, baseline oriented x2  :: TSH 0.5  :: Suspect 2/2 sepsis and hypernatremia, possible motor neuron disease  Plan:  - Continue with fluid resuscitation  - Ammonia wnl  - pending MRI Brain     #Acute Hypoxic Respiratory failure  #Multifocal PNA  #Aspiration PNA, worsening  #COVID PNA  ::s/p Azithromycin (4/18-4/20)  ::CT with GGO throughout right lung and to lesser degree within dependent left lower lobe  ::Urine strep/legionella negative, MRSA nares positive  :: s/p 5 days Remdesevir (last dose 4/22)   ::s/p 2 doses of decadron  ::CXR 4/24 c/f worsening aspiration pna/pneumonitis  Plan:  - Continue with Vanc and Zosyn  - Frequent suctioning for BP hygiene, RT on floor engaged  - Patient on 4LNC  - Hold TF until more stable/less altered    #acute Hypotension  #chronic HTN  ::fluid responsive, active resuscitation in micu  ::s/p 500ml this am  ::AM cortisol normal  Plan:  - Home home amlodipine in setting of sepsis  - Fluid bolus as appropriate I/s/o dehydration and ongoing diarrhea      #Dysphagia  :: ENT previously consulted, no anatomic cause  ::Concern for central cause raised 4/18 (?stroke)  Plan:  - Neuro recs for dysphagia workup; MRI Brain, ammonia level     #purulent drainage from chronic sacral wounds  #hidradenitis  suppurativa  ::Derm recommending humira and minocycline 100 BID, patient will need labs to confirm eligibility for these medications  ::hepatitis C antibody negative, hepatitis B surface antigen, hepatitis B core antibody negative, total, hepatitis B surface antibody negative, T-spot pending, hIV 1/2 antigen/antibody screen negative -> to determine Humira eligibility  ::wound team following, daily dressing changes  Plan:  -Will need to follow up outpatient with dermatology within 2 weeks of discharge.  -Doxycycline 100 Bid outpatient after abx course for pneumonia is completed  -Wound care consult     #Acute on chronic anemia  :: Mixed anemia --> iron deficiency +/- Acute illness/inflammatory  ::Iron <10, TIBC incalculable, UIBC 95  ::Folate wnl  ::B12 supratherapeutic  ::s/p 1U PRBC 4/23  Plan:  - CTM for s/sx of bleeding  - Outpt iron replacement    # TERRI on CKD Stage III, improving  # Hypernatremia  ::Free water deficit approx 1.5L  ::s/p 500ml this am  :: Suspect 2/2 hypovolemia with component of sepsis ATN  Plan:  - Baseline Cr 1.7  - Monitor RFP BID for now  - D5W @ 125ml/hr, Free water deficit 2L this am     # T2DM  # Hyperglycemia  :: Last Hba1c 9.1 March 2023  Plan:  - hold 15 units glargine and SSI while NPO and TF held, off steroids  - Q4 POCT checks while NPO     # Fecal impaction on CT - improving?  # diarrhea  ::new diarrhea reported overnight, persistent  ::hx of fecal impaction, may need repeat KUB pending GOC discussion  Plan:  - Doc/senna and Miralax from BID to PRN I/s/o new, persistent diarrhea  - S/p Enema  - No melena     #Nutrition  - NPO while concern for aspiration  - Diet consulted appreciate recs  - Tube feeds held  - Head of bed elevated, frequent suctioning, oral hygiene.    #Depression  - Mirtazapine 7.5 mg nightly, holding d/t persistent AMS  # HLD  - Continue home Atorvastatin 10 mg     F D5W @ 125ml/hr  E Replete as needed  N Trickle feeds tube NPO  G PEG 17g  DVT: SubQ heparin  Abx:  Nic Wilkinson: None  Pressors: None  Code status: Full Code  NoK: DEEPAK RANGEL, Home Phone: [phone]    Eliecer Serrano,   Internal Medicine-Genetics, PGY-1

## 2024-04-26 ENCOUNTER — APPOINTMENT (OUTPATIENT)
Dept: RADIOLOGY | Facility: HOSPITAL | Age: 69
DRG: 871 | End: 2024-04-26
Payer: MEDICARE

## 2024-04-26 ENCOUNTER — APPOINTMENT (OUTPATIENT)
Dept: CARDIOLOGY | Facility: HOSPITAL | Age: 69
DRG: 871 | End: 2024-04-26
Payer: MEDICARE

## 2024-04-26 LAB
ALBUMIN SERPL BCP-MCNC: 1.8 G/DL (ref 3.4–5)
ALBUMIN SERPL BCP-MCNC: <1.5 G/DL (ref 3.4–5)
ANION GAP BLDA CALCULATED.4IONS-SCNC: 14 MMO/L (ref 10–25)
ANION GAP BLDA CALCULATED.4IONS-SCNC: 21 MMO/L (ref 10–25)
ANION GAP BLDV CALCULATED.4IONS-SCNC: 21 MMOL/L (ref 10–25)
ANION GAP SERPL CALC-SCNC: 17 MMOL/L (ref 10–20)
ANION GAP SERPL CALC-SCNC: 19 MMOL/L (ref 10–20)
APPEARANCE UR: ABNORMAL
ATRIAL RATE: 88 BPM
BACTERIA #/AREA URNS AUTO: ABNORMAL /HPF
BACTERIA BLD CULT: NORMAL
BACTERIA BLD CULT: NORMAL
BACTERIA SPEC RESP CULT: ABNORMAL
BASE EXCESS BLDA CALC-SCNC: -1.6 MMOL/L (ref -2–3)
BASE EXCESS BLDA CALC-SCNC: -9.2 MMOL/L (ref -2–3)
BASE EXCESS BLDV CALC-SCNC: -8 MMOL/L (ref -2–3)
BASOPHILS # BLD MANUAL: 0 X10*3/UL (ref 0–0.1)
BASOPHILS NFR BLD MANUAL: 0 %
BILIRUB UR STRIP.AUTO-MCNC: NEGATIVE MG/DL
BNP SERPL-MCNC: 205 PG/ML (ref 0–99)
BODY TEMPERATURE: 37 DEGREES CELSIUS
BUN SERPL-MCNC: 41 MG/DL (ref 6–23)
BUN SERPL-MCNC: 47 MG/DL (ref 6–23)
BURR CELLS BLD QL SMEAR: ABNORMAL
CA-I BLDA-SCNC: 1.28 MMOL/L (ref 1.1–1.33)
CA-I BLDA-SCNC: 1.31 MMOL/L (ref 1.1–1.33)
CA-I BLDV-SCNC: 1.32 MMOL/L (ref 1.1–1.33)
CALCIUM SERPL-MCNC: 8.1 MG/DL (ref 8.6–10.6)
CALCIUM SERPL-MCNC: 8.8 MG/DL (ref 8.6–10.6)
CARDIAC TROPONIN I PNL SERPL HS: 12 NG/L (ref 0–53)
CHLORIDE BLDA-SCNC: 117 MMOL/L (ref 98–107)
CHLORIDE BLDA-SCNC: 119 MMOL/L (ref 98–107)
CHLORIDE BLDV-SCNC: 119 MMOL/L (ref 98–107)
CHLORIDE SERPL-SCNC: 116 MMOL/L (ref 98–107)
CHLORIDE SERPL-SCNC: 119 MMOL/L (ref 98–107)
CHLORIDE UR-SCNC: 112 MMOL/L
CHLORIDE/CREATININE (MMOL/G) IN URINE: 286 MMOL/G CREAT (ref 23–275)
CO2 SERPL-SCNC: 19 MMOL/L (ref 21–32)
CO2 SERPL-SCNC: 20 MMOL/L (ref 21–32)
COLOR UR: ABNORMAL
CREAT SERPL-MCNC: 2.82 MG/DL (ref 0.5–1.3)
CREAT SERPL-MCNC: 3.24 MG/DL (ref 0.5–1.3)
CREAT UR-MCNC: 39.2 MG/DL (ref 20–370)
EGFRCR SERPLBLD CKD-EPI 2021: 20 ML/MIN/1.73M*2
EGFRCR SERPLBLD CKD-EPI 2021: 23 ML/MIN/1.73M*2
EOSINOPHIL # BLD MANUAL: 0 X10*3/UL (ref 0–0.4)
EOSINOPHIL NFR BLD MANUAL: 0 %
ERYTHROCYTE [DISTWIDTH] IN BLOOD BY AUTOMATED COUNT: 22 % (ref 11.5–14.5)
GLUCOSE BLD MANUAL STRIP-MCNC: 356 MG/DL (ref 74–99)
GLUCOSE BLD MANUAL STRIP-MCNC: 363 MG/DL (ref 74–99)
GLUCOSE BLD MANUAL STRIP-MCNC: 390 MG/DL (ref 74–99)
GLUCOSE BLD MANUAL STRIP-MCNC: 439 MG/DL (ref 74–99)
GLUCOSE BLD MANUAL STRIP-MCNC: 452 MG/DL (ref 74–99)
GLUCOSE BLDA-MCNC: 311 MG/DL (ref 74–99)
GLUCOSE BLDA-MCNC: 430 MG/DL (ref 74–99)
GLUCOSE BLDV-MCNC: 535 MG/DL (ref 74–99)
GLUCOSE SERPL-MCNC: 387 MG/DL (ref 74–99)
GLUCOSE SERPL-MCNC: 497 MG/DL (ref 74–99)
GLUCOSE UR STRIP.AUTO-MCNC: NORMAL MG/DL
GRAM STN SPEC: ABNORMAL
HCO3 BLDA-SCNC: 14.8 MMOL/L (ref 22–26)
HCO3 BLDA-SCNC: 21.3 MMOL/L (ref 22–26)
HCO3 BLDV-SCNC: 15.3 MMOL/L (ref 22–26)
HCT VFR BLD AUTO: 24 % (ref 41–52)
HCT VFR BLD EST: 23 % (ref 41–52)
HGB BLD-MCNC: 7.5 G/DL (ref 13.5–17.5)
HGB BLDA-MCNC: 7.6 G/DL (ref 13.5–17.5)
HGB BLDA-MCNC: 7.8 G/DL (ref 13.5–17.5)
HGB BLDV-MCNC: 7.7 G/DL (ref 13.5–17.5)
HOLD SPECIMEN: NORMAL
HOLD SPECIMEN: NORMAL
HYALINE CASTS #/AREA URNS AUTO: ABNORMAL /LPF
HYPOCHROMIA BLD QL SMEAR: ABNORMAL
IMM GRANULOCYTES # BLD AUTO: 0.3 X10*3/UL (ref 0–0.5)
IMM GRANULOCYTES NFR BLD AUTO: 1.9 % (ref 0–0.9)
INHALED O2 CONCENTRATION: 100 %
INHALED O2 CONCENTRATION: 21 %
KETONES UR STRIP.AUTO-MCNC: NEGATIVE MG/DL
LACTATE BLDA-SCNC: 2.2 MMOL/L (ref 0.4–2)
LACTATE BLDA-SCNC: 2.8 MMOL/L (ref 0.4–2)
LACTATE BLDV-SCNC: 2.3 MMOL/L (ref 0.4–2)
LACTATE SERPL-SCNC: 1.6 MMOL/L (ref 0.4–2)
LACTATE SERPL-SCNC: 1.6 MMOL/L (ref 0.4–2)
LACTATE SERPL-SCNC: 2.5 MMOL/L (ref 0.4–2)
LEUKOCYTE ESTERASE UR QL STRIP.AUTO: ABNORMAL
LYMPHOCYTES # BLD MANUAL: 0.42 X10*3/UL (ref 0.8–3)
LYMPHOCYTES NFR BLD MANUAL: 2.6 %
MAGNESIUM SERPL-MCNC: 2.28 MG/DL (ref 1.6–2.4)
MAGNESIUM SERPL-MCNC: 2.35 MG/DL (ref 1.6–2.4)
MCH RBC QN AUTO: 24.1 PG (ref 26–34)
MCHC RBC AUTO-ENTMCNC: 31.3 G/DL (ref 32–36)
MCV RBC AUTO: 77 FL (ref 80–100)
MONOCYTES # BLD MANUAL: 0.15 X10*3/UL (ref 0.05–0.8)
MONOCYTES NFR BLD MANUAL: 0.9 %
MUCOUS THREADS #/AREA URNS AUTO: ABNORMAL /LPF
NEUTROPHILS # BLD MANUAL: 15.36 X10*3/UL (ref 1.6–5.5)
NEUTS BAND # BLD MANUAL: 0.99 X10*3/UL (ref 0–0.5)
NEUTS BAND NFR BLD MANUAL: 6.1 %
NEUTS SEG # BLD MANUAL: 14.37 X10*3/UL (ref 1.6–5)
NEUTS SEG NFR BLD MANUAL: 88.7 %
NITRITE UR QL STRIP.AUTO: NEGATIVE
NRBC BLD-RTO: 0.4 /100 WBCS (ref 0–0)
OXYHGB MFR BLDA: 85.7 % (ref 94–98)
OXYHGB MFR BLDA: 94.4 % (ref 94–98)
OXYHGB MFR BLDV: 86.4 % (ref 45–75)
PCO2 BLDA: 25 MM HG (ref 38–42)
PCO2 BLDA: 28 MM HG (ref 38–42)
PCO2 BLDV: 23 MM HG (ref 41–51)
PH BLDA: 7.38 PH (ref 7.38–7.42)
PH BLDA: 7.49 PH (ref 7.38–7.42)
PH BLDV: 7.43 PH (ref 7.33–7.43)
PH UR STRIP.AUTO: 5 [PH]
PHOSPHATE SERPL-MCNC: 3.7 MG/DL (ref 2.5–4.9)
PHOSPHATE SERPL-MCNC: 4.1 MG/DL (ref 2.5–4.9)
PLATELET # BLD AUTO: 264 X10*3/UL (ref 150–450)
PO2 BLDA: 57 MM HG (ref 85–95)
PO2 BLDA: 77 MM HG (ref 85–95)
PO2 BLDV: 59 MM HG (ref 35–45)
POLYCHROMASIA BLD QL SMEAR: ABNORMAL
POTASSIUM BLDA-SCNC: 2.8 MMOL/L (ref 3.5–5.3)
POTASSIUM BLDA-SCNC: 3.8 MMOL/L (ref 3.5–5.3)
POTASSIUM BLDV-SCNC: 3.9 MMOL/L (ref 3.5–5.3)
POTASSIUM SERPL-SCNC: 4 MMOL/L (ref 3.5–5.3)
POTASSIUM SERPL-SCNC: 4.4 MMOL/L (ref 3.5–5.3)
POTASSIUM UR-SCNC: 35 MMOL/L
POTASSIUM/CREAT UR-RTO: 89 MMOL/G CREAT
PROCALCITONIN SERPL-MCNC: 7.49 NG/ML
PROT UR STRIP.AUTO-MCNC: ABNORMAL MG/DL
Q ONSET: 219 MS
QRS COUNT: 18 BEATS
QRS DURATION: 76 MS
QT INTERVAL: 400 MS
QTC CALCULATION(BAZETT): 536 MS
QTC FREDERICIA: 486 MS
R AXIS: 58 DEGREES
RBC # BLD AUTO: 3.11 X10*6/UL (ref 4.5–5.9)
RBC # UR STRIP.AUTO: ABNORMAL /UL
RBC #/AREA URNS AUTO: ABNORMAL /HPF
RBC MORPH BLD: ABNORMAL
SAO2 % BLDA: 88 % (ref 94–100)
SAO2 % BLDA: 97 % (ref 94–100)
SAO2 % BLDV: 89 % (ref 45–75)
SODIUM BLDA-SCNC: 149 MMOL/L (ref 136–145)
SODIUM BLDA-SCNC: 151 MMOL/L (ref 136–145)
SODIUM BLDV-SCNC: 151 MMOL/L (ref 136–145)
SODIUM SERPL-SCNC: 149 MMOL/L (ref 136–145)
SODIUM SERPL-SCNC: 153 MMOL/L (ref 136–145)
SODIUM UR-SCNC: 107 MMOL/L
SODIUM/CREAT UR-RTO: 273 MMOL/G CREAT
SP GR UR STRIP.AUTO: 1.01
SQUAMOUS #/AREA URNS AUTO: ABNORMAL /HPF
T AXIS: 54 DEGREES
T OFFSET: 419 MS
TARGETS BLD QL SMEAR: ABNORMAL
TOTAL CELLS COUNTED BLD: 115
UROBILINOGEN UR STRIP.AUTO-MCNC: NORMAL MG/DL
VANCOMYCIN TROUGH SERPL-MCNC: 17.3 UG/ML (ref 5–20)
VARIANT LYMPHS # BLD MANUAL: 0.28 X10*3/UL (ref 0–0.3)
VARIANT LYMPHS NFR BLD: 1.7 %
VENTRICULAR RATE: 108 BPM
WBC # BLD AUTO: 16.2 X10*3/UL (ref 4.4–11.3)
WBC #/AREA URNS AUTO: ABNORMAL /HPF
YEAST BUDDING #/AREA UR COMP ASSIST: PRESENT /HPF

## 2024-04-26 PROCEDURE — 87040 BLOOD CULTURE FOR BACTERIA: CPT

## 2024-04-26 PROCEDURE — 2500000002 HC RX 250 W HCPCS SELF ADMINISTERED DRUGS (ALT 637 FOR MEDICARE OP, ALT 636 FOR OP/ED)

## 2024-04-26 PROCEDURE — 2500000004 HC RX 250 GENERAL PHARMACY W/ HCPCS (ALT 636 FOR OP/ED)

## 2024-04-26 PROCEDURE — 94640 AIRWAY INHALATION TREATMENT: CPT

## 2024-04-26 PROCEDURE — 83605 ASSAY OF LACTIC ACID: CPT | Mod: MUE

## 2024-04-26 PROCEDURE — 2500000001 HC RX 250 WO HCPCS SELF ADMINISTERED DRUGS (ALT 637 FOR MEDICARE OP)

## 2024-04-26 PROCEDURE — 85027 COMPLETE CBC AUTOMATED: CPT

## 2024-04-26 PROCEDURE — 2500000002 HC RX 250 W HCPCS SELF ADMINISTERED DRUGS (ALT 637 FOR MEDICARE OP, ALT 636 FOR OP/ED): Performed by: INTERNAL MEDICINE

## 2024-04-26 PROCEDURE — 80202 ASSAY OF VANCOMYCIN: CPT

## 2024-04-26 PROCEDURE — 84132 ASSAY OF SERUM POTASSIUM: CPT

## 2024-04-26 PROCEDURE — 2500000004 HC RX 250 GENERAL PHARMACY W/ HCPCS (ALT 636 FOR OP/ED): Performed by: STUDENT IN AN ORGANIZED HEALTH CARE EDUCATION/TRAINING PROGRAM

## 2024-04-26 PROCEDURE — 71045 X-RAY EXAM CHEST 1 VIEW: CPT

## 2024-04-26 PROCEDURE — 2500000005 HC RX 250 GENERAL PHARMACY W/O HCPCS: Performed by: INTERNAL MEDICINE

## 2024-04-26 PROCEDURE — 87305 ASPERGILLUS AG IA: CPT

## 2024-04-26 PROCEDURE — 87205 SMEAR GRAM STAIN: CPT

## 2024-04-26 PROCEDURE — 85007 BL SMEAR W/DIFF WBC COUNT: CPT

## 2024-04-26 PROCEDURE — 84145 PROCALCITONIN (PCT): CPT

## 2024-04-26 PROCEDURE — 84484 ASSAY OF TROPONIN QUANT: CPT | Performed by: STUDENT IN AN ORGANIZED HEALTH CARE EDUCATION/TRAINING PROGRAM

## 2024-04-26 PROCEDURE — C9113 INJ PANTOPRAZOLE SODIUM, VIA: HCPCS | Performed by: STUDENT IN AN ORGANIZED HEALTH CARE EDUCATION/TRAINING PROGRAM

## 2024-04-26 PROCEDURE — 83605 ASSAY OF LACTIC ACID: CPT

## 2024-04-26 PROCEDURE — 2500000004 HC RX 250 GENERAL PHARMACY W/ HCPCS (ALT 636 FOR OP/ED): Performed by: INTERNAL MEDICINE

## 2024-04-26 PROCEDURE — 36415 COLL VENOUS BLD VENIPUNCTURE: CPT

## 2024-04-26 PROCEDURE — 2500000002 HC RX 250 W HCPCS SELF ADMINISTERED DRUGS (ALT 637 FOR MEDICARE OP, ALT 636 FOR OP/ED): Mod: MUE

## 2024-04-26 PROCEDURE — 81001 URINALYSIS AUTO W/SCOPE: CPT

## 2024-04-26 PROCEDURE — 82947 ASSAY GLUCOSE BLOOD QUANT: CPT | Mod: MUE

## 2024-04-26 PROCEDURE — 87449 NOS EACH ORGANISM AG IA: CPT

## 2024-04-26 PROCEDURE — 1200000002 HC GENERAL ROOM WITH TELEMETRY DAILY

## 2024-04-26 PROCEDURE — 36600 WITHDRAWAL OF ARTERIAL BLOOD: CPT

## 2024-04-26 PROCEDURE — 83880 ASSAY OF NATRIURETIC PEPTIDE: CPT

## 2024-04-26 PROCEDURE — 82947 ASSAY GLUCOSE BLOOD QUANT: CPT

## 2024-04-26 PROCEDURE — 71045 X-RAY EXAM CHEST 1 VIEW: CPT | Performed by: STUDENT IN AN ORGANIZED HEALTH CARE EDUCATION/TRAINING PROGRAM

## 2024-04-26 PROCEDURE — 99233 SBSQ HOSP IP/OBS HIGH 50: CPT

## 2024-04-26 PROCEDURE — 99291 CRITICAL CARE FIRST HOUR: CPT

## 2024-04-26 PROCEDURE — 87899 AGENT NOS ASSAY W/OPTIC: CPT

## 2024-04-26 PROCEDURE — 84132 ASSAY OF SERUM POTASSIUM: CPT | Performed by: STUDENT IN AN ORGANIZED HEALTH CARE EDUCATION/TRAINING PROGRAM

## 2024-04-26 PROCEDURE — 84300 ASSAY OF URINE SODIUM: CPT

## 2024-04-26 PROCEDURE — 83735 ASSAY OF MAGNESIUM: CPT

## 2024-04-26 PROCEDURE — 2500000004 HC RX 250 GENERAL PHARMACY W/ HCPCS (ALT 636 FOR OP/ED): Mod: MUE

## 2024-04-26 PROCEDURE — 94799 UNLISTED PULMONARY SVC/PX: CPT

## 2024-04-26 PROCEDURE — 87081 CULTURE SCREEN ONLY: CPT

## 2024-04-26 PROCEDURE — 93005 ELECTROCARDIOGRAM TRACING: CPT

## 2024-04-26 PROCEDURE — 87086 URINE CULTURE/COLONY COUNT: CPT

## 2024-04-26 RX ORDER — INSULIN LISPRO 100 [IU]/ML
12 INJECTION, SOLUTION INTRAVENOUS; SUBCUTANEOUS ONCE
Status: COMPLETED | OUTPATIENT
Start: 2024-04-26 | End: 2024-04-26

## 2024-04-26 RX ORDER — INSULIN LISPRO 100 [IU]/ML
8 INJECTION, SOLUTION INTRAVENOUS; SUBCUTANEOUS ONCE
Status: DISCONTINUED | OUTPATIENT
Start: 2024-04-26 | End: 2024-04-26

## 2024-04-26 RX ORDER — ALBUTEROL SULFATE 0.83 MG/ML
2.5 SOLUTION RESPIRATORY (INHALATION) EVERY 6 HOURS PRN
Status: DISCONTINUED | OUTPATIENT
Start: 2024-04-26 | End: 2024-04-27

## 2024-04-26 RX ORDER — INSULIN LISPRO 100 [IU]/ML
0-15 INJECTION, SOLUTION INTRAVENOUS; SUBCUTANEOUS
Status: DISCONTINUED | OUTPATIENT
Start: 2024-04-27 | End: 2024-04-27

## 2024-04-26 RX ORDER — POTASSIUM CHLORIDE 14.9 MG/ML
20 INJECTION INTRAVENOUS
Status: DISCONTINUED | OUTPATIENT
Start: 2024-04-26 | End: 2024-04-26

## 2024-04-26 RX ORDER — FUROSEMIDE 10 MG/ML
40 INJECTION INTRAMUSCULAR; INTRAVENOUS ONCE
Status: COMPLETED | OUTPATIENT
Start: 2024-04-26 | End: 2024-04-26

## 2024-04-26 RX ORDER — ACETYLCYSTEINE 100 MG/ML
6 SOLUTION ORAL; RESPIRATORY (INHALATION) 4 TIMES DAILY
Status: DISCONTINUED | OUTPATIENT
Start: 2024-04-26 | End: 2024-04-26

## 2024-04-26 RX ORDER — IPRATROPIUM BROMIDE AND ALBUTEROL SULFATE 2.5; .5 MG/3ML; MG/3ML
3 SOLUTION RESPIRATORY (INHALATION) EVERY 6 HOURS PRN
Status: DISCONTINUED | OUTPATIENT
Start: 2024-04-26 | End: 2024-04-27

## 2024-04-26 RX ORDER — MEROPENEM 1 G/1
1000 INJECTION, POWDER, FOR SOLUTION INTRAVENOUS EVERY 12 HOURS
Status: DISCONTINUED | OUTPATIENT
Start: 2024-04-26 | End: 2024-05-02

## 2024-04-26 RX ORDER — FUROSEMIDE 10 MG/ML
20 INJECTION INTRAMUSCULAR; INTRAVENOUS ONCE
Status: COMPLETED | OUTPATIENT
Start: 2024-04-26 | End: 2024-04-26

## 2024-04-26 RX ORDER — INSULIN GLARGINE 100 [IU]/ML
15 INJECTION, SOLUTION SUBCUTANEOUS NIGHTLY
Status: DISCONTINUED | OUTPATIENT
Start: 2024-04-26 | End: 2024-04-30

## 2024-04-26 RX ORDER — POTASSIUM CHLORIDE 14.9 MG/ML
20 INJECTION INTRAVENOUS ONCE
Status: DISCONTINUED | OUTPATIENT
Start: 2024-04-26 | End: 2024-04-26

## 2024-04-26 RX ORDER — INSULIN GLARGINE 100 [IU]/ML
7 INJECTION, SOLUTION SUBCUTANEOUS NIGHTLY
Status: DISCONTINUED | OUTPATIENT
Start: 2024-04-26 | End: 2024-04-26

## 2024-04-26 RX ORDER — VANCOMYCIN HYDROCHLORIDE 500 MG/100ML
500 INJECTION, SOLUTION INTRAVENOUS ONCE
Qty: 100 ML | Refills: 0 | Status: COMPLETED | OUTPATIENT
Start: 2024-04-26 | End: 2024-04-26

## 2024-04-26 RX ORDER — DEXAMETHASONE SODIUM PHOSPHATE 10 MG/ML
6 INJECTION INTRAMUSCULAR; INTRAVENOUS ONCE
Status: COMPLETED | OUTPATIENT
Start: 2024-04-26 | End: 2024-04-26

## 2024-04-26 RX ORDER — DEXAMETHASONE SODIUM PHOSPHATE 100 MG/10ML
6 INJECTION INTRAMUSCULAR; INTRAVENOUS EVERY 24 HOURS
Status: DISCONTINUED | OUTPATIENT
Start: 2024-04-26 | End: 2024-04-27

## 2024-04-26 RX ADMIN — HEPARIN SODIUM 5000 UNITS: 5000 INJECTION INTRAVENOUS; SUBCUTANEOUS at 21:21

## 2024-04-26 RX ADMIN — DEXAMETHASONE SODIUM PHOSPHATE 6 MG: 10 INJECTION, SOLUTION INTRAMUSCULAR; INTRAVENOUS at 01:46

## 2024-04-26 RX ADMIN — SALINE NASAL SPRAY 1 SPRAY: 1.5 SOLUTION NASAL at 01:56

## 2024-04-26 RX ADMIN — PIPERACILLIN SODIUM AND TAZOBACTAM SODIUM 3.38 G: 3; .375 INJECTION, SOLUTION INTRAVENOUS at 06:54

## 2024-04-26 RX ADMIN — POTASSIUM CHLORIDE 20 MEQ: 14.9 INJECTION, SOLUTION INTRAVENOUS at 02:07

## 2024-04-26 RX ADMIN — INSULIN GLARGINE 15 UNITS: 100 INJECTION, SOLUTION SUBCUTANEOUS at 20:06

## 2024-04-26 RX ADMIN — POTASSIUM CHLORIDE 20 MEQ: 14.9 INJECTION, SOLUTION INTRAVENOUS at 04:12

## 2024-04-26 RX ADMIN — FUROSEMIDE 40 MG: 10 INJECTION, SOLUTION INTRAMUSCULAR; INTRAVENOUS at 11:41

## 2024-04-26 RX ADMIN — MEROPENEM 1000 MG: 1 INJECTION, POWDER, FOR SOLUTION INTRAVENOUS at 13:09

## 2024-04-26 RX ADMIN — SODIUM BICARBONATE 650 MG: 650 TABLET ORAL at 20:06

## 2024-04-26 RX ADMIN — DEXAMETHASONE SODIUM PHOSPHATE 6 MG: 10 INJECTION INTRAMUSCULAR; INTRAVENOUS at 14:41

## 2024-04-26 RX ADMIN — FUROSEMIDE 20 MG: 10 INJECTION, SOLUTION INTRAVENOUS at 01:46

## 2024-04-26 RX ADMIN — POTASSIUM CHLORIDE 20 MEQ: 14.9 INJECTION, SOLUTION INTRAVENOUS at 06:54

## 2024-04-26 RX ADMIN — IPRATROPIUM BROMIDE AND ALBUTEROL SULFATE 3 ML: .5; 3 SOLUTION RESPIRATORY (INHALATION) at 00:16

## 2024-04-26 RX ADMIN — SODIUM CHLORIDE, POTASSIUM CHLORIDE, SODIUM LACTATE AND CALCIUM CHLORIDE 500 ML: 600; 310; 30; 20 INJECTION, SOLUTION INTRAVENOUS at 14:41

## 2024-04-26 RX ADMIN — SALINE NASAL SPRAY 1 SPRAY: 1.5 SOLUTION NASAL at 21:23

## 2024-04-26 RX ADMIN — ALBUTEROL SULFATE 2.5 MG: 2.5 SOLUTION RESPIRATORY (INHALATION) at 10:31

## 2024-04-26 RX ADMIN — PIPERACILLIN SODIUM AND TAZOBACTAM SODIUM 3.38 G: 3; .375 INJECTION, SOLUTION INTRAVENOUS at 12:00

## 2024-04-26 RX ADMIN — HEPARIN SODIUM 5000 UNITS: 5000 INJECTION INTRAVENOUS; SUBCUTANEOUS at 14:41

## 2024-04-26 RX ADMIN — PANTOPRAZOLE SODIUM 40 MG: 40 INJECTION, POWDER, FOR SOLUTION INTRAVENOUS at 10:52

## 2024-04-26 RX ADMIN — HEPARIN SODIUM 5000 UNITS: 5000 INJECTION INTRAVENOUS; SUBCUTANEOUS at 06:54

## 2024-04-26 RX ADMIN — INSULIN LISPRO 5 UNITS: 100 INJECTION, SOLUTION INTRAVENOUS; SUBCUTANEOUS at 17:07

## 2024-04-26 RX ADMIN — INSULIN LISPRO 12 UNITS: 100 INJECTION, SOLUTION INTRAVENOUS; SUBCUTANEOUS at 20:05

## 2024-04-26 RX ADMIN — PIPERACILLIN SODIUM AND TAZOBACTAM SODIUM 3.38 G: 3; .375 INJECTION, SOLUTION INTRAVENOUS at 01:46

## 2024-04-26 RX ADMIN — ACETYLCYSTEINE 4 ML: 100 SOLUTION ORAL; RESPIRATORY (INHALATION) at 10:33

## 2024-04-26 RX ADMIN — VANCOMYCIN HYDROCHLORIDE 500 MG: 500 INJECTION, SOLUTION INTRAVENOUS at 18:27

## 2024-04-26 RX ADMIN — INSULIN LISPRO 5 UNITS: 100 INJECTION, SOLUTION INTRAVENOUS; SUBCUTANEOUS at 09:30

## 2024-04-26 RX ADMIN — Medication 60 L/MIN: at 21:45

## 2024-04-26 RX ADMIN — INSULIN LISPRO 5 UNITS: 100 INJECTION, SOLUTION INTRAVENOUS; SUBCUTANEOUS at 04:13

## 2024-04-26 ASSESSMENT — PAIN SCALES - GENERAL: PAINLEVEL_OUTOF10: 0 - NO PAIN

## 2024-04-26 ASSESSMENT — PAIN - FUNCTIONAL ASSESSMENT
PAIN_FUNCTIONAL_ASSESSMENT: CPOT (CRITICAL CARE PAIN OBSERVATION TOOL)
PAIN_FUNCTIONAL_ASSESSMENT: CPOT (CRITICAL CARE PAIN OBSERVATION TOOL)

## 2024-04-26 NOTE — SIGNIFICANT EVENT
Rapid Response RN Note    Rapid response RN at bedside for desaturation. Pt had been transferred from H3 to floor around 2000. Upon arrival, bedside RN and team at bedside. NACR arrived to bedside. ECG being performed. IVF stopped. NT suction attempted but initially unable to pass catheter d/t bilateral nare blockage. Deep oral suctioning successfully performed, clearing mod tan secretions. ABG obtained. Nasal passageway cleared of hardened boogers and NT suctioned performed with some success (mainly epiglottic clearance, unable to pass into lungs on this attempt). CXR performed. SpO2 89-90%. Lasix, decadron and ocean spray ordered. SpO2 goal >/= 90%. No additional interventions by rapid response team indicated at this time.      Staff to page rapid response for any concerns or acute change in condition/VS. Matt Moss RN.

## 2024-04-26 NOTE — PROGRESS NOTES
Vancomycin Dosing by Pharmacy  FOLLOW UP    Brian Rodriguez is a 69 y.o. male who Pharmacy is consulted to dose vancomycin for pneumonia.     Based on the patient's indication and renal status, this patient is being dosed based on a goal vancomycin level of 15-20.     Last vancomycin dose: 750 mg on 4/25 @ 1516    Most recent vancomycin level: 17.3 mcg/mL (24 hour level)    Renal function is currently declining.    Estimated Creatinine Clearance: 21.6 mL/min (A) (by C-G formula based on SCr of 2.82 mg/dL (H)).      Results from last 7 days   Lab Units 04/26/24  1010 04/25/24  2117 04/25/24  0627 04/24/24  1658 04/24/24  0902 04/23/24  1515   CREATININE mg/dL 2.82* 2.50* 2.59* 2.86* 3.04*  --    BUN mg/dL 41* 41* 47* 54* 57*  --    WBC AUTO x10*3/uL 16.2*  --  16.2*  --  24.3* 17.4*        Visit Vitals  /62   Pulse 72   Temp 36.5 °C (97.7 °F)   Resp (!) 42       Assessment/Plan    Vancomycin level is within goal range of 15-20. Will re-dose vancomycin with one time order of 500 mg.  The next vancomycin level will be ordered for 4/27 at 1800.  Will continue to monitor renal function daily while on vancomycin and order serum creatinine at least every 48 hours if not already ordered.  Will follow for continued vancomycin needs, clinical response, and signs/symptoms of toxicity.       Jenny Tobias, AdriánD

## 2024-04-26 NOTE — ACP (ADVANCE CARE PLANNING)
Advance Care Planning Note     Discussion Date: 04/26/24   Discussion Participants: DEEPAK RANGEL, Home Phone: [phone] (brother)    The patient wishes to discuss Advance Care Planning today and the following is a brief summary of our discussion.     Patient has capacity to make their own medical decisions: No  Health Care Agent/Surrogate Decision Maker documented in chart: LORELEI Aguilar    Documents on file and valid:  Advance Directive/Living Will: No   Health Care Power of : No  Other: LORELEI Aguilar    Communication of Medical Status/Prognosis:   Discussed with the patient's brother that his prognosis is poor as he is most likely developing ARDS in the setting of his aspiration and COVID. Discussed that there is a potential for the patient to be intubated.     Communication of Treatment Goals/Options:   Patient's brother would like patient to be a DNR as he would not like chest compressions in the event that his heart would stop. He understands that this means the patient would pass. Ok for intubation at this point. Wants to re-address at a later date once he can talk to all his family.     Treatment Decisions  Goals of Care: survival is prioritized, if goals for quality or survival can reasonably be achieved   Follow Up Plan  DNR ok for intubation ok for ICU  Team Members  Fiona Swanson MD  PGY-1 Internal Medicine    Time Statement: Total face to face time spent on advance care planning was 30 minutes with 30 minutes spent in counseling, including the explanation.    Fiona Swanson MD  4/26/2024 12:44 PM

## 2024-04-26 NOTE — H&P
History Of Present Illness  Brian Rodriguez is a 69 y.o. male PMHx CKD, DM2, HTN, sacral wound, bipolar disorder who was originally admitted from his [facility]F 2[facility] AMS, transferred now back to the MICU in the setting of acute hypoxic respiratory failure.     Pt first presented to the floor with confusion and multifocal pneumonia on 4/17. Patient was found porfirio hypernatremic, with severe swallowing dysfunction and found to be COVID positive. Patient was stated on Vanc, Zosyn, and azithromycin for a pneumonia and remdesevir for COVID and free water flushes for hypernatremia.      Two rapid responses were called on 4/21 pm and 4/22 am. Intital rapid for increase O2 requirement to 12 L. CXR with worsening RLL consolidation. Pt placedon Airvo at the time. ABG notable for pH 7.44, pO2 68. Dexamethasone started. Follow up ABG 1 hour later with pH 7.47/CO2 23, lactate 2.9 on Airvo 55%, 50 L. Thought to be 2/2 compensation for metabolic acidosis. Pt also bladder scanned, with only 22 cc residual, given 500 cc fluids and started on NaHCO3 tabs.      Rapid called again at 1:30 am for BP 86/46. Pt given additional 500 cc fluid for total of 1 L and patient transferred to MICU.      Patient was admitted to ICU for hypotension concerning for sepsis. On arrival to the ICU, the patient was bolused with LR. In total, the patient received 5.5L fluid from the time a rapid was called for hypotension to the time the patient left the MICU. His pressures dipped on several occasions, and each time responded to fluid resuscitation. He was on airvo 50/50 when admitted and now weaned to room air at the time of transfer. There are concerns for his ability to swallow, neurology has been consulted, ENT did not identify any anatomic etiologies.      In the interim, patient continued to have rapid responses with hypotension and hypoxia. In total, four rapid responses since being on the floor. For the rapids, patient was either given lasix or bolus'd.  Neurology has been following and feels his mental status changes are not appropriate for his known baseline ans suspect illness related delirium. Patient has remained unable to get MRI brain as he cannot protect his airway flat as he has a weak cough.    Today, RR called for desaturation. Patient was AOxO in acute distress, not followingcommands. PE demonstrated tachycardia to the 100s, desaturation to 86% and tachypneic to the 30s. Diffuse rhonchi heard. Received 40 IV Lasix, placed on venti mask.     On arrival to the MICU, patient was afebrile, tachycardic to the 100s, tachypneic to the 30s, saturating 99% on HFNC. Patient was aggressively suctioned, sputum productive of 25cc tf colored sputum. Patient AOx0, unable to converse/vocalize. Started IV steroids, broadened to meropenem.       Labs:   Labs Reviewed   MRSA SURVEILLANCE FOR VANCOMYCIN DE-ESCALATION, PCR - Abnormal       Result Value    MRSA PCR Detected (*)     Narrative:     This assay is an FDA-approved in vitro diagnostic nucleic acid amplification test for the detection of methicillin-resistant Staphylococcus aureus (MRSA) DNA directly from nasal swabs in patients at risk for nasal colonization. MRSA NxG is intended to aid in the prevention and control of MRSA infections in healthcare settings. This assay is NOT intended to diagnose, guide, or monitor treatment for MRSA infections, or provide results of susceptibility to methicillin. A negative result does not preclude MRSA nasal colonization. Test performance has not been evaluated in patients less than two years of age.   RESPIRATORY CULTURE/SMEAR - Abnormal    Respiratory Culture/Smear   (*)     Value: Culture not performed. See Gram stain findings. Recollect if clinically indicated.    Gram Stain   (*)     Value: Gram stain indicates specimen contains significant salivary contamination.   RESPIRATORY CULTURE/SMEAR - Abnormal    Respiratory Culture/Smear   (*)     Value: Culture not performed. See  Gram stain findings. Recollect if clinically indicated.    Gram Stain   (*)     Value: Gram stain indicates specimen contains significant salivary contamination.   RESPIRATORY CULTURE/SMEAR - Abnormal    Respiratory Culture/Smear   (*)     Value: Culture not performed. See Gram stain findings. Recollect if clinically indicated.    Gram Stain   (*)     Value: Gram stain indicates specimen contains significant salivary contamination.   CBC WITH AUTO DIFFERENTIAL - Abnormal    WBC 21.2 (*)     nRBC 0.0      RBC 3.96 (*)     Hemoglobin 9.0 (*)     Hematocrit 30.0 (*)     MCV 76 (*)     MCH 22.7 (*)     MCHC 30.0 (*)     RDW 19.5 (*)     Platelets 363      Neutrophils % 85.5      Immature Granulocytes %, Automated 0.9      Lymphocytes % 8.3      Monocytes % 4.4      Eosinophils % 0.8      Basophils % 0.1      Neutrophils Absolute 18.12 (*)     Immature Granulocytes Absolute, Automated 0.19      Lymphocytes Absolute 1.75      Monocytes Absolute 0.94 (*)     Eosinophils Absolute 0.17      Basophils Absolute 0.02     COMPREHENSIVE METABOLIC PANEL - Abnormal    Glucose 289 (*)     Sodium 154 (*)     Potassium 4.3      Chloride 121 (*)     Bicarbonate 19 (*)     Anion Gap 18      Urea Nitrogen 73 (*)     Creatinine 3.38 (*)     eGFR 19 (*)     Calcium 9.2      Albumin 2.6 (*)     Alkaline Phosphatase 107      Total Protein 9.2 (*)     AST 9      Bilirubin, Total 0.3      ALT <3 (*)    PHOSPHORUS - Abnormal    Phosphorus 5.2 (*)    URINALYSIS WITH REFLEX CULTURE AND MICROSCOPIC - Abnormal    Color, Urine Yellow      Appearance, Urine Turbid (*)     Specific Gravity, Urine 1.020      pH, Urine 5.0      Protein, Urine 50 (1+) (*)     Glucose, Urine Normal      Blood, Urine NEGATIVE      Ketones, Urine NEGATIVE      Bilirubin, Urine NEGATIVE      Urobilinogen, Urine Normal      Nitrite, Urine NEGATIVE      Leukocyte Esterase, Urine NEGATIVE     CBC - Abnormal    WBC 13.9 (*)     nRBC 0.0      RBC 3.92 (*)     Hemoglobin 9.1 (*)      Hematocrit 28.8 (*)     MCV 74 (*)     MCH 23.2 (*)     MCHC 31.6 (*)     RDW 19.3 (*)     Platelets 349     COMPREHENSIVE METABOLIC PANEL - Abnormal    Glucose 320 (*)     Sodium 155 (*)     Potassium 3.4 (*)     Chloride 120 (*)     Bicarbonate 24      Anion Gap 14      Urea Nitrogen 64 (*)     Creatinine 3.12 (*)     eGFR 21 (*)     Calcium 8.6      Albumin 2.2 (*)     Alkaline Phosphatase 87      Total Protein 8.1      AST 7 (*)     Bilirubin, Total 0.3      ALT 4 (*)    BLOOD GAS VENOUS FULL PANEL - Abnormal    POCT pH, Venous 7.40      POCT pCO2, Venous 41      POCT pO2, Venous 27 (*)     POCT SO2, Venous 27 (*)     POCT Oxy Hemoglobin, Venous 26.5 (*)     POCT Hematocrit Calculated, Venous 25.0 (*)     POCT Sodium, Venous 156 (*)     POCT Potassium, Venous 3.6      POCT Chloride, Venous 123 (*)     POCT Ionized Calicum, Venous 1.24      POCT Glucose, Venous 309 (*)     POCT Lactate, Venous 1.4      POCT Base Excess, Venous 0.5      POCT HCO3 Calculated, Venous 25.4      POCT Hemoglobin, Venous 8.3 (*)     POCT Anion Gap, Venous 11.0      Patient Temperature 37.0      FiO2 21     HEMOGLOBIN A1C - Abnormal    Hemoglobin A1C 8.5 (*)     Estimated Average Glucose 197      Narrative:     Diagnosis of Diabetes-Adults  Non-Diabetic: < or = 5.6%  Increased risk for developing diabetes: 5.7-6.4%  Diagnostic of diabetes: > or = 6.5%    Monitoring of Diabetes  Age (y)....................... Therapeutic Goal (%)  Adults: >18.........................<7.0  Pediatrics: 13-18...................<7.5  Pediatrics: 7-12....................<8.0  Pediatrics: 0-6..................... 7.5-8.5    American Diabetes Association. Diabetes Care 33(S1), Jan 2010       PROCALCITONIN - Abnormal    Procalcitonin 4.48 (*)     Narrative:     Procalcitonin (PCT) results measured serially can  aid in decision-making for antibiotic discontinuation in  patients with suspected or confirmed sepsis in conjunction  with additional clinical  information. Antibiotic  discontinuation may be considered with a change in PCT of  >80% from the peak result or when PCT falls below 0.50 ng/mL.    Procalcitonin results should not be used in isolation but  should be interpreted in conjunction with additional clinical  and laboratory findings. Procalcitonin results should not be  used to guide the initiation of antibiotic therapy.    Falsely low PCT values in the presence of bacterial infection  may occur in early infection, with atypical pathogens,  localized infections, and subacute infectious endocarditis.    Falsely elevated results outside of severe bacterial  infection/sepsis may be seen in patients with renal failure  or insufficiency, severe trauma or burns, recent major  abdominal/cardiac surgery, acute multi-organ failure, rarely  in patients with medullary thyroid carcinoma and rare  neuroendocrine tumors, and non-specific interfering antibodies  (heterophile antibodies, rheumatoid factor, human anti-mouse  antibodies (HAMA), etc).    Performance of the PCT test in pediatric patients (<17yo),  pregnant women, immunocompromised patients, and patients on  immunomodulatory medications has not been evaluated.   SARS-COV-2 AND INFLUENZA A/B PCR - Abnormal    Flu A Result Not Detected      Flu B Result Not Detected      Coronavirus 2019, PCR Detected (*)     Narrative:     This assay has received FDA Emergency Use Authorization (EUA) and  is only authorized for the duration of time that circumstances exist to justify the authorization of the emergency use of in vitro diagnostic tests for the detection of SARS-CoV-2 virus and/or diagnosis of COVID-19 infection under section 564(b)(1) of the Act, 21 U.S.C. 360bbb-3(b)(1). Testing for SARS-CoV-2 is only recommended for patients who meet current clinical and/or epidemiological criteria as defined by federal, state, or local public health directives. This assay is an in vitro diagnostic nucleic acid amplification  test for the qualitative detection of SARS-CoV-2, Influenza A, and Influenza B from nasopharyngeal specimens and has been validated for use at [facility]. Negative results do not preclude COVID-19 infections or Influenza A/B infections, and should not be used as the sole basis for diagnosis, treatment, or other management decisions. If Influenza A/B and RSV PCR results are negative, testing for Parainfluenza virus, Adenovirus and Metapneumovirus is routinely performed for [facility] [place], and is available on other patients by placing an add-on request.    RENAL FUNCTION PANEL - Abnormal    Glucose 186 (*)     Sodium 155 (*)     Potassium 3.5      Chloride 121 (*)     Bicarbonate 23      Anion Gap 15      Urea Nitrogen 55 (*)     Creatinine 2.80 (*)     eGFR 23 (*)     Calcium 9.2      Phosphorus 3.1      Albumin 2.5 (*)    RENAL FUNCTION PANEL - Abnormal    Glucose 234 (*)     Sodium 156 (*)     Potassium 3.3 (*)     Chloride 122 (*)     Bicarbonate 21      Anion Gap 16      Urea Nitrogen 53 (*)     Creatinine 2.66 (*)     eGFR 25 (*)     Calcium 8.7      Phosphorus 4.1      Albumin 2.0 (*)    CBC WITH AUTO DIFFERENTIAL - Abnormal    WBC 14.4 (*)     nRBC 0.0      RBC 3.41 (*)     Hemoglobin 7.9 (*)     Hematocrit 27.0 (*)     MCV 79 (*)     MCH 23.2 (*)     MCHC 29.3 (*)     RDW 19.9 (*)     Platelets 369      Immature Granulocytes %, Automated 0.4      Immature Granulocytes Absolute, Automated 0.06      Narrative:     The previously reported component Neutrophils % is no longer being reported.  The previously reported component Lymphocytes % is no longer being reported.  The previously reported component Monocytes % is no longer being reported.  The previously   reported component Eosinophils % is no longer being reported.  The previously reported component Basophils % is no longer being reported.  The previously reported component Absolute Neutrophils  is no longer being reported.  The previously reported   component Absolute Lymphocytes is no longer being reported.  The previously reported component Absolute Monocytes is no longer being reported.  The previously reported component Absolute Eosinophils is no longer being reported.  The previously reported   component Absolute Basophils is no longer being reported.   RENAL FUNCTION PANEL - Abnormal    Glucose 500 (*)     Sodium 150 (*)     Potassium 3.6      Chloride 117 (*)     Bicarbonate 20 (*)     Anion Gap 17      Urea Nitrogen 46 (*)     Creatinine 2.67 (*)     eGFR 25 (*)     Calcium 8.6      Phosphorus 3.6      Albumin 2.1 (*)    RENAL FUNCTION PANEL - Abnormal    Glucose 77      Sodium 154 (*)     Potassium 3.3 (*)     Chloride 120 (*)     Bicarbonate 20 (*)     Anion Gap 17      Urea Nitrogen 45 (*)     Creatinine 2.74 (*)     eGFR 24 (*)     Calcium 9.2      Phosphorus 3.3      Albumin 2.3 (*)    CBC WITH AUTO DIFFERENTIAL - Abnormal    WBC 9.8      nRBC 0.0      RBC 3.99 (*)     Hemoglobin 9.2 (*)     Hematocrit 31.5 (*)     MCV 79 (*)     MCH 23.1 (*)     MCHC 29.2 (*)     RDW 20.3 (*)     Platelets 393      Immature Granulocytes %, Automated 0.7      Immature Granulocytes Absolute, Automated 0.07      Narrative:     The previously reported component Neutrophils % is no longer being reported.  The previously reported component Lymphocytes % is no longer being reported.  The previously reported component Monocytes % is no longer being reported.  The previously   reported component Eosinophils % is no longer being reported.  The previously reported component Basophils % is no longer being reported.  The previously reported component Absolute Neutrophils is no longer being reported.  The previously reported   component Absolute Lymphocytes is no longer being reported.  The previously reported component Absolute Monocytes is no longer being reported.  The previously reported component Absolute Eosinophils  is no longer being reported.  The previously reported   component Absolute Basophils is no longer being reported.   RENAL FUNCTION PANEL - Abnormal    Glucose 115 (*)     Sodium 154 (*)     Potassium 3.8      Chloride 122 (*)     Bicarbonate 20 (*)     Anion Gap 16      Urea Nitrogen 43 (*)     Creatinine 2.91 (*)     eGFR 22 (*)     Calcium 9.1      Phosphorus 3.6      Albumin 2.2 (*)    VANCOMYCIN - Abnormal    Vancomycin 21.0 (*)     Narrative:     Vancomycin levels can be monitored according to area under the curve (AUC) or concentration (ug/mL). The preferred monitoring strategy is determined by the patient's renal function and indication for therapy.     For AUC monitoring, a random vancomycin level should be interpreted in the context of AUC rather than the concentration at a single point in time.    For concentration monitoring, a trough concentration drawn immediately prior to the next dose is preferred.       Therapeutic ranges using concentration-guided results:  Peak (all ages):                  30.0-40.0 ug/mL  Trough (all ages):                10.0-20.0 ug/mL              RENAL FUNCTION PANEL - Abnormal    Glucose 222 (*)     Sodium 152 (*)     Potassium 3.0 (*)     Chloride 121 (*)     Bicarbonate 19 (*)     Anion Gap 15      Urea Nitrogen 41 (*)     Creatinine 2.68 (*)     eGFR 25 (*)     Calcium 8.7      Phosphorus 2.5      Albumin 1.9 (*)    CBC WITH AUTO DIFFERENTIAL - Abnormal    WBC 14.5 (*)     nRBC 0.0      RBC 3.52 (*)     Hemoglobin 8.2 (*)     Hematocrit 25.3 (*)     MCV 72 (*)     MCH 23.3 (*)     MCHC 32.4      RDW 19.7 (*)     Platelets 343      Immature Granulocytes %, Automated 1.2 (*)     Immature Granulocytes Absolute, Automated 0.17      Narrative:     The previously reported component Neutrophils % is no longer being reported.  The previously reported component Lymphocytes % is no longer being reported.  The previously reported component Monocytes % is no longer being  reported.  The previously   reported component Eosinophils % is no longer being reported.  The previously reported component Basophils % is no longer being reported.  The previously reported component Absolute Neutrophils is no longer being reported.  The previously reported   component Absolute Lymphocytes is no longer being reported.  The previously reported component Absolute Monocytes is no longer being reported.  The previously reported component Absolute Eosinophils is no longer being reported.  The previously reported   component Absolute Basophils is no longer being reported.   RENAL FUNCTION PANEL - Abnormal    Glucose 254 (*)     Sodium 149 (*)     Potassium 3.2 (*)     Chloride 118 (*)     Bicarbonate 18 (*)     Anion Gap 16      Urea Nitrogen 44 (*)     Creatinine 2.88 (*)     eGFR 23 (*)     Calcium 8.6      Phosphorus 3.2      Albumin 1.9 (*)    RENAL FUNCTION PANEL - Abnormal    Glucose 263 (*)     Sodium 149 (*)     Potassium 3.3 (*)     Chloride 119 (*)     Bicarbonate 19 (*)     Anion Gap 14      Urea Nitrogen 46 (*)     Creatinine 2.84 (*)     eGFR 23 (*)     Calcium 8.7      Phosphorus 2.6      Albumin 1.8 (*)    BLOOD GAS ARTERIAL FULL PANEL - Abnormal    POCT pH, Arterial 7.44 (*)     POCT pCO2, Arterial 24 (*)     POCT pO2, Arterial 68 (*)     POCT SO2, Arterial 94      POCT Oxy Hemoglobin, Arterial 91.5 (*)     POCT Hematocrit Calculated, Arterial 28.0 (*)     POCT Sodium, Arterial 148 (*)     POCT Potassium, Arterial 3.0 (*)     POCT Chloride, Arterial 122 (*)     POCT Ionized Calcium, Arterial 1.32      POCT Glucose, Arterial 357 (*)     POCT Lactate, Arterial 1.8      POCT Base Excess, Arterial -6.7 (*)     POCT HCO3 Calculated, Arterial 16.3 (*)     POCT Hemoglobin, Arterial 9.3 (*)     POCT Anion Gap, Arterial 13      Patient Temperature 37.0      FiO2 60      Site of Arterial Puncture Radial Left      Srinivasa's Test Negative     PROCALCITONIN - Abnormal    Procalcitonin 4.13 (*)      Narrative:     Procalcitonin (PCT) results measured serially can  aid in decision-making for antibiotic discontinuation in  patients with suspected or confirmed sepsis in conjunction  with additional clinical information. Antibiotic  discontinuation may be considered with a change in PCT of  >80% from the peak result or when PCT falls below 0.50 ng/mL.    Procalcitonin results should not be used in isolation but  should be interpreted in conjunction with additional clinical  and laboratory findings. Procalcitonin results should not be  used to guide the initiation of antibiotic therapy.    Falsely low PCT values in the presence of bacterial infection  may occur in early infection, with atypical pathogens,  localized infections, and subacute infectious endocarditis.    Falsely elevated results outside of severe bacterial  infection/sepsis may be seen in patients with renal failure  or insufficiency, severe trauma or burns, recent major  abdominal/cardiac surgery, acute multi-organ failure, rarely  in patients with medullary thyroid carcinoma and rare  neuroendocrine tumors, and non-specific interfering antibodies  (heterophile antibodies, rheumatoid factor, human anti-mouse  antibodies (HAMA), etc).    Performance of the PCT test in pediatric patients (<17yo),  pregnant women, immunocompromised patients, and patients on  immunomodulatory medications has not been evaluated.   BLOOD GAS ARTERIAL FULL PANEL - Abnormal    POCT pH, Arterial 7.47 (*)     POCT pCO2, Arterial 23 (*)     POCT pO2, Arterial 84 (*)     POCT SO2, Arterial 99      POCT Oxy Hemoglobin, Arterial 96.8      POCT Hematocrit Calculated, Arterial 24.0 (*)     POCT Sodium, Arterial 146 (*)     POCT Potassium, Arterial 3.2 (*)     POCT Chloride, Arterial 122 (*)     POCT Ionized Calcium, Arterial 1.36 (*)     POCT Glucose, Arterial 268 (*)     POCT Lactate, Arterial 2.6 (*)     POCT Base Excess, Arterial -6.0 (*)     POCT HCO3 Calculated, Arterial 16.7  (*)     POCT Hemoglobin, Arterial 7.9 (*)     POCT Anion Gap, Arterial 11      Patient Temperature 37.0      FiO2 55     URINALYSIS WITH REFLEX MICROSCOPIC - Abnormal    Color, Urine Yellow      Appearance, Urine Ex.Turbid (*)     Specific Gravity, Urine 1.025      pH, Urine 5.5      Protein, Urine 50 (1+) (*)     Glucose, Urine Normal      Blood, Urine 0.5 (2+) (*)     Ketones, Urine TRACE (*)     Bilirubin, Urine NEGATIVE      Urobilinogen, Urine Normal      Nitrite, Urine NEGATIVE      Leukocyte Esterase, Urine NEGATIVE     MICROSCOPIC ONLY, URINE - Abnormal    WBC, Urine 21-50 (*)     RBC, Urine >20 (*)     Budding Yeast, Urine PRESENT (*)     Uric Acid Crystals, Urine 4+ (*)    BLOOD GAS LACTIC ACID, VENOUS - Abnormal    POCT Lactate, Venous 3.0 (*)    CBC WITH AUTO DIFFERENTIAL - Abnormal    WBC 16.6 (*)     nRBC 0.1 (*)     RBC 3.23 (*)     Hemoglobin 7.4 (*)     Hematocrit 24.4 (*)     MCV 76 (*)     MCH 22.9 (*)     MCHC 30.3 (*)     RDW 20.5 (*)     Platelets 347      Immature Granulocytes %, Automated 0.7      Immature Granulocytes Absolute, Automated 0.12      Narrative:     The previously reported component Neutrophils % is no longer being reported.  The previously reported component Lymphocytes % is no longer being reported.  The previously reported component Monocytes % is no longer being reported.  The previously   reported component Eosinophils % is no longer being reported.  The previously reported component Basophils % is no longer being reported.  The previously reported component Absolute Neutrophils is no longer being reported.  The previously reported   component Absolute Lymphocytes is no longer being reported.  The previously reported component Absolute Monocytes is no longer being reported.  The previously reported component Absolute Eosinophils is no longer being reported.  The previously reported   component Absolute Basophils is no longer being reported.   COMPREHENSIVE METABOLIC PANEL -  Abnormal    Glucose 235 (*)     Sodium 151 (*)     Potassium 3.6      Chloride 120 (*)     Bicarbonate 20 (*)     Anion Gap 15      Urea Nitrogen 45 (*)     Creatinine 2.88 (*)     eGFR 23 (*)     Calcium 8.1 (*)     Albumin 1.5 (*)     Alkaline Phosphatase 81      Total Protein 5.9 (*)     AST 9      Bilirubin, Total 0.3      ALT 7 (*)    BLOOD GAS VENOUS FULL PANEL - Abnormal    POCT pH, Venous 7.41      POCT pCO2, Venous 28 (*)     POCT pO2, Venous 57 (*)     POCT SO2, Venous 86 (*)     POCT Oxy Hemoglobin, Venous 84.0 (*)     POCT Hematocrit Calculated, Venous 23.0 (*)     POCT Sodium, Venous 149 (*)     POCT Potassium, Venous 3.7      POCT Chloride, Venous 122 (*)     POCT Ionized Calicum, Venous 1.29      POCT Glucose, Venous 251 (*)     POCT Lactate, Venous 3.0 (*)     POCT Base Excess, Venous -6.2 (*)     POCT HCO3 Calculated, Venous 17.7 (*)     POCT Hemoglobin, Venous 7.8 (*)     POCT Anion Gap, Venous 13.0      Patient Temperature 37.0      FiO2 58     LACTATE - Abnormal    Lactate 2.2 (*)     Narrative:     Venipuncture immediately after or during the administration of Metamizole may lead to falsely low results. Testing should be performed immediately  prior to Metamizole dosing.   RENAL FUNCTION PANEL - Abnormal    Glucose 153 (*)     Sodium 145      Potassium 3.1 (*)     Chloride 116 (*)     Bicarbonate 20 (*)     Anion Gap 12      Urea Nitrogen 38 (*)     Creatinine 2.71 (*)     eGFR 24 (*)     Calcium 7.4 (*)     Phosphorus 3.4      Albumin <1.5 (*)    CBC WITH AUTO DIFFERENTIAL - Abnormal    WBC 18.5 (*)     nRBC 0.1 (*)     RBC 2.62 (*)     Hemoglobin 6.1 (*)     Hematocrit 18.4 (*)     MCV 70 (*)     MCH 23.3 (*)     MCHC 33.2      RDW 20.0 (*)     Platelets 243      Immature Granulocytes %, Automated 0.8      Immature Granulocytes Absolute, Automated 0.15     RENAL FUNCTION PANEL - Abnormal    Glucose 214 (*)     Sodium 146 (*)     Potassium 3.4 (*)     Chloride 115 (*)     Bicarbonate 19 (*)      Anion Gap 15      Urea Nitrogen 41 (*)     Creatinine 2.71 (*)     eGFR 24 (*)     Calcium 7.8 (*)     Phosphorus 3.2      Albumin 1.5 (*)    FIBRINOGEN - Abnormal    Fibrinogen 706 (*)    APTT - Abnormal    aPTT 57 (*)     Narrative:     The APTT is no longer used for monitoring Unfractionated Heparin Therapy. For monitoring Heparin Therapy, use the Heparin Assay.   PROTIME-INR - Abnormal    Protime 19.4 (*)     INR 1.7 (*)    CBC - Abnormal    WBC 17.4 (*)     nRBC 0.1 (*)     RBC 3.23 (*)     Hemoglobin 7.9 (*)     Hematocrit 22.9 (*)     MCV 71 (*)     MCH 24.5 (*)     MCHC 34.5      RDW 20.9 (*)     Platelets 237     FERRITIN - Abnormal    Ferritin 352 (*)    IRON AND TIBC - Abnormal    Iron <10 (*)     UIBC 95 (*)     TIBC        % Saturation       VITAMIN B12 - Abnormal    Vitamin B12 1,895 (*)    CBC WITH AUTO DIFFERENTIAL - Abnormal    WBC 24.3 (*)     nRBC 0.2 (*)     RBC 3.40 (*)     Hemoglobin 8.2 (*)     Hematocrit 25.4 (*)     MCV 75 (*)     MCH 24.1 (*)     MCHC 32.3      RDW 20.5 (*)     Platelets 233      Immature Granulocytes %, Automated 1.3 (*)     Immature Granulocytes Absolute, Automated 0.31      Narrative:     The previously reported component Neutrophils % is no longer being reported.  The previously reported component Lymphocytes % is no longer being reported.  The previously reported component Monocytes % is no longer being reported.  The previously   reported component Eosinophils % is no longer being reported.  The previously reported component Basophils % is no longer being reported.  The previously reported component Absolute Neutrophils is no longer being reported.  The previously reported   component Absolute Lymphocytes is no longer being reported.  The previously reported component Absolute Monocytes is no longer being reported.  The previously reported component Absolute Eosinophils is no longer being reported.  The previously reported   component Absolute Basophils is no longer  being reported.   MAGNESIUM - Abnormal    Magnesium 2.52 (*)    COMPREHENSIVE METABOLIC PANEL - Abnormal    Glucose 180 (*)     Sodium 150 (*)     Potassium 3.4 (*)     Chloride 117 (*)     Bicarbonate 24      Anion Gap 12      Urea Nitrogen 57 (*)     Creatinine 3.04 (*)     eGFR 21 (*)     Calcium 8.4 (*)     Albumin 1.5 (*)     Alkaline Phosphatase 78      Total Protein 5.7 (*)     AST 10      Bilirubin, Total 0.4      ALT 8 (*)    BLOOD GAS ARTERIAL FULL PANEL - Abnormal    POCT pH, Arterial 7.44 (*)     POCT pCO2, Arterial 34 (*)     POCT pO2, Arterial 66 (*)     POCT SO2, Arterial 93 (*)     POCT Oxy Hemoglobin, Arterial 90.7 (*)     POCT Hematocrit Calculated, Arterial 28.0 (*)     POCT Sodium, Arterial 147 (*)     POCT Potassium, Arterial 3.3 (*)     POCT Chloride, Arterial 116 (*)     POCT Ionized Calcium, Arterial 1.32      POCT Glucose, Arterial 178 (*)     POCT Lactate, Arterial 1.6      POCT Base Excess, Arterial -0.8      POCT HCO3 Calculated, Arterial 23.1      POCT Hemoglobin, Arterial 9.2 (*)     POCT Anion Gap, Arterial 11      Patient Temperature 37.0      FiO2 40     RENAL FUNCTION PANEL - Abnormal    Glucose 165 (*)     Sodium 151 (*)     Potassium 3.5      Chloride 119 (*)     Bicarbonate 22      Anion Gap 14      Urea Nitrogen 54 (*)     Creatinine 2.86 (*)     eGFR 23 (*)     Calcium 8.0 (*)     Phosphorus 4.0      Albumin <1.5 (*)    MAGNESIUM - Abnormal    Magnesium 2.43 (*)    SEDIMENTATION RATE, AUTOMATED - Abnormal    Sedimentation Rate >130 (*)    C-REACTIVE PROTEIN - Abnormal    C-Reactive Protein 20.03 (*)    CBC WITH AUTO DIFFERENTIAL - Abnormal    WBC 16.2 (*)     nRBC 0.3 (*)     RBC 3.51 (*)     Hemoglobin 8.3 (*)     Hematocrit 27.2 (*)     MCV 78 (*)     MCH 23.6 (*)     MCHC 30.5 (*)     RDW 21.8 (*)     Platelets 255      Neutrophils % 81.3      Immature Granulocytes %, Automated 0.6      Lymphocytes % 11.6      Monocytes % 2.0      Eosinophils % 4.3      Basophils % 0.2       Neutrophils Absolute 13.13 (*)     Immature Granulocytes Absolute, Automated 0.10      Lymphocytes Absolute 1.88      Monocytes Absolute 0.32      Eosinophils Absolute 0.69 (*)     Basophils Absolute 0.04     RENAL FUNCTION PANEL - Abnormal    Glucose 265 (*)     Sodium 150 (*)     Potassium 3.9      Chloride 117 (*)     Bicarbonate 21      Anion Gap 16      Urea Nitrogen 47 (*)     Creatinine 2.59 (*)     eGFR 26 (*)     Calcium 8.5 (*)     Phosphorus 4.0      Albumin 1.8 (*)    RENAL FUNCTION PANEL - Abnormal    Glucose 333 (*)     Sodium 151 (*)     Potassium 3.1 (*)     Chloride 116 (*)     Bicarbonate 22      Anion Gap 16      Urea Nitrogen 41 (*)     Creatinine 2.50 (*)     eGFR 27 (*)     Calcium 7.9 (*)     Phosphorus 3.3      Albumin 1.5 (*)    CBC WITH AUTO DIFFERENTIAL - Abnormal    WBC 16.2 (*)     nRBC 0.4 (*)     RBC 3.11 (*)     Hemoglobin 7.5 (*)     Hematocrit 24.0 (*)     MCV 77 (*)     MCH 24.1 (*)     MCHC 31.3 (*)     RDW 22.0 (*)     Platelets 264      Immature Granulocytes %, Automated 1.9 (*)     Immature Granulocytes Absolute, Automated 0.30      Narrative:     The previously reported component Neutrophils % is no longer being reported.  The previously reported component Lymphocytes % is no longer being reported.  The previously reported component Monocytes % is no longer being reported.  The previously   reported component Eosinophils % is no longer being reported.  The previously reported component Basophils % is no longer being reported.  The previously reported component Absolute Neutrophils is no longer being reported.  The previously reported   component Absolute Lymphocytes is no longer being reported.  The previously reported component Absolute Monocytes is no longer being reported.  The previously reported component Absolute Eosinophils is no longer being reported.  The previously reported   component Absolute Basophils is no longer being reported.   RENAL FUNCTION PANEL - Abnormal     Glucose 387 (*)     Sodium 153 (*)     Potassium 4.4      Chloride 119 (*)     Bicarbonate 19 (*)     Anion Gap 19      Urea Nitrogen 41 (*)     Creatinine 2.82 (*)     eGFR 23 (*)     Calcium 8.8      Phosphorus 4.1      Albumin 1.8 (*)    BLOOD GAS ARTERIAL FULL PANEL - Abnormal    POCT pH, Arterial 7.49 (*)     POCT pCO2, Arterial 28 (*)     POCT pO2, Arterial 57 (*)     POCT SO2, Arterial 88 (*)     POCT Oxy Hemoglobin, Arterial 85.7 (*)     POCT Hematocrit Calculated, Arterial 23.0 (*)     POCT Sodium, Arterial 149 (*)     POCT Potassium, Arterial 2.8 (*)     POCT Chloride, Arterial 117 (*)     POCT Ionized Calcium, Arterial 1.31      POCT Glucose, Arterial 311 (*)     POCT Lactate, Arterial 2.8 (*)     POCT Base Excess, Arterial -1.6      POCT HCO3 Calculated, Arterial 21.3 (*)     POCT Hemoglobin, Arterial 7.6 (*)     POCT Anion Gap, Arterial 14      Patient Temperature 37.0      FiO2 21     BLOOD GAS ARTERIAL FULL PANEL - Abnormal    POCT pH, Arterial 7.38      POCT pCO2, Arterial 25 (*)     POCT pO2, Arterial 77 (*)     POCT SO2, Arterial 97      POCT Oxy Hemoglobin, Arterial 94.4      POCT Hematocrit Calculated, Arterial 23.0 (*)     POCT Sodium, Arterial 151 (*)     POCT Potassium, Arterial 3.8      POCT Chloride, Arterial 119 (*)     POCT Ionized Calcium, Arterial 1.28      POCT Glucose, Arterial 430 (*)     POCT Lactate, Arterial 2.2 (*)     POCT Base Excess, Arterial -9.2 (*)     POCT HCO3 Calculated, Arterial 14.8 (*)     POCT Hemoglobin, Arterial 7.8 (*)     POCT Anion Gap, Arterial 21      Patient Temperature 37.0      FiO2 100     POCT GLUCOSE - Abnormal    POCT Glucose 293 (*)    POCT GLUCOSE - Abnormal    POCT Glucose 168 (*)    POCT GLUCOSE - Abnormal    POCT Glucose 167 (*)    POCT GLUCOSE - Abnormal    POCT Glucose 172 (*)    POCT GLUCOSE - Abnormal    POCT Glucose 238 (*)    POCT GLUCOSE - Abnormal    POCT Glucose 300 (*)    POCT GLUCOSE - Abnormal    POCT Glucose 419 (*)    POCT GLUCOSE -  Abnormal    POCT Glucose 412 (*)    POCT GLUCOSE - Abnormal    POCT Glucose 352 (*)    POCT GLUCOSE - Abnormal    POCT Glucose 167 (*)    POCT GLUCOSE - Abnormal    POCT Glucose 262 (*)    POCT GLUCOSE - Abnormal    POCT Glucose 103 (*)    POCT GLUCOSE - Abnormal    POCT Glucose 155 (*)    POCT GLUCOSE - Abnormal    POCT Glucose 118 (*)    POCT GLUCOSE - Abnormal    POCT Glucose 178 (*)    POCT GLUCOSE - Abnormal    POCT Glucose 135 (*)    POCT GLUCOSE - Abnormal    POCT Glucose 71 (*)    POCT GLUCOSE - Abnormal    POCT Glucose 142 (*)    POCT GLUCOSE - Abnormal    POCT Glucose 285 (*)    POCT GLUCOSE - Abnormal    POCT Glucose 192 (*)    POCT GLUCOSE - Abnormal    POCT Glucose 229 (*)    POCT GLUCOSE - Abnormal    POCT Glucose 225 (*)    POCT GLUCOSE - Abnormal    POCT Glucose 281 (*)    POCT GLUCOSE - Abnormal    POCT Glucose 242 (*)    POCT GLUCOSE - Abnormal    POCT Glucose 306 (*)    POCT GLUCOSE - Abnormal    POCT Glucose 276 (*)    POCT GLUCOSE - Abnormal    POCT Glucose 301 (*)    POCT GLUCOSE - Abnormal    POCT Glucose 272 (*)    POCT GLUCOSE - Abnormal    POCT Glucose 236 (*)    POCT GLUCOSE - Abnormal    POCT Glucose 307 (*)    POCT GLUCOSE - Abnormal    POCT Glucose 260 (*)    POCT GLUCOSE - Abnormal    POCT Glucose 153 (*)    POCT GLUCOSE - Abnormal    POCT Glucose 189 (*)    POCT GLUCOSE - Abnormal    POCT Glucose 195 (*)    POCT GLUCOSE - Abnormal    POCT Glucose 225 (*)    POCT GLUCOSE - Abnormal    POCT Glucose 197 (*)    POCT GLUCOSE - Abnormal    POCT Glucose 246 (*)    POCT GLUCOSE - Abnormal    POCT Glucose 235 (*)    POCT GLUCOSE - Abnormal    POCT Glucose 317 (*)    POCT GLUCOSE - Abnormal    POCT Glucose 382 (*)    POCT GLUCOSE - Abnormal    POCT Glucose 323 (*)    POCT GLUCOSE - Abnormal    POCT Glucose 191 (*)    POCT GLUCOSE - Abnormal    POCT Glucose 140 (*)    POCT GLUCOSE - Abnormal    POCT Glucose 148 (*)    POCT GLUCOSE - Abnormal    POCT Glucose 178 (*)    POCT GLUCOSE - Abnormal     POCT Glucose 189 (*)    POCT GLUCOSE - Abnormal    POCT Glucose 213 (*)    POCT GLUCOSE - Abnormal    POCT Glucose 219 (*)    POCT GLUCOSE - Abnormal    POCT Glucose 276 (*)    POCT GLUCOSE - Abnormal    POCT Glucose 290 (*)    POCT GLUCOSE - Abnormal    POCT Glucose 349 (*)    POCT GLUCOSE - Abnormal    POCT Glucose 287 (*)    POCT GLUCOSE - Abnormal    POCT Glucose 292 (*)    POCT GLUCOSE - Abnormal    POCT Glucose 318 (*)    POCT GLUCOSE - Abnormal    POCT Glucose 363 (*)    POCT GLUCOSE - Abnormal    POCT Glucose 356 (*)    POCT GLUCOSE - Abnormal    POCT Glucose 390 (*)    URINALYSIS MICROSCOPIC WITH REFLEX CULTURE - Abnormal    WBC, Urine 1-5      RBC, Urine 3-5      Mucus, Urine FEW      Hyaline Casts, Urine 2+ (*)     Amorphous Crystals, Urine 1+     MANUAL DIFFERENTIAL - Abnormal    Neutrophils %, Manual 68.1      Bands %, Manual 16.4      Lymphocytes %, Manual 13.8      Monocytes %, Manual 0.9      Eosinophils %, Manual 0.8      Basophils %, Manual 0.0      Seg Neutrophils Absolute, Manual 9.81 (*)     Bands Absolute, Manual 2.36 (*)     Lymphocytes Absolute, Manual 1.99      Monocytes Absolute, Manual 0.13      Eosinophils Absolute, Manual 0.12      Basophils Absolute, Manual 0.00      Total Cells Counted 116      Neutrophils Absolute, Manual 12.17 (*)     RBC Morphology See Below      Polychromasia Mild      [place] Cells Few     MANUAL DIFFERENTIAL - Abnormal    Neutrophils %, Manual 75.2      Bands %, Manual 5.3      Lymphocytes %, Manual 13.3      Monocytes %, Manual 0.9      Eosinophils %, Manual 4.4      Basophils %, Manual 0.0      Atypical Lymphocytes %, Manual 0.9      Seg Neutrophils Absolute, Manual 7.37 (*)     Bands Absolute, Manual 0.52 (*)     Lymphocytes Absolute, Manual 1.30      Monocytes Absolute, Manual 0.09      Eosinophils Absolute, Manual 0.43 (*)     Basophils Absolute, Manual 0.00      Atypical Lymphs Absolute, Manual 0.09      Total Cells Counted 113      Neutrophils Absolute,  Manual 7.89 (*)     RBC Morphology See Below      [place] Cells Few     MANUAL DIFFERENTIAL - Abnormal    Neutrophils %, Manual 77.9      Lymphocytes %, Manual 10.6      Monocytes %, Manual 5.3      Eosinophils %, Manual 1.8      Basophils %, Manual 0.0      Atypical Lymphocytes %, Manual 4.4      Seg Neutrophils Absolute, Manual 11.30 (*)     Lymphocytes Absolute, Manual 1.54      Monocytes Absolute, Manual 0.77      Eosinophils Absolute, Manual 0.26      Basophils Absolute, Manual 0.00      Atypical Lymphs Absolute, Manual 0.64 (*)     Total Cells Counted 113      RBC Morphology See Below      [place] Cells Few     MANUAL DIFFERENTIAL - Abnormal    Neutrophils %, Manual 72.7      Bands %, Manual 17.4      Lymphocytes %, Manual 4.1      Monocytes %, Manual 2.5      Eosinophils %, Manual 1.7      Basophils %, Manual 0.8      Atypical Lymphocytes %, Manual 0.8      Seg Neutrophils Absolute, Manual 12.07 (*)     Bands Absolute, Manual 2.89 (*)     Lymphocytes Absolute, Manual 0.68 (*)     Monocytes Absolute, Manual 0.42      Eosinophils Absolute, Manual 0.28      Basophils Absolute, Manual 0.13 (*)     Atypical Lymphs Absolute, Manual 0.13      Total Cells Counted 121      Neutrophils Absolute, Manual 14.96 (*)     RBC Morphology See Below      Acanthocytes Few     MANUAL DIFFERENTIAL - Abnormal    Neutrophils %, Manual 84.4      Lymphocytes %, Manual 11.3      Monocytes %, Manual 0.0      Eosinophils %, Manual 4.3      Basophils %, Manual 0.0      Seg Neutrophils Absolute, Manual 15.61 (*)     Lymphocytes Absolute, Manual 2.09      Monocytes Absolute, Manual 0.00 (*)     Eosinophils Absolute, Manual 0.80 (*)     Basophils Absolute, Manual 0.00      Total Cells Counted 115      RBC Morphology See Below      Hypochromia Mild      Target Cells Many      Ovalocytes Few      [place] Cells Few     MANUAL DIFFERENTIAL - Abnormal    Neutrophils %, Manual 82.8      Bands %, Manual 6.9      Lymphocytes %, Manual 10.3      Monocytes %,  Manual 0.0      Eosinophils %, Manual 0.0      Basophils %, Manual 0.0      Seg Neutrophils Absolute, Manual 20.12 (*)     Bands Absolute, Manual 1.68 (*)     Lymphocytes Absolute, Manual 2.50      Monocytes Absolute, Manual 0.00 (*)     Eosinophils Absolute, Manual 0.00      Basophils Absolute, Manual 0.00      Total Cells Counted 116      Neutrophils Absolute, Manual 21.80 (*)     RBC Morphology See Below      Polychromasia Mild      Target Cells Few     MANUAL DIFFERENTIAL - Abnormal    Neutrophils %, Manual 88.7      Bands %, Manual 6.1      Lymphocytes %, Manual 2.6      Monocytes %, Manual 0.9      Eosinophils %, Manual 0.0      Basophils %, Manual 0.0      Atypical Lymphocytes %, Manual 1.7      Seg Neutrophils Absolute, Manual 14.37 (*)     Bands Absolute, Manual 0.99 (*)     Lymphocytes Absolute, Manual 0.42 (*)     Monocytes Absolute, Manual 0.15      Eosinophils Absolute, Manual 0.00      Basophils Absolute, Manual 0.00      Atypical Lymphs Absolute, Manual 0.28      Total Cells Counted 115      Neutrophils Absolute, Manual 15.36 (*)     RBC Morphology See Below      Polychromasia Mild      Hypochromia Mild      Target Cells Few      [place] Cells Few     BLOOD CULTURE - Normal    Blood Culture No growth at 4 days -  FINAL REPORT     BLOOD CULTURE - Normal    Blood Culture No growth at 4 days -  FINAL REPORT     LEGIONELLA ANTIGEN, URINE - Normal    L. pneumophila Urine Ag Negative     STREPTOCOCCUS PNEUMONIAE ANTIGEN, URINE - Normal    Streptococcus pneumoniae Ag, Urine Negative     BLOOD CULTURE - Normal    Blood Culture No growth at 4 days -  FINAL REPORT     BLOOD CULTURE - Normal    Blood Culture No growth at 4 days -  FINAL REPORT     LACTATE - Normal    Lactate 1.2      Narrative:     Venipuncture immediately after or during the administration of Metamizole may lead to falsely low results. Testing should be performed immediately  prior to Metamizole dosing.   MAGNESIUM - Normal    Magnesium 2.29      HEPATITIS B SURFACE ANTIBODY - Normal    Hepatitis B Surface AB <3.1     HEPATITIS B SURFACE ANTIGEN - Normal    Hepatitis B Surface AG Nonreactive     HEPATITIS C ANTIBODY - Normal    Hepatitis C AB Nonreactive     HEPATITIS B CORE ANTIBODY, TOTAL - Normal    Hepatitis B Core AB- Total Nonreactive      Narrative:     Results from patients taking biotin supplements or receiving high-dose biotin therapy should be interpreted with caution due to possible interference with this test. Providers may contact their local laboratory for further information.   HIV 1/2 ANTIGEN/ANTIBODY SCREEN WIH REFLEX TO CONFIRMATION - Normal    HIV 1/2 Antigen/Antibody Screen with Reflex to Confirmation Nonreactive      Narrative:     HIV Ag/Ab screen is performed using the Siemens Lightbox HIV Ag/Ab Combo assay which detects the presence of HIV p24 antigen as well as antibodies to HIV-1 (Group M and O) and HIV-2.  No laboratory evidence of HIV infection. If acute HIV infection is suspected, consider testing for HIV RNA by PCR (viral load).   VANCOMYCIN, TROUGH - Normal    Vancomycin, Trough 11.4     MAGNESIUM - Normal    Magnesium 2.23     VANCOMYCIN, TROUGH - Normal    Vancomycin, Trough 17.7     MAGNESIUM - Normal    Magnesium 2.02     VANCOMYCIN - Normal    Vancomycin 16.7      Narrative:     Vancomycin levels can be monitored according to area under the curve (AUC) or concentration (ug/mL). The preferred monitoring strategy is determined by the patient's renal function and indication for therapy.     For AUC monitoring, a random vancomycin level should be interpreted in the context of AUC rather than the concentration at a single point in time.    For concentration monitoring, a trough concentration drawn immediately prior to the next dose is preferred.       Therapeutic ranges using concentration-guided results:  Peak (all ages):                  30.0-40.0 ug/mL  Trough (all ages):                10.0-20.0 ug/mL               MAGNESIUM - Normal    Magnesium 2.26     PHOSPHORUS - Normal    Phosphorus 4.4     B-TYPE NATRIURETIC PEPTIDE - Normal    BNP 95      Narrative:        <100 pg/mL - Heart failure unlikely  100-299 pg/mL - Intermediate probability of acute heart                  failure exacerbation. Correlate with clinical                  context and patient history.    >=300 pg/mL - Heart Failure likely. Correlate with clinical                  context and patient history.     Biotin interference may cause falsely decreased results. Patients taking a Biotin dose of up to 5 mg/day should refrain from taking Biotin for 24 hours before sample  collection. Providers may contact their local laboratory for further information.   MAGNESIUM - Normal    Magnesium 1.93     MAGNESIUM - Normal    Magnesium 1.89     VANCOMYCIN - Normal    Vancomycin 17.3      Narrative:     Vancomycin levels can be monitored according to area under the curve (AUC) or concentration (ug/mL). The preferred monitoring strategy is determined by the patient's renal function and indication for therapy.     For AUC monitoring, a random vancomycin level should be interpreted in the context of AUC rather than the concentration at a single point in time.    For concentration monitoring, a trough concentration drawn immediately prior to the next dose is preferred.       Therapeutic ranges using concentration-guided results:  Peak (all ages):                  30.0-40.0 ug/mL  Trough (all ages):                10.0-20.0 ug/mL              FOLATE - Normal    Folate, Serum 11.8      Narrative:     Low           <3.4  Borderline 3.4-5.0  Normal        >5.0    Patients receiving more than 5 mg/day of biotin may have interference in test results. A sample should be taken no sooner than eight hours after previous dose. Contact the testing laboratory for additional information.    LACTATE - Normal    Lactate 1.6      Narrative:     Venipuncture immediately after or during the  administration of Metamizole may lead to falsely low results. Testing should be performed immediately  prior to Metamizole dosing.   TSH WITH REFLEX TO FREE T4 IF ABNORMAL - Normal    Thyroid Stimulating Hormone 0.50      Narrative:     TSH testing is performed using different testing methodology at [facility] than at other [facility]. Direct result comparisons should only be made within the same method.     AMMONIA - Normal    Ammonia 30     TSH - Normal    Thyroid Stimulating Hormone 1.09      Narrative:     TSH testing is performed using different testing methodology at [facility] than at other [facility]. Direct result comparisons should only be made within the same method.     VANCOMYCIN - Normal    Vancomycin 15.9      Narrative:     Vancomycin levels can be monitored according to area under the curve (AUC) or concentration (ug/mL). The preferred monitoring strategy is determined by the patient's renal function and indication for therapy.     For AUC monitoring, a random vancomycin level should be interpreted in the context of AUC rather than the concentration at a single point in time.    For concentration monitoring, a trough concentration drawn immediately prior to the next dose is preferred.       Therapeutic ranges using concentration-guided results:  Peak (all ages):                  30.0-40.0 ug/mL  Trough (all ages):                10.0-20.0 ug/mL              PHOSPHORUS - Normal    Phosphorus 3.8     LACTATE - Normal    Lactate 1.8      Narrative:     Venipuncture immediately after or during the administration of Metamizole may lead to falsely low results. Testing should be performed immediately  prior to Metamizole dosing.   CORTISOL AM - Normal    Cortisol  A.M. 18.0     VANCOMYCIN - Normal    Vancomycin 13.4      Narrative:     Vancomycin levels can be monitored according to area under the curve (AUC) or concentration (ug/mL). The preferred monitoring  strategy is determined by the patient's renal function and indication for therapy.     For AUC monitoring, a random vancomycin level should be interpreted in the context of AUC rather than the concentration at a single point in time.    For concentration monitoring, a trough concentration drawn immediately prior to the next dose is preferred.       Therapeutic ranges using concentration-guided results:  Peak (all ages):                  30.0-40.0 ug/mL  Trough (all ages):                10.0-20.0 ug/mL              LACTATE - Normal    Lactate 1.6      Narrative:     Venipuncture immediately after or during the administration of Metamizole may lead to falsely low results. Testing should be performed immediately  prior to Metamizole dosing.   MAGNESIUM - Normal    Magnesium 2.06     MAGNESIUM - Normal    Magnesium 2.30     MAGNESIUM - Normal    Magnesium 2.28     LACTATE - Normal    Lactate 1.6      Narrative:     Venipuncture immediately after or during the administration of Metamizole may lead to falsely low results. Testing should be performed immediately  prior to Metamizole dosing.   TROPONIN I, HIGH SENSITIVITY - Normal    Troponin I, High Sensitivity 12      Narrative:     Less than 99th percentile of normal range cutoff-  Female and children under 18 years old <35 ng/L; Male <54 ng/L: Negative  Repeat testing should be performed if clinically indicated.     Female and children under 18 years old  ng/L; Male  ng/L:  Consistent with possible cardiac damage and possible increased clinical   risk. Serial measurements may help to assess extent of myocardial damage.     >120 ng/L: Consistent with cardiac damage, increased clinical risk and  myocardial infarction. Serial measurements may help assess extent of   myocardial damage.      NOTE: Children less than 1 year old may have higher baseline troponin   levels and results should be interpreted in conjunction with the overall   clinical context.    NOTE:  Troponin I testing is performed using a different   testing methodology at [facility] than at other   [facility]. Direct result comparisons should only   be made within the same method.     POCT GLUCOSE - Normal    POCT Glucose 97     POCT GLUCOSE - Normal    POCT Glucose 97     POCT GLUCOSE - Normal    POCT Glucose 95     RESPIRATORY CULTURE/SMEAR   STREPTOCOCCUS PNEUMONIAE ANTIGEN, URINE   LEGIONELLA ANTIGEN, URINE   STAPHYLOCOCCUS AUREUS/MRSA COLONIZATION, CULTURE   URINALYSIS WITH REFLEX CULTURE AND MICROSCOPIC    Narrative:     The following orders were created for panel order Urinalysis with Reflex Culture and Microscopic.  Procedure                               Abnormality         Status                     ---------                               -----------         ------                     Urinalysis with Reflex C...[[id number]]  Abnormal            Final result               Extra Urine Gray Tube[[id number]]                            Final result                 Please view results for these tests on the individual orders.   EXTRA URINE GRAY TUBE    Extra Tube Hold for add-ons.     RESPIRATORY VIRAL PANEL    Adenovirus RVP Not Detected      Enterovirus/Rhinovirus RVP Not Detected      Human Bocavirus RVP Not Detected      Human Coronavirus RVP Not Detected      Metapneumovirus Not Detected      Influenza A Not Detected      Influenza A E9Z2-53 Not Detected      Influenza B PCR Not Detected      Parainfluenza PCR Not Detected      RSV PCR, RVP Not Detected     ELECTROLYTE PANEL, URINE    Sodium, Urine Random 54      Sodium/Creatinine Ratio 71      Potassium, Urine Random 40      Potassium/Creatinine Ratio 52      Chloride, Urine Random 49      Chloride/Creatinine Ratio 64      Creatinine, Urine Random 76.4     T-SPOT TB    T-SPOT. TB Interpretation Negative      Panel A Spot Count 1      Panel B Spot Count 0      NIL(NEG) Control Spot Count Passed      POS Control Spot Count Passed      ELECTROLYTE PANEL, URINE    Sodium, Urine Random 24      Sodium/Creatinine Ratio 29      Potassium, Urine Random 71      Potassium/Creatinine Ratio 85      Chloride, Urine Random 21      Chloride/Creatinine Ratio 25      Creatinine, Urine Random 83.8     STERILE CUP    Extra Tube Hold for add-ons.     TYPE AND SCREEN    ABO TYPE O      Rh TYPE POS      ANTIBODY SCREEN NEG     SLIDE REQUEST   ABORH    ABO TYPE O      Rh TYPE POS     VANCOMYCIN, TROUGH   B-TYPE NATRIURETIC PEPTIDE   PROCALCITONIN   ASPERGILLUS GALACTOMANNAN EIA,SERUM   FUNGITELL BETA-D GLUCAN,S   LACTATE   PREPARE RBC    PRODUCT CODE Z9210S95      Unit Number [id number]      Unit ABO O      Unit RH POS      XM INTEP COMP      Dispense Status TR      Blood Expiration Date May 15, 2024 23:59 EDT      PRODUCT BLOOD TYPE 5100      UNIT VOLUME 350     PREPARE RBC    PRODUCT CODE C0643E64      Unit Number [id number]      Unit ABO O      Unit RH POS      XM INTEP COMP      Dispense Status XM      Blood Expiration Date May 21, 2024 23:59 EDT      PRODUCT BLOOD TYPE 5100      UNIT VOLUME 278     POCT GLUCOSE METER   POCT GLUCOSE METER   POCT GLUCOSE METER   POCT GLUCOSE METER   POCT GLUCOSE METER   POCT GLUCOSE METER   POCT GLUCOSE METER   MORPHOLOGY    RBC Morphology See Below      Target Cells Few     PATH REVIEW-CBC DIFFERENTIAL    Pathologist Review-CBC Differential        Value: Microcytic anemia with anisopoikilocytosis, many junior cells and scattered schistocytes and elliptocytes, rule out iron deficiency anemia.  Neutrophilia.  Clinical correlation is recommended.      MORPHOLOGY    RBC Morphology See Below      Polychromasia Mild      Target Cells Few      [place] Cells Few         Imaging:  XR chest 1 view   Final Result   1. Interval slight worsening of extensive patchy and confluent   opacities throughout bilateral lungs, concerning for continued   progression of multifocal pneumonia/pneumonitis.   2. Slightly increased blunting of the  bilateral costophrenic angles   and small pleural effusions are not excluded.   3. Medical devices as above.        I personally reviewed the image(s)/study and resident interpretation   as stated by Dr. Tierra Robertson MD. I agree with the findings as   stated. This study was interpreted at [place], [place].        MACRO:   None        Signed by: Kendrick Simpson 4/26/2024 11:34 AM   Dictation workstation:   [id number]      XR chest 1 view   Final Result   1. Interval worsening of patchy alveolar and interstitial opacities   throughout the right lung with new development of similar appearing   opacities throughout the left mid to lower lung concerning for   progression of multifocal pneumonia/pneumonitis possibly related to   aspiration.   2. Possible trace right pleural effusion.        I personally reviewed the image(s)/study and resident interpretation   as stated by Dr. Tierra Robertson MD. I agree with the findings as   stated. This study was interpreted at [place], [place].        MACRO:   None        Signed by: Harish Welch 4/24/2024 12:40 PM   Dictation workstation:   [id number]      XR abdomen 1 view   Final Result   1.  Enteric tube with tip overlying the gastric pylorus versus   proximal duodenum. Consider slight advancement if post pyloric   positioning is desired.   2. Nonobstructive bowel-gas pattern.   3. Partially visualized nonspecific right basilar opacities. Findings   may be represent consolidation versus atelectasis.   4. Left retrocardiac bandlike opacities likely reflecting atelectasis.        I personally reviewed the images/study and I agree with the findings   as stated by Na Johnson MD. This study was interpreted at   [place], [place].        MACRO:   None        Signed by: Harish Welch 4/24/2024 10:05 AM   Dictation workstation:    [id number]      Transthoracic Echo (TTE) Complete   Final Result      XR abdomen 1 view   Final Result   1.  Interval repositioning of the enteric tube with the tip now   projecting over the distal gastric body/gastric antrum.   2. Nonobstructive bowel-gas pattern.        MACRO:   None        Signed by: Harish Welch 4/22/2024 9:38 AM   Dictation workstation:   [id number]      XR chest 1 view   Final Result   1. Mild central pulmonary vascular congestion.   2. Slight interval worsening of the previously described multifocal   patchy airspace opacities in the right lung with interval increase in   confluent opacification of the right lower lung. These findings may   be seen in the setting of pulmonary edema, however unable to exclude   an atypical infectious process or aspiration pneumonia/pneumonitis,   in the correct clinical context.   3.  Enteric tube is visualized with the distal tip curving and   projecting over the expected region of the gastric fundus. Consider   retraction followed by advancement, as clinically indicated.        The above findings were communicated to ESAU SILVA by   Karthik Guzman MD on 04/22/2024 at about 4:18 a.m. via EPIC secure   chat with read receipt by the provider.        I personally reviewed the images/study and I agree with the findings   as stated by Karthik Guzman MD. This study was interpreted at   [facility], [place], [place]        MACRO:   None        Signed by: Harish Welch 4/22/2024 9:35 AM   Dictation workstation:   [id number]      XR abdomen 1 view   Final Result   1.  Enteric tube tip projects over the stomach. Correlate with   desired positioning.   2. Right-greater-than-left lung airspace opacities which likely   represent multifocal pneumonia.        MACRO:   None        Signed by: Harish Welch 4/20/2024 12:07 PM   Dictation workstation:   [id number]      FL modified barium swallow study   Final Result   Impression:    Modified Barium Swallow Study completed. Pt received awake/alert,   positioned upright in [place] chair. Tested + for COVID - All   appropriate PPE donned and doffed for session. Wrist restraints in   place. Pt continued to present confused; however, participated well   and followed simple, 1-step commands. When cued, pt protruded tongue   w/ ongoing mild lingual deviation to R and noted continued dysarthric   speech. Informed verbal consent obtained prior to completion of exam.   X2 tsp trials were given of thin liquids and nectar thick liquids.   Testing D/C'd 2/2 severity of swallow function and safety. Pt w/   profoundly impaired oropharyngeal swallow as described below: Upon   fluoroing, noted ? Edematous epiglottis and arytenoids. Recommend ENT   consult. Orally, pt w/ lingual pumping, reduced A-P transit w/   slowed/weakened propulsion, premature pharyngeal entry and pooling in   pharynx prior to swallow onset. Delay and limited-no initiation in   swallow onset resulted in gross aspiration before and during the   swallow w/ thin and nectar thick liquids. During attempts made to   initiate a swallow, pt w/ limited to absent BOT retraction,   hyolaryngeal elevation/excursion, epiglottic deflection, pharyngeal   squeeze and UES opening. MAX amounts of pharyngeal residue remained   that resulted in additional gross aspiration of thin and nectar thick   liquids after the swallow attempts. Cues to provide effortful   swallows were provided by SLP in attempt to clear residuals; however,   not effective (in addition to biofeedback). Pt inconsistently   sensated aspiration events and produced ineffective cough in clearing   material as pt continued to re-aspirate expectorated material from   subglottis during inhalation. Cued cough was also not productive.   Given severity of deficits, recommend STRICT NPO. ? Deficits related   to brainstem/cranial nerve involvement given poor mechanics of the   swallow as well as  reduced sensation. Recommend NEUROLOGY CONSULT.   Additionally, recommend ENT CONSULT 2/2 ? Anatomical abnormalities   noted during exam. Lastly, recommend consideration of an alternate   form of nutrition/hydration as well as ? Need for long-term means of   nutrition/hydration pending overall etiology of dysphagia. MD   notified.        Rolando:   Thin: 8 - Material enters airway, passes below the folds, no effort   to eject (no cough). Nectar: 8 - Material enters airway, passes below   the folds, no effort to eject (no cough).        Recommendations:   STRICT NPO   Frequent, aggressive oral care is strongly recommended to improve   infection control as well as reduce dental plaque and bacteria on   oropharyngeal surfaces which may increase the risk nosocomial   infections, including pneumonia. Recommend consideration of an   alternate form of nutrition/hydration. RECOMMEND NEUROLOGY/ENT   CONSULT TO DETERMINE ETIOLOGY OF DYSPHAGIA. SLP to follow-up s/p   work-up on dysphagia etiology.        Goal:   Pt will tolerate least restrictive diet and recall/utilize safe   swallow guidelines independently with no clinical s/s of aspiration   100% of time             Plan:   SLP Services Indicated: Yes   Frequency: 2x week   Discussed POC with patient   SLP - OK to Discharge        Pain:   0-10   0 = No pain.        Inpatient Education:   Extensive education provided to patient regarding current swallow   function, recommendations/results, and POC.        Consultations/Referrals/Coordination of Services:   Neuro and ENT consults        Speech Therapy section of this report signed by Aleisha Lazo M.A., CCC-SLP on 4/19/2024 at 9:44 am.        RADIOLOGY FINDINGS:   No radiographic evidence of acute osseous abnormality within limits   of current examination.        Radiology section of this report signed by Dr. Kay.        IMPRESSION:   Swallow evaluation as dictated above by speech pathology.        I personally  reviewed the images/study and I agree with the findings   as stated by resident Clint Davenport. This study was interpreted   at [facility], [place], [place].        MACRO:   None        Signed by: Chip Kay 4/19/2024 10:13 AM   Dictation workstation:   [id number]      CT chest abdomen pelvis wo IV contrast   Final Result   1. Peribronchovascular patchy ground-glass/consolidative opacities   throughout the right lung and to a lesser degree within the dependent   left lower lobe compatible with multifocal pneumonia. Scattered   endobronchial debris most pronounced within the right lower lobe with   associated bronchial wall thickening and debris within the distal   trachea and right mainstem bronchus concerning for aspiration.   2. Extensive skin thickening of the left gluteal soft tissues which   appears slightly more pronounced when compared to 03/08/2023, though   with resolution of subcutaneous gas/fluid seen on the prior exam. No   definitive associated focal fluid collection, however evaluation is   limited in the absence of IV contrast.  The skin thickening/fat   stranding extends to the sacrococcygeal bone surface, possibly   reflecting decubitus ulceration.   3. Regions of focal ovoid hypoattenuation within the right inferior   scrotum as seen on images 236-239. The findings are indeterminate and   may be chronic (similar findings were seen on the 03/08/2023   examination). Scrotum is suboptimally evaluated on noncontrast CT and   scrotal ultrasound may be considered for further evaluation.   4. Large rectal stool volume. Please correlate for fecal impaction.   5. There is moderate to severe distention of the urinary bladder.   Please correlate for urinary retention.   6. Coarse pancreatic calcifications compatible with chronic   pancreatitis.   7. Bilateral C7 cervical ribs.   8. Cholelithiasis.        Signed by: Chester Chinchilla 4/17/2024 8:33 PM   Dictation  workstation:   [id number]      CT head wo IV contrast   Final Result   1. There is no evidence of acute hemorrhage, mass lesion or acute   infarction.             I personally reviewed the images/study and resident's interpretation   and I agree with the findings as stated by Britany Arroyo MD (resident   radiologist). This study was analyzed and interpreted at [place], [place].        MACRO:   None        Signed by: Case Elena 4/17/2024 6:41 PM   Dictation workstation:   [id number]      XR chest 2 views   Final Result   1.  Patchy opacities throughout the right mid and lower lung zones   concerning for pneumonia.        I personally reviewed the images/study and resident's interpretation   and I agree with the findings as stated by Britany Arroyo MD (resident   radiologist). This study was analyzed and interpreted at [place], [place].        MACRO:   None        Signed by: Nicholas Bingham 4/17/2024 6:28 PM   Dictation workstation:   [id number]        - EKG:     -       - Chest X-Ray:    - === 04/17/24 ===    XR CHEST 1 VIEW    - Impression -  1. Interval slight worsening of extensive patchy and confluent  opacities throughout bilateral lungs, concerning for continued  progression of multifocal pneumonia/pneumonitis.  2. Slightly increased blunting of the bilateral costophrenic angles  and small pleural effusions are not excluded.  3. Medical devices as above.    I personally reviewed the image(s)/study and resident interpretation  as stated by Dr. Tierra Robertson MD. I agree with the findings as  stated. This study was interpreted at [facility], [place], [place].    MACRO:  None    Signed by: Kendrick Simpson 4/26/2024 11:34 AM  Dictation workstation:   [id number]     - TTE:     - No results found for this or any previous visit from the past 1095 days.       - Transthoracic Echo  (TTE) Complete    Result Date: 4/23/2024   [facility], [address] [place] [address]                Tel [phone] and Fax [phone] TRANSTHORACIC ECHOCARDIOGRAM REPORT  Patient Name:      JARRED RANGEL     [place] Physician:    [id number] Pino Richards MD Study Date:        4/22/2024            Ordering Provider:    [id number] VETO VILLAGRAN MRN/PID:           [id number]             Fellow: Accession#:        [id number]         Nurse: Date of Birth/Age: 1955 / 69 years Sonographer:          RALPH Mckeon RDCS Gender:            M                    Additional Staff: Height:            177.80 cm            Admit Date:           4/17/2024 Weight:            58.97 kg             Admission Status:     Inpatient -                                                               Routine BSA / BMI:         1.74 m2 / 18.65      Encounter#:           [id number]                    kg/m2                                         [address] Blood Pressure: 95 /48 mmHg Study Type:    TRANSTHORACIC ECHO (TTE) COMPLETE Diagnosis/ICD: Encounter for screening for cardiovascular disorders-Z13.6 Indication:    Fluid Status CPT Code:      Echo Complete w Full Doppler-[id number] Patient History: Diabetes:         Yes Pertinent         HTN. PNA, CKD, AMS, bipolar, hypoxic respiratory failure, History:          sepsis. Study Detail: The following Echo studies were performed: 2D, M-Mode, Doppler and               color flow. Technically challenging study due to patient lying in               supine position, body habitus and prominent lung artifact.  PHYSICIAN INTERPRETATION: Left Ventricle: The left ventricular systolic function is normal, with an estimated ejection fraction of 65-70%. There are no  regional wall motion abnormalities. The left ventricular cavity size is normal. Left ventricular diastolic filling was indeterminate. Left Atrium: The left atrium is normal in size. Right Ventricle: The right ventricle is normal in size. There is normal right ventricular global systolic function. Right Atrium: The right atrium is normal in size. Aortic Valve: The aortic valve is trileaflet. There is minimal aortic valve cusp calcification. There is no evidence of aortic valve regurgitation. The peak instantaneous gradient of the aortic valve is 4.9 mmHg. Mitral Valve: The mitral valve is normal in structure. There is mild mitral annular calcification. There is no evidence of mitral valve regurgitation. Tricuspid Valve: The tricuspid valve was not well visualized. No evidence of tricuspid regurgitation. The right ventricular systolic pressure is unable to be estimated. Pulmonic Valve: The pulmonic valve is not well visualized. There is physiologic pulmonic valve regurgitation. Pericardium: There is no pericardial effusion noted. Aorta: The aortic root is normal. The ascending aorta was not well visualized. The aortic root appears normal in size and measures 3.70 cm. Systemic Veins: The inferior vena cava appears to be of normal size. There is IVC inspiratory collapse greater than 50%. In comparison to the previous echocardiogram(s): There are no prior studies on this patient for comparison purposes.  CONCLUSIONS:  1. Poorly visualized anatomical structures due to suboptimal image quality.  2. Left ventricular systolic function is normal with a 65-70% estimated ejection fraction.  3. Normal aortic root. QUANTITATIVE DATA SUMMARY: 2D MEASUREMENTS:                          Normal Ranges: Ao Root d:     3.70 cm   (2.0-3.7cm) LAs:           3.30 cm   (2.7-4.0cm) IVSd:          0.80 cm   (0.6-1.1cm) LVPWd:         0.90 cm   (0.6-1.1cm) LVIDd:         5.00 cm   (3.9-5.9cm) LVIDs:         4.40 cm LV Mass Index: 84.5 g/m2 LV  % FS        12.0 % LV SYSTOLIC FUNCTION BY 2D PLANIMETRY (MOD):                     Normal Ranges: EF-A4C View: 81.0 % (>=55%) EF-A2C View: 68.7 % EF-Biplane:  76.1 % LV DIASTOLIC FUNCTION:                        Normal Ranges: MV Peak E:    0.80 m/s (0.7-1.2 m/s) MV Peak A:    0.72 m/s (0.42-0.7 m/s) E/A Ratio:    1.12     (1.0-2.2) MV e'         0.12 m/s (>8.0) MV lateral e' 0.14 m/s MV medial e'  0.09 m/s E/e' Ratio:   6.97     (<8.0) MV DT:        219 msec (150-240 msec) MITRAL VALVE:                 Normal Ranges: MV DT: 219 msec (150-240msec) AORTIC VALVE:                         Normal Ranges: AoV Vmax:      1.11 m/s (<=1.7m/s) AoV Peak P.9 mmHg (<20mmHg) LVOT Max Yandel:  0.91 m/s (<=1.1m/s) LVOT VTI:      17.40 cm LVOT Diameter: 2.10 cm  (1.8-2.4cm) AoV Area,Vmax: 2.83 cm2 (2.5-4.5cm2)  RIGHT VENTRICLE: TAPSE: 20.9 mm RV s'  0.14 m/s TRICUSPID VALVE/RVSP:                   Normal Ranges: IVC Diam: 1.97 cm  [id number] Pino Richards MD Electronically signed on 2024 at 8:39:17 AM  ** Final **        - CT:     - === 24 ===    CT CHEST ABDOMEN PELVIS WO CONTRAST    - Impression -  1. Peribronchovascular patchy ground-glass/consolidative opacities  throughout the right lung and to a lesser degree within the dependent  left lower lobe compatible with multifocal pneumonia. Scattered  endobronchial debris most pronounced within the right lower lobe with  associated bronchial wall thickening and debris within the distal  trachea and right mainstem bronchus concerning for aspiration.  2. Extensive skin thickening of the left gluteal soft tissues which  appears slightly more pronounced when compared to 2023, though  with resolution of subcutaneous gas/fluid seen on the prior exam. No  definitive associated focal fluid collection, however evaluation is  limited in the absence of IV contrast.  The skin thickening/fat  stranding extends to the sacrococcygeal bone surface, possibly  reflecting decubitus  ulceration.  3. Regions of focal ovoid hypoattenuation within the right inferior  scrotum as seen on images 236-239. The findings are indeterminate and  may be chronic (similar findings were seen on the 03/08/2023  examination). Scrotum is suboptimally evaluated on noncontrast CT and  scrotal ultrasound may be considered for further evaluation.  4. Large rectal stool volume. Please correlate for fecal impaction.  5. There is moderate to severe distention of the urinary bladder.  Please correlate for urinary retention.  6. Coarse pancreatic calcifications compatible with chronic  pancreatitis.  7. Bilateral C7 cervical ribs.  8. Cholelithiasis.    Signed by: Chester Chinchilla 4/17/2024 8:33 PM  Dictation workstation:   [id number]     - MRI:     - === 06/08/13 ===    MRI BRAIN WO CONTRAST    - Impression -  There is no MRI evidence of acute infarction on the diffusion  weighted images.    There is mild to moderate diffuse brain parenchymal volume loss.    Several small scattered nonspecific white matter changes are noted  within the cerebral hemispheres bilaterally which while nonspecific  comment given the patient's age, likely represent sequelae of more  remote small vessel ischemic change.    There are inflammatory changes noted within the paranasal sinuses as  noted above.    The study was interpreted at [facility].    Interventions:  - ED Course    -   ED Course as of 04/26/24 1357   Wed Apr 17, 2024   1850 EKG interpreted by ED attending: Ventricular rate of 92 bpm.  Normal sinus rhythm.  Normal axis.  Normal intervals.  No ST or T wave elevations or inversions consistent with acute ischemia. [JT]      ED Course User Index  [JT] Milo Farfan MD MPH         Diagnoses as of 04/26/24 1357   Pneumonia due to infectious organism, unspecified laterality, unspecified part of lung   TERRI (acute kidney injury) (CMS-[facility])   Hypernatremia        - ED Medications    -    Medications   heparin (porcine) injection 5,000 Units (5,000 Units subcutaneous Given 4/26/24 0654)   melatonin tablet 3 mg (3 mg oral Given 4/24/24 2025)   acetaminophen (Tylenol) tablet 650 mg ( oral See Alternative 4/24/24 2024)     Or   acetaminophen (Tylenol) oral liquid 650 mg (650 mg oral Given 4/24/24 2024)     Or   acetaminophen (Tylenol) suppository 650 mg ( rectal See Alternative 4/24/24 2024)   vancomycin (Vancocin) pharmacy to dose - pharmacy monitoring (has no administration in time range)   guaiFENesin (Robitussin) 100 mg/5 mL syrup 100 mg (100 mg oral Given 4/24/24 2024)   glucagon (Glucagen) injection 1 mg ( intramuscular MAR Unhold 4/23/24 2236)   dextrose 50 % injection 25 g ( intravenous MAR Unhold 4/23/24 2236)   glucagon (Glucagen) injection 1 mg ( intramuscular MAR Unhold 4/23/24 2236)   dextrose 50 % injection 12.5 g ( intravenous MAR Unhold 4/23/24 2236)   mirtazapine (Remeron) tablet 7.5 mg ( oral Dose Auto Held 4/30/24 2100)   traMADol (Ultram) tablet 50 mg ( oral MAR Unhold 4/23/24 2236)   multivitamin with minerals 1 tablet (1 tablet oral Not Given 4/26/24 0900)   azithromycin (Zithromax) tablet 500 mg (500 mg oral Given 4/20/24 0805)   chlorhexidine (Hibiclens) 4 % liquid ( Topical Not Given 4/26/24 0900)   dextrose 10 % in water (D10W) infusion (100 mL/hr intravenous Restarted 4/20/24 1224)   polyethylene glycol (Glycolax, Miralax) packet 17 g ( oral MAR Unhold 4/23/24 2236)   insulin glargine (Lantus) injection 15 Units ( subcutaneous Dose Auto Held 4/30/24 0900)   sodium bicarbonate tablet 650 mg (650 mg oral Not Given 4/26/24 0900)   pantoprazole (ProtoNix) injection 40 mg (40 mg intravenous Given 4/26/24 1052)   albumin human solution  - Omnicell Override Pull (  Rate/Dose Verify 4/23/24 0245)   sennosides-docusate sodium (Annia-Colace) 8.6-50 mg per tablet 2 tablet (has no administration in time range)   dextrose 5 % in water (D5W) infusion ( intravenous Held by provider 4/26/24  0133)   insulin lispro (HumaLOG) injection 0-5 Units ( subcutaneous Not Given 4/26/24 1200)   sodium chloride (Ocean) 0.65 % nasal spray 1 spray (1 spray Each Nostril Not Given 4/26/24 0900)   albuterol 2.5 mg /3 mL (0.083 %) nebulizer solution 2.5 mg (2.5 mg nebulization Given 4/26/24 1031)   meropenem (Merrem) in sodium chloride 0.9 % 100 mL IV 1,000 mg (1,000 mg intravenous New Bag 4/26/24 1309)   dexAMETHasone (Decadron) injection 6 mg (has no administration in time range)   ipratropium-albuteroL (Duo-Neb) 0.5-2.5 mg/3 mL nebulizer solution 3 mL (has no administration in time range)   lactated Ringer's bolus 1,000 mL (0 mL intravenous Stopped 4/17/24 1846)   morphine injection 4 mg (4 mg intravenous Given 4/17/24 1737)   piperacillin-tazobactam-dextrose (Zosyn) IV 4.5 g (0 g intravenous Stopped 4/17/24 1915)   vancomycin in dextrose 5 % (Vancocin) IVPB 1,500 mg (0 mg intravenous Stopped 4/17/24 2200)   insulin lispro (HumaLOG) injection 5 Units (5 Units subcutaneous Given 4/18/24 0619)   remdesivir (Veklury) 200 mg in sodium chloride 0.9% 250 mL IV (0 mg intravenous Stopped 4/19/24 0120)     Followed by   remdesivir (Veklury) 100 mg in sodium chloride 0.9% 250 mL IV (0 mg intravenous Stopped 4/22/24 1613)   potassium chloride 20 mEq in 100 mL IV premix (0 mEq intravenous Stopped 4/19/24 0512)   haloperidol lactate (Haldol) injection 5 mg (5 mg intramuscular Given 4/19/24 0650)   barium sulfate (Varibar THIN Liquid) 81 % (w/w) suspension 5 mL (5 mL oral Given 4/19/24 0834)   barium sulfate (Varibar [place]) 40 % (w/v) suspension 5 mL (5 mL oral Given 4/19/24 0833)   vancomycin (Vancocin) in dextrose 5 % water (D5W) 500 mL IV 1,500 mg (0 mg intravenous Stopped 4/19/24 1619)   potassium chloride (Klor-Con) packet 40 mEq (40 mEq oral Given 4/19/24 2343)   potassium chloride 20 mEq in 100 mL IV premix (0 mEq intravenous Stopped 4/21/24 0108)   magnesium sulfate IV 2 g (0 g intravenous Stopped 4/21/24 0421)    vancomycin in dextrose 5 % (Vancocin) IVPB 500 mg (0 mg intravenous Stopped 4/21/24 0915)   potassium chloride (Klor-Con) packet 40 mEq (40 mEq oral Given 4/21/24 0912)   potassium phosphates 21 mmol in dextrose 5 % in water (D5W) 250 mL IV (0 mmol intravenous Stopped 4/22/24 0424)   lactated Ringer's bolus 500 mL (500 mL intravenous New Bag 4/22/24 0057)   lactated Ringer's bolus 1,000 mL (0 mL intravenous Stopped 4/22/24 0500)   vancomycin in dextrose 5 % (Vancocin) IVPB 500 mg (0 mg intravenous Stopped 4/22/24 0926)   lactated Ringer's bolus 1,000 mL (0 mL intravenous Stopped 4/22/24 1152)   potassium chloride 20 mEq in 100 mL IV premix (0 mEq intravenous Stopped 4/22/24 2012)   lactated Ringer's bolus 1,000 mL (0 mL intravenous Stopped 4/22/24 2226)   albumin human 5 % infusion 25 g (0 g intravenous Stopped 4/23/24 0445)   vancomycin in dextrose 5 % (Vancocin) IVPB 500 mg (0 mg intravenous Stopped 4/23/24 0923)   potassium chloride (Klor-Con) packet 40 mEq (40 mEq nasogastric tube Given 4/23/24 0853)   magnesium sulfate IV 2 g (0 g intravenous Stopped 4/23/24 1053)   sodium chloride 0.9 % bolus 500 mL (0 mL intravenous Stopped 4/24/24 1100)   vancomycin in dextrose 5 % (Vancocin) IVPB 500 mg (0 mg intravenous Stopped 4/24/24 1236)   potassium chloride 20 mEq in 100 mL IV premix (0 mEq intravenous Stopped 4/24/24 1727)   lactated Ringer's bolus 500 mL (0 mL intravenous Stopped 4/24/24 2339)   vancomycin in dextrose 5 % (Vancocin) IVPB 750 mg (0 mg intravenous Stopped 4/25/24 1516)   potassium chloride 20 mEq in 100 mL IV premix (0 mEq intravenous Stopped 4/26/24 0612)   furosemide (Lasix) injection 20 mg (20 mg intravenous Given 4/26/24 0146)   dexAMETHasone (PF) (Decadron) injection 6 mg (6 mg intravenous Given 4/26/24 0146)   furosemide (Lasix) injection 40 mg (40 mg intravenous Given 4/26/24 1141)        Past Medical History:  He has no past medical history on file.    Past Surgical History:  He has no past  "surgical history on file.    Family History  No family history on file.     Social history:  Alcohol:   Alcohol Use: Patient Unable To Answer (4/18/2024)    AUDIT-C     Frequency of Alcohol Consumption: Patient unable to answer     Average Number of Drinks: Patient unable to answer     Frequency of Binge Drinking: Patient unable to answer     Tobacco:   Tobacco Use: Unknown (4/18/2024)    Patient History     Smoking Tobacco Use: Never     Smokeless Tobacco Use: Unknown     Passive Exposure: Never      Illicit Drugs:   Social History     Substance and Sexual Activity   Drug Use Defer       He reports that he has never smoked. He has never been exposed to tobacco smoke. He does not have any smokeless tobacco history on file. He reports that he does not currently use alcohol. Drug use questions deferred to the physician.     Allergies  Patient has no known allergies.    Review of Systems   Reason unable to perform ROS: unable to assess as patient is altered.        Physical Exam  Constitutional:       General: He is in acute distress.   HENT:      Head: Normocephalic and atraumatic.      Mouth/Throat:      Mouth: Mucous membranes are dry.   Eyes:      General: No scleral icterus.  Cardiovascular:      Rate and Rhythm: Tachycardia present.   Pulmonary:      Breath sounds: Rhonchi present.      Comments: tachypnea  Abdominal:      General: Abdomen is flat. Bowel sounds are normal.      Palpations: Abdomen is soft.      Tenderness: There is no abdominal tenderness.   Musculoskeletal:      Right lower leg: No edema.      Left lower leg: No edema.   Skin:     General: Skin is warm and dry.   Neurological:      Comments: AOx0            Last Recorded Vitals  Blood pressure 100/60, pulse 100, temperature 36.9 °C (98.4 °F), resp. rate 26, height 1.778 m (5' 10\"), weight 63.6 kg (140 lb 3.4 oz), SpO2 94%.    Relevant Results  Labs:  Most recent labs:   Results from last 7 days   Lab Units 04/26/24  1010 04/25/24  0627 " 04/24/24  0902   WBC AUTO x10*3/uL 16.2* 16.2* 24.3*   HEMOGLOBIN g/dL 7.5* 8.3* 8.2*   HEMATOCRIT % 24.0* 27.2* 25.4*   PLATELETS AUTO x10*3/uL 264 255 233     Results from last 7 days   Lab Units 04/26/24  1010 04/25/24  2117 04/25/24  0627   CO2 mmol/L 19* 22 21   ANION GAP mmol/L 19 16 16   BUN mg/dL 41* 41* 47*   CREATININE mg/dL 2.82* 2.50* 2.59*   GLUCOSE mg/dL 387* 333* 265*   CALCIUM mg/dL 8.8 7.9* 8.5*   MAGNESIUM mg/dL 2.28 2.06 2.30   PHOSPHORUS mg/dL 4.1 3.3 4.0      Results from last 7 days   Lab Units 04/24/24  0902 04/22/24  0312   ALT U/L 8* 7*   AST U/L 10 9   ALK PHOS U/L 78 81      Results from last 7 days   Lab Units 04/23/24  0732   INR  1.7*     Results from last 7 days   Lab Units 04/26/24  1125 04/26/24  0123 04/24/24  0903   POCT PH, ARTERIAL pH 7.38 7.49* 7.44*   POCT PO2, ARTERIAL mm Hg 77* 57* 66*   POCT PCO2, ARTERIAL mm Hg 25* 28* 34*   FIO2 % 100 21 40     Results from last 7 days   Lab Units 04/22/24  0312   POCT PH, VENOUS pH 7.41   POCT PCO2, VENOUS mm Hg 28*     Results from last 7 days   Lab Units 04/26/24  1010 04/25/24  1103 04/24/24  0902   LACTATE mmol/L 1.6 1.6 1.8           Imaging:  All images:   XR chest 1 view    Result Date: 4/26/2024  Interpreted By:  Kendrick Simpson,  and Ailyn Mayorga STUDY: XR CHEST 1 VIEW;  4/26/2024 1:35 am   INDICATION: Signs/Symptoms:Acute hypoxic respiratory failure.   COMPARISON: Chest radiograph 04/24/2024 and CT scan chest, abdomen pelvis 04/17/2024   ACCESSION NUMBER(S): [id number]   ORDERING CLINICIAN: MICHELLE LEA   FINDINGS: AP radiograph of the chest was provided. Enteric tube is again seen coursing below diaphragm and tip not included in field of view.   CARDIOMEDIASTINAL SILHOUETTE: Cardiomediastinal silhouette is stable in size and configuration. Mild aortic knob calcifications.   LUNGS: Redemonstrated extensive patchy and confluent bilateral airspace opacities, with interval slight worsening of lung aeration as compared to prior  examination. Mild blunting of bilateral costophrenic angles, slightly more conspicuous than prior. There is no pneumothorax.   ABDOMEN: No remarkable upper abdominal findings.   BONES: No acute osseous abnormality.       1. Interval slight worsening of extensive patchy and confluent opacities throughout bilateral lungs, concerning for continued progression of multifocal pneumonia/pneumonitis. 2. Slightly increased blunting of the bilateral costophrenic angles and small pleural effusions are not excluded. 3. Medical devices as above.   I personally reviewed the image(s)/study and resident interpretation as stated by Dr. Tierra Robertson MD. I agree with the findings as stated. This study was interpreted at [facility], [place], [place].   MACRO: None   Signed by: Kendrick Simpson 4/26/2024 11:34 AM Dictation workstation:   [id number]       Assessment/Plan   Principal Problem:    Pneumonia due to infectious organism, unspecified laterality, unspecified part of lung      Assessment/Plan:  Brian Rodriguez is a 69 y.o. male PMHx CKD, DM2, HTN, sacral wound, bipolar disorder who was originally admitted from his SNF 2/2 AMS, transferred now back to the MICU in the setting of acute hypoxic respiratory failure potentially secondary to multiple aspiration events vs developing ARDS in the setting of COVID pneumonia.       NEURO  #Acute Encephalopathy  #Dementia  #Hypoactive Delirium  ::per family baseline AOx1-2, able to converse and follow commands  ::TSH 1.09, B12 1895, folate 11.8, ammonia WNL  ::CT head 4/17: no acute  ::most likely iso of infectious etiologies as described below additionally iso of hypernatremia  -neurology consulted, appreciate recommendations: per neurology would like MRI brain w/o contrast (not stable enough for this at the moment), can get EMG to r/o underlying motor neuron disease    #Depression  -hold mirtazapine 7.5 mg at bedtime    CARDIO  #Hypotension  ::iso septic  shock, total fluid + over this hospital stay is 4.5 L  -holding off on fluid resuscitating at the moment as patient has CXR suggestive of fluid overload  -POCUS today, IVC large but very compressible, on HFNC though  -giving 500 ml LR    #HTN  -holding home amlodipine iso hypotension    #HLD  -continue home atorvastatin    PULM  #Acute Hypoxic Respiratory Failure  #Multifocal Pneumonia  #Aspiration Pneumonia  #COVID Pneumonia   #ARDS  ::CXR 4/26: interval slight worsening of extensive patchy and confluent opacities throughout bilateral lungs, concerning for continued progression of multifocal  ::CT chest 4/17: Peribronchovascular patchy ground-glass/consolidative opacities throughout the right lung   ::MRSA nares positive, legionella/strep negative  -s/p azithromycin 4/18-4/20, Remdesevir 4/18-4/22  -start decadron 6 mg Q6H  -broaden to meropenem, vancomycin as below   -send fungal studies, repeat BC, repeat urine studies  -aggressive respiratory hygiene  -duonebs PRN    FEN/GI  #Constipation-resolved  -bowel regimen ordered    #Nutrition  -NPO concern for aspiration pneumonia - hold tube feeds  -diet consulted, appreciate recommendations    #Dysphagia  -ENT previously consulted, no anatomic cause  -neurology consulted as above  -PEG     RENAL  #TERRI on CKD Stage III  #Hypernatremia  ::most likely iso hypovolemia/hemodynamic instability  ::baseline Cr 1.7  -FWD: 2.1L  -PM RFP  -will restart free water flushes: 250 Q4H    #Hx Retention  -urology placed last bunch    ID  #Acute Hypoxic Respiratory Failure  #Multifocal Pneumonia  #Aspiration Pneumonia  #COVID Pneumonia   ::CXR 4/26: interval slight worsening of extensive patchy and confluent opacities throughout bilateral lungs, concerning for continued progression of multifocal  ::CT chest 4/17: Peribronchovascular patchy ground-glass/consolidative opacities throughout the right lung   ::MRSA nares positive, legionella/strep negative  -s/p azithromycin 4/18-4/20,  Remdesevir 4/18-4/22  -start decadron 6 mg Q6H  -broaden to meropenem, vancomycin as below   -send fungal studies, repeat BC, repeat urine studies, fu cultures  -aggressive respiratory hygiene  -duonebs PRN    ABX:  Vanc: 4/17-  Zosyn: 4/17-4/26  Skylar: 4/26-  Azithro: 4/18-4/20    #Hidradenitis Suppurativa  #Multiple Sacral Wounds  ::derm has previously recommended humira/minocycline 100 BID but currently w/o lab confirmation, hold off iso septic shock  ::hep c/b negative, HIV negative  -will need outpatient dermatology follow up w/ doxycycline 100 BID outpatient   -wound care consulted, appreciate recommendations    ENDO  #T2DM  ::A1c 8.5  -holding Lantus 15U while NPO, continue SSI -> will half and then uptitrate as tolerated    HEME/ONC  #Acute on Chronic Anemia  ::most likely iso iron deficiency anemia and anemia of chronic disease: iron low, TIBC 95, folate WNL, B12 supratherapeutic  -continue to monitor  -hold off on iron supplementation iso sepsis           F: fluid up total  E: Replete PRN  N: NPO  DVT Ppx: subq  GI Ppx: Pantoprazole   Access/Lines: PIV / Barton   Abx: Skylar/Vanc  O2: 60/60    Code status: DNR  NOK: DEEPAK RANGEL, Home Phone: [phone]     Plan is not finalized till staffed with the attending physician Dr. Houston.    Fiona Swanson MD  PGY-1 Internal Medicine  Please Haiku with any questions or concerns.

## 2024-04-26 NOTE — SIGNIFICANT EVENT
Rapid Response Note    I responded to Rapid Response for desaturation to 85% on 8 liters. Primary team at the bedside along with DACR, Dr. Munroe.     Rapid Response RT at bedside obtaining ABG.     Upon examination at the bedside, patient tachypneic 30s-40s with abdominal retractions.  Breath sounds significant for coarse crackles heard bilaterally.     Placed patient on 100% Nonrebreather which improved SpO2 to 96%.  Attempted to wean to 50% Venti mask but desaturated back to the mid 80s.      AB.38, 25, 77, SaO2 97%, HCO3 14.8, Lactate 2.2    MICU contacted by Dr. Munroe for escalation of care.  Patient transferred to MICU bed 17 on 100% O2.  Updates provided to MICU team and primary ICU RN.

## 2024-04-26 NOTE — SIGNIFICANT EVENT
Rapid Response RN responding for RADAR score of 6 due to the following VS: T 36.3 °Celsius;  ; RR 24; /49; SPO2 92% .      Reviewed abnormal VS with bedside RN who expressed no concerns regarding the patient's current condition based upon these.  No interventions by Rapid Response team indicated at this time.

## 2024-04-26 NOTE — PROGRESS NOTES
Brian Rodriguez is a 69 y.o. male on day 9 of admission presenting with Pneumonia due to infectious organism, unspecified laterality, unspecified part of lung.    Subjective   Overnight, rapid response for acute hypoxic respiratory failure. See significant event note 4/26 from Richard Hernandez MD. Stable on 8L NC, s/p 20IV lasix, seemingly unable to manage secretions, concern for worsening aspiration pna/pneumonitis vs. Progression of covid pneumonia. Given another 6mg decadron    This morning: Patient examined in floor bed, active gurgling and weakly coughing. Patient denies acute changes of symptoms, following command for blinking but not responding verbally nor moving extremitis. Brother spoken with over the phone and wants to speak with sibling once more before further discussions of code status changes. Called back twice but both times to voicemail.    Objective   Physical Exam  Physical Exam  Vitals reviewed.   Constitutional:       General: He is not in acute distress.     Appearance: He is ill-appearing.   HENT:      Head: Normocephalic and atraumatic.   Eyes:      General: No scleral icterus.     Pupils: Pupils are equal, round, and reactive to light.   Cardiovascular:      Rate and Rhythm: Regular rhythm. Tachycardia present.      Pulses: Normal pulses.      Heart sounds: Normal heart sounds.   Pulmonary:      Effort: Tachypnea present.      Breath sounds: Rhonchi (diffuse) present.   Musculoskeletal:      Right lower leg: No edema.      Left lower leg: No edema.   Skin:     General: Skin is warm and dry.      Capillary Refill: Capillary refill takes less than 2 seconds.   Neurological:      General: No focal deficit present.      Mental Status: He is alert. He is disoriented.      GCS: GCS eye subscore is 4. GCS verbal subscore is 2. GCS motor subscore is 6.      Motor: Weakness (weakness to gravity in B/l U and L extremities) present.      Comments: Oriented x0, unable to respond verbally     Last Recorded  Vitals  Vitals:    04/26/24 0258 04/26/24 0347 04/26/24 0500 04/26/24 0858   BP: 93/50 94/66 93/58 102/64   BP Location: Right arm Right arm Right arm    Patient Position: Lying Lying Lying    Pulse: 104 100 102 94   Resp: 22 20 22 16   Temp: 36.1 °C (97 °F) 36.3 °C (97.3 °F) 36.2 °C (97.2 °F) 36.9 °C (98.4 °F)   TempSrc: Temporal Temporal Temporal    SpO2: 92% 90% 92% 94%   Weight:       Height:         Intake/Output last 3 Shifts:  I/O last 3 completed shifts:  In: 1329 (16.7 mL/kg) [I.V.:1179 (14.9 mL/kg); IV Piggyback:150]  Out: 2700 (34 mL/kg) [Urine:2700 (0.9 mL/kg/hr)]  Dosing Weight: 79.4 kg     Relevant Results  This patient has a urinary catheter   Reason for the urinary catheter remaining today? incontinent patients with an open sacral wound or perineal wounds    No results found.    Results for orders placed or performed during the hospital encounter of 04/17/24 (from the past 24 hour(s))   POCT GLUCOSE   Result Value Ref Range    POCT Glucose 290 (H) 74 - 99 mg/dL   Vancomycin   Result Value Ref Range    Vancomycin 13.4 5.0 - 20.0 ug/mL   Lactate   Result Value Ref Range    Lactate 1.6 0.4 - 2.0 mmol/L   POCT GLUCOSE   Result Value Ref Range    POCT Glucose 349 (H) 74 - 99 mg/dL   POCT GLUCOSE   Result Value Ref Range    POCT Glucose 287 (H) 74 - 99 mg/dL   POCT GLUCOSE   Result Value Ref Range    POCT Glucose 292 (H) 74 - 99 mg/dL   Renal Function Panel   Result Value Ref Range    Glucose 333 (H) 74 - 99 mg/dL    Sodium 151 (H) 136 - 145 mmol/L    Potassium 3.1 (L) 3.5 - 5.3 mmol/L    Chloride 116 (H) 98 - 107 mmol/L    Bicarbonate 22 21 - 32 mmol/L    Anion Gap 16 10 - 20 mmol/L    Urea Nitrogen 41 (H) 6 - 23 mg/dL    Creatinine 2.50 (H) 0.50 - 1.30 mg/dL    eGFR 27 (L) >60 mL/min/1.73m*2    Calcium 7.9 (L) 8.6 - 10.6 mg/dL    Phosphorus 3.3 2.5 - 4.9 mg/dL    Albumin 1.5 (L) 3.4 - 5.0 g/dL   Magnesium   Result Value Ref Range    Magnesium 2.06 1.60 - 2.40 mg/dL   POCT GLUCOSE   Result Value Ref Range     POCT Glucose 318 (H) 74 - 99 mg/dL   Blood Gas Arterial Full Panel   Result Value Ref Range    POCT pH, Arterial 7.49 (H) 7.38 - 7.42 pH    POCT pCO2, Arterial 28 (L) 38 - 42 mm Hg    POCT pO2, Arterial 57 (L) 85 - 95 mm Hg    POCT SO2, Arterial 88 (L) 94 - 100 %    POCT Oxy Hemoglobin, Arterial 85.7 (L) 94.0 - 98.0 %    POCT Hematocrit Calculated, Arterial 23.0 (L) 41.0 - 52.0 %    POCT Sodium, Arterial 149 (H) 136 - 145 mmol/L    POCT Potassium, Arterial 2.8 (LL) 3.5 - 5.3 mmol/L    POCT Chloride, Arterial 117 (H) 98 - 107 mmol/L    POCT Ionized Calcium, Arterial 1.31 1.10 - 1.33 mmol/L    POCT Glucose, Arterial 311 (H) 74 - 99 mg/dL    POCT Lactate, Arterial 2.8 (H) 0.4 - 2.0 mmol/L    POCT Base Excess, Arterial -1.6 -2.0 - 3.0 mmol/L    POCT HCO3 Calculated, Arterial 21.3 (L) 22.0 - 26.0 mmol/L    POCT Hemoglobin, Arterial 7.6 (L) 13.5 - 17.5 g/dL    POCT Anion Gap, Arterial 14 10 - 25 mmo/L    Patient Temperature 37.0 degrees Celsius    FiO2 21 %   Respiratory Culture/Smear    Specimen: SPUTUM; Fluid   Result Value Ref Range    Respiratory Culture/Smear (A)      Culture not performed. See Gram stain findings. Recollect if clinically indicated.    Gram Stain (A)      Gram stain indicates specimen contains significant salivary contamination.   POCT GLUCOSE   Result Value Ref Range    POCT Glucose 363 (H) 74 - 99 mg/dL     Assessment/Plan   Principal Problem:    Pneumonia due to infectious organism, unspecified laterality, unspecified part of lung    Brian Rodriguez is a 69 y.o. male presenting for with CKD, DM, HTN, sacral wound, bipolar, here with sepsis, likely 2/2 aspiration PNA. Responding to fluid resuscitation, continue pending pressure stabilization. Required albumin infusion. Unclear if patient is hypovolemic from dehydration vs bacteremia/sepsis. Course c/b persistent dysphagia with concern for neurological cause, neuro on board recommending MRI.     Updates 04/26  - transferred to [facility] for  continuous pulse ox monitoring; another rapid as above in subjective  - worsened lethargy/mentation -->  GoC with family ongoing  - Monitor RFP BID for now, trend lactate     #AMS, worsening  :: Per MICU team, baseline oriented x2  :: TSH 0.5  :: Suspect 2/2 sepsis and hypernatremia, possible motor neuron disease  Plan:  - Continue with fluid resuscitation  - Ammonia wnl  - pending MRI Brain     #Acute Hypoxic Respiratory failure  #Multifocal PNA  #Aspiration PNA, worsening  #COVID PNA  ::s/p Azithromycin (4/18-4/20)  ::CT with GGO throughout right lung and to lesser degree within dependent left lower lobe  ::Urine strep/legionella negative, MRSA nares positive  :: s/p 5 days Remdesevir (last dose 4/22)   ::s/p 3 doses of decadron  ::CXR 4/24 c/f worsening aspiration pna/pneumonitis  Plan:  - Continue with Vanc and Zosyn  - Frequent suctioning for BP hygiene, RT on floor engaged  - Patient on 4LNC  - Hold TF until more stable/less altered    #acute Hypotension  #chronic HTN  ::fluid responsive, active resuscitation in micu  ::s/p 500ml this am  ::AM cortisol normal  Plan:  - Home home amlodipine in setting of sepsis  - Fluid bolus as appropriate I/s/o dehydration and ongoing diarrhea      #Dysphagia  :: ENT previously consulted, no anatomic cause  ::Concern for central cause raised 4/18 (?stroke)  Plan:  - Neuro recs for dysphagia workup; MRI Brain, ammonia level     #purulent drainage from chronic sacral wounds  #hidradenitis suppurativa  ::Derm recommending humira and minocycline 100 BID, patient will need labs to confirm eligibility for these medications  ::hepatitis C antibody negative, hepatitis B surface antigen, hepatitis B core antibody negative, total, hepatitis B surface antibody negative, T-spot pending, hIV 1/2 antigen/antibody screen negative -> to determine Humira eligibility  ::wound team following, daily dressing changes  Plan:  -Will need to follow up outpatient with dermatology within 2 weeks of  discharge.  -Doxycycline 100 Bid outpatient after abx course for pneumonia is completed  -Wound care consult     #Acute on chronic anemia  :: Mixed anemia --> iron deficiency +/- Acute illness/inflammatory  ::Iron <10, TIBC incalculable, UIBC 95  ::Folate wnl  ::B12 supratherapeutic  ::s/p 1U PRBC 4/23  Plan:  - CTM for s/sx of bleeding  - Outpt iron replacement    # TERRI on CKD Stage III, improving  # Hypernatremia  ::Free water deficit approx 1.5L  ::s/p 500ml this am  :: Suspect 2/2 hypovolemia with component of sepsis ATN  Plan:  - Baseline Cr 1.7  - Monitor RFP BID for now  - D5W @ 125ml/hr, Free water deficit 2L this am     # T2DM  # Hyperglycemia  :: Last Hba1c 9.1 March 2023  Plan:  - hold 15 units glargine and SSI while NPO and TF held, off steroids  - Q4 POCT checks while NPO     # Fecal impaction on CT - improving?  # diarrhea  ::new diarrhea reported overnight, persistent  ::hx of fecal impaction, may need repeat KUB pending GOC discussion  Plan:  - Doc/senna and Miralax from BID to PRN I/s/o new, persistent diarrhea  - S/p Enema  - No melena     #Nutrition  - NPO while concern for aspiration  - Diet consulted appreciate recs  - Tube feeds held  - Head of bed elevated, frequent suctioning, oral hygiene.    #Depression  - Mirtazapine 7.5 mg nightly, holding d/t persistent AMS  # HLD  - Continue home Atorvastatin 10 mg     F D5W @ 125ml/hr  E Replete as needed  N Trickle feeds tube NPO  G PEG 17g  DVT: SubQ heparin  Abx: Zosyn  Drips: None  Pressors: None  Code status: Full Code  NoK: DEEPAK RANGEL, Home Phone: [phone]    Eliecer Serrano DO  Internal Medicine-Genetics, PGY-1

## 2024-04-26 NOTE — PROGRESS NOTES
"Nutrition Follow Up Assessment:   Nutrition Assessment         Patient is a 69 y.o. male presenting initially with a change in mental status from NH.  Treated for PNA.  Transferred to the MICU for the first time on 4/22 for hypoxic respiratory failure and sepsis.  Transferred out to the floor but back again today after a rapid response for tachypnea and hypoxic respiratory failure.  Concern for ARDS from aspiration and COVID.  He remains in COVID isolation today.     Pt with a dobhoff in place for enteral access d/t failed MBSS on 4/19.  He has been on and off of his tube feed since dobhoff placed based on respiratory status and transfers to and from MICU.  TF order had been Glucerna 1.5@ 55mls/hr.  Tip of tube noted to be in gastric antrum/possible proximal duodenum per KUB on 4/23.      Anthropometrics:  Height: 177.8 cm (5' 10\")   Weight: 63.6 kg (140 lb 3.4 oz)   BMI (Calculated): 20.12  IBW/kg (Dietitian Calculated): 75.5 kg  Percent of IBW: 105 %       Weight History:   Wt Readings from Last 15 Encounters:   04/23/24 4/22/24 4/18/24 63.6 kg (140 lb 3.4 oz)  59.2kg  55.3kg (admit)   03/30/23 85.3 kg (188 lb)     12/22/22: 80kg  7/22/20: 75.7kg    Weight Change %:     Based on available weights, he is down 25% in one year, if weights are accurate.    Nutrition Focused Physical Exam Findings:  Defer-- although able to see patient through windows into his room.  He appears to be moderately to severely wasted to face and upper body upon visual inspection.     Subcutaneous Fat Loss:      Muscle Wasting:     Edema:  Edema: none  Physical Findings:  Skin: Positive (MASD scrotum, wound to sacrum)    Nutrition Significant Labs:  BMP Trend:   Results from last 7 days   Lab Units 04/26/24  1010 04/25/24  2117 04/25/24  0627 04/24/24  1658   GLUCOSE mg/dL 387* 333* 265* 165*   CALCIUM mg/dL 8.8 7.9* 8.5* 8.0*   SODIUM mmol/L 153* 151* 150* 151*   POTASSIUM mmol/L 4.4 3.1* 3.9 3.5   CO2 mmol/L 19* 22 21 22   CHLORIDE " "mmol/L 119* 116* 117* 119*   BUN mg/dL 41* 41* 47* 54*   CREATININE mg/dL 2.82* 2.50* 2.59* 2.86*    , A1C:  Lab Results   Component Value Date    HGBA1C 8.5 (H) 04/18/2024   , BG POCT trend:   Results from last 7 days   Lab Units 04/26/24  1304 04/26/24  0859 04/26/24  0405 04/25/24  2301 04/25/24 2004   POCT GLUCOSE mg/dL 390* 356* 363* 318* 292*    , Renal Lab Trend:   Results from last 7 days   Lab Units 04/26/24  1010 04/25/24  2117 04/25/24  0627 04/24/24  1658   POTASSIUM mmol/L 4.4 3.1* 3.9 3.5   PHOSPHORUS mg/dL 4.1 3.3 4.0 4.0   SODIUM mmol/L 153* 151* 150* 151*   MAGNESIUM mg/dL 2.28 2.06 2.30 2.43*   EGFR mL/min/1.73m*2 23* 27* 26* 23*   BUN mg/dL 41* 41* 47* 54*   CREATININE mg/dL 2.82* 2.50* 2.59* 2.86*    , Vit D: No results found for: \"VITD25\"     Nutrition Specific Medications:  Scheduled medications  chlorhexidine, , Topical, Daily  dexAMETHasone, 6 mg, intravenous, q24h  heparin (porcine), 5,000 Units, subcutaneous, q8h RANDAL  [Held by provider] insulin glargine, 15 Units, subcutaneous, q AM  insulin lispro, 0-5 Units, subcutaneous, q4h  lactated Ringer's, 500 mL, intravenous, Once  meropenem, 1,000 mg, intravenous, q12h  [Held by provider] mirtazapine, 7.5 mg, oral, Nightly  multivitamin with minerals, 1 tablet, oral, Daily  pantoprazole, 40 mg, intravenous, Daily  sodium bicarbonate, 650 mg, oral, BID  sodium chloride, 1 spray, Each Nostril, 4x daily      Continuous medications  [Held by provider] dextrose 5 % in water (D5W), 150 mL/hr, Last Rate: Stopped (04/26/24 0133)      PRN medications  PRN medications: acetaminophen **OR** acetaminophen **OR** acetaminophen, albuterol, dextrose, dextrose, glucagon, glucagon, guaiFENesin, ipratropium-albuteroL, melatonin, polyethylene glycol, sennosides-docusate sodium, traMADol, vancomycin     I/O:   Last BM Date: 04/25/24 (loose stool on that date); Stool Appearance: Loose (04/25/24 0730)        Dietary Orders (From admission, onward)       Start     " Ordered    04/26/24 1348  NPO Diet; Effective now  Diet effective now         04/26/24 1353                     Estimated Needs:   Total Energy Estimated Needs (kCal): 2000 kCal  Method for Estimating Needs: 25 kcals/kg using 79.4 kgs/actual BW  Total Protein Estimated Needs (g): 95 g  Method for Estimating Needs: 1.2 gm/kg using 79.4 kgs/actual BW  Total Fluid Estimated Needs (mL): 2400 mL (or per MD)  Method for Estimating Needs: 30mls/kcal/kg using 79.4 kgs/actual BW        Nutrition Diagnosis   Malnutrition Diagnosis  Patient has Malnutrition Diagnosis: Yes  Diagnosis Status: New  Malnutrition Diagnosis: Severe malnutrition related to chronic disease or condition  As Evidenced by: suspect inadequate PO intake in this patient along with a 25% weight loss in the last year plus noted areas of fat and muscle loss to face and upper body upon visual inspection      Nutrition Diagnosis  Additional Nutrition Diagnosis: Diagnosis 2  Diagnosis Status (2): New  Nutrition Diagnosis 2: Swallowing difficulity  Related to (2): acute vs chronic illness vs unknown etiology  As Evidenced by (2): failed MBS with NPO status per SLP rec's       Nutrition Interventions/Recommendations         Nutrition Prescription:  For tube feed, consider advancing pt's dobhoff to be post-pyloric in order to decrease risk of aspiration from tube feed.    Can otherwise continue with Glucerna 1.5 at 55mls/hr as pt's tube feed.    Needs better blood sugar control in light of sugars into the 300s the last few days.  Check Vitamin D level          Nutrition Interventions:   Food and/or Nutrient Delivery Interventions  Interventions: Enteral intake  Goal: TF at goal= 1980kcals, 109grams protein         Nutrition Education:   Not appropriate        Nutrition Monitoring and Evaluation   Food/Nutrient Related History Monitoring  Monitoring and Evaluation Plan: Enteral and parenteral nutrition intake  Criteria: TF to meet 100% estimated nutrition needs        Time Spent/Follow-up Reminder:   Time Spent (min): 60 minutes  Last Date of Nutrition Visit: 04/26/24  Nutrition Follow-Up Needed?: Dietitian to reassess per policy  Follow up Comment: TF via soila

## 2024-04-26 NOTE — PROGRESS NOTES
Pt transferred to ICU bed 19. Report given to Jennifer SMYTH. Transported with MD's and Respiratory and RN's.  Call placed to Brother Rigoberto Rodriguez, I made him aware of his Brothers transfer to room 19 - he was appreciative of the call, and stated he spoke with Physicians this morning.

## 2024-04-26 NOTE — SIGNIFICANT EVENT
Another rapid response called approximately 11:00 am for desaturation. Family unavailable over the phone but last phone call at 8am still desired ICU escalation and full code.    TF held since first rapid in [facility], d5w held last night at 2am.     Patient a&ox0, tachypneic, in acute distress, responsive only to ocular commands, weak to gravity b/l upper and lower extremity.    Physical exam significant for tachycardia in the 100s, desatting to 86%, SBP in 100s, tachypneic to 30s.  Diffuse rhoncherous breath sounds, intact pulses b/l, cap refill 2-3s, urine output since 2am lasix ~700ml.     Patient received 40IV lasix, placed on venti mask. CXR pending, ABG pending.    Micu called and accepted transfer given third rapid respones and progression of AHRF despite floor interventions.     Hospital course:  See MICU note 4/23 for prior course to floor transfer  4/23 transferred to floor --   70 yo male with CKD, DM, HTN, sacral wound, bipolar disorder initially admitted from a nursing home for altered mental status, and now transferred to the MICU form the floor for hypoxic respiratory failure and sepsis.       Pt first presented to the floor with confusion and multifocal pneumonia on 4/17. Patient was found porfirio hypernatremic, with severe swallowing dysfunction and found to be COVID positive. Patient was stated on Vanc, Zosyn, and azithromycin for a pneumonia and remdesevir for COVID and free water flushes for hypernatremia.      Two rapid responses were called on 4/21 pm and 4/22 am. Intital rapid for increase O2 requirement to 12 L. CXR with worsening RLL consolidation. Pt placedon Airvo at the time. ABG notable for pH 7.44, pO2 68. Dexamethasone started. Follow up ABG 1 hour later with pH 7.47/CO2 23, lactate 2.9 on Airvo 55%, 50 L. Thought to be 2/2 compensation for metabolic acidosis. Pt also bladder scanned, with only 22 cc residual, given 500 cc fluids and started on NaHCO3 tabs.      Rapid called again at  1:30 am for BP 86/46. Pt given additional 500 cc fluid for total of 1 L and patient transferred to MICU.      Patient was admitted to ICU for hypotension concerning for sepsis. On arrival to the ICU, the patient was bolused with LR. In total, the patient received 5.5L fluid from the time a rapid was called for hypotension to the time the patient left the MICU. His pressures dipped on several occasions, and each time responded to fluid resuscitation. He was on airvo 50/50 when admitted and now weaned to room air at the time of transfer. There are concerns for his ability to swallow, neurology has been consulted, ENT did not identify any anatomic etiologies.      In the interim, patient continued to have rapid responses with hypotension and hypoxia. In total, four rapid responses since being on the floor, with the above being the fourth. Patient unable to protect airway and likely has progression of ARDS from covid PNA with concurrent aspiration events and aspiration pneumonitis I/s/o illness delirium. Neurology has been following and feels his mental status changes are not appropriate for his known baseline ans suspect illness related delirium. Patient has remained unable to get MRI brain as he cannot protect his airway flat as he has a weak cough.

## 2024-04-26 NOTE — SIGNIFICANT EVENT
RAPID RESPONSE NOTE     Rapid response paged at approx 11:30 AM for tachypnea and hypoxic respiratory failure. Patient has had multiple rapid called within the last 24h for the same. Currently being managed for acute hypoxic resp failure 2/2 aspiration PNA vs COVID PNA, hypernatremia, and delirium.     On my arrival patient is tachypneic (resp rate in 40s, counted manually). SpO2 85% on 8L NC, switched to NRB with improvement to 95%. NT suction attempted with no return of secretions; minimal output with oral suction. Pt is not following commands and has clearly increased work of breathing with visible retractions. Attempted de-escalation to 50% venti mask, but pt desaturated back to 80s and was placed back on NRB. ABG while on NRB 7.38/25/77, lact 2.2. CXR from this AM shows apparent fluid overload with notable fissure line as well as extensive bilateral patchy infiltrates. Worsening infiltrates and hypoxic respiratory failure I/s/o PNA concerning for ARDS. At rapid response overnight pt was given 20 IV lasix with 700cc UOP; net positive almost 6L since admission. D5W infusion was held overnight. Additional 40 IV lasix administered. Patient transferred to ICU for further management of acute hypoxic respiratory failure.     Myself, primary team including attending Dr. Montoya, rapid response RNs, bedside RN and RT at bedside. Attempts were made to contact family without success.     Lee Ann Munroe   PGY3 Internal Medicine   DACR

## 2024-04-26 NOTE — SIGNIFICANT EVENT
Please see excellent CCR[facility] significant event note.     Rapid response called for acute hypoxic respiratory failure, possible aspiration event. Patient saturating in the low-mid 80s on 3Ls NC, which was increased to 8L NC. -110s, HR 90-110s. On exam, patient with baseline mental status, tachypnic, respiratory distress, diffuse bibasilar crackles, JVD to the mandible, tachycardic without murmurs. ABG (not in system) 7.49, pCO2 28, pO2 57, HCO3 21.3, lactate 2.8. CXR showed diffuse bilateral opacities, pulmonary edema, bilateral pleural effusions. Maintenance fluids and TF were held. Given 20 mg IV Lasix, 80 mEq potassium chloride, breathing treatment with ocean spray/Mucomyst, underwent NT suctioning. Oxygen improved > 90%.     Diffuse opacities / pulmonary edema are concerning for volume overload in the setting of maintenance fluids, however also concerning for concomitant COVID-19 pneumonia. He received only 2-doses of Dexamethasone in the MICU. Prior radiographic imaging appeared more consistent with aspiration pneumonia and much less attributable to COVID-19 at the time, yet current radiographic evidence is now more consistent with severe COVID-19 PNA therefore dexamethasone was re-initiated. Pulmonary embolism also on the differential and will consider CT PE if patient does not respond to above therapies.

## 2024-04-27 ENCOUNTER — APPOINTMENT (OUTPATIENT)
Dept: RADIOLOGY | Facility: HOSPITAL | Age: 69
DRG: 871 | End: 2024-04-27
Payer: MEDICARE

## 2024-04-27 LAB
ALBUMIN SERPL BCP-MCNC: 1.5 G/DL (ref 3.4–5)
ALBUMIN SERPL BCP-MCNC: 1.5 G/DL (ref 3.4–5)
ANION GAP BLDV CALCULATED.4IONS-SCNC: 7 MMOL/L (ref 10–25)
ANION GAP SERPL CALC-SCNC: 13 MMOL/L (ref 10–20)
ANION GAP SERPL CALC-SCNC: 14 MMOL/L (ref 10–20)
ANION GAP SERPL CALC-SCNC: 16 MMOL/L (ref 10–20)
ANION GAP SERPL CALC-SCNC: 17 MMOL/L (ref 10–20)
BACTERIA SPEC RESP CULT: ABNORMAL
BASE EXCESS BLDV CALC-SCNC: 3 MMOL/L (ref -2–3)
BASOPHILS # BLD AUTO: 0.03 X10*3/UL (ref 0–0.1)
BASOPHILS NFR BLD AUTO: 0.2 %
BLOOD EXPIRATION DATE: NORMAL
BODY TEMPERATURE: 37 DEGREES CELSIUS
BUN SERPL-MCNC: 48 MG/DL (ref 6–23)
BUN SERPL-MCNC: 50 MG/DL (ref 6–23)
BUN SERPL-MCNC: 51 MG/DL (ref 6–23)
BUN SERPL-MCNC: 54 MG/DL (ref 6–23)
BURR CELLS BLD QL SMEAR: NORMAL
CA-I BLDV-SCNC: 1.16 MMOL/L (ref 1.1–1.33)
CALCIUM SERPL-MCNC: 7.3 MG/DL (ref 8.6–10.6)
CALCIUM SERPL-MCNC: 7.5 MG/DL (ref 8.6–10.6)
CALCIUM SERPL-MCNC: 8.1 MG/DL (ref 8.6–10.6)
CALCIUM SERPL-MCNC: 8.5 MG/DL (ref 8.6–10.6)
CHLORIDE BLDV-SCNC: 125 MMOL/L (ref 98–107)
CHLORIDE SERPL-SCNC: 114 MMOL/L (ref 98–107)
CHLORIDE SERPL-SCNC: 118 MMOL/L (ref 98–107)
CHLORIDE SERPL-SCNC: 119 MMOL/L (ref 98–107)
CHLORIDE SERPL-SCNC: 121 MMOL/L (ref 98–107)
CO2 SERPL-SCNC: 20 MMOL/L (ref 21–32)
CO2 SERPL-SCNC: 21 MMOL/L (ref 21–32)
CO2 SERPL-SCNC: 22 MMOL/L (ref 21–32)
CO2 SERPL-SCNC: 24 MMOL/L (ref 21–32)
CREAT SERPL-MCNC: 2.93 MG/DL (ref 0.5–1.3)
CREAT SERPL-MCNC: 2.93 MG/DL (ref 0.5–1.3)
CREAT SERPL-MCNC: 3.22 MG/DL (ref 0.5–1.3)
CREAT SERPL-MCNC: 3.35 MG/DL (ref 0.5–1.3)
DISPENSE STATUS: NORMAL
EGFRCR SERPLBLD CKD-EPI 2021: 19 ML/MIN/1.73M*2
EGFRCR SERPLBLD CKD-EPI 2021: 20 ML/MIN/1.73M*2
EGFRCR SERPLBLD CKD-EPI 2021: 22 ML/MIN/1.73M*2
EGFRCR SERPLBLD CKD-EPI 2021: 22 ML/MIN/1.73M*2
EOSINOPHIL # BLD AUTO: 0.07 X10*3/UL (ref 0–0.4)
EOSINOPHIL NFR BLD AUTO: 0.5 %
ERYTHROCYTE [DISTWIDTH] IN BLOOD BY AUTOMATED COUNT: 21.6 % (ref 11.5–14.5)
GLUCOSE BLD MANUAL STRIP-MCNC: 150 MG/DL (ref 74–99)
GLUCOSE BLD MANUAL STRIP-MCNC: 190 MG/DL (ref 74–99)
GLUCOSE BLD MANUAL STRIP-MCNC: 200 MG/DL (ref 74–99)
GLUCOSE BLD MANUAL STRIP-MCNC: 204 MG/DL (ref 74–99)
GLUCOSE BLD MANUAL STRIP-MCNC: 218 MG/DL (ref 74–99)
GLUCOSE BLD MANUAL STRIP-MCNC: 223 MG/DL (ref 74–99)
GLUCOSE BLD MANUAL STRIP-MCNC: 233 MG/DL (ref 74–99)
GLUCOSE BLD MANUAL STRIP-MCNC: 254 MG/DL (ref 74–99)
GLUCOSE BLD MANUAL STRIP-MCNC: 278 MG/DL (ref 74–99)
GLUCOSE BLD MANUAL STRIP-MCNC: 349 MG/DL (ref 74–99)
GLUCOSE BLD MANUAL STRIP-MCNC: 367 MG/DL (ref 74–99)
GLUCOSE BLD MANUAL STRIP-MCNC: 448 MG/DL (ref 74–99)
GLUCOSE BLDV-MCNC: 129 MG/DL (ref 74–99)
GLUCOSE SERPL-MCNC: 227 MG/DL (ref 74–99)
GLUCOSE SERPL-MCNC: 267 MG/DL (ref 74–99)
GLUCOSE SERPL-MCNC: 309 MG/DL (ref 74–99)
GLUCOSE SERPL-MCNC: 466 MG/DL (ref 74–99)
GRAM STN SPEC: ABNORMAL
HCO3 BLDV-SCNC: 27 MMOL/L (ref 22–26)
HCT VFR BLD AUTO: 24.6 % (ref 41–52)
HCT VFR BLD EST: 22 % (ref 41–52)
HGB BLD-MCNC: 8 G/DL (ref 13.5–17.5)
HGB BLDV-MCNC: 7.3 G/DL (ref 13.5–17.5)
IMM GRANULOCYTES # BLD AUTO: 0.17 X10*3/UL (ref 0–0.5)
IMM GRANULOCYTES NFR BLD AUTO: 1.2 % (ref 0–0.9)
INHALED O2 CONCENTRATION: 50 %
LACTATE BLDV-SCNC: 1.7 MMOL/L (ref 0.4–2)
LACTATE SERPL-SCNC: 3.4 MMOL/L (ref 0.4–2)
LEGIONELLA AG UR QL: NEGATIVE
LYMPHOCYTES # BLD AUTO: 1.98 X10*3/UL (ref 0.8–3)
LYMPHOCYTES NFR BLD AUTO: 13.5 %
MAGNESIUM SERPL-MCNC: 2.28 MG/DL (ref 1.6–2.4)
MCH RBC QN AUTO: 23.8 PG (ref 26–34)
MCHC RBC AUTO-ENTMCNC: 32.5 G/DL (ref 32–36)
MCV RBC AUTO: 73 FL (ref 80–100)
MONOCYTES # BLD AUTO: 0.22 X10*3/UL (ref 0.05–0.8)
MONOCYTES NFR BLD AUTO: 1.5 %
NEUTROPHILS # BLD AUTO: 12.17 X10*3/UL (ref 1.6–5.5)
NEUTROPHILS NFR BLD AUTO: 83.1 %
NRBC BLD-RTO: 0.4 /100 WBCS (ref 0–0)
OXYHGB MFR BLDV: 79.1 % (ref 45–75)
PCO2 BLDV: 38 MM HG (ref 41–51)
PH BLDV: 7.46 PH (ref 7.33–7.43)
PHOSPHATE SERPL-MCNC: 2.3 MG/DL (ref 2.5–4.9)
PHOSPHATE SERPL-MCNC: 3.4 MG/DL (ref 2.5–4.9)
PLATELET # BLD AUTO: 248 X10*3/UL (ref 150–450)
PO2 BLDV: 47 MM HG (ref 35–45)
POTASSIUM BLDV-SCNC: 8.8 MMOL/L (ref 3.5–5.3)
POTASSIUM SERPL-SCNC: 3.2 MMOL/L (ref 3.5–5.3)
POTASSIUM SERPL-SCNC: 3.2 MMOL/L (ref 3.5–5.3)
POTASSIUM SERPL-SCNC: 3.8 MMOL/L (ref 3.5–5.3)
POTASSIUM SERPL-SCNC: 4.1 MMOL/L (ref 3.5–5.3)
PRODUCT BLOOD TYPE: 5100
PRODUCT CODE: NORMAL
RBC # BLD AUTO: 3.36 X10*6/UL (ref 4.5–5.9)
RBC MORPH BLD: NORMAL
S PNEUM AG UR QL: NEGATIVE
SAO2 % BLDV: 81 % (ref 45–75)
SODIUM BLDV-SCNC: 150 MMOL/L (ref 136–145)
SODIUM SERPL-SCNC: 146 MMOL/L (ref 136–145)
SODIUM SERPL-SCNC: 151 MMOL/L (ref 136–145)
SODIUM SERPL-SCNC: 152 MMOL/L (ref 136–145)
SODIUM SERPL-SCNC: 156 MMOL/L (ref 136–145)
TARGETS BLD QL SMEAR: NORMAL
UNIT ABO: NORMAL
UNIT NUMBER: NORMAL
UNIT RH: NORMAL
UNIT VOLUME: 278
VANCOMYCIN SERPL-MCNC: 14.8 UG/ML (ref 5–20)
WBC # BLD AUTO: 14.6 X10*3/UL (ref 4.4–11.3)
XM INTEP: NORMAL

## 2024-04-27 PROCEDURE — 93975 VASCULAR STUDY: CPT

## 2024-04-27 PROCEDURE — 2500000004 HC RX 250 GENERAL PHARMACY W/ HCPCS (ALT 636 FOR OP/ED)

## 2024-04-27 PROCEDURE — 82947 ASSAY GLUCOSE BLOOD QUANT: CPT

## 2024-04-27 PROCEDURE — 2500000002 HC RX 250 W HCPCS SELF ADMINISTERED DRUGS (ALT 637 FOR MEDICARE OP, ALT 636 FOR OP/ED): Mod: MUE

## 2024-04-27 PROCEDURE — 80069 RENAL FUNCTION PANEL: CPT

## 2024-04-27 PROCEDURE — 99291 CRITICAL CARE FIRST HOUR: CPT

## 2024-04-27 PROCEDURE — 74018 RADEX ABDOMEN 1 VIEW: CPT

## 2024-04-27 PROCEDURE — 80069 RENAL FUNCTION PANEL: CPT | Mod: 91,MUE

## 2024-04-27 PROCEDURE — C9113 INJ PANTOPRAZOLE SODIUM, VIA: HCPCS

## 2024-04-27 PROCEDURE — 84132 ASSAY OF SERUM POTASSIUM: CPT

## 2024-04-27 PROCEDURE — 80202 ASSAY OF VANCOMYCIN: CPT

## 2024-04-27 PROCEDURE — 83605 ASSAY OF LACTIC ACID: CPT

## 2024-04-27 PROCEDURE — 31720 CLEARANCE OF AIRWAYS: CPT

## 2024-04-27 PROCEDURE — 2500000005 HC RX 250 GENERAL PHARMACY W/O HCPCS

## 2024-04-27 PROCEDURE — 2500000001 HC RX 250 WO HCPCS SELF ADMINISTERED DRUGS (ALT 637 FOR MEDICARE OP)

## 2024-04-27 PROCEDURE — 82947 ASSAY GLUCOSE BLOOD QUANT: CPT | Mod: 91,MUE

## 2024-04-27 PROCEDURE — 2500000002 HC RX 250 W HCPCS SELF ADMINISTERED DRUGS (ALT 637 FOR MEDICARE OP, ALT 636 FOR OP/ED)

## 2024-04-27 PROCEDURE — 36415 COLL VENOUS BLD VENIPUNCTURE: CPT

## 2024-04-27 PROCEDURE — 76870 US EXAM SCROTUM: CPT | Performed by: RADIOLOGY

## 2024-04-27 PROCEDURE — 87205 SMEAR GRAM STAIN: CPT

## 2024-04-27 PROCEDURE — 74018 RADEX ABDOMEN 1 VIEW: CPT | Performed by: STUDENT IN AN ORGANIZED HEALTH CARE EDUCATION/TRAINING PROGRAM

## 2024-04-27 PROCEDURE — 94640 AIRWAY INHALATION TREATMENT: CPT

## 2024-04-27 PROCEDURE — 1200000002 HC GENERAL ROOM WITH TELEMETRY DAILY

## 2024-04-27 PROCEDURE — 94668 MNPJ CHEST WALL SBSQ: CPT

## 2024-04-27 PROCEDURE — 85025 COMPLETE CBC W/AUTO DIFF WBC: CPT

## 2024-04-27 PROCEDURE — 83735 ASSAY OF MAGNESIUM: CPT

## 2024-04-27 RX ORDER — DEXTROSE MONOHYDRATE 50 MG/ML
75 INJECTION, SOLUTION INTRAVENOUS CONTINUOUS
Status: DISCONTINUED | OUTPATIENT
Start: 2024-04-27 | End: 2024-04-28

## 2024-04-27 RX ORDER — IPRATROPIUM BROMIDE AND ALBUTEROL SULFATE 2.5; .5 MG/3ML; MG/3ML
3 SOLUTION RESPIRATORY (INHALATION) EVERY 6 HOURS PRN
Status: DISCONTINUED | OUTPATIENT
Start: 2024-04-27 | End: 2024-04-28

## 2024-04-27 RX ORDER — NOREPINEPHRINE BITARTRATE/D5W 8 MG/250ML
PLASTIC BAG, INJECTION (ML) INTRAVENOUS
Status: COMPLETED
Start: 2024-04-27 | End: 2024-04-27

## 2024-04-27 RX ORDER — INSULIN LISPRO 100 [IU]/ML
0-15 INJECTION, SOLUTION INTRAVENOUS; SUBCUTANEOUS EVERY 4 HOURS
Status: DISCONTINUED | OUTPATIENT
Start: 2024-04-27 | End: 2024-04-28

## 2024-04-27 RX ORDER — POTASSIUM CHLORIDE 1.5 G/1.58G
40 POWDER, FOR SOLUTION ORAL ONCE
Status: COMPLETED | OUTPATIENT
Start: 2024-04-28 | End: 2024-04-28

## 2024-04-27 RX ORDER — IPRATROPIUM BROMIDE AND ALBUTEROL SULFATE 2.5; .5 MG/3ML; MG/3ML
3 SOLUTION RESPIRATORY (INHALATION)
Status: DISCONTINUED | OUTPATIENT
Start: 2024-04-27 | End: 2024-04-28

## 2024-04-27 RX ORDER — INSULIN GLARGINE 100 [IU]/ML
15 INJECTION, SOLUTION SUBCUTANEOUS ONCE
Status: COMPLETED | OUTPATIENT
Start: 2024-04-27 | End: 2024-04-27

## 2024-04-27 RX ORDER — DEXTROSE 50 % IN WATER (D50W) INTRAVENOUS SYRINGE
25
Status: DISCONTINUED | OUTPATIENT
Start: 2024-04-27 | End: 2024-05-15 | Stop reason: HOSPADM

## 2024-04-27 RX ORDER — NOREPINEPHRINE BITARTRATE/D5W 8 MG/250ML
0-3 PLASTIC BAG, INJECTION (ML) INTRAVENOUS CONTINUOUS
Status: DISCONTINUED | OUTPATIENT
Start: 2024-04-27 | End: 2024-04-29

## 2024-04-27 RX ORDER — PHENYLEPHRINE HCL IN 0.9% NACL 0.4MG/10ML
SYRINGE (ML) INTRAVENOUS
Status: DISPENSED
Start: 2024-04-27 | End: 2024-04-28

## 2024-04-27 RX ORDER — IPRATROPIUM BROMIDE AND ALBUTEROL SULFATE 2.5; .5 MG/3ML; MG/3ML
3 SOLUTION RESPIRATORY (INHALATION) EVERY 6 HOURS
Status: DISCONTINUED | OUTPATIENT
Start: 2024-04-27 | End: 2024-04-27

## 2024-04-27 RX ORDER — DEXTROSE 50 % IN WATER (D50W) INTRAVENOUS SYRINGE
12.5
Status: DISCONTINUED | OUTPATIENT
Start: 2024-04-27 | End: 2024-05-15 | Stop reason: HOSPADM

## 2024-04-27 RX ORDER — VANCOMYCIN HYDROCHLORIDE 1 G/200ML
1000 INJECTION, SOLUTION INTRAVENOUS ONCE
Status: COMPLETED | OUTPATIENT
Start: 2024-04-27 | End: 2024-04-27

## 2024-04-27 RX ORDER — POTASSIUM CHLORIDE 14.9 MG/ML
20 INJECTION INTRAVENOUS
Status: COMPLETED | OUTPATIENT
Start: 2024-04-27 | End: 2024-04-27

## 2024-04-27 RX ADMIN — MEROPENEM 1000 MG: 1 INJECTION, POWDER, FOR SOLUTION INTRAVENOUS at 12:20

## 2024-04-27 RX ADMIN — IPRATROPIUM BROMIDE AND ALBUTEROL SULFATE 3 ML: .5; 3 SOLUTION RESPIRATORY (INHALATION) at 21:38

## 2024-04-27 RX ADMIN — SALINE NASAL SPRAY 1 SPRAY: 1.5 SOLUTION NASAL at 12:20

## 2024-04-27 RX ADMIN — Medication 0.06 MCG/KG/MIN: at 22:30

## 2024-04-27 RX ADMIN — MELATONIN 3 MG: 3 TAB ORAL at 20:10

## 2024-04-27 RX ADMIN — SODIUM BICARBONATE 650 MG: 650 TABLET ORAL at 21:00

## 2024-04-27 RX ADMIN — HEPARIN SODIUM 5000 UNITS: 5000 INJECTION INTRAVENOUS; SUBCUTANEOUS at 14:16

## 2024-04-27 RX ADMIN — INSULIN LISPRO 6 UNITS: 100 INJECTION, SOLUTION INTRAVENOUS; SUBCUTANEOUS at 15:52

## 2024-04-27 RX ADMIN — INSULIN LISPRO 6 UNITS: 100 INJECTION, SOLUTION INTRAVENOUS; SUBCUTANEOUS at 12:29

## 2024-04-27 RX ADMIN — INSULIN HUMAN 4 UNITS/HR: 1 INJECTION, SOLUTION INTRAVENOUS at 01:25

## 2024-04-27 RX ADMIN — SALINE NASAL SPRAY 1 SPRAY: 1.5 SOLUTION NASAL at 21:00

## 2024-04-27 RX ADMIN — VANCOMYCIN HYDROCHLORIDE 1000 MG: 1 INJECTION, SOLUTION INTRAVENOUS at 20:45

## 2024-04-27 RX ADMIN — SODIUM CHLORIDE, SODIUM LACTATE, POTASSIUM CHLORIDE, AND CALCIUM CHLORIDE 500 ML: 600; 310; 30; 20 INJECTION, SOLUTION INTRAVENOUS at 23:30

## 2024-04-27 RX ADMIN — POTASSIUM CHLORIDE 20 MEQ: 14.9 INJECTION, SOLUTION INTRAVENOUS at 08:12

## 2024-04-27 RX ADMIN — IPRATROPIUM BROMIDE AND ALBUTEROL SULFATE 3 ML: .5; 3 SOLUTION RESPIRATORY (INHALATION) at 14:45

## 2024-04-27 RX ADMIN — Medication 1 TABLET: at 08:12

## 2024-04-27 RX ADMIN — INSULIN LISPRO 6 UNITS: 100 INJECTION, SOLUTION INTRAVENOUS; SUBCUTANEOUS at 20:00

## 2024-04-27 RX ADMIN — MEROPENEM 1000 MG: 1 INJECTION, POWDER, FOR SOLUTION INTRAVENOUS at 00:35

## 2024-04-27 RX ADMIN — ACETAMINOPHEN 650 MG: 650 SOLUTION ORAL at 20:10

## 2024-04-27 RX ADMIN — PANTOPRAZOLE SODIUM 40 MG: 40 INJECTION, POWDER, FOR SOLUTION INTRAVENOUS at 08:12

## 2024-04-27 RX ADMIN — POTASSIUM PHOSPHATE, MONOBASIC POTASSIUM PHOSPHATE, DIBASIC 15 MMOL: 224; 236 INJECTION, SOLUTION, CONCENTRATE INTRAVENOUS at 10:50

## 2024-04-27 RX ADMIN — Medication: at 20:00

## 2024-04-27 RX ADMIN — HEPARIN SODIUM 5000 UNITS: 5000 INJECTION INTRAVENOUS; SUBCUTANEOUS at 06:13

## 2024-04-27 RX ADMIN — SALINE NASAL SPRAY 1 SPRAY: 1.5 SOLUTION NASAL at 06:13

## 2024-04-27 RX ADMIN — DEXTROSE MONOHYDRATE 75 ML/HR: 50 INJECTION, SOLUTION INTRAVENOUS at 08:15

## 2024-04-27 RX ADMIN — SALINE NASAL SPRAY 1 SPRAY: 1.5 SOLUTION NASAL at 17:24

## 2024-04-27 RX ADMIN — SODIUM BICARBONATE 650 MG: 650 TABLET ORAL at 08:12

## 2024-04-27 RX ADMIN — HEPARIN SODIUM 5000 UNITS: 5000 INJECTION INTRAVENOUS; SUBCUTANEOUS at 22:00

## 2024-04-27 RX ADMIN — SODIUM CHLORIDE, SODIUM LACTATE, POTASSIUM CHLORIDE, AND CALCIUM CHLORIDE 1000 ML: 600; 310; 30; 20 INJECTION, SOLUTION INTRAVENOUS at 23:30

## 2024-04-27 RX ADMIN — POTASSIUM CHLORIDE 20 MEQ: 14.9 INJECTION, SOLUTION INTRAVENOUS at 10:50

## 2024-04-27 RX ADMIN — INSULIN HUMAN 4 UNITS/HR: 1 INJECTION, SOLUTION INTRAVENOUS at 01:20

## 2024-04-27 RX ADMIN — INSULIN GLARGINE 15 UNITS: 100 INJECTION, SOLUTION SUBCUTANEOUS at 08:12

## 2024-04-27 RX ADMIN — SODIUM CHLORIDE, SODIUM LACTATE, POTASSIUM CHLORIDE, AND CALCIUM CHLORIDE 500 ML: 600; 310; 30; 20 INJECTION, SOLUTION INTRAVENOUS at 22:45

## 2024-04-27 ASSESSMENT — PAIN - FUNCTIONAL ASSESSMENT
PAIN_FUNCTIONAL_ASSESSMENT: CPOT (CRITICAL CARE PAIN OBSERVATION TOOL)

## 2024-04-27 ASSESSMENT — PAIN SCALES - PAIN ASSESSMENT IN ADVANCED DEMENTIA (PAINAD)
CONSOLABILITY: NO NEED TO CONSOLE
TOTALSCORE: REPOSITIONED;MEDICATION (SEE MAR)
BODYLANGUAGE: RELAXED
BREATHING: NORMAL
FACIALEXPRESSION: SMILING OR INEXPRESSIVE
TOTALSCORE: 0

## 2024-04-27 ASSESSMENT — PAIN SCALES - GENERAL
PAINLEVEL_OUTOF10: 0 - NO PAIN
PAINLEVEL_OUTOF10: 0 - NO PAIN

## 2024-04-27 NOTE — HOSPITAL COURSE
Brian Rodriguez is a 69 y.o. male with PMH of CKD, DM2, HTN, sacral wound, bipolar disorder who was admitted on 4/17 from his SNF 2/2 encephalopathy. On the floor, the patient had several aspiration events. His pressures responded to fluids and IVC was collapsible. Presentation most consistent with aspiration pna 2/2 dysphagia and shock of unknown etiology which is improving. Was transferred to the MICU on 4/22/2024 following a rapid response with increased O2 to 12L and hypotension at 86/46, BP was responsive to fluids, and acute hypoxic respiratory failure requiring airvo support. 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 For his multifocal and aspiration pneumonia, he received azithromycin 4/18-4/20 and meropenem 4/26-5/2. Procal 2.95 Additionally, received Remdesevir 4/18-4/22 (covid + 4/18).  Fungitel 231. Bcx -ve, and Sputum culture was contaminated. Doxy 100mg restarted for Hydradenitis suppurativa ppx. NGT was placed for dysphagia, TF and FWF held for Brain MRI. Also had TERRI on CKD stage III (baseline Cr 1.7), likely prerenal azotemia 2/2 hypovolemia and hemodynamic instability. Hospital course complicated by Afib found on 5/2/2024 without tachycardia, patient transitioned to heparin gtt for anticoagulation. Currently breathing in RA with stable BP, ready to be transferred to Stepdown.  Patient with increase loose stool on 5/6, C-diff PCR negative and FMS inserted.  Patient s/p PEG placement on 5/7; Eliquis started on 5/7 for new onset Afib.   ID c/s for Syphilis Total Ab Reactive on 5/2 >RPR Neg> Treponoma Reactive on 5/8; f.up rec's.  Continous enteral feeding was changed to bolus per nutrition.   Bedside LP attempted 5/11 to evaluate for neurosyphilis, given 2 mg IV Versed for LP.  Patient was agitated and not able to lay still and were unable to the LP. Patient was transferred to the floor to attempt to get a LP with anesthesia but  unable to coordinate with anesthesia. After discussing with Infectious disease, patient obtained a PiCC and was started on high dose penicillin G.   It is recommended that the patient continue Doxycyline 100 mg for Hidradenitis ppx treatment and will follow up with dermatology.   Patient should continue treatment with IV Penicillin G for 2 weeks. He will continue to receive his tube feeds through his PEG and will continue to remain NPO but continue to receive aggressive oral care. He should follow up with SLP for a repeat MBBS in 4 weeks. He should follow up with a neurologist for his acute encephalopathy and neurosyphilis.

## 2024-04-27 NOTE — PROGRESS NOTES
Subjective   Brian Rodriguez is a 69 y.o. male transferred to the MICU for multiple rapids called due to hypotension and increased oxygen requirements. Today, patient is not at baseline orientation and has deteriorated since he was in the MICU. He was able to open his eyes to command but had minimal  on command. Patient did not speak. Did not indicate pain anywhere.    Objective   Physical Exam  Constitutional:       Appearance: He is cachectic. He is ill-appearing. He is not toxic-appearing.      Interventions: He is restrained.   HENT:      Head: Normocephalic and atraumatic.      Nose: Nose normal.      Mouth/Throat:      Mouth: Mucous membranes are dry.   Cardiovascular:      Rate and Rhythm: Regular rhythm. Tachycardia present.      Heart sounds: Normal heart sounds.   Pulmonary:      Effort: Respiratory distress present.      Breath sounds: Wheezing and rhonchi present. No rales.   Abdominal:      General: Abdomen is flat. Bowel sounds are normal.      Palpations: Abdomen is soft.   Musculoskeletal:      Right lower leg: No edema.      Left lower leg: No edema.   Skin:     General: Skin is warm and dry.      Capillary Refill: Capillary refill takes 2 to 3 seconds.   Neurological:      Mental Status: He is alert. He is disoriented.      Comments: Could not speak  Opened eyes to command     Temp:  [36.4 °C (97.5 °F)-36.5 °C (97.7 °F)] 36.4 °C (97.5 °F)  Heart Rate:  [] 78  Resp:  [15-45] 17  BP: ()/(52-73) 98/71  FiO2 (%):  [50 %-60 %] 60 %    Intake/Output Summary (Last 24 hours) at 4/27/2024 1427  Last data filed at 4/27/2024 1050  Gross per 24 hour   Intake 2853.59 ml   Output 1165 ml   Net 1688.59 ml     Vitals:    04/27/24 0600   Weight: 52 kg (114 lb 10.2 oz)     FiO2 (%):  [50 %-60 %] 60 %     I/Os    Intake/Output Summary (Last 24 hours) at 4/27/2024 1427  Last data filed at 4/27/2024 1050  Gross per 24 hour   Intake 2853.59 ml   Output 1165 ml   Net 1688.59 ml     Labs:   Results from  last 72 hours   Lab Units 04/27/24  0434 04/27/24  0048 04/26/24 2032   SODIUM mmol/L 156* 152* 149*   POTASSIUM mmol/L 3.2* 3.8 4.0   CHLORIDE mmol/L 121* 119* 116*   CO2 mmol/L 24 20* 20*   BUN mg/dL 54* 50* 47*   CREATININE mg/dL 3.35* 3.22* 3.24*   GLUCOSE mg/dL 309* 466* 497*   CALCIUM mg/dL 8.1* 8.5* 8.1*   ANION GAP mmol/L 14 17 17   EGFR mL/min/1.73m*2 19* 20* 20*   PHOSPHORUS mg/dL 2.3* 3.4 3.7      Results from last 72 hours   Lab Units 04/27/24  0434 04/26/24  1010 04/25/24  0627   WBC AUTO x10*3/uL 14.6* 16.2* 16.2*   HEMOGLOBIN g/dL 8.0* 7.5* 8.3*   HEMATOCRIT % 24.6* 24.0* 27.2*   PLATELETS AUTO x10*3/uL 248 264 255   NEUTROS PCT AUTO % 83.1  --  81.3   LYMPHO PCT MAN %  --  2.6  --    LYMPHS PCT AUTO % 13.5  --  11.6   MONO PCT MAN %  --  0.9  --    MONOS PCT AUTO % 1.5  --  2.0   EOSINO PCT MAN %  --  0.0  --    EOS PCT AUTO % 0.5  --  4.3      Results from last 72 hours   Lab Units 04/27/24  0825   POCT PH, VENOUS pH 7.46*   POCT PCO2, VENOUS mm Hg 38*   POCT SO2, VENOUS % 81*   POCT OXY HEMOGLOBIN, VENOUS % 79.1*   POCT HEMATOCRIT CALCULATED, VENOUS % 22.0*   POCT SODIUM, VENOUS mmol/L 150*   POCT POTASSIUM, VENOUS mmol/L 8.8*   POCT CHLORIDE, VENOUS mmol/L 125*   POCT IONIZED CALCIUM, VENOUS mmol/L 1.16   POCT GLUCOSE, VENOUS mg/dL 129*   POCT LACTATE, VENOUS mmol/L 1.7   POCT BASE EXCESS, VENOUS mmol/L 3.0   POCT HCO3 CALCULATED, VENOUS mmol/L 27.0*   POCT HEMOGLOBIN, VENOUS g/dL 7.3*   FIO2 % 50      Lab Results   Component Value Date    CALCIUM 8.1 (L) 04/27/2024    PHOS 2.3 (L) 04/27/2024      Lab Results   Component Value Date    CRP 20.03 (H) 04/24/2024     Micro/ID:   Lab Results   Component Value Date    BLOODCULT Loaded on Instrument - Culture in progress 04/26/2024    BLOODCULT Loaded on Instrument - Culture in progress 04/26/2024     Images:  US scrotum w doppler  Narrative: Interpreted By:  Antonio Fernandez  and Fany Carrillo   STUDY:  US SCROTUM WITH DOPPLER;   4/27/2024 10:05 am      INDICATION:  Signs/Symptoms:to rule out infection.      COMPARISON:  Scrotal ultrasound dated 02/16/2013  CT chest abdomen pelvis dated 04/17/2024      ACCESSION NUMBER(S):  [id number]      ORDERING CLINICIAN:  STACIA PIERCE      TECHNIQUE:  Multiple ultrasonographic images of scrotum and tested were obtained.      FINDINGS:  RIGHT HEMISCROTUM:      RIGHT TESTICLE:  The right testicle measures 2.5 cm x 1.2 cmx 3.4 cm. The right  testicle demonstrates a homogeneous echotexture and normal contour.  Normal vascularity and Doppler waveforms are observed in the right  testicle. There is a 0.7 cm echogenic focus inferior to the right  testicle again seen, likely a scrotal david. Trace right-sided  hydrocele.      RIGHT EPIDIDYMIS:  The right epididymal head measures 0.7 cm x 0.5 cm x 0.8 cm and is  within normal limits.      LEFT HEMISCROTUM:      LEFT TESTICLE:  The left testicle measures 2.8 cm x 1.0 cm x 3.8 cm. The left  testicle demonstrates a homogeneous echotexture and normal contour.  Normal vascularity and Doppler waveforms are observed in the left  testicle.      LEFT EPIDIDYMIS:  The left epididymal head was not well visualized.      Impression: 1. No abnormal scrotal wall thickening or hyperemia to suggest active  infectious process.  2. Normal bilateral testes with intact flow.  3. Trace right-sided hydrocele with scrotal david.      I personally reviewed the images/study and I agree with the findings  as stated by resident physician Dr. Mynor Soares.      MACRO:  None      Signed by: Antonio Matute 4/27/2024 12:21 PM  Dictation workstation:   [id number]  XR abdomen 1 view  Narrative: Interpreted By:  Kendrick Simpson,   STUDY:  XR ABDOMEN 1 VIEW;  4/27/2024 10:23 am      INDICATION:  Signs/Symptoms:for diarrhea.      COMPARISON:  Abdominal radiograph 04/23/2024 and chest, abdomen pelvis CT scan  04/17/2024      ACCESSION NUMBER(S):  [id number]      ORDERING  CLINICIAN:  STACIA PIERCE      FINDINGS:  AP radiograph of abdomen available for interpretation.      Enteric tube is seen coursing below diaphragm and tip overlying  expected location of distal stomach. A tubular catheter partially  visualized projecting over lower pelvis. Cholelithiasis.      Nonobstructive bowel gas pattern.Limited evaluation of  pneumoperitoneum on supine imaging, however no gross evidence of free  air is noted.      Visualized basilar lungs demonstrate patchy airspace opacities,  better assessed on chest radiograph from yesterday.      Osseous structures demonstrate no acute bony changes.      Impression: 1.  Enteric tube tip overlying expected location of distal stomach.  2. Nonobstructive bowel gas pattern.  3. Cholelithiasis.      Signed by: Kendrick Simpson 4/27/2024 11:37 AM  Dictation workstation:   [id number]  XR chest 1 view  Narrative: Interpreted By:  Kendrick Simpson and Tippareddy Charit   STUDY:  XR CHEST 1 VIEW;  4/26/2024 7:27 pm      INDICATION:  Signs/Symptoms:sob.      COMPARISON:  XR CHEST 1 VIEW;  4/26/2024 1:35 am; chest, abdomen pelvis CT scan  04/17/2024      ACCESSION NUMBER(S):  [id number]      ORDERING CLINICIAN:  NOEL AUGUSTE      FINDINGS:  AP radiograph of the chest was provided.  Enteric tube is again visualized coursing beneath the diaphragm with  the tip overlying the gastric body.      CARDIOMEDIASTINAL SILHOUETTE:  Cardiomediastinal silhouette is stable in size and configuration with  similar partial obscuration bilaterally due to lung parenchymal  opacities. Mild aortic knob calcifications.      LUNGS:  Allowing for differences in technical factors, there has been  interval slight improvement in extensive patchy and confluence  bilateral airspace opacities, most pronounced within mid basilar  lungs. Mild blunting of bilateral costophrenic angles. There is no  pneumothorax.      ABDOMEN:  No remarkable upper abdominal findings.      BONES:  No acute osseous  abnormality.      Impression: 1. Interval slight improvement in extensive patchy and confluent  opacities throughout bilateral lungs, particularly the mid basilar  lung zones. The findings again represent multifocal  pneumonia/pneumonitis.  2. Possible trace bilateral pleural effusions.      I personally reviewed the images/study and I agree with the findings  as stated by Na Johnson MD. This study was interpreted at  [facility], [place], [place].      MACRO:  None.      Signed by: Kendrick Simpson 4/27/2024 5:24 AM  Dictation workstation:   [id number]       Meds:  Scheduled medications  chlorhexidine, , Topical, Daily  heparin (porcine), 5,000 Units, subcutaneous, q8h RANDAL  [Held by provider] insulin glargine, 15 Units, subcutaneous, Nightly  insulin lispro, 0-15 Units, subcutaneous, q4h  ipratropium-albuteroL, 3 mL, nebulization, q6h  meropenem, 1,000 mg, intravenous, q12h  [Held by provider] mirtazapine, 7.5 mg, oral, Nightly  multivitamin with minerals, 1 tablet, oral, Daily  pantoprazole, 40 mg, intravenous, Daily  potassium phosphate, 15 mmol, intravenous, Once  sodium bicarbonate, 650 mg, oral, BID  sodium chloride, 1 spray, Each Nostril, 4x daily    Continuous medications  dextrose 5 % in water (D5W), 75 mL/hr, Last Rate: 75 mL/hr (04/27/24 1100)    PRN medications  PRN medications: acetaminophen **OR** acetaminophen **OR** acetaminophen, dextrose, dextrose, dextrose, dextrose, glucagon, glucagon, glucagon, glucagon, guaiFENesin, insulin regular, ipratropium-albuteroL, melatonin, oxygen, polyethylene glycol, sennosides-docusate sodium, traMADol, vancomycin     Assessment   Brian Rodriguez is a 69 y.o. male admitted to the MICU for multiple rapids, hypotension, and acute hypoxic respiratory failure requiring airvo support.  PMHx CKD, DM2, HTN, sacral wound, bipolar disorder who was originally admitted from his SNF 2/2 encephalopathy. On the floor, the patient had several  aspiration events. His pressures responded to fluids and IVC was collapsible. Presentation most consistent with aspiration pna 2/2 dysphagia and hypovolemia 2/2 dehydration.     Neuro  # Acute Encephalopathy  # Dementia  # Hypoactive Delirium  :: per family baseline AOx1-2, able to converse and follow commands  :: Hyponatremia +/- delirium  - Neurology consulted, appreciate recommendations: per neurology would like MRI brain w/o contrast  -- Limited d/t aspiration when lying flat  - EMG to r/o underlying motor neuron disease    Pulm  # Acute Hypoxic Respiratory Failure  # Multifocal Pneumonia  # Aspiration Pneumonia  :: Imaging suggestive of bacterial pna in the setting of recent covid s/p remdesivir  - s/p azithromycin 4/18-4/20, Remdesevir 4/18-4/22  - DC dexamethasone  - broaden to meropenem, vancomycin as below   - sent fungal studies, repeat BC, repeat urine studies  - aggressive respiratory hygiene with IPV and chest percussion  - duonebs q6hr for one day    Cardiovascular  # Hypotension  :: ?septic shock vs dehydration  - Responds to fluids  - POCUS today, IVC large but very compressible  - Received 500cc LR overnight    # HTN  - Hold home amlodipine    # HLD  - Hold home atorvastatin    GI  # Nutrition  - NPO concern for aspiration pneumonia - tube feeds stable 15cc/hr  - Diet following     # Dysphagia  - ENT previously consulted, no anatomic cause  - Neurology consulted as above  - NG tube    Renal/  #TERRI on CKD Stage III  :: most likely prerenal azotemia 2/2 hypovolemia/hemodynamic instability  :: baseline Cr 1.7  -FWD: 2.1L  - PM RFP  - will restart free water flushes: 250 Q4H    # Hypernatremia  - Free water flushes  - D5W 100cc/hr  - Q4hr RFP    Endo  # DM2  # Hyperglycemia  :: On D5W and steroids  - Was on insulin infusion ON at 6 units/hr  - Sugars came down to 200, infusion stopped  - Given 15 units of lantus and glucose stabilized    ID  # Acute Hypoxic Respiratory Failure  # Multifocal  Pneumonia  # Aspiration Pneumonia  :: Bacterial pna 2/2 aspiration in the setting of dysphagia and recent covid pna  - s/p azithromycin 4/18-4/20, Remdesevir 4/18-4/22  - Meropenem, vancomycin   - Fungal chem, blood and respiratory sent     # Hidradenitis Suppurativa  # Multiple Sacral Wounds  :: derm has previously recommended humira/minocycline 100 BID but currently w/o lab confirmation, hold off iso septic shock  - will need outpatient dermatology follow up w/ doxycycline 100 BID outpatient   - wound care consulted, appreciate recommendations    Heme  # Acute on Chronic Anemia  :: most likely iso iron deficiency anemia and anemia of chronic disease: iron low, TIBC 95, folate WNL, B12 supratherapeutic  - Continue to monitor  - Hold off on iron supplementation iso active infection    F D5w 100cc/hr + free water 300cc q4hr  E Replete prn  N NPO, TF at 15cc/hr  G None  DVT: subcutaneous heparin  ABX:  Vanc: 4/17-  Zosyn: 4/17-4/26  Ksylar: 4/26-  Azithro: 4/18-4/20  Lines: PIV x2, bunch  Drips: D5w 100cc/hr  Pressors: None  Code status: DNR  NoK:  Primary Emergency Contact: DEEPAK RANGEL, Home Phone: [phone]    Dispo: 69 y.o. male admitted to the MICU for multiple rapids, hypotension, and acute hypoxic respiratory failure requiring airvo support.  PMHx CKD, DM2, HTN, sacral wound, bipolar disorder who was originally admitted from his SNF 2/2 AM. Floors once O2 requirement decreases and pressures stabilize.

## 2024-04-28 LAB
ALBUMIN SERPL BCP-MCNC: 1.5 G/DL (ref 3.4–5)
ALBUMIN SERPL BCP-MCNC: <1.5 G/DL (ref 3.4–5)
ALP SERPL-CCNC: 89 U/L (ref 33–136)
ALT SERPL W P-5'-P-CCNC: 10 U/L (ref 10–52)
ANION GAP BLDV CALCULATED.4IONS-SCNC: 10 MMOL/L (ref 10–25)
ANION GAP SERPL CALC-SCNC: 15 MMOL/L (ref 10–20)
ANION GAP SERPL CALC-SCNC: 16 MMOL/L (ref 10–20)
AST SERPL W P-5'-P-CCNC: 15 U/L (ref 9–39)
BACTERIA SPEC RESP CULT: ABNORMAL
BACTERIA UR CULT: NORMAL
BASE EXCESS BLDV CALC-SCNC: -0.3 MMOL/L (ref -2–3)
BASOPHILS # BLD MANUAL: 0 X10*3/UL (ref 0–0.1)
BASOPHILS NFR BLD MANUAL: 0 %
BILIRUB SERPL-MCNC: 0.3 MG/DL (ref 0–1.2)
BODY TEMPERATURE: 37 DEGREES CELSIUS
BUN SERPL-MCNC: 43 MG/DL (ref 6–23)
BUN SERPL-MCNC: 46 MG/DL (ref 6–23)
BURR CELLS BLD QL SMEAR: ABNORMAL
CA-I BLDV-SCNC: 1.27 MMOL/L (ref 1.1–1.33)
CALCIUM SERPL-MCNC: 7.6 MG/DL (ref 8.6–10.6)
CALCIUM SERPL-MCNC: 7.7 MG/DL (ref 8.6–10.6)
CHLORIDE BLDV-SCNC: 117 MMOL/L (ref 98–107)
CHLORIDE SERPL-SCNC: 113 MMOL/L (ref 98–107)
CHLORIDE SERPL-SCNC: 113 MMOL/L (ref 98–107)
CO2 SERPL-SCNC: 22 MMOL/L (ref 21–32)
CO2 SERPL-SCNC: 23 MMOL/L (ref 21–32)
CREAT SERPL-MCNC: 2.66 MG/DL (ref 0.5–1.3)
CREAT SERPL-MCNC: 2.67 MG/DL (ref 0.5–1.3)
EGFRCR SERPLBLD CKD-EPI 2021: 25 ML/MIN/1.73M*2
EGFRCR SERPLBLD CKD-EPI 2021: 25 ML/MIN/1.73M*2
EOSINOPHIL # BLD MANUAL: 0.58 X10*3/UL (ref 0–0.4)
EOSINOPHIL NFR BLD MANUAL: 3.6 %
ERYTHROCYTE [DISTWIDTH] IN BLOOD BY AUTOMATED COUNT: 23.2 % (ref 11.5–14.5)
GLUCOSE BLD MANUAL STRIP-MCNC: 148 MG/DL (ref 74–99)
GLUCOSE BLD MANUAL STRIP-MCNC: 184 MG/DL (ref 74–99)
GLUCOSE BLD MANUAL STRIP-MCNC: 191 MG/DL (ref 74–99)
GLUCOSE BLD MANUAL STRIP-MCNC: 218 MG/DL (ref 74–99)
GLUCOSE BLD MANUAL STRIP-MCNC: 242 MG/DL (ref 74–99)
GLUCOSE BLD MANUAL STRIP-MCNC: 270 MG/DL (ref 74–99)
GLUCOSE BLDV-MCNC: 191 MG/DL (ref 74–99)
GLUCOSE SERPL-MCNC: 184 MG/DL (ref 74–99)
GLUCOSE SERPL-MCNC: 307 MG/DL (ref 74–99)
GRAM STN SPEC: ABNORMAL
HCO3 BLDV-SCNC: 22.6 MMOL/L (ref 22–26)
HCT VFR BLD AUTO: 24.7 % (ref 41–52)
HCT VFR BLD EST: 22 % (ref 41–52)
HGB BLD-MCNC: 7.5 G/DL (ref 13.5–17.5)
HGB BLDV-MCNC: 7.3 G/DL (ref 13.5–17.5)
HYPOCHROMIA BLD QL SMEAR: ABNORMAL
IMM GRANULOCYTES # BLD AUTO: 0.1 X10*3/UL (ref 0–0.5)
IMM GRANULOCYTES NFR BLD AUTO: 0.6 % (ref 0–0.9)
INHALED O2 CONCENTRATION: 50 %
LACTATE BLDV-SCNC: 2.3 MMOL/L (ref 0.4–2)
LACTATE SERPL-SCNC: 2.2 MMOL/L (ref 0.4–2)
LYMPHOCYTES # BLD MANUAL: 1.73 X10*3/UL (ref 0.8–3)
LYMPHOCYTES NFR BLD MANUAL: 10.7 %
MAGNESIUM SERPL-MCNC: 1.9 MG/DL (ref 1.6–2.4)
MCH RBC QN AUTO: 23.6 PG (ref 26–34)
MCHC RBC AUTO-ENTMCNC: 30.4 G/DL (ref 32–36)
MCV RBC AUTO: 78 FL (ref 80–100)
MONOCYTES # BLD MANUAL: 0 X10*3/UL (ref 0.05–0.8)
MONOCYTES NFR BLD MANUAL: 0 %
NEUTROPHILS # BLD MANUAL: 13.88 X10*3/UL (ref 1.6–5.5)
NEUTS BAND # BLD MANUAL: 2.46 X10*3/UL (ref 0–0.5)
NEUTS BAND NFR BLD MANUAL: 15.2 %
NEUTS SEG # BLD MANUAL: 11.42 X10*3/UL (ref 1.6–5)
NEUTS SEG NFR BLD MANUAL: 70.5 %
NRBC BLD-RTO: 0.2 /100 WBCS (ref 0–0)
OXYHGB MFR BLDV: 87 % (ref 45–75)
PCO2 BLDV: 29 MM HG (ref 41–51)
PH BLDV: 7.5 PH (ref 7.33–7.43)
PHOSPHATE SERPL-MCNC: 3.6 MG/DL (ref 2.5–4.9)
PLATELET # BLD AUTO: 263 X10*3/UL (ref 150–450)
PO2 BLDV: 56 MM HG (ref 35–45)
POTASSIUM BLDV-SCNC: 3.8 MMOL/L (ref 3.5–5.3)
POTASSIUM SERPL-SCNC: 3.9 MMOL/L (ref 3.5–5.3)
POTASSIUM SERPL-SCNC: 4.6 MMOL/L (ref 3.5–5.3)
PROT SERPL-MCNC: 5.6 G/DL (ref 6.4–8.2)
RBC # BLD AUTO: 3.18 X10*6/UL (ref 4.5–5.9)
RBC MORPH BLD: ABNORMAL
SAO2 % BLDV: 89 % (ref 45–75)
SODIUM BLDV-SCNC: 146 MMOL/L (ref 136–145)
SODIUM SERPL-SCNC: 146 MMOL/L (ref 136–145)
SODIUM SERPL-SCNC: 147 MMOL/L (ref 136–145)
TOTAL CELLS COUNTED BLD: 112
VANCOMYCIN SERPL-MCNC: 20.1 UG/ML (ref 5–20)
WBC # BLD AUTO: 16.2 X10*3/UL (ref 4.4–11.3)

## 2024-04-28 PROCEDURE — 82947 ASSAY GLUCOSE BLOOD QUANT: CPT

## 2024-04-28 PROCEDURE — 2500000004 HC RX 250 GENERAL PHARMACY W/ HCPCS (ALT 636 FOR OP/ED)

## 2024-04-28 PROCEDURE — 2500000001 HC RX 250 WO HCPCS SELF ADMINISTERED DRUGS (ALT 637 FOR MEDICARE OP)

## 2024-04-28 PROCEDURE — 2500000005 HC RX 250 GENERAL PHARMACY W/O HCPCS: Performed by: INTERNAL MEDICINE

## 2024-04-28 PROCEDURE — 80202 ASSAY OF VANCOMYCIN: CPT

## 2024-04-28 PROCEDURE — P9045 ALBUMIN (HUMAN), 5%, 250 ML: HCPCS | Mod: JZ

## 2024-04-28 PROCEDURE — 83735 ASSAY OF MAGNESIUM: CPT

## 2024-04-28 PROCEDURE — 36415 COLL VENOUS BLD VENIPUNCTURE: CPT

## 2024-04-28 PROCEDURE — 84132 ASSAY OF SERUM POTASSIUM: CPT

## 2024-04-28 PROCEDURE — C9113 INJ PANTOPRAZOLE SODIUM, VIA: HCPCS

## 2024-04-28 PROCEDURE — 2500000004 HC RX 250 GENERAL PHARMACY W/ HCPCS (ALT 636 FOR OP/ED): Mod: JZ

## 2024-04-28 PROCEDURE — 2500000002 HC RX 250 W HCPCS SELF ADMINISTERED DRUGS (ALT 637 FOR MEDICARE OP, ALT 636 FOR OP/ED)

## 2024-04-28 PROCEDURE — 94668 MNPJ CHEST WALL SBSQ: CPT

## 2024-04-28 PROCEDURE — 84443 ASSAY THYROID STIM HORMONE: CPT

## 2024-04-28 PROCEDURE — 85027 COMPLETE CBC AUTOMATED: CPT

## 2024-04-28 PROCEDURE — 94640 AIRWAY INHALATION TREATMENT: CPT

## 2024-04-28 PROCEDURE — 87205 SMEAR GRAM STAIN: CPT

## 2024-04-28 PROCEDURE — 1200000002 HC GENERAL ROOM WITH TELEMETRY DAILY

## 2024-04-28 PROCEDURE — 31720 CLEARANCE OF AIRWAYS: CPT

## 2024-04-28 PROCEDURE — 83605 ASSAY OF LACTIC ACID: CPT

## 2024-04-28 PROCEDURE — 85007 BL SMEAR W/DIFF WBC COUNT: CPT

## 2024-04-28 PROCEDURE — 83605 ASSAY OF LACTIC ACID: CPT | Mod: MUE

## 2024-04-28 PROCEDURE — 82947 ASSAY GLUCOSE BLOOD QUANT: CPT | Mod: MUE

## 2024-04-28 RX ORDER — IPRATROPIUM BROMIDE AND ALBUTEROL SULFATE 2.5; .5 MG/3ML; MG/3ML
3 SOLUTION RESPIRATORY (INHALATION) EVERY 6 HOURS PRN
Status: DISCONTINUED | OUTPATIENT
Start: 2024-04-28 | End: 2024-05-15 | Stop reason: HOSPADM

## 2024-04-28 RX ORDER — VANCOMYCIN HYDROCHLORIDE 500 MG/100ML
500 INJECTION, SOLUTION INTRAVENOUS ONCE
Status: COMPLETED | OUTPATIENT
Start: 2024-04-28 | End: 2024-04-28

## 2024-04-28 RX ORDER — MAGNESIUM SULFATE HEPTAHYDRATE 40 MG/ML
2 INJECTION, SOLUTION INTRAVENOUS ONCE
Status: COMPLETED | OUTPATIENT
Start: 2024-04-28 | End: 2024-04-28

## 2024-04-28 RX ORDER — ALBUMIN HUMAN 50 G/1000ML
SOLUTION INTRAVENOUS
Status: COMPLETED
Start: 2024-04-28 | End: 2024-04-28

## 2024-04-28 RX ORDER — INSULIN LISPRO 100 [IU]/ML
0-10 INJECTION, SOLUTION INTRAVENOUS; SUBCUTANEOUS EVERY 4 HOURS
Status: DISCONTINUED | OUTPATIENT
Start: 2024-04-28 | End: 2024-05-02

## 2024-04-28 RX ORDER — ALBUMIN HUMAN 50 G/1000ML
25 SOLUTION INTRAVENOUS ONCE
Status: COMPLETED | OUTPATIENT
Start: 2024-04-28 | End: 2024-04-28

## 2024-04-28 RX ADMIN — Medication: at 09:00

## 2024-04-28 RX ADMIN — SALINE NASAL SPRAY 1 SPRAY: 1.5 SOLUTION NASAL at 08:58

## 2024-04-28 RX ADMIN — TRAMADOL HYDROCHLORIDE 50 MG: 50 TABLET, COATED ORAL at 16:36

## 2024-04-28 RX ADMIN — MEROPENEM 1000 MG: 1 INJECTION, POWDER, FOR SOLUTION INTRAVENOUS at 00:35

## 2024-04-28 RX ADMIN — ALBUMIN HUMAN 25 G: 0.05 INJECTION, SOLUTION INTRAVENOUS at 20:52

## 2024-04-28 RX ADMIN — SALINE NASAL SPRAY 1 SPRAY: 1.5 SOLUTION NASAL at 21:05

## 2024-04-28 RX ADMIN — SODIUM BICARBONATE 650 MG: 650 TABLET ORAL at 20:36

## 2024-04-28 RX ADMIN — Medication 40 L/MIN: at 09:40

## 2024-04-28 RX ADMIN — SALINE NASAL SPRAY 1 SPRAY: 1.5 SOLUTION NASAL at 12:38

## 2024-04-28 RX ADMIN — INSULIN LISPRO 4 UNITS: 100 INJECTION, SOLUTION INTRAVENOUS; SUBCUTANEOUS at 20:35

## 2024-04-28 RX ADMIN — HEPARIN SODIUM 5000 UNITS: 5000 INJECTION INTRAVENOUS; SUBCUTANEOUS at 06:00

## 2024-04-28 RX ADMIN — PANTOPRAZOLE SODIUM 40 MG: 40 INJECTION, POWDER, FOR SOLUTION INTRAVENOUS at 08:58

## 2024-04-28 RX ADMIN — ACETAMINOPHEN 650 MG: 650 SOLUTION ORAL at 20:36

## 2024-04-28 RX ADMIN — VANCOMYCIN HYDROCHLORIDE 500 MG: 500 INJECTION, SOLUTION INTRAVENOUS at 21:04

## 2024-04-28 RX ADMIN — SODIUM BICARBONATE 650 MG: 650 TABLET ORAL at 08:58

## 2024-04-28 RX ADMIN — SODIUM CHLORIDE, POTASSIUM CHLORIDE, SODIUM LACTATE AND CALCIUM CHLORIDE 1000 ML: 600; 310; 30; 20 INJECTION, SOLUTION INTRAVENOUS at 18:19

## 2024-04-28 RX ADMIN — INSULIN LISPRO 6 UNITS: 100 INJECTION, SOLUTION INTRAVENOUS; SUBCUTANEOUS at 15:41

## 2024-04-28 RX ADMIN — SALINE NASAL SPRAY 1 SPRAY: 1.5 SOLUTION NASAL at 16:36

## 2024-04-28 RX ADMIN — HEPARIN SODIUM 5000 UNITS: 5000 INJECTION INTRAVENOUS; SUBCUTANEOUS at 22:00

## 2024-04-28 RX ADMIN — INSULIN LISPRO 6 UNITS: 100 INJECTION, SOLUTION INTRAVENOUS; SUBCUTANEOUS at 23:45

## 2024-04-28 RX ADMIN — Medication 50 L/MIN: at 08:00

## 2024-04-28 RX ADMIN — Medication 1 TABLET: at 08:58

## 2024-04-28 RX ADMIN — IPRATROPIUM BROMIDE AND ALBUTEROL SULFATE 3 ML: .5; 3 SOLUTION RESPIRATORY (INHALATION) at 00:49

## 2024-04-28 RX ADMIN — POTASSIUM CHLORIDE 40 MEQ: 1.5 POWDER, FOR SOLUTION ORAL at 00:00

## 2024-04-28 RX ADMIN — INSULIN LISPRO 4 UNITS: 100 INJECTION, SOLUTION INTRAVENOUS; SUBCUTANEOUS at 11:34

## 2024-04-28 RX ADMIN — HEPARIN SODIUM 5000 UNITS: 5000 INJECTION INTRAVENOUS; SUBCUTANEOUS at 12:39

## 2024-04-28 RX ADMIN — INSULIN GLARGINE 15 UNITS: 100 INJECTION, SOLUTION SUBCUTANEOUS at 20:35

## 2024-04-28 RX ADMIN — Medication 40 L/MIN: at 12:00

## 2024-04-28 RX ADMIN — MEROPENEM 1000 MG: 1 INJECTION, POWDER, FOR SOLUTION INTRAVENOUS at 12:38

## 2024-04-28 RX ADMIN — SODIUM CHLORIDE, POTASSIUM CHLORIDE, SODIUM LACTATE AND CALCIUM CHLORIDE 1000 ML: 600; 310; 30; 20 INJECTION, SOLUTION INTRAVENOUS at 19:33

## 2024-04-28 RX ADMIN — MAGNESIUM SULFATE HEPTAHYDRATE 2 G: 40 INJECTION, SOLUTION INTRAVENOUS at 11:08

## 2024-04-28 RX ADMIN — ALBUMIN HUMAN 25 G: 50 SOLUTION INTRAVENOUS at 20:52

## 2024-04-28 RX ADMIN — INSULIN LISPRO 6 UNITS: 100 INJECTION, SOLUTION INTRAVENOUS; SUBCUTANEOUS at 00:35

## 2024-04-28 RX ADMIN — INSULIN LISPRO 3 UNITS: 100 INJECTION, SOLUTION INTRAVENOUS; SUBCUTANEOUS at 04:00

## 2024-04-28 ASSESSMENT — PAIN SCALES - PAIN ASSESSMENT IN ADVANCED DEMENTIA (PAINAD)
TOTALSCORE: MEDICATION (SEE MAR)
TOTALSCORE: MEDICATION (SEE MAR)

## 2024-04-28 ASSESSMENT — PAIN SCALES - GENERAL
PAINLEVEL_OUTOF10: 0 - NO PAIN

## 2024-04-28 ASSESSMENT — PAIN - FUNCTIONAL ASSESSMENT
PAIN_FUNCTIONAL_ASSESSMENT: 0-10
PAIN_FUNCTIONAL_ASSESSMENT: CPOT (CRITICAL CARE PAIN OBSERVATION TOOL)
PAIN_FUNCTIONAL_ASSESSMENT: CPOT (CRITICAL CARE PAIN OBSERVATION TOOL)
PAIN_FUNCTIONAL_ASSESSMENT: 0-10
PAIN_FUNCTIONAL_ASSESSMENT: 0-10

## 2024-04-28 NOTE — PROGRESS NOTES
"Vancomycin Dosing by Pharmacy- FOLLOW UP    rBian Rodriguez is a 69 y.o. year old male who Pharmacy has been consulted for vancomycin dosing for pneumonia. Based on the patient's indication and renal status this patient is being dosed based on a goal trough/random level of 15-20.     Renal function is currently improving.    Most recent random level: 14.8 mcg/mL    Visit Vitals  /56   Pulse 88   Temp 36.4 °C (97.5 °F) (Temporal)   Resp (!) 32        Lab Results   Component Value Date    CREATININE 2.93 (H) 04/27/2024    CREATININE 3.35 (H) 04/27/2024    CREATININE 3.22 (H) 04/27/2024    CREATININE 3.24 (H) 04/26/2024        Patient weight is No results found for: \"PTWEIGHT\"    No results found for: \"CULTURE\"     I/O last 3 completed shifts:  In: 4713.6 (59.4 mL/kg) [I.V.:743.6 (9.4 mL/kg); NG/GT:2315; IV Piggyback:1655]  Out: 1595 (20.1 mL/kg) [Urine:1595 (0.6 mL/kg/hr)]  Dosing Weight: 79.4 kg   [unfilled]    Lab Results   Component Value Date    PATIENTTEMP 37.0 04/27/2024    PATIENTTEMP 37.0 04/26/2024    PATIENTTEMP 37.0 04/26/2024        Assessment/Plan    Below goal trough/random level. Will redose with 1000mg once.   The next level will be obtained on 4/28/24 at 1800. May be obtained sooner if clinically indicated.   Will continue to monitor renal function daily while on vancomycin and order serum creatinine at least every 48 hours if not already ordered.  Follow for continued vancomycin needs, clinical response, and signs/symptoms of toxicity.       Claudine Enriquez, PharmD           "

## 2024-04-28 NOTE — SIGNIFICANT EVENT
Restraints indicated as alternative therapies have been attempted and have been ineffective.  Restrain with soft mitt restraints until medical devices discontinued and/or patient able to participate with plan of care.

## 2024-04-28 NOTE — PROGRESS NOTES
Subjective   Brian Rodriguez is a 69 y.o. male transferred to the MICU for multiple rapids called due to hypotension and increased oxygen requirements. Today, patient is not at baseline orientation and has deteriorated since he was in the MICU, but has mildly improved compared to yesterday. Can follow some commands and attempts to speak.    Objective   Physical Exam  Constitutional:       Appearance: He is cachectic. He is ill-appearing. He is not toxic-appearing.   HENT:      Head: Normocephalic and atraumatic.      Nose: Nose normal.      Mouth/Throat:      Mouth: Mucous membranes are dry.   Cardiovascular:      Rate and Rhythm: Regular rhythm. Tachycardia present.      Heart sounds: Normal heart sounds.   Pulmonary:      Effort: Respiratory distress present.      Breath sounds: Rhonchi present. No wheezing or rales.   Abdominal:      General: Abdomen is flat. Bowel sounds are normal.      Palpations: Abdomen is soft.   Musculoskeletal:      Right lower leg: No edema.      Left lower leg: No edema.   Skin:     General: Skin is warm and dry.      Capillary Refill: Capillary refill takes 2 to 3 seconds.      Findings: Lesion present.      Comments: Subcutaneous nodules noted on R zygomatic bone. No exudate, no erythema   Neurological:      Mental Status: He is easily aroused. He is lethargic and disoriented.      Cranial Nerves: Cranial nerve deficit present.      Motor: Weakness present.      Comments: Could not speak  Opened eyes to command     Temp:  [36 °C (96.8 °F)-36.5 °C (97.7 °F)] 36.5 °C (97.7 °F)  Heart Rate:  [] 97  Resp:  [13-33] 29  BP: ()/(42-89) 80/62  FiO2 (%):  [40 %-50 %] 40 %    Intake/Output Summary (Last 24 hours) at 4/28/2024 1233  Last data filed at 4/28/2024 1200  Gross per 24 hour   Intake 4504.76 ml   Output 1742 ml   Net 2762.76 ml     Vitals:    04/27/24 0600   Weight: 52 kg (114 lb 10.2 oz)     FiO2 (%):  [40 %-50 %] 40 %     I/Os    Intake/Output Summary (Last 24 hours) at  4/28/2024 1233  Last data filed at 4/28/2024 1200  Gross per 24 hour   Intake 4504.76 ml   Output 1742 ml   Net 2762.76 ml     Labs:   Results from last 72 hours   Lab Units 04/28/24  0439 04/27/24  2225 04/27/24  1551 04/27/24  0434 04/27/24  0048 04/26/24  2032   SODIUM mmol/L 146* 146* 151* 156* 152* 149*   POTASSIUM mmol/L 3.9 3.2* 4.1 3.2* 3.8 4.0   CHLORIDE mmol/L 113* 114* 118* 121* 119* 116*   CO2 mmol/L 22 22 21 24 20* 20*   BUN mg/dL 46* 51* 48* 54* 50* 47*   CREATININE mg/dL 2.66* 2.93* 2.93* 3.35* 3.22* 3.24*   GLUCOSE mg/dL 184* 267* 227* 309* 466* 497*   CALCIUM mg/dL 7.6* 7.5* 7.3* 8.1* 8.5* 8.1*   ANION GAP mmol/L 15 13 16 14 17 17   EGFR mL/min/1.73m*2 25* 22* 22* 19* 20* 20*   PHOSPHORUS mg/dL  --   --   --  2.3* 3.4 3.7      Results from last 72 hours   Lab Units 04/28/24 0439 04/27/24 0434 04/26/24  1010   WBC AUTO x10*3/uL 16.2* 14.6* 16.2*   HEMOGLOBIN g/dL 7.5* 8.0* 7.5*   HEMATOCRIT % 24.7* 24.6* 24.0*   PLATELETS AUTO x10*3/uL 263 248 264   NEUTROS PCT AUTO %  --  83.1  --    LYMPHO PCT MAN % 10.7  --  2.6   LYMPHS PCT AUTO %  --  13.5  --    MONO PCT MAN % 0.0  --  0.9   MONOS PCT AUTO %  --  1.5  --    EOSINO PCT MAN % 3.6  --  0.0   EOS PCT AUTO %  --  0.5  --       Results from last 72 hours   Lab Units 04/28/24  0446   POCT PH, VENOUS pH 7.50*   POCT PCO2, VENOUS mm Hg 29*   POCT SO2, VENOUS % 89*   POCT OXY HEMOGLOBIN, VENOUS % 87.0*   POCT HEMATOCRIT CALCULATED, VENOUS % 22.0*   POCT SODIUM, VENOUS mmol/L 146*   POCT POTASSIUM, VENOUS mmol/L 3.8   POCT CHLORIDE, VENOUS mmol/L 117*   POCT IONIZED CALCIUM, VENOUS mmol/L 1.27   POCT GLUCOSE, VENOUS mg/dL 191*   POCT LACTATE, VENOUS mmol/L 2.3*   POCT BASE EXCESS, VENOUS mmol/L -0.3   POCT HCO3 CALCULATED, VENOUS mmol/L 22.6   POCT HEMOGLOBIN, VENOUS g/dL 7.3*   FIO2 % 50      Lab Results   Component Value Date    CALCIUM 7.6 (L) 04/28/2024    PHOS 2.3 (L) 04/27/2024      Lab Results   Component Value Date    CRP 20.03 (H) 04/24/2024      Micro/ID:   Lab Results   Component Value Date    URINECULTURE Normal genitourinary elda 04/26/2024    BLOODCULT No growth at 1 day 04/26/2024    BLOODCULT No growth at 1 day 04/26/2024     Images:  US scrotum w doppler  Narrative: Interpreted By:  Antonio Fernandez and O'Connor Gregory   STUDY:  US SCROTUM WITH DOPPLER;  4/27/2024 10:05 am      INDICATION:  Signs/Symptoms:to rule out infection.      COMPARISON:  Scrotal ultrasound dated 02/16/2013  CT chest abdomen pelvis dated 04/17/2024      ACCESSION NUMBER(S):  [id number]      ORDERING CLINICIAN:  STACIA PIECRE      TECHNIQUE:  Multiple ultrasonographic images of scrotum and tested were obtained.      FINDINGS:  RIGHT HEMISCROTUM:      RIGHT TESTICLE:  The right testicle measures 2.5 cm x 1.2 cmx 3.4 cm. The right  testicle demonstrates a homogeneous echotexture and normal contour.  Normal vascularity and Doppler waveforms are observed in the right  testicle. There is a 0.7 cm echogenic focus inferior to the right  testicle again seen, likely a scrotal david. Trace right-sided  hydrocele.      RIGHT EPIDIDYMIS:  The right epididymal head measures 0.7 cm x 0.5 cm x 0.8 cm and is  within normal limits.      LEFT HEMISCROTUM:      LEFT TESTICLE:  The left testicle measures 2.8 cm x 1.0 cm x 3.8 cm. The left  testicle demonstrates a homogeneous echotexture and normal contour.  Normal vascularity and Doppler waveforms are observed in the left  testicle.      LEFT EPIDIDYMIS:  The left epididymal head was not well visualized.      Impression: 1. No abnormal scrotal wall thickening or hyperemia to suggest active  infectious process.  2. Normal bilateral testes with intact flow.  3. Trace right-sided hydrocele with scrotal david.      I personally reviewed the images/study and I agree with the findings  as stated by resident physician Dr. Mynor Soares.      MACRO:  None      Signed by: Antonio Matute 4/27/2024 12:21 PM  Dictation  workstation:   [id number]  XR abdomen 1 view  Narrative: Interpreted By:  Kendrick Simpson,   STUDY:  XR ABDOMEN 1 VIEW;  4/27/2024 10:23 am      INDICATION:  Signs/Symptoms:for diarrhea.      COMPARISON:  Abdominal radiograph 04/23/2024 and chest, abdomen pelvis CT scan  04/17/2024      ACCESSION NUMBER(S):  [id number]      ORDERING CLINICIAN:  STACIA PIERCE      FINDINGS:  AP radiograph of abdomen available for interpretation.      Enteric tube is seen coursing below diaphragm and tip overlying  expected location of distal stomach. A tubular catheter partially  visualized projecting over lower pelvis. Cholelithiasis.      Nonobstructive bowel gas pattern.Limited evaluation of  pneumoperitoneum on supine imaging, however no gross evidence of free  air is noted.      Visualized basilar lungs demonstrate patchy airspace opacities,  better assessed on chest radiograph from yesterday.      Osseous structures demonstrate no acute bony changes.      Impression: 1.  Enteric tube tip overlying expected location of distal stomach.  2. Nonobstructive bowel gas pattern.  3. Cholelithiasis.      Signed by: Kendrick Simpson 4/27/2024 11:37 AM  Dictation workstation:   [id number]  XR chest 1 view  Narrative: Interpreted By:  Kendrick Simpson,  and Alex Monzon   STUDY:  XR CHEST 1 VIEW;  4/26/2024 7:27 pm      INDICATION:  Signs/Symptoms:sob.      COMPARISON:  XR CHEST 1 VIEW;  4/26/2024 1:35 am; chest, abdomen pelvis CT scan  04/17/2024      ACCESSION NUMBER(S):  [id number]      ORDERING CLINICIAN:  NOEL AUGUSTE      FINDINGS:  AP radiograph of the chest was provided.  Enteric tube is again visualized coursing beneath the diaphragm with  the tip overlying the gastric body.      CARDIOMEDIASTINAL SILHOUETTE:  Cardiomediastinal silhouette is stable in size and configuration with  similar partial obscuration bilaterally due to lung parenchymal  opacities. Mild aortic knob calcifications.      LUNGS:  Allowing for differences  in technical factors, there has been  interval slight improvement in extensive patchy and confluence  bilateral airspace opacities, most pronounced within mid basilar  lungs. Mild blunting of bilateral costophrenic angles. There is no  pneumothorax.      ABDOMEN:  No remarkable upper abdominal findings.      BONES:  No acute osseous abnormality.      Impression: 1. Interval slight improvement in extensive patchy and confluent  opacities throughout bilateral lungs, particularly the mid basilar  lung zones. The findings again represent multifocal  pneumonia/pneumonitis.  2. Possible trace bilateral pleural effusions.      I personally reviewed the images/study and I agree with the findings  as stated by Na Johnson MD. This study was interpreted at  [place], [place].      MACRO:  None.      Signed by: Kendrick Simpson 4/27/2024 5:24 AM  Dictation workstation:   [id number]       Meds:  Scheduled medications  chlorhexidine, , Topical, Daily  heparin (porcine), 5,000 Units, subcutaneous, q8h RANDAL  insulin glargine, 15 Units, subcutaneous, Nightly  insulin lispro, 0-10 Units, subcutaneous, q4h  magnesium sulfate, 2 g, intravenous, Once  meropenem, 1,000 mg, intravenous, q12h  [Held by provider] mirtazapine, 7.5 mg, oral, Nightly  multivitamin with minerals, 1 tablet, oral, Daily  pantoprazole, 40 mg, intravenous, Daily  sodium bicarbonate, 650 mg, oral, BID  sodium chloride, 1 spray, Each Nostril, 4x daily    Continuous medications  norepinephrine, 0-3 mcg/kg/min (Dosing Weight), Last Rate: Stopped (04/28/24 0413)    PRN medications  PRN medications: acetaminophen **OR** acetaminophen **OR** acetaminophen, dextrose, dextrose, dextrose, dextrose, glucagon, glucagon, glucagon, glucagon, guaiFENesin, insulin regular, ipratropium-albuteroL, melatonin, oxygen, polyethylene glycol, sennosides-docusate sodium, traMADol, vancomycin     Assessment   Brian Rodriguez is a 69 y.o. male  admitted to the MICU for multiple rapids, hypotension, and acute hypoxic respiratory failure requiring airvo support.  PMHx CKD, DM2, HTN, sacral wound, bipolar disorder who was originally admitted from his SNF 2/2 encephalopathy. On the floor, the patient had several aspiration events. His pressures responded to fluids and IVC was collapsible. Presentation most consistent with aspiration pna 2/2 dysphagia and hypovolemia 2/2 dehydration.     Neuro  # Acute Encephalopathy  # Dementia  # Hypoactive Delirium  :: per family baseline AOx1-2, able to converse and follow commands  :: Hyponatremia +/- delirium 2/2 infection and prolonged hospitalization  - Neurology consulted, appreciate recommendations: per neurology would like MRI brain w/o contrast  -- Limited d/t aspiration when lying flat  -- FU neurology once pt can tolerate MRI  - EMG to r/o underlying motor neuron disease    Pulm  # Acute Hypoxic Respiratory Failure  # Multifocal Pneumonia  # Aspiration Pneumonia  :: Imaging suggestive of bacterial pna in the setting of recent covid s/p remdesivir  - s/p azithromycin 4/18-4/20, Remdesevir 4/18-4/22  - meropenem, vancomycin   - sent fungal studies, repeat BC, repeat urine studies, repeat sputum cultures  - aggressive respiratory hygiene with IPV and chest percussion  - duonebs q6hr PRN  - Covid precautions last day 4/28    Cardiovascular  # Hypotension  :: ?septic shock vs dehydration  - 2L LR ON  - Required pressors ON, discontinued 4 hours later    # HTN  - Hold home amlodipine    # HLD  - Hold home atorvastatin    GI  # Nutrition  - NPO concern for aspiration pneumonia - tube feeds uptitrate to goal 55cc/hr  - Diet recs in     # Dysphagia  - ENT previously consulted, no anatomic cause  - Neurology consulted as above  - NG tube    Renal/  #TERRI on CKD Stage III  :: most likely prerenal azotemia 2/2 hypovolemia/hemodynamic instability  :: baseline Cr 1.7  - Q12hr RFP  - Will restart free water flushes: 200  Q6H    # Hypernatremia- resolved    Endo  # DM2  # Hyperglycemia - resolved  :: Was on D5W for Na and steroids, both now stopped  - Given 15 units of lantus and glucose stabilized    ID  # Acute Hypoxic Respiratory Failure  # Multifocal Pneumonia  # Aspiration Pneumonia  :: Bacterial pna 2/2 aspiration in the setting of dysphagia and recent covid pna  - s/p azithromycin 4/18-4/20, Remdesevir 4/18-4/22  - Meropenem, vancomycin   - Fungal chem, blood and respiratory sent     # Hidradenitis Suppurativa  # Multiple Sacral Wounds  :: derm has previously recommended humira/minocycline 100 BID but currently w/o lab confirmation, hold off iso septic shock  - will need outpatient dermatology follow up w/ doxycycline 100 BID outpatient   - wound care consulted, appreciate recommendations    Heme  # Acute on Chronic Anemia  :: most likely iso iron deficiency anemia and anemia of chronic disease: iron low, TIBC 95, folate WNL, B12 supratherapeutic  - Continue to monitor  - Hold off on iron supplementation iso active infection    MSK  # RUE swollen  :: Suspect local fluid overload  - US DVT RUE    F Free water 200cc q6hr  E Replete prn  N NPO, TF at 55cc/hr  G None  DVT: subcutaneous heparin  ABX:  Vanc: 4/17-  Zosyn: 4/17-4/26  Skylar: 4/26-  Azithro: 4/18-4/20  Lines: PIV x4, bunch  Drips: None  Pressors: None  Code status: DNR  NoK:  Primary Emergency Contact: DEEPAK RANGEL, Home Phone: [phone]    Dispo: 69 y.o. male admitted to the MICU for multiple rapids, hypotension, and acute hypoxic respiratory failure requiring airvo support.  PMHx CKD, DM2, HTN, sacral wound, bipolar disorder who was originally admitted from his SNF 2/2 AM. Floors once O2 requirement decreases and pressures stabilize.

## 2024-04-29 ENCOUNTER — APPOINTMENT (OUTPATIENT)
Dept: CARDIOLOGY | Facility: HOSPITAL | Age: 69
DRG: 871 | End: 2024-04-29
Payer: MEDICARE

## 2024-04-29 ENCOUNTER — APPOINTMENT (OUTPATIENT)
Dept: VASCULAR MEDICINE | Facility: HOSPITAL | Age: 69
DRG: 871 | End: 2024-04-29
Payer: MEDICARE

## 2024-04-29 ENCOUNTER — APPOINTMENT (OUTPATIENT)
Dept: RADIOLOGY | Facility: HOSPITAL | Age: 69
DRG: 871 | End: 2024-04-29
Payer: MEDICARE

## 2024-04-29 LAB
ABO GROUP (TYPE) IN BLOOD: NORMAL
ALBUMIN SERPL BCP-MCNC: 1.6 G/DL (ref 3.4–5)
ALBUMIN SERPL BCP-MCNC: 1.8 G/DL (ref 3.4–5)
ANION GAP BLDV CALCULATED.4IONS-SCNC: 5 MMOL/L (ref 10–25)
ANION GAP BLDV CALCULATED.4IONS-SCNC: 7 MMOL/L (ref 10–25)
ANION GAP SERPL CALC-SCNC: 13 MMOL/L (ref 10–20)
ANION GAP SERPL CALC-SCNC: 14 MMOL/L (ref 10–20)
ANTIBODY SCREEN: NORMAL
ATRIAL RATE: 113 BPM
BASE EXCESS BLDV CALC-SCNC: 3.1 MMOL/L (ref -2–3)
BASE EXCESS BLDV CALC-SCNC: 3.4 MMOL/L (ref -2–3)
BASOPHILS # BLD AUTO: 0.02 X10*3/UL (ref 0–0.1)
BASOPHILS NFR BLD AUTO: 0.1 %
BLOOD EXPIRATION DATE: NORMAL
BODY TEMPERATURE: 37 DEGREES CELSIUS
BODY TEMPERATURE: 37 DEGREES CELSIUS
BUN SERPL-MCNC: 39 MG/DL (ref 6–23)
BUN SERPL-MCNC: 41 MG/DL (ref 6–23)
CA-I BLDV-SCNC: 1.19 MMOL/L (ref 1.1–1.33)
CA-I BLDV-SCNC: 1.25 MMOL/L (ref 1.1–1.33)
CALCIUM SERPL-MCNC: 7.4 MG/DL (ref 8.6–10.6)
CALCIUM SERPL-MCNC: 7.7 MG/DL (ref 8.6–10.6)
CHLORIDE BLDV-SCNC: 111 MMOL/L (ref 98–107)
CHLORIDE BLDV-SCNC: 116 MMOL/L (ref 98–107)
CHLORIDE SERPL-SCNC: 108 MMOL/L (ref 98–107)
CHLORIDE SERPL-SCNC: 112 MMOL/L (ref 98–107)
CO2 SERPL-SCNC: 23 MMOL/L (ref 21–32)
CO2 SERPL-SCNC: 24 MMOL/L (ref 21–32)
CREAT SERPL-MCNC: 2.35 MG/DL (ref 0.5–1.3)
CREAT SERPL-MCNC: 2.39 MG/DL (ref 0.5–1.3)
DISPENSE STATUS: NORMAL
EGFRCR SERPLBLD CKD-EPI 2021: 28 ML/MIN/1.73M*2
EGFRCR SERPLBLD CKD-EPI 2021: 29 ML/MIN/1.73M*2
EOSINOPHIL # BLD AUTO: 1.16 X10*3/UL (ref 0–0.4)
EOSINOPHIL NFR BLD AUTO: 7.7 %
ERYTHROCYTE [DISTWIDTH] IN BLOOD BY AUTOMATED COUNT: 21.1 % (ref 11.5–14.5)
ERYTHROCYTE [DISTWIDTH] IN BLOOD BY AUTOMATED COUNT: 22.7 % (ref 11.5–14.5)
FERRITIN SERPL-MCNC: 539 NG/ML (ref 20–300)
GLUCOSE BLD MANUAL STRIP-MCNC: 149 MG/DL (ref 74–99)
GLUCOSE BLD MANUAL STRIP-MCNC: 156 MG/DL (ref 74–99)
GLUCOSE BLD MANUAL STRIP-MCNC: 167 MG/DL (ref 74–99)
GLUCOSE BLD MANUAL STRIP-MCNC: 191 MG/DL (ref 74–99)
GLUCOSE BLD MANUAL STRIP-MCNC: 216 MG/DL (ref 74–99)
GLUCOSE BLD MANUAL STRIP-MCNC: 251 MG/DL (ref 74–99)
GLUCOSE BLD MANUAL STRIP-MCNC: 288 MG/DL (ref 74–99)
GLUCOSE BLDV-MCNC: 167 MG/DL (ref 74–99)
GLUCOSE BLDV-MCNC: 333 MG/DL (ref 74–99)
GLUCOSE SERPL-MCNC: 164 MG/DL (ref 74–99)
GLUCOSE SERPL-MCNC: 294 MG/DL (ref 74–99)
HCO3 BLDV-SCNC: 26.5 MMOL/L (ref 22–26)
HCO3 BLDV-SCNC: 26.7 MMOL/L (ref 22–26)
HCT VFR BLD AUTO: 19.8 % (ref 41–52)
HCT VFR BLD AUTO: 23.7 % (ref 41–52)
HCT VFR BLD EST: 21 % (ref 41–52)
HCT VFR BLD EST: 33 % (ref 41–52)
HGB BLD-MCNC: 6 G/DL (ref 13.5–17.5)
HGB BLD-MCNC: 7.5 G/DL (ref 13.5–17.5)
HGB BLDV-MCNC: 11 G/DL (ref 13.5–17.5)
HGB BLDV-MCNC: 6.9 G/DL (ref 13.5–17.5)
HGB RETIC QN: 24 PG (ref 28–38)
HGB RETIC QN: 25 PG (ref 28–38)
HYPOCHROMIA BLD QL SMEAR: NORMAL
IMM GRANULOCYTES # BLD AUTO: 0.1 X10*3/UL (ref 0–0.5)
IMM GRANULOCYTES NFR BLD AUTO: 0.7 % (ref 0–0.9)
IMMATURE RETIC FRACTION: 17.6 %
IMMATURE RETIC FRACTION: 19.1 %
INHALED O2 CONCENTRATION: 40 %
INHALED O2 CONCENTRATION: 40 %
IRON SATN MFR SERPL: 17 % (ref 25–45)
IRON SERPL-MCNC: 16 UG/DL (ref 35–150)
LACTATE BLDV-SCNC: 2.1 MMOL/L (ref 0.4–2)
LACTATE BLDV-SCNC: 2.5 MMOL/L (ref 0.4–2)
LACTATE BLDV-SCNC: 2.5 MMOL/L (ref 0.4–2)
LACTATE SERPL-SCNC: 2 MMOL/L (ref 0.4–2)
LDH SERPL L TO P-CCNC: 203 U/L (ref 84–246)
LIPASE SERPL-CCNC: 48 U/L (ref 9–82)
LYMPHOCYTES # BLD AUTO: 2.16 X10*3/UL (ref 0.8–3)
LYMPHOCYTES NFR BLD AUTO: 14.3 %
MAGNESIUM SERPL-MCNC: 2.31 MG/DL (ref 1.6–2.4)
MCH RBC QN AUTO: 24.2 PG (ref 26–34)
MCH RBC QN AUTO: 24.7 PG (ref 26–34)
MCHC RBC AUTO-ENTMCNC: 30.3 G/DL (ref 32–36)
MCHC RBC AUTO-ENTMCNC: 31.6 G/DL (ref 32–36)
MCV RBC AUTO: 78 FL (ref 80–100)
MCV RBC AUTO: 80 FL (ref 80–100)
MONOCYTES # BLD AUTO: 0.14 X10*3/UL (ref 0.05–0.8)
MONOCYTES NFR BLD AUTO: 0.9 %
NEUTROPHILS # BLD AUTO: 11.51 X10*3/UL (ref 1.6–5.5)
NEUTROPHILS NFR BLD AUTO: 76.3 %
NRBC BLD-RTO: 0.2 /100 WBCS (ref 0–0)
NRBC BLD-RTO: 0.2 /100 WBCS (ref 0–0)
OXYHGB MFR BLDV: 81.8 % (ref 45–75)
OXYHGB MFR BLDV: 90.6 % (ref 45–75)
PCO2 BLDV: 34 MM HG (ref 41–51)
PCO2 BLDV: 35 MM HG (ref 41–51)
PH BLDV: 7.49 PH (ref 7.33–7.43)
PH BLDV: 7.5 PH (ref 7.33–7.43)
PHOSPHATE SERPL-MCNC: 3.1 MG/DL (ref 2.5–4.9)
PHOSPHATE SERPL-MCNC: 3.2 MG/DL (ref 2.5–4.9)
PLATELET # BLD AUTO: 207 X10*3/UL (ref 150–450)
PLATELET # BLD AUTO: 232 X10*3/UL (ref 150–450)
PO2 BLDV: 52 MM HG (ref 35–45)
PO2 BLDV: 59 MM HG (ref 35–45)
POTASSIUM BLDV-SCNC: 3.7 MMOL/L (ref 3.5–5.3)
POTASSIUM BLDV-SCNC: 3.9 MMOL/L (ref 3.5–5.3)
POTASSIUM SERPL-SCNC: 3.9 MMOL/L (ref 3.5–5.3)
POTASSIUM SERPL-SCNC: 3.9 MMOL/L (ref 3.5–5.3)
PRODUCT BLOOD TYPE: 5100
PRODUCT CODE: NORMAL
PROT SERPL-MCNC: 5.3 G/DL (ref 6.4–8.2)
Q ONSET: 222 MS
QRS COUNT: 19 BEATS
QRS DURATION: 78 MS
QT INTERVAL: 412 MS
QTC CALCULATION(BAZETT): 565 MS
QTC FREDERICIA: 508 MS
R AXIS: 73 DEGREES
RBC # BLD AUTO: 2.48 X10*6/UL (ref 4.5–5.9)
RBC # BLD AUTO: 3.04 X10*6/UL (ref 4.5–5.9)
RBC MORPH BLD: NORMAL
RETICS #: 0.03 X10*6/UL (ref 0.02–0.11)
RETICS #: 0.05 X10*6/UL (ref 0.02–0.11)
RETICS/RBC NFR AUTO: 1 % (ref 0.5–2)
RETICS/RBC NFR AUTO: 1.9 % (ref 0.5–2)
RH FACTOR (ANTIGEN D): NORMAL
SAO2 % BLDV: 84 % (ref 45–75)
SAO2 % BLDV: 94 % (ref 45–75)
SODIUM BLDV-SCNC: 141 MMOL/L (ref 136–145)
SODIUM BLDV-SCNC: 144 MMOL/L (ref 136–145)
SODIUM SERPL-SCNC: 141 MMOL/L (ref 136–145)
SODIUM SERPL-SCNC: 145 MMOL/L (ref 136–145)
T AXIS: 90 DEGREES
T OFFSET: 428 MS
TIBC SERPL-MCNC: 94 UG/DL (ref 240–445)
TSH SERPL-ACNC: 4.37 MIU/L (ref 0.44–3.98)
UIBC SERPL-MCNC: 78 UG/DL (ref 110–370)
UNIT ABO: NORMAL
UNIT NUMBER: NORMAL
UNIT RH: NORMAL
UNIT VOLUME: 350
VANCOMYCIN SERPL-MCNC: 20.7 UG/ML (ref 5–20)
VENTRICULAR RATE: 113 BPM
WBC # BLD AUTO: 14 X10*3/UL (ref 4.4–11.3)
WBC # BLD AUTO: 15.1 X10*3/UL (ref 4.4–11.3)
XM INTEP: NORMAL

## 2024-04-29 PROCEDURE — 84425 ASSAY OF VITAMIN B-1: CPT

## 2024-04-29 PROCEDURE — 1200000002 HC GENERAL ROOM WITH TELEMETRY DAILY

## 2024-04-29 PROCEDURE — 94668 MNPJ CHEST WALL SBSQ: CPT

## 2024-04-29 PROCEDURE — 87075 CULTR BACTERIA EXCEPT BLOOD: CPT | Mod: 91

## 2024-04-29 PROCEDURE — 87081 CULTURE SCREEN ONLY: CPT

## 2024-04-29 PROCEDURE — 83605 ASSAY OF LACTIC ACID: CPT

## 2024-04-29 PROCEDURE — 71045 X-RAY EXAM CHEST 1 VIEW: CPT | Performed by: RADIOLOGY

## 2024-04-29 PROCEDURE — 2500000005 HC RX 250 GENERAL PHARMACY W/O HCPCS: Performed by: INTERNAL MEDICINE

## 2024-04-29 PROCEDURE — 83550 IRON BINDING TEST: CPT | Mod: 91

## 2024-04-29 PROCEDURE — 93971 EXTREMITY STUDY: CPT

## 2024-04-29 PROCEDURE — 87640 STAPH A DNA AMP PROBE: CPT | Mod: 91

## 2024-04-29 PROCEDURE — 2500000004 HC RX 250 GENERAL PHARMACY W/ HCPCS (ALT 636 FOR OP/ED)

## 2024-04-29 PROCEDURE — 84132 ASSAY OF SERUM POTASSIUM: CPT

## 2024-04-29 PROCEDURE — 74176 CT ABD & PELVIS W/O CONTRAST: CPT

## 2024-04-29 PROCEDURE — 84155 ASSAY OF PROTEIN SERUM: CPT

## 2024-04-29 PROCEDURE — 85045 AUTOMATED RETICULOCYTE COUNT: CPT

## 2024-04-29 PROCEDURE — 85025 COMPLETE CBC W/AUTO DIFF WBC: CPT

## 2024-04-29 PROCEDURE — 83735 ASSAY OF MAGNESIUM: CPT

## 2024-04-29 PROCEDURE — 82728 ASSAY OF FERRITIN: CPT

## 2024-04-29 PROCEDURE — 93308 TTE F-UP OR LMTD: CPT | Performed by: INTERNAL MEDICINE

## 2024-04-29 PROCEDURE — 85045 AUTOMATED RETICULOCYTE COUNT: CPT | Mod: 91,MUE

## 2024-04-29 PROCEDURE — 93308 TTE F-UP OR LMTD: CPT

## 2024-04-29 PROCEDURE — 82947 ASSAY GLUCOSE BLOOD QUANT: CPT

## 2024-04-29 PROCEDURE — 84425 ASSAY OF VITAMIN B-1: CPT | Mod: 91,MUE

## 2024-04-29 PROCEDURE — 36415 COLL VENOUS BLD VENIPUNCTURE: CPT

## 2024-04-29 PROCEDURE — C9113 INJ PANTOPRAZOLE SODIUM, VIA: HCPCS

## 2024-04-29 PROCEDURE — 82947 ASSAY GLUCOSE BLOOD QUANT: CPT | Mod: 91

## 2024-04-29 PROCEDURE — 86923 COMPATIBILITY TEST ELECTRIC: CPT

## 2024-04-29 PROCEDURE — 86900 BLOOD TYPING SEROLOGIC ABO: CPT | Mod: 91

## 2024-04-29 PROCEDURE — 80202 ASSAY OF VANCOMYCIN: CPT

## 2024-04-29 PROCEDURE — 36430 TRANSFUSION BLD/BLD COMPNT: CPT

## 2024-04-29 PROCEDURE — 93005 ELECTROCARDIOGRAM TRACING: CPT

## 2024-04-29 PROCEDURE — 83615 LACTATE (LD) (LDH) ENZYME: CPT

## 2024-04-29 PROCEDURE — P9016 RBC LEUKOCYTES REDUCED: HCPCS

## 2024-04-29 PROCEDURE — 74176 CT ABD & PELVIS W/O CONTRAST: CPT | Performed by: RADIOLOGY

## 2024-04-29 PROCEDURE — 93971 EXTREMITY STUDY: CPT | Performed by: INTERNAL MEDICINE

## 2024-04-29 PROCEDURE — 85027 COMPLETE CBC AUTOMATED: CPT

## 2024-04-29 PROCEDURE — 2500000001 HC RX 250 WO HCPCS SELF ADMINISTERED DRUGS (ALT 637 FOR MEDICARE OP)

## 2024-04-29 PROCEDURE — 2500000002 HC RX 250 W HCPCS SELF ADMINISTERED DRUGS (ALT 637 FOR MEDICARE OP, ALT 636 FOR OP/ED)

## 2024-04-29 PROCEDURE — 99291 CRITICAL CARE FIRST HOUR: CPT

## 2024-04-29 PROCEDURE — 83690 ASSAY OF LIPASE: CPT

## 2024-04-29 PROCEDURE — 71045 X-RAY EXAM CHEST 1 VIEW: CPT

## 2024-04-29 PROCEDURE — 31720 CLEARANCE OF AIRWAYS: CPT

## 2024-04-29 RX ORDER — SODIUM CHLORIDE FOR INHALATION 3 %
3 VIAL, NEBULIZER (ML) INHALATION EVERY 6 HOURS SCHEDULED
Status: DISCONTINUED | OUTPATIENT
Start: 2024-04-30 | End: 2024-05-01

## 2024-04-29 RX ORDER — NOREPINEPHRINE BITARTRATE/D5W 8 MG/250ML
0-3 PLASTIC BAG, INJECTION (ML) INTRAVENOUS CONTINUOUS
Status: DISCONTINUED | OUTPATIENT
Start: 2024-04-29 | End: 2024-04-29

## 2024-04-29 RX ORDER — VANCOMYCIN HYDROCHLORIDE 500 MG/100ML
500 INJECTION, SOLUTION INTRAVENOUS ONCE
Status: COMPLETED | OUTPATIENT
Start: 2024-04-29 | End: 2024-04-29

## 2024-04-29 RX ORDER — ALBUTEROL SULFATE 0.83 MG/ML
2.5 SOLUTION RESPIRATORY (INHALATION) EVERY 6 HOURS PRN
Status: DISCONTINUED | OUTPATIENT
Start: 2024-04-29 | End: 2024-05-15 | Stop reason: HOSPADM

## 2024-04-29 RX ORDER — LIDOCAINE HYDROCHLORIDE 10 MG/ML
5 INJECTION, SOLUTION EPIDURAL; INFILTRATION; INTRACAUDAL; PERINEURAL ONCE
Status: DISCONTINUED | OUTPATIENT
Start: 2024-04-29 | End: 2024-04-29

## 2024-04-29 RX ORDER — MUPIROCIN 20 MG/G
OINTMENT TOPICAL 2 TIMES DAILY
Status: DISCONTINUED | OUTPATIENT
Start: 2024-04-29 | End: 2024-04-29

## 2024-04-29 RX ADMIN — MEROPENEM 1000 MG: 1 INJECTION, POWDER, FOR SOLUTION INTRAVENOUS at 12:22

## 2024-04-29 RX ADMIN — SODIUM CHLORIDE, POTASSIUM CHLORIDE, SODIUM LACTATE AND CALCIUM CHLORIDE 500 ML: 600; 310; 30; 20 INJECTION, SOLUTION INTRAVENOUS at 17:59

## 2024-04-29 RX ADMIN — SALINE NASAL SPRAY 1 SPRAY: 1.5 SOLUTION NASAL at 20:01

## 2024-04-29 RX ADMIN — INSULIN LISPRO 4 UNITS: 100 INJECTION, SOLUTION INTRAVENOUS; SUBCUTANEOUS at 16:03

## 2024-04-29 RX ADMIN — PERFLUTREN 10 ML OF DILUTION: 6.52 INJECTION, SUSPENSION INTRAVENOUS at 13:56

## 2024-04-29 RX ADMIN — Medication 40 L/MIN: at 04:12

## 2024-04-29 RX ADMIN — SALINE NASAL SPRAY 1 SPRAY: 1.5 SOLUTION NASAL at 17:20

## 2024-04-29 RX ADMIN — MEROPENEM 1000 MG: 1 INJECTION, POWDER, FOR SOLUTION INTRAVENOUS at 00:00

## 2024-04-29 RX ADMIN — INSULIN LISPRO 2 UNITS: 100 INJECTION, SOLUTION INTRAVENOUS; SUBCUTANEOUS at 08:36

## 2024-04-29 RX ADMIN — VANCOMYCIN HYDROCHLORIDE 500 MG: 500 INJECTION, SOLUTION INTRAVENOUS at 22:18

## 2024-04-29 RX ADMIN — HEPARIN SODIUM 5000 UNITS: 5000 INJECTION INTRAVENOUS; SUBCUTANEOUS at 05:19

## 2024-04-29 RX ADMIN — SODIUM BICARBONATE 650 MG: 650 TABLET ORAL at 08:37

## 2024-04-29 RX ADMIN — Medication 1 TABLET: at 08:37

## 2024-04-29 RX ADMIN — Medication 40 L/MIN: at 00:16

## 2024-04-29 RX ADMIN — HEPARIN SODIUM 5000 UNITS: 5000 INJECTION INTRAVENOUS; SUBCUTANEOUS at 21:36

## 2024-04-29 RX ADMIN — THIAMINE HYDROCHLORIDE 500 MG: 100 INJECTION, SOLUTION INTRAMUSCULAR; INTRAVENOUS at 20:01

## 2024-04-29 RX ADMIN — THIAMINE HYDROCHLORIDE 500 MG: 100 INJECTION, SOLUTION INTRAMUSCULAR; INTRAVENOUS at 14:05

## 2024-04-29 RX ADMIN — THIAMINE HYDROCHLORIDE 500 MG: 100 INJECTION, SOLUTION INTRAMUSCULAR; INTRAVENOUS at 11:53

## 2024-04-29 RX ADMIN — Medication 40 L/MIN: at 19:49

## 2024-04-29 RX ADMIN — SALINE NASAL SPRAY 1 SPRAY: 1.5 SOLUTION NASAL at 12:22

## 2024-04-29 RX ADMIN — INSULIN LISPRO 6 UNITS: 100 INJECTION, SOLUTION INTRAVENOUS; SUBCUTANEOUS at 19:59

## 2024-04-29 RX ADMIN — HEPARIN SODIUM 5000 UNITS: 5000 INJECTION INTRAVENOUS; SUBCUTANEOUS at 13:15

## 2024-04-29 RX ADMIN — SALINE NASAL SPRAY 1 SPRAY: 1.5 SOLUTION NASAL at 08:38

## 2024-04-29 RX ADMIN — Medication 50 L/MIN: at 23:59

## 2024-04-29 RX ADMIN — INSULIN LISPRO 2 UNITS: 100 INJECTION, SOLUTION INTRAVENOUS; SUBCUTANEOUS at 03:50

## 2024-04-29 RX ADMIN — INSULIN GLARGINE 15 UNITS: 100 INJECTION, SOLUTION SUBCUTANEOUS at 20:01

## 2024-04-29 RX ADMIN — SODIUM BICARBONATE 650 MG: 650 TABLET ORAL at 20:01

## 2024-04-29 RX ADMIN — INSULIN LISPRO 2 UNITS: 100 INJECTION, SOLUTION INTRAVENOUS; SUBCUTANEOUS at 11:54

## 2024-04-29 RX ADMIN — PANTOPRAZOLE SODIUM 40 MG: 40 INJECTION, POWDER, FOR SOLUTION INTRAVENOUS at 08:37

## 2024-04-29 RX ADMIN — SODIUM CHLORIDE, POTASSIUM CHLORIDE, SODIUM LACTATE AND CALCIUM CHLORIDE 500 ML: 600; 310; 30; 20 INJECTION, SOLUTION INTRAVENOUS at 10:56

## 2024-04-29 ASSESSMENT — PAIN - FUNCTIONAL ASSESSMENT
PAIN_FUNCTIONAL_ASSESSMENT: CPOT (CRITICAL CARE PAIN OBSERVATION TOOL)
PAIN_FUNCTIONAL_ASSESSMENT: CPOT (CRITICAL CARE PAIN OBSERVATION TOOL)
PAIN_FUNCTIONAL_ASSESSMENT: 0-10
PAIN_FUNCTIONAL_ASSESSMENT: 0-10

## 2024-04-29 ASSESSMENT — PAIN SCALES - GENERAL
PAINLEVEL_OUTOF10: 0 - NO PAIN
PAINLEVEL_OUTOF10: 0 - NO PAIN

## 2024-04-29 NOTE — CARE PLAN
The patient's goals for the shift include Pt will be free from falls  use call light  bed alarm remians on    The clinical goals for the shift include patient will maintain 02 sats above 93    Problem: Safety  Goal: Patient will be injury free during hospitalization  Outcome: Progressing  Goal: I will remain free of falls  Outcome: Progressing     Problem: Daily Care  Goal: Daily care needs are met  Outcome: Progressing     Problem: Fall/Injury  Goal: Not fall by end of shift  Outcome: Progressing  Goal: Be free from injury by end of the shift  Outcome: Progressing     Problem: Skin  Goal: Prevent/manage excess moisture  Outcome: Progressing  Goal: Prevent/minimize sheer/friction injuries  Outcome: Progressing  Goal: Promote/optimize nutrition  Outcome: Progressing

## 2024-04-29 NOTE — PROGRESS NOTES
"Vancomycin Dosing by Pharmacy- FOLLOW UP    Brian Rodriguez is a 69 y.o. year old male who Pharmacy has been consulted for vancomycin dosing for pneumonia. Based on the patient's indication and renal status this patient is being dosed based on a goal trough/random level of 15-20.     Renal function is currently improving.    Most recent random level: 20.1 mcg/mL    Visit Vitals  BP (!) 79/40   Pulse 89   Temp 36.7 °C (98.1 °F) (Temporal)   Resp 24        Lab Results   Component Value Date    CREATININE 2.67 (H) 04/28/2024    CREATININE 2.66 (H) 04/28/2024    CREATININE 2.93 (H) 04/27/2024    CREATININE 2.93 (H) 04/27/2024        Patient weight is No results found for: \"PTWEIGHT\"    No results found for: \"CULTURE\"     I/O last 3 completed shifts:  In: 7571 (95.4 mL/kg) [I.V.:966 (12.2 mL/kg); NG/GT:2650; IV Piggyback:3955]  Out: 3322 (41.9 mL/kg) [Urine:3322 (1.2 mL/kg/hr)]  Dosing Weight: 79.4 kg   [unfilled]    Lab Results   Component Value Date    PATIENTTEMP 37.0 04/28/2024    PATIENTTEMP 37.0 04/27/2024    PATIENTTEMP 37.0 04/26/2024        Assessment/Plan    Within goal random/trough level. Level not true trough - true trough likely lower. Will redose with 500mg once.   The next level will be obtained on 4/29/24 at 2000. May be obtained sooner if clinically indicated.   Will continue to monitor renal function daily while on vancomycin and order serum creatinine at least every 48 hours if not already ordered.  Follow for continued vancomycin needs, clinical response, and signs/symptoms of toxicity.       Claudine Enriquez, PharmD           "

## 2024-04-29 NOTE — PROGRESS NOTES
Spiritual Care Visit    Clinical Encounter Type  Visited With: Patient  Routine Visit: Introduction  Continue Visiting: Yes    Samaritan Encounters  Samaritan Needs: Prayer         Sacramental Encounters  Sacrament of Sick-Anointing: Anointed    Patient received the Sacrament of the Anointing of the Sick by Fr. Pranav Bauer,  [facility] .        Within the [facility] [employer] the Sacrament of the Sick, also known as the Anointing of the Sick, is a prayer in which we invoke the healing power of God.  Along with Eucharist and Reconciliation it is a repeatable sacrament and should be received by anyone about to undergo surgery, those in recovery and the elderly.  This IS NOT 'Last Rites' nor does the [facility] have such a ritual.  Those who are actively dying should receive the Anointing of the Sick for physical and spiritual healing.

## 2024-04-29 NOTE — PROGRESS NOTES
Subjective   Brian Rodriguez is a 69 y.o. male transferred to the MICU for multiple rapids called due to hypotension and increased oxygen requirements. Today, patient is not at baseline orientation and has deteriorated since he was in the MICU, but has mildly improved compared to yesterday. Can follow some commands and attempts to speak.    Objective   Physical Exam  Constitutional:       Appearance: He is cachectic. He is ill-appearing. He is not toxic-appearing.   HENT:      Head: Normocephalic and atraumatic.      Nose: Nose normal.      Mouth/Throat:      Mouth: Mucous membranes are dry.   Cardiovascular:      Rate and Rhythm: Regular rhythm. Tachycardia present.      Heart sounds: Normal heart sounds.   Pulmonary:      Effort: Respiratory distress present.      Breath sounds: Rhonchi present. No wheezing or rales.   Abdominal:      General: Abdomen is flat. Bowel sounds are normal.      Palpations: Abdomen is soft.   Musculoskeletal:      Right lower leg: No edema.      Left lower leg: No edema.   Skin:     General: Skin is warm and dry.      Capillary Refill: Capillary refill takes 2 to 3 seconds.      Findings: Lesion present.      Comments: Subcutaneous nodules noted on R zygomatic bone. No exudate, no erythema.  L buttock wound with new purulent drainage   Neurological:      Mental Status: He is easily aroused. He is lethargic and disoriented.      Cranial Nerves: Cranial nerve deficit present.      Motor: Weakness present.      Comments: Attempts to speak  Dysarthric  Opened eyes to command     Temp:  [36.2 °C (97.2 °F)-36.7 °C (98.1 °F)] 36.3 °C (97.3 °F)  Heart Rate:  [] 108  Resp:  [18-33] 29  BP: ()/(40-98) 117/86  FiO2 (%):  [40 %] 40 %    Intake/Output Summary (Last 24 hours) at 4/29/2024 0786  Last data filed at 4/29/2024 0652  Gross per 24 hour   Intake 5065.78 ml   Output 3170 ml   Net 1895.78 ml     Vitals:    04/27/24 0600   Weight: 52 kg (114 lb 10.2 oz)     FiO2 (%):  [40 %] 40 %      I/Os    Intake/Output Summary (Last 24 hours) at 4/29/2024 0729  Last data filed at 4/29/2024 0652  Gross per 24 hour   Intake 5065.78 ml   Output 3170 ml   Net 1895.78 ml     Labs:   Results from last 72 hours   Lab Units 04/29/24  0342 04/28/24  1535 04/28/24  0439 04/27/24  1551 04/27/24  0434   SODIUM mmol/L 145 147* 146*   < > 156*   POTASSIUM mmol/L 3.9 4.6 3.9   < > 3.2*   CHLORIDE mmol/L 112* 113* 113*   < > 121*   CO2 mmol/L 23 23 22   < > 24   BUN mg/dL 41* 43* 46*   < > 54*   CREATININE mg/dL 2.35* 2.67* 2.66*   < > 3.35*   GLUCOSE mg/dL 164* 307* 184*   < > 309*   CALCIUM mg/dL 7.7* 7.7* 7.6*   < > 8.1*   ANION GAP mmol/L 14 16 15   < > 14   EGFR mL/min/1.73m*2 29* 25* 25*   < > 19*   PHOSPHORUS mg/dL 3.2 3.6  --   --  2.3*    < > = values in this interval not displayed.      Results from last 72 hours   Lab Units 04/29/24 0342 04/28/24 0439 04/27/24 0434   WBC AUTO x10*3/uL 15.1* 16.2* 14.6*   HEMOGLOBIN g/dL 6.0* 7.5* 8.0*   HEMATOCRIT % 19.8* 24.7* 24.6*   PLATELETS AUTO x10*3/uL 232 263 248   NEUTROS PCT AUTO % 76.3  --  83.1   LYMPHO PCT MAN %  --  10.7  --    LYMPHS PCT AUTO % 14.3  --  13.5   MONO PCT MAN %  --  0.0  --    MONOS PCT AUTO % 0.9  --  1.5   EOSINO PCT MAN %  --  3.6  --    EOS PCT AUTO % 7.7  --  0.5      Results from last 72 hours   Lab Units 04/29/24 0342   POCT PH, VENOUS pH 7.49*   POCT PCO2, VENOUS mm Hg 35*   POCT SO2, VENOUS % 94*   POCT OXY HEMOGLOBIN, VENOUS % 90.6*   POCT HEMATOCRIT CALCULATED, VENOUS % 21.0*   POCT SODIUM, VENOUS mmol/L 144   POCT POTASSIUM, VENOUS mmol/L 3.7   POCT CHLORIDE, VENOUS mmol/L 116*   POCT IONIZED CALCIUM, VENOUS mmol/L 1.25   POCT GLUCOSE, VENOUS mg/dL 167*   POCT LACTATE, VENOUS mmol/L 2.1*   POCT BASE EXCESS, VENOUS mmol/L 3.1*   POCT HCO3 CALCULATED, VENOUS mmol/L 26.7*   POCT HEMOGLOBIN, VENOUS g/dL 6.9*   FIO2 % 40      Lab Results   Component Value Date    CALCIUM 7.7 (L) 04/29/2024    PHOS 3.2 04/29/2024      Lab Results    Component Value Date    CRP 20.03 (H) 04/24/2024     Micro/ID:   Lab Results   Component Value Date    URINECULTURE Normal genitourinary elda 04/26/2024    BLOODCULT No growth at 2 days 04/26/2024    BLOODCULT No growth at 2 days 04/26/2024     Images:  XR chest 1 view  Narrative: STUDY:  XR CHEST 1 VIEW;  4/29/2024 3:40 am      INDICATION:  Signs/Symptoms:concern for aspiration.      COMPARISON:  Chest radiograph dated 04/26/2024.      ACCESSION NUMBER(S):  [id number]      ORDERING CLINICIAN:  HOWARD LINARES      FINDINGS:  AP radiograph of the chest was provided.      There is an enteric tube projecting over the midline, with the distal  tip outside the field of view of this study.      CARDIOMEDIASTINAL SILHOUETTE:  Cardiomediastinal silhouette is stable in size and configuration,  with similar partial obscuration of the bilateral heart borders, when  accounting for changes in patient positioning. Mild aortic knob  calcifications are noted.      LUNGS:  Similar extensive patchy and confluent bilateral airspace opacities,  when accounting for changes in position positioning. Findings are  again most prominent at the lung bases. There is again trace blunting  of the bilateral costophrenic angles. No evidence of pneumothorax.      ABDOMEN:  No remarkable upper abdominal findings.      BONES:  No acute osseous changes.      Impression: 1. Similar extensive patchy and confluent bilateral airspace  opacities, when accounting for changes in position positioning.  Findings are again most prominent at the lung bases and are most  consistent with multifocal pneumonia/pneumonitis.  2. Trace bilateral pleural effusions.  3. Cardiomediastinal silhouette is stable in size and configuration,  with similar partial obscuration of the bilateral heart borders due  to the aforementioned multifocal airspace opacities.      I personally reviewed the images/study and I agree with the findings  as stated by Karthik Guzman MD. This  study was interpreted at  [facility], [place], [place]      MACRO:  None          Dictation workstation:   [id number]       Meds:  Scheduled medications  chlorhexidine, , Topical, Daily  heparin (porcine), 5,000 Units, subcutaneous, q8h RANDAL  insulin glargine, 15 Units, subcutaneous, Nightly  insulin lispro, 0-10 Units, subcutaneous, q4h  meropenem, 1,000 mg, intravenous, q12h  [Held by provider] mirtazapine, 7.5 mg, oral, Nightly  multivitamin with minerals, 1 tablet, oral, Daily  pantoprazole, 40 mg, intravenous, Daily  sodium bicarbonate, 650 mg, oral, BID  sodium chloride, 1 spray, Each Nostril, 4x daily    Continuous medications  norepinephrine, 0-3 mcg/kg/min (Dosing Weight), Last Rate: 0.04 mcg/kg/min (04/29/24 0652)    PRN medications  PRN medications: acetaminophen **OR** acetaminophen **OR** acetaminophen, dextrose, dextrose, dextrose, dextrose, glucagon, glucagon, glucagon, glucagon, guaiFENesin, insulin regular, ipratropium-albuteroL, melatonin, oxygen, polyethylene glycol, sennosides-docusate sodium, traMADol, vancomycin     Assessment   Brian Rodriguez is a 69 y.o. male admitted to the MICU for multiple rapids, hypotension, and acute hypoxic respiratory failure requiring airvo support.  PMHx CKD, DM2, HTN, sacral wound, bipolar disorder who was originally admitted from his SNF 2/2 encephalopathy. On the floor, the patient had several aspiration events. His pressures responded to fluids and IVC was collapsible. Presentation most consistent with aspiration pna 2/2 dysphagia and hypovolemia 2/2 dehydration vs bacteremia.     Neuro  # Acute Encephalopathy  # Dementia  # Hypoactive Delirium  :: per family baseline AOx1-2, able to converse and follow commands  :: Hyponatremia +/- delirium 2/2 infection and prolonged hospitalization  - Neurology consulted, appreciate recommendations: per neurology would like MRI brain w/o contrast  -- Limited d/t aspiration when lying  flat  -- FU neurology once pt can tolerate MRI  - EMG to r/o underlying motor neuron disease  - TSH level  - Thiamine levels sent    Pulm  # Acute Hypoxic Respiratory Failure  # Multifocal Pneumonia  # Aspiration Pneumonia  :: Imaging suggestive of bacterial pna in the setting of recent covid s/p remdesivir  - s/p azithromycin 4/18-4/20, Remdesevir 4/18-4/22  - meropenem 4/26, vancomycin started 4/17  - sent fungal studies, repeat BC, repeat urine studies, repeat sputum cultures  - IV thiamine 500mg TID for 7 days  - aggressive respiratory hygiene with IPV and chest percussion  - duonebs q6hr PRN  - Covid precautions last day 4/28    Cardiovascular  # Hypotension  :: ?septic shock vs dehydration  - 1L LR ON  - Required pressors overnight, norepinephrine discontinued by 0930 4/29  - AM cortisol    # HTN  - Hold home amlodipine    # HLD  - Hold home atorvastatin    GI  # Nutrition  - NPO concern for aspiration pneumonia - tube feeds uptitrate to goal 55cc/hr  - Diet recs in     # Dysphagia  - ENT previously consulted, no anatomic cause  - Neurology consulted as above  - NG tube    Renal/  #TERRI on CKD Stage III  :: most likely prerenal azotemia 2/2 hypovolemia/hemodynamic instability  :: baseline Cr 1.7  - Q12hr RFP  - Free water flushes: 300 Q6H  - Bolus fluids to keep net +500cc daily    # Hypernatremia - improving  - Free water flushes increased to 300 q6hr up from 200 q6hr    Endo  # DM2  # Hyperglycemia - resolved  :: Was on D5W for Na and steroids, both now stopped  - Given 15 units of lantus and glucose stabilized    ID  # Acute Hypoxic Respiratory Failure  # Multifocal Pneumonia  # Aspiration Pneumonia  :: Bacterial pna 2/2 aspiration in the setting of dysphagia and recent covid pna  - s/p azithromycin 4/18-4/20, Remdesevir 4/18-4/22  - Meropenem, vancomycin started 4/27  - Fungal chem, blood and respiratory sent     # Multiple Sacral Wounds  :: R buttock, new exudate noted  - Wound cultures sent  - Continue  broad spectrum abx    # Hidradenitis Suppurativa  :: derm has previously recommended humira/minocycline 100 BID but currently w/o lab confirmation, hold off iso septic shock  - will need outpatient dermatology follow up w/ doxycycline 100 BID outpatient   - wound care consulted, appreciate recommendations    Heme  # Acute on Chronic Anemia  :: most likely iso iron deficiency anemia and anemia of chronic disease: iron low, TIBC 95, folate WNL, B12 supratherapeutic  - Transfuse 1 unit PRBC  - Hold off on iron supplementation iso active infection  - Ferritin/transferin/iron  - Reticulocyte count  - Peripheral smear  - Post trasfusion CBC    MSK  # RUE swollen  :: Suspect local fluid overload  - US DVT RUE    F Free water 200cc q6hr  E Replete prn  N NPO, TF at 55cc/hr  G None  DVT: subcutaneous heparin  ABX:  Vanc: 4/17-  Zosyn: 4/17-4/26  Skylar: 4/26-  Azithro: 4/18-4/20  Lines: PIV x4, bunch  Drips: None  Pressors: None  Code status: DNR  NoK:  Primary Emergency Contact: DEEPAK RANGEL, Home Phone: [phone]    Dispo: 69 y.o. male admitted to the MICU for multiple rapids, hypotension, and acute hypoxic respiratory failure requiring airvo support.  PMHx CKD, DM2, HTN, sacral wound, bipolar disorder who was originally admitted from his SNF 2/2 AM. Floors once O2 requirement decreases and pressures stabilize.
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
## 2024-04-29 NOTE — PROGRESS NOTES
Physical Therapy                 Therapy Communication Note    Patient Name: Brian Rodriguez  MRN: [id number]  Today's Date: 4/29/2024     Discipline: Physical Therapy    Missed Visit Reason: Missed Visit Reason:  (per MICU fellow, patient requiring CT abdomen to guide further POC (surgical intervention?); PT will hold and re-attempt as time and schedule allows and as medically appropriate.)    Missed Time: Attempt    Comment:

## 2024-04-30 ENCOUNTER — APPOINTMENT (OUTPATIENT)
Dept: CARDIOLOGY | Facility: HOSPITAL | Age: 69
DRG: 871 | End: 2024-04-30
Payer: MEDICARE

## 2024-04-30 ENCOUNTER — APPOINTMENT (OUTPATIENT)
Dept: RADIOLOGY | Facility: HOSPITAL | Age: 69
DRG: 871 | End: 2024-04-30
Payer: MEDICARE

## 2024-04-30 LAB
ALBUMIN SERPL BCP-MCNC: 1.5 G/DL (ref 3.4–5)
ALBUMIN SERPL BCP-MCNC: <1.5 G/DL (ref 3.4–5)
ANION GAP SERPL CALC-SCNC: 12 MMOL/L (ref 10–20)
ANION GAP SERPL CALC-SCNC: 16 MMOL/L (ref 10–20)
ATRIAL RATE: 93 BPM
BACTERIA BLD CULT: NORMAL
BACTERIA BLD CULT: NORMAL
BACTERIA SPEC RESP CULT: ABNORMAL
BASOPHILS # BLD AUTO: 0.01 X10*3/UL (ref 0–0.1)
BASOPHILS NFR BLD AUTO: 0.1 %
BNP SERPL-MCNC: 211 PG/ML (ref 0–99)
BUN SERPL-MCNC: 33 MG/DL (ref 6–23)
BUN SERPL-MCNC: 36 MG/DL (ref 6–23)
CALCIUM SERPL-MCNC: 7 MG/DL (ref 8.6–10.6)
CALCIUM SERPL-MCNC: 7.5 MG/DL (ref 8.6–10.6)
CHLORIDE SERPL-SCNC: 107 MMOL/L (ref 98–107)
CHLORIDE SERPL-SCNC: 112 MMOL/L (ref 98–107)
CO2 SERPL-SCNC: 22 MMOL/L (ref 21–32)
CO2 SERPL-SCNC: 25 MMOL/L (ref 21–32)
CORTIS AM PEAK SERPL-MSCNC: 12.6 UG/DL (ref 5–20)
CORTIS SERPL-MCNC: 18 UG/DL (ref 2.5–20)
CORTIS SERPL-MCNC: 25.5 UG/DL (ref 2.5–20)
CORTIS SERPL-MCNC: 28.7 UG/DL (ref 2.5–20)
CREAT SERPL-MCNC: 2.22 MG/DL (ref 0.5–1.3)
CREAT SERPL-MCNC: 2.6 MG/DL (ref 0.5–1.3)
EGFRCR SERPLBLD CKD-EPI 2021: 26 ML/MIN/1.73M*2
EGFRCR SERPLBLD CKD-EPI 2021: 31 ML/MIN/1.73M*2
EOSINOPHIL # BLD AUTO: 0.83 X10*3/UL (ref 0–0.4)
EOSINOPHIL NFR BLD AUTO: 6.9 %
ERYTHROCYTE [DISTWIDTH] IN BLOOD BY AUTOMATED COUNT: 21.2 % (ref 11.5–14.5)
FUNGITELL BETA-D GLUCAN,SERUM: 231 PG/ML
GLUCOSE BLD MANUAL STRIP-MCNC: 148 MG/DL (ref 74–99)
GLUCOSE BLD MANUAL STRIP-MCNC: 232 MG/DL (ref 74–99)
GLUCOSE BLD MANUAL STRIP-MCNC: 235 MG/DL (ref 74–99)
GLUCOSE BLD MANUAL STRIP-MCNC: 271 MG/DL (ref 74–99)
GLUCOSE BLD MANUAL STRIP-MCNC: 275 MG/DL (ref 74–99)
GLUCOSE BLD MANUAL STRIP-MCNC: 285 MG/DL (ref 74–99)
GLUCOSE BLD MANUAL STRIP-MCNC: 315 MG/DL (ref 74–99)
GLUCOSE BLD MANUAL STRIP-MCNC: 97 MG/DL (ref 74–99)
GLUCOSE SERPL-MCNC: 144 MG/DL (ref 74–99)
GLUCOSE SERPL-MCNC: 291 MG/DL (ref 74–99)
GRAM STN SPEC: ABNORMAL
GRAM STN SPEC: ABNORMAL
HCT VFR BLD AUTO: 27.1 % (ref 41–52)
HGB BLD-MCNC: 9.3 G/DL (ref 13.5–17.5)
HYPOCHROMIA BLD QL SMEAR: NORMAL
IMM GRANULOCYTES # BLD AUTO: 0.05 X10*3/UL (ref 0–0.5)
IMM GRANULOCYTES NFR BLD AUTO: 0.4 % (ref 0–0.9)
LYMPHOCYTES # BLD AUTO: 1.79 X10*3/UL (ref 0.8–3)
LYMPHOCYTES NFR BLD AUTO: 14.8 %
MAGNESIUM SERPL-MCNC: 1.92 MG/DL (ref 1.6–2.4)
MCH RBC QN AUTO: 24.9 PG (ref 26–34)
MCHC RBC AUTO-ENTMCNC: 34.3 G/DL (ref 32–36)
MCV RBC AUTO: 73 FL (ref 80–100)
MONOCYTES # BLD AUTO: 0.19 X10*3/UL (ref 0.05–0.8)
MONOCYTES NFR BLD AUTO: 1.6 %
MRSA DNA SPEC QL NAA+PROBE: DETECTED
NEUTROPHILS # BLD AUTO: 9.21 X10*3/UL (ref 1.6–5.5)
NEUTROPHILS NFR BLD AUTO: 76.2 %
NRBC BLD-RTO: 0 /100 WBCS (ref 0–0)
P OFFSET: 179 MS
P ONSET: 159 MS
PHOSPHATE SERPL-MCNC: 3.1 MG/DL (ref 2.5–4.9)
PHOSPHATE SERPL-MCNC: 3.5 MG/DL (ref 2.5–4.9)
PLATELET # BLD AUTO: 173 X10*3/UL (ref 150–450)
POTASSIUM SERPL-SCNC: 3.6 MMOL/L (ref 3.5–5.3)
POTASSIUM SERPL-SCNC: 4.9 MMOL/L (ref 3.5–5.3)
PROCALCITONIN SERPL-MCNC: 2.95 NG/ML
Q ONSET: 223 MS
QRS COUNT: 16 BEATS
QRS DURATION: 62 MS
QT INTERVAL: 392 MS
QTC CALCULATION(BAZETT): 503 MS
QTC FREDERICIA: 463 MS
R AXIS: 41 DEGREES
RBC # BLD AUTO: 3.73 X10*6/UL (ref 4.5–5.9)
RBC MORPH BLD: NORMAL
SODIUM SERPL-SCNC: 140 MMOL/L (ref 136–145)
SODIUM SERPL-SCNC: 145 MMOL/L (ref 136–145)
T AXIS: 41 DEGREES
T OFFSET: 419 MS
TARGETS BLD QL SMEAR: NORMAL
VANCOMYCIN SERPL-MCNC: 17.1 UG/ML (ref 5–20)
VENTRICULAR RATE: 99 BPM
WBC # BLD AUTO: 12.1 X10*3/UL (ref 4.4–11.3)

## 2024-04-30 PROCEDURE — 82947 ASSAY GLUCOSE BLOOD QUANT: CPT

## 2024-04-30 PROCEDURE — 2500000001 HC RX 250 WO HCPCS SELF ADMINISTERED DRUGS (ALT 637 FOR MEDICARE OP)

## 2024-04-30 PROCEDURE — 80069 RENAL FUNCTION PANEL: CPT

## 2024-04-30 PROCEDURE — 2500000002 HC RX 250 W HCPCS SELF ADMINISTERED DRUGS (ALT 637 FOR MEDICARE OP, ALT 636 FOR OP/ED): Mod: MUE | Performed by: STUDENT IN AN ORGANIZED HEALTH CARE EDUCATION/TRAINING PROGRAM

## 2024-04-30 PROCEDURE — 82306 VITAMIN D 25 HYDROXY: CPT | Performed by: NURSE PRACTITIONER

## 2024-04-30 PROCEDURE — 99291 CRITICAL CARE FIRST HOUR: CPT

## 2024-04-30 PROCEDURE — 2500000004 HC RX 250 GENERAL PHARMACY W/ HCPCS (ALT 636 FOR OP/ED)

## 2024-04-30 PROCEDURE — 80069 RENAL FUNCTION PANEL: CPT | Mod: 91,MUE

## 2024-04-30 PROCEDURE — 94640 AIRWAY INHALATION TREATMENT: CPT

## 2024-04-30 PROCEDURE — 83010 ASSAY OF HAPTOGLOBIN QUANT: CPT | Mod: WESLAB

## 2024-04-30 PROCEDURE — 82533 TOTAL CORTISOL: CPT

## 2024-04-30 PROCEDURE — C9113 INJ PANTOPRAZOLE SODIUM, VIA: HCPCS

## 2024-04-30 PROCEDURE — 83735 ASSAY OF MAGNESIUM: CPT

## 2024-04-30 PROCEDURE — 87086 URINE CULTURE/COLONY COUNT: CPT

## 2024-04-30 PROCEDURE — 36415 COLL VENOUS BLD VENIPUNCTURE: CPT

## 2024-04-30 PROCEDURE — 85025 COMPLETE CBC W/AUTO DIFF WBC: CPT

## 2024-04-30 PROCEDURE — 2500000002 HC RX 250 W HCPCS SELF ADMINISTERED DRUGS (ALT 637 FOR MEDICARE OP, ALT 636 FOR OP/ED)

## 2024-04-30 PROCEDURE — 71045 X-RAY EXAM CHEST 1 VIEW: CPT

## 2024-04-30 PROCEDURE — 2500000005 HC RX 250 GENERAL PHARMACY W/O HCPCS: Performed by: INTERNAL MEDICINE

## 2024-04-30 PROCEDURE — 87205 SMEAR GRAM STAIN: CPT

## 2024-04-30 PROCEDURE — 71045 X-RAY EXAM CHEST 1 VIEW: CPT | Performed by: RADIOLOGY

## 2024-04-30 PROCEDURE — 2500000005 HC RX 250 GENERAL PHARMACY W/O HCPCS: Performed by: STUDENT IN AN ORGANIZED HEALTH CARE EDUCATION/TRAINING PROGRAM

## 2024-04-30 PROCEDURE — 1200000002 HC GENERAL ROOM WITH TELEMETRY DAILY

## 2024-04-30 PROCEDURE — 87040 BLOOD CULTURE FOR BACTERIA: CPT | Mod: 91

## 2024-04-30 PROCEDURE — 84145 PROCALCITONIN (PCT): CPT

## 2024-04-30 PROCEDURE — 37799 UNLISTED PX VASCULAR SURGERY: CPT

## 2024-04-30 PROCEDURE — 93005 ELECTROCARDIOGRAM TRACING: CPT

## 2024-04-30 PROCEDURE — 80202 ASSAY OF VANCOMYCIN: CPT

## 2024-04-30 PROCEDURE — 97530 THERAPEUTIC ACTIVITIES: CPT | Mod: GO

## 2024-04-30 PROCEDURE — 97535 SELF CARE MNGMENT TRAINING: CPT | Mod: GO

## 2024-04-30 PROCEDURE — 97530 THERAPEUTIC ACTIVITIES: CPT | Mod: GP

## 2024-04-30 PROCEDURE — 83880 ASSAY OF NATRIURETIC PEPTIDE: CPT

## 2024-04-30 RX ORDER — FUROSEMIDE 10 MG/ML
40 INJECTION INTRAMUSCULAR; INTRAVENOUS ONCE
Status: COMPLETED | OUTPATIENT
Start: 2024-04-30 | End: 2024-04-30

## 2024-04-30 RX ORDER — VANCOMYCIN HYDROCHLORIDE 750 MG/150ML
750 INJECTION, SOLUTION INTRAVENOUS ONCE
Status: COMPLETED | OUTPATIENT
Start: 2024-05-01 | End: 2024-05-01

## 2024-04-30 RX ORDER — INSULIN GLARGINE 100 [IU]/ML
21 INJECTION, SOLUTION SUBCUTANEOUS NIGHTLY
Status: DISCONTINUED | OUTPATIENT
Start: 2024-04-30 | End: 2024-05-02

## 2024-04-30 RX ORDER — COSYNTROPIN 0.25 MG/ML
250 INJECTION, POWDER, FOR SOLUTION INTRAMUSCULAR; INTRAVENOUS ONCE
Status: COMPLETED | OUTPATIENT
Start: 2024-04-30 | End: 2024-04-30

## 2024-04-30 RX ORDER — POTASSIUM CHLORIDE 1.5 G/1.58G
40 POWDER, FOR SOLUTION ORAL ONCE
Status: COMPLETED | OUTPATIENT
Start: 2024-04-30 | End: 2024-04-30

## 2024-04-30 RX ORDER — MAGNESIUM SULFATE HEPTAHYDRATE 40 MG/ML
2 INJECTION, SOLUTION INTRAVENOUS ONCE
Status: COMPLETED | OUTPATIENT
Start: 2024-04-30 | End: 2024-04-30

## 2024-04-30 RX ADMIN — ALBUTEROL SULFATE 2.5 MG: 2.5 SOLUTION RESPIRATORY (INHALATION) at 04:18

## 2024-04-30 RX ADMIN — SODIUM CHLORIDE SOLN NEBU 3% 3 ML: 3 NEBU SOLN at 18:00

## 2024-04-30 RX ADMIN — SODIUM BICARBONATE 650 MG: 650 TABLET ORAL at 21:00

## 2024-04-30 RX ADMIN — THIAMINE HYDROCHLORIDE 500 MG: 100 INJECTION, SOLUTION INTRAMUSCULAR; INTRAVENOUS at 14:12

## 2024-04-30 RX ADMIN — INSULIN LISPRO 6 UNITS: 100 INJECTION, SOLUTION INTRAVENOUS; SUBCUTANEOUS at 09:19

## 2024-04-30 RX ADMIN — THIAMINE HYDROCHLORIDE 500 MG: 100 INJECTION, SOLUTION INTRAMUSCULAR; INTRAVENOUS at 20:01

## 2024-04-30 RX ADMIN — HEPARIN SODIUM 5000 UNITS: 5000 INJECTION INTRAVENOUS; SUBCUTANEOUS at 21:43

## 2024-04-30 RX ADMIN — PANTOPRAZOLE SODIUM 40 MG: 40 INJECTION, POWDER, FOR SOLUTION INTRAVENOUS at 09:19

## 2024-04-30 RX ADMIN — SALINE NASAL SPRAY 1 SPRAY: 1.5 SOLUTION NASAL at 17:00

## 2024-04-30 RX ADMIN — INSULIN LISPRO 4 UNITS: 100 INJECTION, SOLUTION INTRAVENOUS; SUBCUTANEOUS at 12:46

## 2024-04-30 RX ADMIN — SALINE NASAL SPRAY 1 SPRAY: 1.5 SOLUTION NASAL at 06:08

## 2024-04-30 RX ADMIN — SALINE NASAL SPRAY 1 SPRAY: 1.5 SOLUTION NASAL at 20:01

## 2024-04-30 RX ADMIN — HEPARIN SODIUM 5000 UNITS: 5000 INJECTION INTRAVENOUS; SUBCUTANEOUS at 14:27

## 2024-04-30 RX ADMIN — INSULIN LISPRO 4 UNITS: 100 INJECTION, SOLUTION INTRAVENOUS; SUBCUTANEOUS at 00:16

## 2024-04-30 RX ADMIN — COSYNTROPIN 250 MCG: 0.25 INJECTION, POWDER, LYOPHILIZED, FOR SOLUTION INTRAVENOUS at 15:45

## 2024-04-30 RX ADMIN — ALBUTEROL SULFATE 2.5 MG: 2.5 SOLUTION RESPIRATORY (INHALATION) at 20:25

## 2024-04-30 RX ADMIN — SODIUM CHLORIDE SOLN NEBU 3% 3 ML: 3 NEBU SOLN at 04:19

## 2024-04-30 RX ADMIN — FUROSEMIDE 40 MG: 10 INJECTION, SOLUTION INTRAVENOUS at 18:23

## 2024-04-30 RX ADMIN — INSULIN LISPRO 6 UNITS: 100 INJECTION, SOLUTION INTRAVENOUS; SUBCUTANEOUS at 16:46

## 2024-04-30 RX ADMIN — INSULIN LISPRO 6 UNITS: 100 INJECTION, SOLUTION INTRAVENOUS; SUBCUTANEOUS at 20:00

## 2024-04-30 RX ADMIN — POTASSIUM CHLORIDE 40 MEQ: 1.5 POWDER, FOR SOLUTION ORAL at 09:19

## 2024-04-30 RX ADMIN — INSULIN GLARGINE 21 UNITS: 100 INJECTION, SOLUTION SUBCUTANEOUS at 20:00

## 2024-04-30 RX ADMIN — MEROPENEM 1000 MG: 1 INJECTION, POWDER, FOR SOLUTION INTRAVENOUS at 01:13

## 2024-04-30 RX ADMIN — SALINE NASAL SPRAY 1 SPRAY: 1.5 SOLUTION NASAL at 14:12

## 2024-04-30 RX ADMIN — SODIUM CHLORIDE SOLN NEBU 3% 3 ML: 3 NEBU SOLN at 14:30

## 2024-04-30 RX ADMIN — HEPARIN SODIUM 5000 UNITS: 5000 INJECTION INTRAVENOUS; SUBCUTANEOUS at 06:07

## 2024-04-30 RX ADMIN — Medication 1 TABLET: at 09:19

## 2024-04-30 RX ADMIN — SODIUM BICARBONATE 650 MG: 650 TABLET ORAL at 09:19

## 2024-04-30 RX ADMIN — SODIUM CHLORIDE SOLN NEBU 3% 3 ML: 3 NEBU SOLN at 07:18

## 2024-04-30 RX ADMIN — Medication 50 L/MIN: at 04:18

## 2024-04-30 RX ADMIN — Medication 50 L/MIN: at 04:00

## 2024-04-30 RX ADMIN — MAGNESIUM SULFATE HEPTAHYDRATE 2 G: 40 INJECTION, SOLUTION INTRAVENOUS at 09:20

## 2024-04-30 RX ADMIN — Medication: at 09:00

## 2024-04-30 RX ADMIN — THIAMINE HYDROCHLORIDE 500 MG: 100 INJECTION, SOLUTION INTRAMUSCULAR; INTRAVENOUS at 09:26

## 2024-04-30 RX ADMIN — MEROPENEM 1000 MG: 1 INJECTION, POWDER, FOR SOLUTION INTRAVENOUS at 14:12

## 2024-04-30 ASSESSMENT — COGNITIVE AND FUNCTIONAL STATUS - GENERAL
TOILETING: TOTAL
MOBILITY SCORE: 6
WALKING IN HOSPITAL ROOM: TOTAL
CLIMB 3 TO 5 STEPS WITH RAILING: TOTAL
TURNING FROM BACK TO SIDE WHILE IN FLAT BAD: TOTAL
HELP NEEDED FOR BATHING: TOTAL
DRESSING REGULAR UPPER BODY CLOTHING: TOTAL
DRESSING REGULAR LOWER BODY CLOTHING: TOTAL
MOVING FROM LYING ON BACK TO SITTING ON SIDE OF FLAT BED WITH BEDRAILS: TOTAL
PERSONAL GROOMING: A LOT
DAILY ACTIVITIY SCORE: 8
EATING MEALS: A LOT
MOVING TO AND FROM BED TO CHAIR: TOTAL
STANDING UP FROM CHAIR USING ARMS: TOTAL

## 2024-04-30 ASSESSMENT — PAIN - FUNCTIONAL ASSESSMENT
PAIN_FUNCTIONAL_ASSESSMENT: 0-10
PAIN_FUNCTIONAL_ASSESSMENT: CPOT (CRITICAL CARE PAIN OBSERVATION TOOL)
PAIN_FUNCTIONAL_ASSESSMENT: 0-10
PAIN_FUNCTIONAL_ASSESSMENT: CPOT (CRITICAL CARE PAIN OBSERVATION TOOL)

## 2024-04-30 ASSESSMENT — PAIN SCALES - GENERAL
PAINLEVEL_OUTOF10: 0 - NO PAIN

## 2024-04-30 ASSESSMENT — ACTIVITIES OF DAILY LIVING (ADL): HOME_MANAGEMENT_TIME_ENTRY: 10

## 2024-04-30 NOTE — PROGRESS NOTES
Physical Therapy    Physical Therapy Treatment    Patient Name: Brian Rodriguez  MRN: [id number]  Today's Date: 4/30/2024  Time Calculation  Start Time: 0907  Stop Time: 0932  Time Calculation (min): 25 min       Assessment/Plan   PT Assessment  End of Session Communication: Bedside nurse  End of Session Patient Position: Bed, 3 rail up, Alarm off, not on at start of session  PT Plan  Inpatient/Swing Bed or Outpatient: Inpatient  PT Plan  Treatment/Interventions: Bed mobility, Transfer training, Gait training, Balance training, Strengthening, Endurance training, Therapeutic exercise, Therapeutic activity  PT Plan: Skilled PT  PT Frequency: 2 times per week  PT Discharge Recommendations: Moderate intensity level of continued care  PT Recommended Transfer Status: Total assist, Assist x2  PT - OK to Discharge: Yes      General Visit Information:   PT  Visit  PT Received On: 04/30/24  General  Family/Caregiver Present: No  Co-Treatment: OT  Co-Treatment Reason: maximize functional mobility; patient from LTC/SNF with unknown PLOF, AMPAC <10  Prior to Session Communication: Bedside nurse  Patient Position Received: Bed, 3 rail up, Alarm off, not on at start of session  General Comment: awake and alert, AirVo 50L/50%, tele, dobhoff, tele. had BM upon PT arrival; required total A for alyce-care.    Subjective   Precautions:  Precautions  Medical Precautions: Fall precautions, Oxygen therapy device and L/min  Vital Signs:  Vital Signs  Heart Rate:  (pre: 110 post: 118; HR intermittently varied, at times, elevated to 140s, RN made aware, per RN, patient has done this before)  Resp:  (pre: 29 post: 25; sitting EOB: RR increased into the 30s-40s)  SpO2:  (>92% during session)  BP:  (pre: 115/72 post: 86/57; MAP >65 during session)    Objective   Pain:  Pain Assessment  Pain Assessment: 0-10  Pain Score:  (did not report)  Cognition:  Cognition  Overall Cognitive Status: Impaired  Arousal/Alertness: Appropriate responses to  stimuli  Orientation Level:  (able to state first name)  Following Commands: Follows one step commands with repetition (25% with increase time, cues and repetition)  Postural Control:  Postural Control  Postural Control: Impaired  Static Sitting Balance  Static Sitting-Balance Support: Bilateral upper extremity supported, Feet supported  Static Sitting-Level of Assistance:  (brief CGx1, mostly MIN-MODx1)  Static Sitting-Comment/Number of Minutes: retrolateral lean R, then lateral lean L with attempted BLE ther-ex; EOB x10 mins  Dynamic Sitting Balance  Dynamic Sitting-Balance Support: Bilateral upper extremity supported, Feet supported  Dynamic Sitting-Comments: MIN-MODx1  Static Standing Balance  Static Standing-Balance Support: Bilateral upper extremity supported  Static Standing-Level of Assistance: Dependent  Static Standing-Comment/Number of Minutes: x2, maximal blocking of B knees/feet; poor task initiation, minimal buttocks clearance, <25% of full standing achieved. Draw sheet for additional assistance.    Activity Tolerance:  Activity Tolerance  Early Mobility/Exercise Safety Screen: Proceed with mobilization - No exclusion criteria met  Activity Tolerance Comments: tachypneic with sitting EOB  Treatments:        Bed Mobility  Bed Mobility: Yes  Bed Mobility 1  Bed Mobility 1: Supine to sitting, Sitting to supine  Level of Assistance 1: Dependent, Moderate verbal cues, Moderate tactile cues  Bed Mobility Comments 1: x2 with HOB elevated, use of draw sheet.  Bed Mobility 2  Bed Mobility  2: Rolling right, Rolling left  Level of Assistance 2: Dependent, Moderate verbal cues, Moderate tactile cues  Bed Mobility Comments 2: x1; required d/t need for alyce-care after BM    Ambulation/Gait Training  Ambulation/Gait Training Performed: No  Transfers  Transfer: Yes  Transfer 1  Transfer From 1: Sit to, Stand to  Transfer to 1: Sit, Stand  Technique 1: Sit to stand, Stand to sit  Transfer Level of Assistance 1: Arm in  arm assistance, Dependent, Maximum verbal cues, Maximum tactile cues, +2  Trials/Comments 1: maximal blocking of B knees/feet; poor task initiation, minimal buttocks clearance, <25% of full standing achieved. Draw sheet for additional assistance.    Outcome Measures:  [facility] Basic Mobility  Turning from your back to your side while in a flat bed without using bedrails: Total  Moving from lying on your back to sitting on the side of a flat bed without using bedrails: Total  Moving to and from bed to chair (including a wheelchair): Total  Standing up from a chair using your arms (e.g. wheelchair or bedside chair): Total  To walk in hospital room: Total  Climbing 3-5 steps with railing: Total  Basic Mobility - Total Score: 6    FSS-ICU  Ambulation: Unable to attempt due to weakness  Rolling: Total assistance (performs 25% or requires another person)  Sitting: Minimal assistance (performs 75% or more of task)  Transfer Sit-to-Stand: Total assistance (performs 25% or requires another person)  Transfer Supine-to-Sit: Unable to perform  Total Score: 6      E = Exercise and Early Mobility  Early Mobility/Exercise Safety Screen: Proceed with mobilization - No exclusion criteria met  Current Activity: Sitting at edge of bed    Education Documentation  Mobility Training, taught by Mesha Gilman, PT at 4/30/2024  1:47 PM.  Learner: Patient  Readiness: Nonacceptance  Method: Explanation  Response: Needs Reinforcement  Comment: mobility progression    Education Comments  No comments found.             Encounter Problems       Encounter Problems (Active)       Mobility       STG - Patient will ambulate >15ft, least restrictive device, min assist (Not Progressing)       Start:  04/19/24    Expected End:  05/03/24               PT Transfers       STG - Patient to transfer to and from sit to supine min assist (Not Progressing)       Start:  04/19/24    Expected End:  05/03/24            STG - Patient will transfer sit to and from  stand min assist (Not Progressing)       Start:  04/19/24    Expected End:  05/03/24                 Mesha Almanzar, PT, DPT

## 2024-04-30 NOTE — CARE PLAN
T  Problem: Skin  Goal: Decreased wound size/increased tissue granulation at next dressing change  Outcome: Progressing  Flowsheets (Taken 4/30/2024 0657)  Decreased wound size/increased tissue granulation at next dressing change:   Promote sleep for wound healing   Utilize specialty bed per algorithm   Protective dressings over bony prominences  Goal: Prevent/manage excess moisture  Outcome: Progressing  Flowsheets (Taken 4/30/2024 0657)  Prevent/manage excess moisture:   Cleanse incontinence/protect with barrier cream   Moisturize dry skin   Use wicking fabric (obtain order)   Follow provider orders for dressing changes   Monitor for/manage infection if present  Goal: Promote/optimize nutrition  Outcome: Progressing  Flowsheets (Taken 4/30/2024 0657)  Promote/optimize nutrition:   Assist with feeding   Monitor/record intake including meals  Goal: Promote skin healing  Outcome: Progressing  Flowsheets (Taken 4/30/2024 0657)  Promote skin healing:   Assess skin/pad under line(s)/device(s)   Ensure correct size (line/device) and apply per  instructions   Protective dressings over bony prominences   Rotate device position/do not position patient on device   Turn/reposition every 2 hours/use positioning/transfer devices     Problem: Safety  Goal: Patient will be injury free during hospitalization  Outcome: Met  Goal: I will remain free of falls  Outcome: Met  Flowsheets (Taken 4/30/2024 0657)  Resident will remain free of falls:   Apply bed/chair alarms as appropriate   Visual checks per facility policy   Maintain bed at position as ordered (chair height, low bed)     Problem: Daily Care  Goal: Daily care needs are met  Outcome: Met  Flowsheets (Taken 4/30/2024 0657)  Daily care needs are met:   Provide mouth care   Encourage independent activity per ability   Assist patient with activities of daily living as needed   Assess skin integrity/risk for skin breakdown     Problem: Fall/Injury  Goal: Not fall by  end of shift  Outcome: Met  Goal: Be free from injury by end of the shift  Outcome: Met

## 2024-04-30 NOTE — PROGRESS NOTES
"Vancomycin Dosing by Pharmacy- FOLLOW UP    Brian Rodriguez is a 69 y.o. year old male who Pharmacy has been consulted for vancomycin dosing for pneumonia. Based on the patient's indication and renal status this patient is being dosed based on a goal trough/random level of 15-20.     Renal function is currently declining.    Current vancomycin dose: 500 mg given every 24 hours x 1 dose    Most recent trough level: 20.7 mcg/mL    Visit Vitals  /58 (BP Location: Right arm, Patient Position: Lying)   Pulse 110   Temp 38.1 °C (100.6 °F) (Temporal) Comment: nurse notified   Resp (!) 33        Lab Results   Component Value Date    CREATININE 2.39 (H) 04/29/2024    CREATININE 2.35 (H) 04/29/2024    CREATININE 2.67 (H) 04/28/2024    CREATININE 2.66 (H) 04/28/2024        Patient weight is No results found for: \"PTWEIGHT\"    No results found for: \"CULTURE\"     I/O last 3 completed shifts:  In: 7624.5 (96 mL/kg) [I.V.:294.5 (3.7 mL/kg); Blood:350; NG/GT:3380; IV Piggyback:3600]  Out: 5696 (71.7 mL/kg) [Urine:5695 (2 mL/kg/hr); Stool:1]  Dosing Weight: 79.4 kg   [unfilled]    Lab Results   Component Value Date    PATIENTTEMP 37.0 04/29/2024    PATIENTTEMP 37.0 04/28/2024    PATIENTTEMP 37.0 04/28/2024        Assessment/Plan    Within goal random/trough level    This dosing regimen is predicted by [employer] to result in the following pharmacokinetic parameters:  Dosing by levels will re-order 500mg x1 tonight    The next level will be obtained on 4/30 at 2200. May be obtained sooner if clinically indicated.   Will continue to monitor renal function daily while on vancomycin and order serum creatinine at least every 48 hours if not already ordered.  Follow for continued vancomycin needs, clinical response, and signs/symptoms of toxicity.       Maisha Cordova, PharmD           "

## 2024-04-30 NOTE — PROGRESS NOTES
Occupational Therapy    Occupational Therapy Treatment    Name: Brian Rodriguez  MRN: [id number]  : 1955  Date: 24  Time Calculation  Start Time: 906  Stop Time: 933  Time Calculation (min): 27 min    Assessment:  End of Session Communication: Bedside nurse  End of Session Patient Position: Bed, 3 rail up, Alarm off, not on at start of session  Plan:  Treatment Interventions: ADL retraining, Functional transfer training, UE strengthening/ROM, Endurance training, Cognitive reorientation, Patient/family training, Equipment evaluation/education, Neuromuscular reeducation, Compensatory technique education  OT Frequency: 2 times per week  OT Discharge Recommendations: Moderate intensity level of continued care  OT - OK to Discharge: Yes    Subjective   Previous Visit Info:  OT Last Visit  OT Received On: 24  General:  General  Family/Caregiver Present: No  Co-Treatment: PT  Co-Treatment Reason: to maximize patient safety and therapeutic gains, AMPAC <10  Prior to Session Communication: Bedside nurse  Patient Position Received: Bed, 3 rail up, Alarm off, not on at start of session  General Comment: patient awake and alert on arrival, noted to be incontient of BM; tele, bunch, dobhoff, Airvo 50L/50%  Precautions:  Medical Precautions: Fall precautions, Oxygen therapy device and L/min  Vitals:  Vital Signs  Heart Rate:  (pre 109, post 117)  SpO2:  (pre 93%, post 94%)  BP:  (pre 115/72, post 86/57; MAP >65 throughout session)  Pain Assessment:  Pain Assessment  Pain Assessment: 0-10  Pain Score:  (patient did not report having pain)     Objective   Cognition:  Overall Cognitive Status: Impaired  Arousal/Alertness: Delayed responses to stimuli  Orientation Level:  (able to state his frist name; did not respond to choices for location)  Following Commands:  (follows 25% of one step commands with increased repetition and time)  Activities of Daily Living: Grooming  Grooming Comments: max A for applying  lotion to (B) hands while sitting EOB, max cues    UE Dressing  UE Dressing Comments: dependent A for doffing and donning gowns    LE Dressing  LE Dressing: Yes  LE Dressing Comments: dependent A for donning/doffing socks    Toileting  Toileting Comments: dependent A for bowel hygiene in supine     Bed Mobility/Transfers: Bed Mobility  Bed Mobility: Yes  Bed Mobility 1  Bed Mobility 1: Supine to sitting, Sitting to supine  Level of Assistance 1: Dependent, Moderate verbal cues, Moderate tactile cues  Bed Mobility Comments 1: x2 assist, HOB elevated for supine to sit; use of draw sheet  Bed Mobility 2  Bed Mobility  2: Rolling right, Rolling left  Level of Assistance 2: Dependent, Moderate verbal cues, Moderate tactile cues    Transfers  Transfer: Yes  Transfer 1  Transfer From 1: Sit to, Stand to  Transfer to 1: Sit, Stand  Technique 1: Sit to stand, Stand to sit  Transfer Level of Assistance 1: Dependent, +2, Maximum verbal cues, Maximum tactile cues, Arm in arm assistance  Trials/Comments 1: (B)LEs blocked, achieved <25% of full standing     Therapy/Activity: Therapeutic Activity  Therapeutic Activity Performed: Yes  Therapeutic Activity 1: Patient sat EOB x10 minutes with mostly min-mod A for balance 2/2 posterior and (R)/(L) lateral leans with decreased support and with dynamic activity; able to sit statically for brief moments with CGA    Outcome Measures:  [facility] Daily Activity  Putting on and taking off regular lower body clothing: Total  Bathing (including washing, rinsing, drying): Total  Putting on and taking off regular upper body clothing: Total  Toileting, which includes using toilet, bedpan or urinal: Total  Taking care of personal grooming such as brushing teeth: A lot  Eating Meals: A lot  Daily Activity - Total Score: 8     and E = Exercise and Early Mobility  Current Activity: Sitting at edge of bed    Education Documentation  ADL Training, taught by Madison Delgado OT at 4/30/2024  3:07  PM.  Learner: Patient  Readiness: Nonacceptance  Method: Explanation  Response: No Evidence of Learning, Needs Reinforcement    Education Comments  No comments found.    Goals:  Encounter Problems       Encounter Problems (Active)       ADLs       Patient with complete upper body dressing with minimal assist  level of assistance donning and doffing all UE clothes with PRN adaptive equipment. (Progressing)       Start:  04/22/24    Expected End:  05/06/24            Patient will complete daily grooming tasks with stand by assist level of assistance and PRN adaptive equipment. (Progressing)       Start:  04/22/24    Expected End:  05/06/24            Patient will complete toileting including hygiene clothing management/hygiene with moderate assist level of assistance. (Progressing)       Start:  04/22/24    Expected End:  05/06/24               BALANCE       Pt will maintain dynamic sitting balance during ADL task with stand by assist level of assistance in order to demonstrate decreased risk of falling and improved postural control. (Progressing)       Start:  04/22/24    Expected End:  05/06/24               COGNITION/SAFETY       Patient will follow >/= 50% Simple commands to allow improved ADL performance. (Progressing)       Start:  04/22/24    Expected End:  05/06/24               EXERCISE/STRENGTHENING       Patient will complete BUE exercises in order to improve strength and activity tolerance for ADL performance.  (Not Progressing)       Start:  04/22/24    Expected End:  05/06/24               TRANSFERS       Patient will perform bed mobility moderate assist level of assistance in order to improve safety and independence with mobility (Progressing)       Start:  04/22/24    Expected End:  05/06/24            Patient will complete functional transfers with least restrictive device with moderate assist x2 level of assistance. (Progressing)       Start:  04/22/24    Expected End:  05/06/24               Madison  Danny, OTR/L  Inpatient Occupational Therapist   Rehab Office: [phone]

## 2024-04-30 NOTE — PROGRESS NOTES
Spiritual Care Visit    Clinical Encounter Type  Visited With: Patient  Routine Visit: Follow-up  Continue Visiting: Yes    Tenriism Encounters  Tenriism Needs: Prayer    Patient received a prayer and blessing by Fr. Pranav Bauer,  Anglican .  Patient had received the Sacrament of the Anointing of the Sick on 4/29/24.

## 2024-04-30 NOTE — PROGRESS NOTES
SOCIAL WORK NOTE   SW met with team for interdisciplinary rounds. Patient presents with respiratory failure in the suspected setting of aspiration pneumonia. Patient is encephalopathic, but mental status appears to be improving. Patient is currently recommended for moderate intensity therapy.  Plan to return to [facility]. Updates sent via [facility]. Social work to follow.  RYLEY Pena, LISW-S ([id number])

## 2024-04-30 NOTE — PROGRESS NOTES
Subjective   Brian Rodriguez is a 69 y.o. male transferred to the MICU for multiple rapids called due to hypotension and increased oxygen requirements. Today, patient is not at baseline orientation and has deteriorated since he was in the MICU, but has mildly improved compared to yesterday. Can follow some commands and attempts to speak, but speech is uninteligible.    Objective   Physical Exam  Constitutional:       Appearance: He is cachectic. He is ill-appearing. He is not toxic-appearing.   HENT:      Head: Normocephalic and atraumatic.      Nose: Nose normal.      Mouth/Throat:      Mouth: Mucous membranes are dry.   Cardiovascular:      Rate and Rhythm: Regular rhythm. Tachycardia present.      Heart sounds: Normal heart sounds.   Pulmonary:      Effort: Respiratory distress present.      Breath sounds: Rhonchi present. No wheezing or rales.   Abdominal:      General: Abdomen is flat. Bowel sounds are normal.      Palpations: Abdomen is soft.   Musculoskeletal:      Right lower leg: No edema.      Left lower leg: No edema.   Skin:     General: Skin is warm and dry.      Capillary Refill: Capillary refill takes 2 to 3 seconds.      Findings: Lesion present.      Comments: Subcutaneous nodules noted on R zygomatic bone. No exudate, no erythema.  L buttock wound with new purulent drainage   Neurological:      Mental Status: He is alert and easily aroused. He is disoriented.      Cranial Nerves: Cranial nerve deficit present.      Motor: Weakness present.      Comments: Marginal improvement in encephalopathy compared to yesterday  Attempts to speak  Dysarthric  Opened eyes to command  L sided facial droop   Temp:  [36.5 °C (97.7 °F)-38.1 °C (100.6 °F)] 37.5 °C (99.5 °F)  Heart Rate:  [101-130] 115  Resp:  [21-37] 30  BP: (101-132)/(48-82) 123/59  FiO2 (%):  [40 %-50 %] 50 %    Intake/Output Summary (Last 24 hours) at 4/30/2024 0723  Last data filed at 4/30/2024 0600  Gross per 24 hour   Intake 4032.91 ml   Output  4306 ml   Net -273.09 ml     Vitals:    04/30/24 0600   Weight: 64.2 kg (141 lb 8.6 oz)     FiO2 (%):  [40 %-50 %] 50 %     I/Os    Intake/Output Summary (Last 24 hours) at 4/30/2024 0723  Last data filed at 4/30/2024 0600  Gross per 24 hour   Intake 4032.91 ml   Output 4306 ml   Net -273.09 ml     Labs:   Results for orders placed or performed during the hospital encounter of 04/17/24 (from the past 24 hour(s))   POCT GLUCOSE   Result Value Ref Range    POCT Glucose 191 (H) 74 - 99 mg/dL   Tissue/Wound Culture/Smear    Specimen: Skin Lesion; Tissue/Biopsy   Result Value Ref Range    Gram Stain (2+) Few Polymorphonuclear leukocytes (A)     Gram Stain (1+) Rare Yeast (A)    POCT GLUCOSE   Result Value Ref Range    POCT Glucose 167 (H) 74 - 99 mg/dL   Electrocardiogram, 12-lead PRN ACS symptoms   Result Value Ref Range    Ventricular Rate 113 BPM    Atrial Rate 113 BPM    QRS Duration 78 ms    QT Interval 412 ms    QTC Calculation(Bazett) 565 ms    R Axis 73 degrees    T Axis 90 degrees    QRS Count 19 beats    Q Onset 222 ms    T Offset 428 ms    QTC Fredericia 508 ms   Reticulocytes   Result Value Ref Range    Retic % 1.0 0.5 - 2.0 %    Retic Absolute 0.030 0.017 - 0.110 x10*6/uL    Reticulocyte Hemoglobin 24 (L) 28 - 38 pg    Immature Retic fraction 19.1 (H) <=16.0 %   Iron and TIBC   Result Value Ref Range    Iron 16 (L) 35 - 150 ug/dL    UIBC 78 (L) 110 - 370 ug/dL    TIBC 94 (L) 240 - 445 ug/dL    % Saturation 17 (L) 25 - 45 %   CBC   Result Value Ref Range    WBC 14.0 (H) 4.4 - 11.3 x10*3/uL    nRBC 0.2 (H) 0.0 - 0.0 /100 WBCs    RBC 3.04 (L) 4.50 - 5.90 x10*6/uL    Hemoglobin 7.5 (L) 13.5 - 17.5 g/dL    Hematocrit 23.7 (L) 41.0 - 52.0 %    MCV 78 (L) 80 - 100 fL    MCH 24.7 (L) 26.0 - 34.0 pg    MCHC 31.6 (L) 32.0 - 36.0 g/dL    RDW 21.1 (H) 11.5 - 14.5 %    Platelets 207 150 - 450 x10*3/uL   Ferritin   Result Value Ref Range    Ferritin 539 (H) 20 - 300 ng/mL   Lactate Dehydrogenase   Result Value Ref Range      84 - 246 U/L   Protein, Total   Result Value Ref Range    Total Protein 5.3 (L) 6.4 - 8.2 g/dL   POCT GLUCOSE   Result Value Ref Range    POCT Glucose 216 (H) 74 - 99 mg/dL   Renal function panel   Result Value Ref Range    Glucose 294 (H) 74 - 99 mg/dL    Sodium 141 136 - 145 mmol/L    Potassium 3.9 3.5 - 5.3 mmol/L    Chloride 108 (H) 98 - 107 mmol/L    Bicarbonate 24 21 - 32 mmol/L    Anion Gap 13 10 - 20 mmol/L    Urea Nitrogen 39 (H) 6 - 23 mg/dL    Creatinine 2.39 (H) 0.50 - 1.30 mg/dL    eGFR 28 (L) >60 mL/min/1.73m*2    Calcium 7.4 (L) 8.6 - 10.6 mg/dL    Phosphorus 3.1 2.5 - 4.9 mg/dL    Albumin 1.6 (L) 3.4 - 5.0 g/dL   Vancomycin   Result Value Ref Range    Vancomycin 20.7 (H) 5.0 - 20.0 ug/mL   POCT GLUCOSE   Result Value Ref Range    POCT Glucose 288 (H) 74 - 99 mg/dL   MRSA Surveillance for Vancomycin De-escalation, PCR    Specimen: Anterior Nares; Swab   Result Value Ref Range    MRSA PCR Detected (A) Not Detected   POCT GLUCOSE   Result Value Ref Range    POCT Glucose 235 (H) 74 - 99 mg/dL   POCT GLUCOSE   Result Value Ref Range    POCT Glucose 97 74 - 99 mg/dL   POCT GLUCOSE   Result Value Ref Range    POCT Glucose 148 (H) 74 - 99 mg/dL   Renal function panel   Result Value Ref Range    Glucose 144 (H) 74 - 99 mg/dL    Sodium 145 136 - 145 mmol/L    Potassium 3.6 3.5 - 5.3 mmol/L    Chloride 112 (H) 98 - 107 mmol/L    Bicarbonate 25 21 - 32 mmol/L    Anion Gap 12 10 - 20 mmol/L    Urea Nitrogen 33 (H) 6 - 23 mg/dL    Creatinine 2.22 (H) 0.50 - 1.30 mg/dL    eGFR 31 (L) >60 mL/min/1.73m*2    Calcium 7.0 (L) 8.6 - 10.6 mg/dL    Phosphorus 3.1 2.5 - 4.9 mg/dL    Albumin <1.5 (L) 3.4 - 5.0 g/dL   CBC and Auto Differential   Result Value Ref Range    WBC 12.1 (H) 4.4 - 11.3 x10*3/uL    nRBC 0.0 0.0 - 0.0 /100 WBCs    RBC 3.73 (L) 4.50 - 5.90 x10*6/uL    Hemoglobin 9.3 (L) 13.5 - 17.5 g/dL    Hematocrit 27.1 (L) 41.0 - 52.0 %    MCV 73 (L) 80 - 100 fL    MCH 24.9 (L) 26.0 - 34.0 pg    MCHC 34.3  32.0 - 36.0 g/dL    RDW 21.2 (H) 11.5 - 14.5 %    Platelets 173 150 - 450 x10*3/uL    Neutrophils % 76.2 40.0 - 80.0 %    Immature Granulocytes %, Automated 0.4 0.0 - 0.9 %    Lymphocytes % 14.8 13.0 - 44.0 %    Monocytes % 1.6 2.0 - 10.0 %    Eosinophils % 6.9 0.0 - 6.0 %    Basophils % 0.1 0.0 - 2.0 %    Neutrophils Absolute 9.21 (H) 1.60 - 5.50 x10*3/uL    Immature Granulocytes Absolute, Automated 0.05 0.00 - 0.50 x10*3/uL    Lymphocytes Absolute 1.79 0.80 - 3.00 x10*3/uL    Monocytes Absolute 0.19 0.05 - 0.80 x10*3/uL    Eosinophils Absolute 0.83 (H) 0.00 - 0.40 x10*3/uL    Basophils Absolute 0.01 0.00 - 0.10 x10*3/uL   Magnesium   Result Value Ref Range    Magnesium 1.92 1.60 - 2.40 mg/dL   Cortisol AM   Result Value Ref Range    Cortisol  A.M. 12.6 5.0 - 20.0 ug/dL   Morphology   Result Value Ref Range    RBC Morphology See Below     Hypochromia Mild     Target Cells Few       Micro/ID:   Lab Results   Component Value Date    URINECULTURE Normal genitourinary elda 04/26/2024    BLOODCULT No growth at 3 days 04/26/2024    BLOODCULT No growth at 3 days 04/26/2024     Images:  CT abdomen pelvis wo IV contrast  Narrative: Interpreted By:  Antonio Fernandez and Bartolomei Aguilar Christopher   STUDY:  CT ABDOMEN PELVIS WO IV CONTRAST;  4/29/2024 10:11 am      INDICATION:  Signs/Symptoms:septic shock with purulent drainage from left gluteal  region, hx of incision and drainage in this area, assess for  worsening abscess/collection.      COMPARISON:  CT chest abdomen pelvis 04/17/2024      ACCESSION NUMBER(S):  [id number]      ORDERING CLINICIAN:  HOWARD LINARES      TECHNIQUE:  CT of the abdomen and pelvis was performed. Contiguous axial images  were obtained at 3 mm slice thickness through the abdomen and pelvis.  Coronal and sagittal reconstructions at 3 mm slice thickness were  performed.  No intravenous contrast was administered; no oral  contrast was given.      FINDINGS:  Please note that the  evaluation of vessels, lymph nodes and organs is  limited without intravenous contrast.      LOWER CHEST:  Somewhat limited examination secondary to respiratory motion artifact.      Marked worsening of predominantly central and peribronchovascular  consolidative and ground-glass opacities which may represent  worsening edema with superimposed multifocal infection not excluded.  There is interval development of a slightly septated moderate  right-sided pleural effusion and small left pleural effusion.      Cardiac chambers appear normal in size. No evidence of pericardial  effusion. Left anterior descending coronary artery calcification  versus stent placement. Partially visualized enteric tube coursing  through the esophagus with the internal tip terminating in the  gastric antrum.      ABDOMEN:      LIVER:  The liver is normal in size measuring 17.4 cm in the craniocaudal  dimension.      BILE DUCTS:  The intrahepatic and extrahepatic ducts are not dilated.      GALLBLADDER:  Cholelithiasis without evidence of acute cystitis.      PANCREAS:  Coarse calcifications within the pancreatic head, similar to prior.  There has been new haziness surrounding the pancreatic head and  peripancreatic fat, which is unclear whether this represents fat  stranding or motion artifact. No pancreatic ductal dilatation noted.  No evidence of peripancreatic fluid collections on noncontrast CT.      SPLEEN:  Normal in size.      ADRENAL GLANDS:  Bilateral adrenal glands appear normal.      KIDNEYS AND URETERS:  Within normal limits.  No hydroureteronephrosis or  nephroureterolithiasis is identified.      PELVIS:      BLADDER:  The urinary bladder is decompressed with scattered foci of gas and a  Barton catheter in place.      REPRODUCTIVE ORGANS:  No pelvic masses.Unchanged region of focal linear hypoattenuation the  right scrotum, and incompletely characterized on noncontrast CT.  Correlate with recent scrotal ultrasound from  04/27/2024.      BOWEL:  Partially visualized enteric tube with the internal tip terminating  in the gastric antrum. Otherwise stomach is decompressed and appears  unremarkable. Fluid contents seen in loops of large bowel. Otherwise  small and large bowel are normal in caliber without abnormal wall  thickening. The appendix is not definitely visualized. There is  however no pericecal stranding or fluid.      VESSELS:  Severe aortoiliac atherosclerotic changes without aneurysmal  dilatation. IVC appears normal in course and caliber.      PERITONEUM/RETROPERITONEUM/LYMPH NODES:  No ascites, loculated fluid collections, or free intraperitoneal air.  No abdominopelvic lymphadenopathy is present.      ABDOMINAL WALL:  Diffuse body wall edema, slightly increased from prior.  Again there is extensive skin thickening of the left gluteal soft  tissues which appears slightly decreased in size and extent when  compared to prior CT 04/17/2024. For example, maximum thickness on  today's examination is 1.2 cm (series 2, image 137) compared to  maximum thickness of 1.6 cm on 04/17/2024 (series 201, image 179). No  definitive associated focal fluid collection, however limited  evaluation given the absence of IV contrast. The skin thickening and  adjacent fat stranding extends approximately 20.4 cm (series 2,  images 112-180), previously measured approximately 21.3 cm (series  201, image 157-228).      BONES:  No acute osseous abnormality or suspicious osseous lesions. Diffuse  demineralization. Similar appearance of endplate degenerative changes  at L3-4 and L4-5.      Impression: 1.  Marked worsening of pulmonary edema, with superimposed multifocal  infection not excluded, with interval development of slightly  septated moderate right-sided pleural effusion and small left pleural  effusion.  2. New peripancreatic haziness around the pancreatic head,  indeterminate and may be due to motion artifact or peripancreatic  inflammation,  correlate with concern for pancreatitis.  3. Redemonstration of extensive skin thickening of the left gluteal  soft tissues which appears slightly decreased in size and extent when  compared to prior, as detailed above. No definitive associated focal  fluid collection however limited evaluation given the absence of IV  contrast.  4. Increased diffuse body wall edema.  5. Fluid contents seen in loops of large bowel, correlate with  diarrhea.  6. Unchanged region of focal ovoid hypoattenuation the right scrotum,  and incompletely characterized on noncontrast CT. Correlate with  recent scrotal ultrasound from 04/27/2024.  7. Additional findings as discussed above.      I personally reviewed the images/study and I agree with the findings  as stated by Richy Mcduffie MD (Radiology Resident).  This study was interpreted at [place], [place].      MACRO:  None      Signed by: Antonio Matute 4/29/2024 9:04 PM  Dictation workstation:   [id number]  Electrocardiogram, 12-lead PRN ACS symptoms  Normal sinus rhythm with frequent PACs  Abnormal ECG  When compared with ECG of 22-APR-2024 04:04,  No significant change was found  Confirmed by Flakito Sharpe ([id number]) on 4/29/2024 4:35:06 PM  Transthoracic Echo (TTE) [facility], [address] [place] [address]                 Tel [phone] and Fax [phone]    TRANSTHORACIC ECHOCARDIOGRAM REPORT       Patient Name:      JARRED Briceño Physician:    [id number] Luis Migule Larios MD  Study Date:        4/29/2024            Ordering Provider:    [id number] BRENDA DUPONT  MRN/PID:           [id number]             Fellow:  Accession#:        [id number]         Nurse:  Date of Birth/Age: 1955 / 69 years Sonographer:          Dhara Smith RDCS  Gender:             M                    Additional Staff:  Height:            177.80 cm            Admit Date:           4/17/2024  Weight:            68.95 kg             Admission Status:     Inpatient -                                                                Routine  BSA / BMI:         1.86 m2 / 21.81      Encounter#:           [id number]                     kg/m2                                          Department Location:  [place] [address] MICU  Blood Pressure: 113 /61 mmHg    Study Type:    TRANSTHORACIC ECHO (TTE) LIMITED  Diagnosis/ICD: Encounter for screening for cardiovascular disorders-Z13.6  Indication:    Persistent Hypotension w/o clear infectious cause  CPT Code:      Echo Limited-48144    Patient History:  Pertinent History: CKD, HTN, DM2, Hypoxic Respiratory Failure, PNA, AMS,                     Bipolar, Sepsis.    Study Detail: The following Echo studies were performed: 2D. Technically                challenging study due to patient lying in supine position, body                habitus and prominent lung artifact. Definity used as a contrast                agent for endocardial border definition. Total contrast used for                this procedure was 3.0 mL via IV push.       PHYSICIAN INTERPRETATION:  Left Ventricle: The left ventricular systolic function is low normal, with an estimated ejection fraction of 60%. There are no regional wall motion abnormalities. The left ventricular cavity size is normal. Left ventricular diastolic filling was not assessed.  Left Atrium: The left atrium is normal in size.  Right Ventricle: The right ventricle is normal in size. There is normal right ventricular global systolic function.  Right Atrium: The right atrium is normal in size.  Aortic Valve: The aortic valve is probably trileaflet. There is minimal aortic valve cusp calcification. Aortic valve regurgitation was not assessed.  Mitral Valve: The mitral valve is normal in structure. There is mild mitral annular  calcification. Mitral valve regurgitation was not assessed.  Tricuspid Valve: The tricuspid valve is structurally normal. Tricuspid regurgitation was not assessed.  Pulmonic Valve: The pulmonic valve was not assessed. Pulmonic valve regurgitation was not assessed.  Pericardium: There is no pericardial effusion noted.  Aorta: The aortic root is normal.  Systemic Veins: The inferior vena cava appears to be of normal size.       CONCLUSIONS:   1. Left ventricular systolic function is low normal with a 60% estimated ejection fraction.   2. No definite valvular vegetations were visualized.   3. Poorly visualized anatomical structures due to suboptimal image quality.    QUANTITATIVE DATA SUMMARY:     [id number] Luis Miguel Larios MD  Electronically signed on 4/29/2024 at 3:37:27 PM       ** Final **  Vascular US upper extremity venous duplex right              [place] [address]    Tel [phone] and Fax [phone]       Vascular Lab Report  VASC US UPPER EXTREMITY VENOUS DUPLEX RIGHT       Patient Name:      JARRED Briceño Physician: [id number] Yoana Muro MD  Study Date:        4/29/2024           Ordering           [id number] RHINA CAPUTO                                         Physician:         JEANETTE  MRN/PID:           [id number]            Technologist:      Johnny Thompson T  Accession#:        [id number]        Technologist 2:  Date of Birth/Age: 1955 / 69      Encounter#:        [id number]                     years  Gender:            M  Admission Status:  Inpatient           Location           [facility]                                         Performed:       Diagnosis/ICD: Right arm swelling-M79.89  CPT Codes:     49916 Peripheral venous duplex scan for DVT Limited       CONCLUSIONS:  Right Upper Venous: No evidence of acute deep vein thrombus visualized in the right upper extremity. There is acute occlusive superficial venous thrombosis visualized in  the cephalic vein in forearm.  Left Upper Venous: The subclavian vein demonstrates a normal spontaneous and phasic flow.     Imaging & Doppler Findings:     Right               Compressible    Thrombus  Internal Jugular        Yes           None  Subclavian Proximal                   None  Subclavian Mid          Yes           None  Subclavian Distal       Yes           None  Axillary                Yes           None  Brachial                Yes           None  Cephalic                 No      Acute occlusive  Basilic                 Yes           None       Left              Flow  Subclavian Spontaneous/Phasic       [id number] Yoana Muro MD  Electronically signed by [id number] Yoana Muro MD on 4/29/2024 at 12:13:04 PM       ** Final **  XR chest 1 view  Narrative: Interpreted By:  Eliecer Kaiser and Barbat Antonio   STUDY:  XR CHEST 1 VIEW;  4/29/2024 3:40 am      INDICATION:  Signs/Symptoms:concern for aspiration.      COMPARISON:  Chest radiograph dated 04/26/2024.      ACCESSION NUMBER(S):  [id number]      ORDERING CLINICIAN:  HOWARD LINARES      FINDINGS:  AP radiograph of the chest was provided.      There is an enteric tube projecting over the midline, with the distal  tip outside the field of view of this study.      CARDIOMEDIASTINAL SILHOUETTE:  Cardiomediastinal silhouette is top-normal in size and configuration,  with similar partial obscuration of the bilateral heart borders. Mild  aortic knob calcifications are noted.      LUNGS:  Similar extensive patchy and confluent bilateral airspace opacities,  when accounting for changes in position positioning. Findings are  again most prominent at the lung bases. There is again trace blunting  of the bilateral costophrenic angles. No evidence of pneumothorax.      ABDOMEN:  No remarkable upper abdominal findings.      BONES:  No acute osseous changes.      Impression: 1. Stable appearance of the chest, with findings consistent with  multifocal  pneumonia/pneumonitis.  2. Trace bilateral pleural effusions.          I personally reviewed the images/study and I agree with the findings  as stated by Karthik Guzman MD. This study was interpreted at  [facility], [place], [place]      MACRO:  None      Signed by: Eliecer Kaiser 4/29/2024 9:13 AM  Dictation workstation:   [id number]       Meds:  Scheduled medications  chlorhexidine, , Topical, Daily  heparin (porcine), 5,000 Units, subcutaneous, q8h RANDAL  insulin glargine, 21 Units, subcutaneous, Nightly  insulin lispro, 0-10 Units, subcutaneous, q4h  meropenem, 1,000 mg, intravenous, q12h  multivitamin with minerals, 1 tablet, oral, Daily  pantoprazole, 40 mg, intravenous, Daily  sodium bicarbonate, 650 mg, oral, BID  sodium chloride, 1 spray, Each Nostril, 4x daily  sodium chloride, 3 mL, nebulization, q6h RANDAL  thiamine, 500 mg, intravenous, TID    Continuous medications     PRN medications  PRN medications: acetaminophen **OR** acetaminophen **OR** acetaminophen, albuterol, dextrose, dextrose, dextrose, dextrose, glucagon, glucagon, glucagon, glucagon, guaiFENesin, ipratropium-albuteroL, melatonin, oxygen, polyethylene glycol, sennosides-docusate sodium, traMADol, vancomycin     Assessment   Brian Rodriguez is a 69 y.o. male admitted to the MICU for multiple rapids, hypotension, and acute hypoxic respiratory failure requiring airvo support.  PMHx CKD, DM2, HTN, sacral wound, bipolar disorder who was originally admitted from his SNF 2/2 encephalopathy. On the floor, the patient had several aspiration events. His pressures responded to fluids and IVC was collapsible. Presentation most consistent with aspiration pna 2/2 dysphagia and hypovolemia 2/2 dehydration vs bacteremia.     Neuro  # Acute Encephalopathy  # Dementia  # Hypoactive Delirium  :: per family baseline AOx1-2, able to converse and follow commands  :: Hyponatremia +/- delirium 2/2 infection and prolonged  hospitalization  - Neurology consulted, appreciate recommendations: per neurology would like MRI brain w/o contrast  -- Limited d/t aspiration when lying flat  -- FU neurology once pt can tolerate MRI  - EMG to r/o underlying motor neuron disease  - TSH level WNL  - Thiamine levels sent     Pulm  # Acute Hypoxic Respiratory Failure  # Multifocal Pneumonia  # Aspiration Pneumonia  :: Imaging suggestive of bacterial pna in the setting of recent covid s/p remdesivir  - s/p azithromycin 4/18-4/20, Remdesevir 4/18-4/22  - meropenem 4/26, vancomycin started 4/17  - sent fungal studies, repeat BC, repeat urine studies, repeat sputum cultures  - IV thiamine 500mg TID for 7 days  - aggressive respiratory hygiene with IPV and chest percussion  - duonebs q6hr PRN  - CXR on 4/30    # Pleural effusions  :: 2/2 aspiration vs heart failure  - CT may have overestimated  - No window for bedside thoracentesis  - limited echo performed 60% EF with suboptimal image quality d/t patient encephalopathy    Cardiovascular  # Hypotension  :: ?septic shock vs dehydration  - AM cortisol 12.6 (nl)     # HTN  - Hold home amlodipine     # HLD  - Hold home atorvastatin     GI  # Nutrition  - NPO concern for aspiration pneumonia - tube feeds uptitrate to goal 55cc/hr  - Diet recs in     # Dysphagia  - ENT previously consulted, no anatomic cause  - Neurology consulted as above  - NG tube     Renal/  #TERRI on CKD Stage III  :: most likely prerenal azotemia 2/2 hypovolemia/hemodynamic instability  :: baseline Cr 1.7  - Q12hr RFP  - Free water flushes: 300 Q6H  - Bolus fluids to keep net +500cc daily     # Hypernatremia - improving  - Free water flushes increased to 300 q6hr up from 200 q6hr     Endo  # DM2  # Hyperglycemia - resolved  :: Was on D5W for Na and steroids, both now stopped  - Increase glargine to 21 units for elevated sugars ON     ID  # Acute Hypoxic Respiratory Failure  # Multifocal Pneumonia  # Aspiration Pneumonia  :: Bacterial pna  2/2 aspiration in the setting of dysphagia and recent covid pna  - s/p azithromycin 4/18-4/20, Remdesevir 4/18-4/22  - Meropenem, vancomycin started 4/27  - Fungal chem, blood and respiratory sent  - Candida from previous respiratory culture, speak with micro about urine findings of candida     # Multiple Sacral Wounds  :: R buttock, new exudate noted  - Wound cultures sent  - Continue broad spectrum abx  - Consider dropping vancomycin     # Hidradenitis Suppurativa  :: derm has previously recommended humira/minocycline 100 BID but currently w/o lab confirmation, hold off iso septic shock  - will need outpatient dermatology follow up w/ doxycycline 100 BID outpatient   - wound care consulted, appreciate recommendations     Heme  # Acute on Chronic Anemia  :: most likely iso iron deficiency anemia and anemia of chronic disease: iron low, TIBC 95, folate WNL, B12 supratherapeutic  - Transfuse 1 unit PR[facility]  - Hold off on iron supplementation iso active infection  - Ferritin 539/TIBC 94/iron 16 => fe deficient + sequestration  - Reticulocyte count: 1%, immature 19% suggesting adequate production  - Peripheral smear pending     MSK  # RUE swollen  :: Suspect local fluid overload  - US DVT RUE negative     F Free water 300cc q6hr  E Replete prn  N NPO, TF at 55cc/hr  G None  DVT: subcutaneous heparin  ABX:  Vanc: 4/17-  Zosyn: 4/17-4/26  Skylar: 4/26-  Azithro: 4/18-4/20  Lines: PIV x4, bunch  Drips: None  Pressors: None  Code status: DNR  NoK:  Primary Emergency Contact: DEEPAK RANGEL, Home Phone: [phone]    Dispo: 69 y.o. male admitted to the MICU for multiple rapids, hypotension, and acute hypoxic respiratory failure requiring airvo support.  PMHx CKD, DM2, HTN, sacral wound, bipolar disorder who was originally admitted from his SNF 2/2 AM. Floors once O2 requirement decreases and pressures stabilize.

## 2024-05-01 ENCOUNTER — APPOINTMENT (OUTPATIENT)
Dept: VASCULAR MEDICINE | Facility: HOSPITAL | Age: 69
DRG: 871 | End: 2024-05-01
Payer: MEDICARE

## 2024-05-01 ENCOUNTER — APPOINTMENT (OUTPATIENT)
Dept: RADIOLOGY | Facility: HOSPITAL | Age: 69
DRG: 871 | End: 2024-05-01
Payer: MEDICARE

## 2024-05-01 LAB
ALBUMIN SERPL BCP-MCNC: 1.5 G/DL (ref 3.4–5)
ALBUMIN SERPL BCP-MCNC: 1.5 G/DL (ref 3.4–5)
ANION GAP SERPL CALC-SCNC: 14 MMOL/L (ref 10–20)
ANION GAP SERPL CALC-SCNC: 16 MMOL/L (ref 10–20)
BACTERIA SPEC CULT: ABNORMAL
BACTERIA SPEC CULT: ABNORMAL
BASOPHILS # BLD MANUAL: 0 X10*3/UL (ref 0–0.1)
BASOPHILS NFR BLD MANUAL: 0 %
BUN SERPL-MCNC: 37 MG/DL (ref 6–23)
BUN SERPL-MCNC: 39 MG/DL (ref 6–23)
CALCIUM SERPL-MCNC: 7.6 MG/DL (ref 8.6–10.6)
CALCIUM SERPL-MCNC: 7.7 MG/DL (ref 8.6–10.6)
CHLORIDE SERPL-SCNC: 101 MMOL/L (ref 98–107)
CHLORIDE SERPL-SCNC: 106 MMOL/L (ref 98–107)
CO2 SERPL-SCNC: 27 MMOL/L (ref 21–32)
CO2 SERPL-SCNC: 28 MMOL/L (ref 21–32)
CREAT SERPL-MCNC: 2.82 MG/DL (ref 0.5–1.3)
CREAT SERPL-MCNC: 2.84 MG/DL (ref 0.5–1.3)
EGFRCR SERPLBLD CKD-EPI 2021: 23 ML/MIN/1.73M*2
EGFRCR SERPLBLD CKD-EPI 2021: 23 ML/MIN/1.73M*2
EOSINOPHIL # BLD MANUAL: 0.81 X10*3/UL (ref 0–0.4)
EOSINOPHIL NFR BLD MANUAL: 6.4 %
ERYTHROCYTE [DISTWIDTH] IN BLOOD BY AUTOMATED COUNT: 21.1 % (ref 11.5–14.5)
GLUCOSE BLD MANUAL STRIP-MCNC: 112 MG/DL (ref 74–99)
GLUCOSE BLD MANUAL STRIP-MCNC: 181 MG/DL (ref 74–99)
GLUCOSE BLD MANUAL STRIP-MCNC: 187 MG/DL (ref 74–99)
GLUCOSE BLD MANUAL STRIP-MCNC: 197 MG/DL (ref 74–99)
GLUCOSE BLD MANUAL STRIP-MCNC: 212 MG/DL (ref 74–99)
GLUCOSE BLD MANUAL STRIP-MCNC: 262 MG/DL (ref 74–99)
GLUCOSE SERPL-MCNC: 177 MG/DL (ref 74–99)
GLUCOSE SERPL-MCNC: 228 MG/DL (ref 74–99)
GRAM STN SPEC: ABNORMAL
GRAM STN SPEC: ABNORMAL
HAPTOGLOB SERPL-MCNC: 228 MG/DL (ref 37–246)
HCT VFR BLD AUTO: 20.8 % (ref 41–52)
HCT VFR BLD AUTO: 21.6 % (ref 41–52)
HGB BLD-MCNC: 6.9 G/DL (ref 13.5–17.5)
HGB BLD-MCNC: 7.1 G/DL (ref 13.5–17.5)
IMM GRANULOCYTES # BLD AUTO: 0.18 X10*3/UL (ref 0–0.5)
IMM GRANULOCYTES NFR BLD AUTO: 1.4 % (ref 0–0.9)
LYMPHOCYTES # BLD MANUAL: 1.38 X10*3/UL (ref 0.8–3)
LYMPHOCYTES NFR BLD MANUAL: 10.9 %
MAGNESIUM SERPL-MCNC: 2.47 MG/DL (ref 1.6–2.4)
MCH RBC QN AUTO: 23.9 PG (ref 26–34)
MCHC RBC AUTO-ENTMCNC: 31.9 G/DL (ref 32–36)
MCV RBC AUTO: 75 FL (ref 80–100)
MONOCYTES # BLD MANUAL: 0.11 X10*3/UL (ref 0.05–0.8)
MONOCYTES NFR BLD MANUAL: 0.9 %
NEUTS SEG # BLD MANUAL: 10.39 X10*3/UL (ref 1.6–5)
NEUTS SEG NFR BLD MANUAL: 81.8 %
NRBC BLD-RTO: 0 /100 WBCS (ref 0–0)
PHOSPHATE SERPL-MCNC: 3.5 MG/DL (ref 2.5–4.9)
PHOSPHATE SERPL-MCNC: 4.7 MG/DL (ref 2.5–4.9)
PLATELET # BLD AUTO: 272 X10*3/UL (ref 150–450)
POTASSIUM SERPL-SCNC: 3.8 MMOL/L (ref 3.5–5.3)
POTASSIUM SERPL-SCNC: 3.9 MMOL/L (ref 3.5–5.3)
RBC # BLD AUTO: 2.89 X10*6/UL (ref 4.5–5.9)
RBC MORPH BLD: ABNORMAL
SODIUM SERPL-SCNC: 139 MMOL/L (ref 136–145)
SODIUM SERPL-SCNC: 145 MMOL/L (ref 136–145)
TARGETS BLD QL SMEAR: ABNORMAL
TOTAL CELLS COUNTED BLD: 110
WBC # BLD AUTO: 12.7 X10*3/UL (ref 4.4–11.3)

## 2024-05-01 PROCEDURE — 2500000001 HC RX 250 WO HCPCS SELF ADMINISTERED DRUGS (ALT 637 FOR MEDICARE OP)

## 2024-05-01 PROCEDURE — 2500000004 HC RX 250 GENERAL PHARMACY W/ HCPCS (ALT 636 FOR OP/ED)

## 2024-05-01 PROCEDURE — 93970 EXTREMITY STUDY: CPT

## 2024-05-01 PROCEDURE — 85007 BL SMEAR W/DIFF WBC COUNT: CPT

## 2024-05-01 PROCEDURE — 82947 ASSAY GLUCOSE BLOOD QUANT: CPT | Mod: 91,MUE

## 2024-05-01 PROCEDURE — C9113 INJ PANTOPRAZOLE SODIUM, VIA: HCPCS

## 2024-05-01 PROCEDURE — 74018 RADEX ABDOMEN 1 VIEW: CPT

## 2024-05-01 PROCEDURE — 37799 UNLISTED PX VASCULAR SURGERY: CPT

## 2024-05-01 PROCEDURE — 1200000002 HC GENERAL ROOM WITH TELEMETRY DAILY

## 2024-05-01 PROCEDURE — 2500000002 HC RX 250 W HCPCS SELF ADMINISTERED DRUGS (ALT 637 FOR MEDICARE OP, ALT 636 FOR OP/ED)

## 2024-05-01 PROCEDURE — 74018 RADEX ABDOMEN 1 VIEW: CPT | Performed by: RADIOLOGY

## 2024-05-01 PROCEDURE — 2500000004 HC RX 250 GENERAL PHARMACY W/ HCPCS (ALT 636 FOR OP/ED): Performed by: INTERNAL MEDICINE

## 2024-05-01 PROCEDURE — 82024 ASSAY OF ACTH: CPT

## 2024-05-01 PROCEDURE — 80069 RENAL FUNCTION PANEL: CPT

## 2024-05-01 PROCEDURE — 99291 CRITICAL CARE FIRST HOUR: CPT

## 2024-05-01 PROCEDURE — 83735 ASSAY OF MAGNESIUM: CPT

## 2024-05-01 PROCEDURE — 2500000005 HC RX 250 GENERAL PHARMACY W/O HCPCS: Performed by: INTERNAL MEDICINE

## 2024-05-01 PROCEDURE — 2500000005 HC RX 250 GENERAL PHARMACY W/O HCPCS: Performed by: STUDENT IN AN ORGANIZED HEALTH CARE EDUCATION/TRAINING PROGRAM

## 2024-05-01 PROCEDURE — 85014 HEMATOCRIT: CPT

## 2024-05-01 PROCEDURE — 85027 COMPLETE CBC AUTOMATED: CPT

## 2024-05-01 PROCEDURE — 93971 EXTREMITY STUDY: CPT

## 2024-05-01 PROCEDURE — 94640 AIRWAY INHALATION TREATMENT: CPT

## 2024-05-01 PROCEDURE — 93971 EXTREMITY STUDY: CPT | Performed by: INTERNAL MEDICINE

## 2024-05-01 PROCEDURE — 93970 EXTREMITY STUDY: CPT | Performed by: INTERNAL MEDICINE

## 2024-05-01 PROCEDURE — 80069 RENAL FUNCTION PANEL: CPT | Mod: 91,MUE

## 2024-05-01 RX ORDER — FUROSEMIDE 10 MG/ML
40 INJECTION INTRAMUSCULAR; INTRAVENOUS ONCE
Status: COMPLETED | OUTPATIENT
Start: 2024-05-01 | End: 2024-05-01

## 2024-05-01 RX ADMIN — MEROPENEM 1000 MG: 1 INJECTION, POWDER, FOR SOLUTION INTRAVENOUS at 01:21

## 2024-05-01 RX ADMIN — SODIUM BICARBONATE 650 MG: 650 TABLET ORAL at 08:39

## 2024-05-01 RX ADMIN — SODIUM CHLORIDE SOLN NEBU 3% 3 ML: 3 NEBU SOLN at 02:55

## 2024-05-01 RX ADMIN — Medication 35 L/MIN: at 02:55

## 2024-05-01 RX ADMIN — HEPARIN SODIUM 5000 UNITS: 5000 INJECTION INTRAVENOUS; SUBCUTANEOUS at 13:42

## 2024-05-01 RX ADMIN — VANCOMYCIN HYDROCHLORIDE 750 MG: 750 INJECTION, SOLUTION INTRAVENOUS at 00:23

## 2024-05-01 RX ADMIN — Medication 1 TABLET: at 08:39

## 2024-05-01 RX ADMIN — Medication 30 L/MIN: at 13:24

## 2024-05-01 RX ADMIN — INSULIN LISPRO 4 UNITS: 100 INJECTION, SOLUTION INTRAVENOUS; SUBCUTANEOUS at 20:02

## 2024-05-01 RX ADMIN — INSULIN LISPRO 2 UNITS: 100 INJECTION, SOLUTION INTRAVENOUS; SUBCUTANEOUS at 17:03

## 2024-05-01 RX ADMIN — HEPARIN SODIUM 5000 UNITS: 5000 INJECTION INTRAVENOUS; SUBCUTANEOUS at 21:59

## 2024-05-01 RX ADMIN — THIAMINE HYDROCHLORIDE 500 MG: 100 INJECTION, SOLUTION INTRAMUSCULAR; INTRAVENOUS at 08:39

## 2024-05-01 RX ADMIN — THIAMINE HYDROCHLORIDE 500 MG: 100 INJECTION, SOLUTION INTRAMUSCULAR; INTRAVENOUS at 17:02

## 2024-05-01 RX ADMIN — PANTOPRAZOLE SODIUM 40 MG: 40 INJECTION, POWDER, FOR SOLUTION INTRAVENOUS at 08:39

## 2024-05-01 RX ADMIN — SODIUM BICARBONATE 650 MG: 650 TABLET ORAL at 20:02

## 2024-05-01 RX ADMIN — MELATONIN 3 MG: 3 TAB ORAL at 19:54

## 2024-05-01 RX ADMIN — INSULIN GLARGINE 21 UNITS: 100 INJECTION, SOLUTION SUBCUTANEOUS at 20:03

## 2024-05-01 RX ADMIN — ACETAMINOPHEN 650 MG: 650 SOLUTION ORAL at 08:38

## 2024-05-01 RX ADMIN — SALINE NASAL SPRAY 1 SPRAY: 1.5 SOLUTION NASAL at 06:27

## 2024-05-01 RX ADMIN — SALINE NASAL SPRAY 1 SPRAY: 1.5 SOLUTION NASAL at 13:43

## 2024-05-01 RX ADMIN — SODIUM CHLORIDE SOLN NEBU 3% 3 ML: 3 NEBU SOLN at 06:00

## 2024-05-01 RX ADMIN — ACETAMINOPHEN 650 MG: 650 SOLUTION ORAL at 19:54

## 2024-05-01 RX ADMIN — Medication 30 L/MIN: at 23:32

## 2024-05-01 RX ADMIN — Medication 30 L/MIN: at 20:16

## 2024-05-01 RX ADMIN — THIAMINE HYDROCHLORIDE 500 MG: 100 INJECTION, SOLUTION INTRAMUSCULAR; INTRAVENOUS at 20:44

## 2024-05-01 RX ADMIN — INSULIN LISPRO 2 UNITS: 100 INJECTION, SOLUTION INTRAVENOUS; SUBCUTANEOUS at 03:23

## 2024-05-01 RX ADMIN — SALINE NASAL SPRAY 1 SPRAY: 1.5 SOLUTION NASAL at 20:05

## 2024-05-01 RX ADMIN — INSULIN LISPRO 6 UNITS: 100 INJECTION, SOLUTION INTRAVENOUS; SUBCUTANEOUS at 12:26

## 2024-05-01 RX ADMIN — FUROSEMIDE 40 MG: 10 INJECTION, SOLUTION INTRAVENOUS at 11:23

## 2024-05-01 RX ADMIN — MEROPENEM 1000 MG: 1 INJECTION, POWDER, FOR SOLUTION INTRAVENOUS at 13:42

## 2024-05-01 RX ADMIN — INSULIN LISPRO 8 UNITS: 100 INJECTION, SOLUTION INTRAVENOUS; SUBCUTANEOUS at 00:05

## 2024-05-01 RX ADMIN — HEPARIN SODIUM 5000 UNITS: 5000 INJECTION INTRAVENOUS; SUBCUTANEOUS at 05:32

## 2024-05-01 ASSESSMENT — PAIN SCALES - PAIN ASSESSMENT IN ADVANCED DEMENTIA (PAINAD)
TOTALSCORE: 0
BODYLANGUAGE: RELAXED
CONSOLABILITY: NO NEED TO CONSOLE
FACIALEXPRESSION: SMILING OR INEXPRESSIVE
BREATHING: NORMAL
FACIALEXPRESSION: SMILING OR INEXPRESSIVE
TOTALSCORE: 0
BODYLANGUAGE: RELAXED
CONSOLABILITY: NO NEED TO CONSOLE
BREATHING: NORMAL

## 2024-05-01 ASSESSMENT — PAIN - FUNCTIONAL ASSESSMENT
PAIN_FUNCTIONAL_ASSESSMENT: 0-10
PAIN_FUNCTIONAL_ASSESSMENT: CPOT (CRITICAL CARE PAIN OBSERVATION TOOL)
PAIN_FUNCTIONAL_ASSESSMENT: CPOT (CRITICAL CARE PAIN OBSERVATION TOOL)
PAIN_FUNCTIONAL_ASSESSMENT: 0-10
PAIN_FUNCTIONAL_ASSESSMENT: PAINAD (PAIN ASSESSMENT IN ADVANCED DEMENTIA SCALE)
PAIN_FUNCTIONAL_ASSESSMENT: PAINAD (PAIN ASSESSMENT IN ADVANCED DEMENTIA SCALE)

## 2024-05-01 ASSESSMENT — PAIN SCALES - GENERAL
PAINLEVEL_OUTOF10: 0 - NO PAIN
PAINLEVEL_OUTOF10: 0 - NO PAIN

## 2024-05-01 NOTE — CARE PLAN
T  Problem: Skin  Goal: Decreased wound size/increased tissue granulation at next dressing change  Outcome: Progressing  Flowsheets (Taken 4/30/2024 0657)  Decreased wound size/increased tissue granulation at next dressing change:   Promote sleep for wound healing   Utilize specialty bed per algorithm   Protective dressings over bony prominences  Goal: Prevent/manage excess moisture  Outcome: Progressing  Flowsheets (Taken 4/30/2024 0657)  Prevent/manage excess moisture:   Cleanse incontinence/protect with barrier cream   Moisturize dry skin   Use wicking fabric (obtain order)   Follow provider orders for dressing changes   Monitor for/manage infection if present  Goal: Prevent/minimize sheer/friction injuries  Outcome: Progressing  Flowsheets (Taken 4/22/2024 2155 by John Mccallum RN)  Prevent/minimize sheer/friction injuries:   Complete micro-shifts as needed if patient unable. Adjust patient position to relieve pressure points, not a full turn   HOB 30 degrees or less   Turn/reposition every 2 hours/use positioning/transfer devices   Use pull sheet   Utilize specialty bed per algorithm  Goal: Promote/optimize nutrition  Outcome: Progressing  Flowsheets (Taken 4/30/2024 0657)  Promote/optimize nutrition:   Assist with feeding   Monitor/record intake including meals  Goal: Promote skin healing  Outcome: Progressing  Flowsheets (Taken 4/30/2024 0657)  Promote skin healing:   Assess skin/pad under line(s)/device(s)   Ensure correct size (line/device) and apply per  instructions   Protective dressings over bony prominences   Rotate device position/do not position patient on device   Turn/reposition every 2 hours/use positioning/transfer devices

## 2024-05-01 NOTE — PROGRESS NOTES
"Vancomycin Dosing by Pharmacy- FOLLOW UP    Brain Rodriguez is a 69 y.o. year old male who Pharmacy has been consulted for vancomycin dosing for pneumonia. Based on the patient's indication and renal status this patient is being dosed based on a goal trough/random level of 15-20.     Renal function is currently declining.    Current vancomycin dose: dose by level    Most recent random level: 17.1 mcg/mL    Visit Vitals  /61   Pulse (!) 112   Temp 37 °C (98.6 °F) (Temporal)   Resp 19        Lab Results   Component Value Date    CREATININE 2.60 (H) 04/30/2024    CREATININE 2.22 (H) 04/30/2024    CREATININE 2.39 (H) 04/29/2024    CREATININE 2.35 (H) 04/29/2024        Patient weight is No results found for: \"PTWEIGHT\"    No results found for: \"CULTURE\"     I/O last 3 completed shifts:  In: 4993.7 (62.9 mL/kg) [I.V.:68.7 (0.9 mL/kg); Blood:350; NG/GT:2675; IV Piggyback:1900]  Out: 5866 (73.9 mL/kg) [Urine:5865 (2.1 mL/kg/hr); Stool:1]  Dosing Weight: 79.4 kg   [unfilled]    Lab Results   Component Value Date    PATIENTTEMP 37.0 04/29/2024    PATIENTTEMP 37.0 04/28/2024    PATIENTTEMP 37.0 04/28/2024        Assessment/Plan    Within goal AUC range. Continue current vancomycin regimen. Repeat one time dose.  750mg x1   The next level will be obtained on 5/2 at 5a. May be obtained sooner if clinically indicated.   Will continue to monitor renal function daily while on vancomycin and order serum creatinine at least every 48 hours if not already ordered.  Follow for continued vancomycin needs, clinical response, and signs/symptoms of toxicity.       Venkat Moura, PharmD           "

## 2024-05-01 NOTE — SIGNIFICANT EVENT
Neurology update:    Asked by primary team to re-evaluate the patient's Neurological status    On examination:  MENTAL STATUS:  - He is alert and oriented to person only with prompting  - He did not produce any discernable speech, occasionally grunting  - He was able to make a thumbs up and move extremities when prompted.  - Spontaneously moves all 4 extremities     CN:   CANDACE, gaze midline with intact EOMs, facial strength symmetrical   Eye closure weak 3/5, mouth closure 4/5  Tongue protrudes midline but is weak when pushing towards the sides with minimal resistance    Motor:   Bulk: Diffuse wasting, no observed fasciculations  Tone: Normal  Tremor: Absent     Strength:  Neck Flex 3  Neck Ext  4-      R L  Deltoid  4- 4-  Biceps  4 4  Triceps  4 4    4 4    Iliopsoas  3- 3-  Quadriceps 4 4  Hamstrings 4- 4-  TA  4- 4-  EHL  4 4    Impression:  Significant bulbar weakness and diffuse weakness suggests that this could be due to a myopathic process vs motor neuron disease. However given that the dysphagia is reportedly new it would be pertinent to rule out a brainstem lesion causing dysphagia as well    Recommendations:  - MRI brain w/o contrast  - Will try to arrange EMG/NCS if MRI unrevealing    Jon Tobar MD  [facility] Inpatient Neurology Team  General Neurology Pager [phone]

## 2024-05-01 NOTE — PROGRESS NOTES
Subjective   Brian Rodriguez is a 69 y.o. male transferred to the MICU for multiple rapids called due to hypotension and increased oxygen requirements. Today, patient is not at baseline orientation and has deteriorated since he was in the MICU, but has mildly improved compared to yesterday. Can follow some commands (effort unclear) and attempts to speak, but speech is uninteligible. Favors moving R side over L side, but can move all four extremities.    Objective   Physical Exam  Constitutional:       Appearance: He is cachectic. He is ill-appearing. He is not toxic-appearing.   HENT:      Head: Normocephalic and atraumatic.      Nose: Nose normal.      Mouth/Throat:      Mouth: Mucous membranes are dry.   Cardiovascular:      Rate and Rhythm: Regular rhythm. Tachycardia present.      Heart sounds: Normal heart sounds.   Pulmonary:      Effort: Respiratory distress present.      Breath sounds: Rhonchi present. No wheezing or rales.   Abdominal:      General: Abdomen is flat. Bowel sounds are normal.      Palpations: Abdomen is soft.   Musculoskeletal:      Right lower leg: No edema.      Left lower leg: No edema.   Skin:     General: Skin is warm and dry.      Capillary Refill: Capillary refill takes 2 to 3 seconds.      Findings: Lesion present.      Comments: Subcutaneous nodules noted on R zygomatic bone. No exudate, no erythema.  L buttock wound with new purulent drainage   Neurological:      Mental Status: He is alert and easily aroused. He is disoriented.      Cranial Nerves: Cranial nerve deficit present.      Motor: Weakness present.      Comments: Marginal improvement in encephalopathy compared to yesterday  Attempts to speak  Dysarthric  Opened eyes to command  L sided facial droop likely 2/2 NG tube     Temp:  [36.3 °C (97.3 °F)-38.3 °C (100.9 °F)] 38.3 °C (100.9 °F)  Heart Rate:  [] 106  Resp:  [18-35] 22  BP: ()/(45-72) 121/69  FiO2 (%):  [35 %-50 %] 35 %    Intake/Output Summary (Last 24  hours) at 5/1/2024 0825  Last data filed at 5/1/2024 0700  Gross per 24 hour   Intake 2007.5 ml   Output 3525 ml   Net -1517.5 ml     Vitals:    05/01/24 0400   Weight: 63.7 kg (140 lb 6.9 oz)     FiO2 (%):  [35 %-50 %] 35 %     I/Os    Intake/Output Summary (Last 24 hours) at 5/1/2024 0825  Last data filed at 5/1/2024 0700  Gross per 24 hour   Intake 2007.5 ml   Output 3525 ml   Net -1517.5 ml     Labs:   Results for orders placed or performed during the hospital encounter of 04/17/24 (from the past 24 hour(s))   Electrocardiogram, 12-lead PRN ACS symptoms   Result Value Ref Range    Ventricular Rate 99 BPM    Atrial Rate 93 BPM    QRS Duration 62 ms    QT Interval 392 ms    QTC Calculation(Bazett) 503 ms    R Axis 41 degrees    T Axis 41 degrees    QRS Count 16 beats    Q Onset 223 ms    P Onset 159 ms    P Offset 179 ms    T Offset 419 ms    QTC Fredericia 463 ms   Respiratory Culture/Smear    Specimen: SPUTUM; Fluid   Result Value Ref Range    Respiratory Culture/Smear (A)      Culture not performed. See Gram stain findings. Recollect if clinically indicated.    Gram Stain (A)      Gram stain indicates specimen contains significant salivary contamination.    Gram Stain (A)      A specimen of better quality should be submitted if clinically indicated.   POCT GLUCOSE   Result Value Ref Range    POCT Glucose 232 (H) 74 - 99 mg/dL   Procalcitonin   Result Value Ref Range    Procalcitonin 2.95 (H) <=0.07 ng/mL   Blood Culture    Specimen: Peripheral Venipuncture; Blood culture   Result Value Ref Range    Blood Culture Loaded on Instrument - Culture in progress    Blood Culture    Specimen: Peripheral Venipuncture; Blood culture   Result Value Ref Range    Blood Culture Loaded on Instrument - Culture in progress    Cortisol   Result Value Ref Range    Cortisol 18.0 2.5 - 20.0 ug/dL   POCT GLUCOSE   Result Value Ref Range    POCT Glucose 285 (H) 74 - 99 mg/dL   Renal function panel   Result Value Ref Range    Glucose  291 (H) 74 - 99 mg/dL    Sodium 140 136 - 145 mmol/L    Potassium 4.9 3.5 - 5.3 mmol/L    Chloride 107 98 - 107 mmol/L    Bicarbonate 22 21 - 32 mmol/L    Anion Gap 16 10 - 20 mmol/L    Urea Nitrogen 36 (H) 6 - 23 mg/dL    Creatinine 2.60 (H) 0.50 - 1.30 mg/dL    eGFR 26 (L) >60 mL/min/1.73m*2    Calcium 7.5 (L) 8.6 - 10.6 mg/dL    Phosphorus 3.5 2.5 - 4.9 mg/dL    Albumin 1.5 (L) 3.4 - 5.0 g/dL   Cortisol   Result Value Ref Range    Cortisol 25.5 (H) 2.5 - 20.0 ug/dL   Cortisol   Result Value Ref Range    Cortisol 28.7 (H) 2.5 - 20.0 ug/dL   POCT GLUCOSE   Result Value Ref Range    POCT Glucose 275 (H) 74 - 99 mg/dL   Vancomycin   Result Value Ref Range    Vancomycin 17.1 5.0 - 20.0 ug/mL   POCT GLUCOSE   Result Value Ref Range    POCT Glucose 315 (H) 74 - 99 mg/dL   CBC and Auto Differential   Result Value Ref Range    WBC 12.7 (H) 4.4 - 11.3 x10*3/uL    nRBC 0.0 0.0 - 0.0 /100 WBCs    RBC 2.89 (L) 4.50 - 5.90 x10*6/uL    Hemoglobin 6.9 (L) 13.5 - 17.5 g/dL    Hematocrit 21.6 (L) 41.0 - 52.0 %    MCV 75 (L) 80 - 100 fL    MCH 23.9 (L) 26.0 - 34.0 pg    MCHC 31.9 (L) 32.0 - 36.0 g/dL    RDW 21.1 (H) 11.5 - 14.5 %    Platelets 272 150 - 450 x10*3/uL    Immature Granulocytes %, Automated 1.4 (H) 0.0 - 0.9 %    Immature Granulocytes Absolute, Automated 0.18 0.00 - 0.50 x10*3/uL   Magnesium   Result Value Ref Range    Magnesium 2.47 (H) 1.60 - 2.40 mg/dL   Renal function panel   Result Value Ref Range    Glucose 177 (H) 74 - 99 mg/dL    Sodium 145 136 - 145 mmol/L    Potassium 3.9 3.5 - 5.3 mmol/L    Chloride 106 98 - 107 mmol/L    Bicarbonate 27 21 - 32 mmol/L    Anion Gap 16 10 - 20 mmol/L    Urea Nitrogen 37 (H) 6 - 23 mg/dL    Creatinine 2.82 (H) 0.50 - 1.30 mg/dL    eGFR 23 (L) >60 mL/min/1.73m*2    Calcium 7.7 (L) 8.6 - 10.6 mg/dL    Phosphorus 3.5 2.5 - 4.9 mg/dL    Albumin 1.5 (L) 3.4 - 5.0 g/dL   Manual Differential   Result Value Ref Range    Neutrophils %, Manual 81.8 40.0 - 80.0 %    Lymphocytes %, Manual  10.9 13.0 - 44.0 %    Monocytes %, Manual 0.9 2.0 - 10.0 %    Eosinophils %, Manual 6.4 0.0 - 6.0 %    Basophils %, Manual 0.0 0.0 - 2.0 %    Seg Neutrophils Absolute, Manual 10.39 (H) 1.60 - 5.00 x10*3/uL    Lymphocytes Absolute, Manual 1.38 0.80 - 3.00 x10*3/uL    Monocytes Absolute, Manual 0.11 0.05 - 0.80 x10*3/uL    Eosinophils Absolute, Manual 0.81 (H) 0.00 - 0.40 x10*3/uL    Basophils Absolute, Manual 0.00 0.00 - 0.10 x10*3/uL    Total Cells Counted 110     RBC Morphology See Below     Target Cells Few    POCT GLUCOSE   Result Value Ref Range    POCT Glucose 181 (H) 74 - 99 mg/dL   Prepare RBC: 1 Units   Result Value Ref Range    PRODUCT CODE [id number]     Unit Number [id number]     Unit ABO O     Unit RH POS     XM INTEP COMP     Dispense Status XM     Blood Expiration Date May 14, 2024 23:59 EDT     PRODUCT BLOOD TYPE 5100     UNIT VOLUME 286    POCT GLUCOSE   Result Value Ref Range    POCT Glucose 112 (H) 74 - 99 mg/dL      Micro/ID:   Lab Results   Component Value Date    URINECULTURE Normal genitourinary elda 04/26/2024    BLOODCULT Loaded on Instrument - Culture in progress 04/30/2024    BLOODCULT Loaded on Instrument - Culture in progress 04/30/2024     Images:  XR chest 1 view  Narrative: Interpreted By:  Eliecer Kaiser and Nakamoto Kent   STUDY:  XR CHEST 1 VIEW;  4/30/2024 10:58 am      INDICATION:  Signs/Symptoms:Increased oxygen need.      COMPARISON:  Chest radiograph 04/29/2024 CT abdomen and pelvis 04/29/2024      ACCESSION NUMBER(S):  [id number]      ORDERING CLINICIAN:  NATALIA NEVILLE      FINDINGS:  AP radiograph of the chest was provided.      Enteric tube seen coursing below the level diaphragm with tip out of  the field of view.      CARDIOMEDIASTINAL SILHOUETTE:  Cardiomediastinal silhouette is stable in size and configuration.  Aortic knob calcifications are noted. Partial obscuration of the  bilateral cardiac borders.      LUNGS:  No pneumothorax. Similar diffuse patchy  bilateral airspace opacities  compared to prior exam. Overall, there has been a general trend in  worsening of the bilateral airspace opacities when compared to chest  radiograph 04/26/2024 and 04/21/2024. Trace blunting of the left  costophrenic angle.      ABDOMEN:  No remarkable upper abdominal findings.      BONES:  No acute osseous changes.      Impression: 1. Similar findings of multifocal pneumonia compared to prior exam.  Overall, there has been a general worsening of the bilateral airspace  opacities when compared to earlier prior exams 04/26/2024 and  04/21/2024.      I personally reviewed the images/study and I agree with the findings  as stated by Vernon Sanchez MD. This study was interpreted at  [facility], [place], [place].      MACRO:  None      Signed by: Eliecer Kaiser 4/30/2024 12:54 PM  Dictation workstation:   [id number]    Meds:  Scheduled medications  chlorhexidine, , Topical, Daily  heparin (porcine), 5,000 Units, subcutaneous, q8h RANDAL  insulin glargine, 21 Units, subcutaneous, Nightly  insulin lispro, 0-10 Units, subcutaneous, q4h  meropenem, 1,000 mg, intravenous, q12h  multivitamin with minerals, 1 tablet, oral, Daily  pantoprazole, 40 mg, intravenous, Daily  sodium bicarbonate, 650 mg, oral, BID  sodium chloride, 1 spray, Each Nostril, 4x daily  thiamine, 500 mg, intravenous, TID    Continuous medications     PRN medications  PRN medications: acetaminophen **OR** acetaminophen **OR** acetaminophen, albuterol, dextrose, dextrose, dextrose, dextrose, glucagon, glucagon, glucagon, glucagon, guaiFENesin, ipratropium-albuteroL, melatonin, oxygen, polyethylene glycol, sennosides-docusate sodium, traMADol, vancomycin     Assessment   Brian Rodriguez is a 69 y.o. male admitted to the MICU for multiple rapids, hypotension, and acute hypoxic respiratory failure requiring airvo support.  PMHx CKD, DM2, HTN, sacral wound, bipolar disorder who was originally admitted  from his SNF 2/2 encephalopathy. On the floor, the patient had several aspiration events. His pressures responded to fluids and IVC was collapsible. Presentation most consistent with aspiration pna 2/2 dysphagia and shock of unknown etiology which is improving.     Neuro  # Acute Encephalopathy  # Dementia  # Hypoactive Delirium  :: per family baseline AOx1-2, able to converse and follow commands  :: Hyponatremia +/- delirium 2/2 infection and prolonged hospitalization  - Neurology consulted, appreciate recommendations: per neurology would like MRI brain w/o contrast for Wallerian degeneration  -- Limited d/t aspiration when lying flat  -- FU neurology once pt can tolerate MRI  - Optimize for MRI  - Request neuro evaluation based on persistent dysphagia and facial droop     Pulm  # Acute Hypoxic Respiratory Failure  # Multifocal Pneumonia  # Aspiration Pneumonia  :: Imaging suggestive of bacterial pna in the setting of recent covid s/p remdesivir  :: Septic workup negative  - s/p azithromycin 4/18-4/20, Remdesevir 4/18-4/22  - meropenem 4/26  - sent fungal studies (fungitel 231)  - Sputum culture contaminated, attempt again with TF off  - IV thiamine 500mg TID for 7 days  - aggressive respiratory hygiene with IPV and chest percussion  - duonebs q6hr PRN     # Pleural effusions  :: 2/2 aspiration vs heart failure  - CT may have overestimated  - No window for bedside thoracentesis  - limited echo performed 60% EF with suboptimal image quality d/t patient encephalopathy     Cardiovascular  # Hypotension - improving  :: ?septic shock vs dehydration, fungemia unlikely  - AM cortisol 12.6 (nl); 1500: 18.0; 1800: 25.5; 1835: 28.7  - Procal 2.95  - Fungitel 231  -      # HTN  - Hold home amlodipine     # HLD  - Hold home atorvastatin     GI  # Nutrition  :: C/f persistent aspiration despite NGT  - NPO concern for aspiration pneumonia   - Hold tube feeds  - Stop free water flushes  - Diet recs in     # Dysphagia  ::  Suction c/f persistent aspiration despite NG tube  - ENT previously consulted, no anatomic cause  - Neurology consulted as above  - Stop TF and free water flushes pending MRI  - NG tube     Renal/  #TERRI on CKD Stage III - worsening from yesterday  :: most likely prerenal azotemia 2/2 hypovolemia/hemodynamic instability  :: baseline Cr 1.7  - Q12hr RFP  - Stop free water flushes  - Diurese  - Consider removing bunch 5/2     # Hypernatremia - improving     Endo  # DM2  # Hyperglycemia - resolved  :: Was on D5W for Na and steroids, both now stopped  - Increase glargine to 21 units for elevated sugars ON     ID  # Acute Hypoxic Respiratory Failure  # Multifocal Pneumonia  # Aspiration Pneumonia  # Fever  :: Bacterial pna 2/2 aspiration in the setting of dysphagia and recent covid pna  :: Fungemia unlikely considering pt's improvement without treatment  - s/p azithromycin 4/18-4/20, Remdesevir 4/18-4/22  - DC vancomycin 5/1  - Meropenem started 4/26  - Fungal chem (fungitel 231), blood (negative) and respiratory (contaminated)  - Candida from previous respiratory culture, resent urine culture as micro lab did not have definitive information     # Multiple Sacral Wounds  :: R buttock, new exudate noted  - Wound cultures sent  - Continue meropenem, consider discontinue 5/2     # Hidradenitis Suppurativa  :: derm has previously recommended humira/minocycline 100 BID but currently w/o lab confirmation, hold off iso septic shock  - will need outpatient dermatology follow up w/ doxycycline 100 BID outpatient   - wound care consulted, appreciate recommendations     Heme  # Acute on Chronic Anemia  :: Most likely iso iron deficiency anemia and anemia of chronic disease: iron low, TIBC 95, folate WNL, B12 supratherapeutic  - Hold off on iron supplementation iso active infection  - Ferritin 539/TIBC 94/iron 16 => fe deficient + sequestration  - Reticulocyte count: 1%, immature 19% suggesting adequate production  - Peripheral  smear pending     MSK  # RUE swollen - improved  :: Suspect local fluid overload  - US DVT all four extremities     F Free water 300cc q6hr  E Replete prn  N NPO, hold tf  G None  DVT: subcutaneous heparin  ABX:  Vanc: 4/17-5/1  Zosyn: 4/17-4/26  Skylar: 4/26-  Azithro: 4/18-4/20  Lines: PIV x4, bunch  Drips: None  Pressors: None  Code status: DNR  NoK:  Primary Emergency Contact: DEEPAK RANGEL, Home Phone: [phone]    Dispo: 69 y.o. male admitted to the MICU for multiple rapids, hypotension, and acute hypoxic respiratory failure requiring airvo support.  PMHx CKD, DM2, HTN, sacral wound, bipolar disorder who was originally admitted from his SNF 2/2 AM. Floors once O2 requirement decreases and pressures stabilize.

## 2024-05-01 NOTE — PROGRESS NOTES
Vancomycin Dosing by Pharmacy- Cessation of Therapy    Consult to pharmacy for vancomycin dosing has been discontinued by the prescriber, pharmacy will sign off at this time.    Please call pharmacy if there are further questions or re-enter a consult if vancomycin is resumed.     Claudine Enriquez, AdriánD

## 2024-05-02 ENCOUNTER — APPOINTMENT (OUTPATIENT)
Dept: CARDIOLOGY | Facility: HOSPITAL | Age: 69
DRG: 871 | End: 2024-05-02
Payer: MEDICARE

## 2024-05-02 ENCOUNTER — APPOINTMENT (OUTPATIENT)
Dept: NEUROLOGY | Facility: HOSPITAL | Age: 69
DRG: 871 | End: 2024-05-02
Payer: MEDICARE

## 2024-05-02 LAB
ABO GROUP (TYPE) IN BLOOD: NORMAL
ACTH PLAS-MCNC: 9.5 PG/ML (ref 7.2–63.3)
ALBUMIN SERPL BCP-MCNC: 1.5 G/DL (ref 3.4–5)
ALBUMIN SERPL BCP-MCNC: 1.5 G/DL (ref 3.4–5)
ANION GAP SERPL CALC-SCNC: 12 MMOL/L (ref 10–20)
ANION GAP SERPL CALC-SCNC: 13 MMOL/L (ref 10–20)
ANTIBODY SCREEN: NORMAL
BASOPHILS # BLD AUTO: 0.02 X10*3/UL (ref 0–0.1)
BASOPHILS # BLD MANUAL: 0 X10*3/UL (ref 0–0.1)
BASOPHILS NFR BLD AUTO: 0.2 %
BASOPHILS NFR BLD MANUAL: 0 %
BLOOD EXPIRATION DATE: NORMAL
BUN SERPL-MCNC: 32 MG/DL (ref 6–23)
BUN SERPL-MCNC: 37 MG/DL (ref 6–23)
CALCIUM SERPL-MCNC: 7.4 MG/DL (ref 8.6–10.6)
CALCIUM SERPL-MCNC: 7.4 MG/DL (ref 8.6–10.6)
CHLORIDE SERPL-SCNC: 101 MMOL/L (ref 98–107)
CHLORIDE SERPL-SCNC: 103 MMOL/L (ref 98–107)
CO2 SERPL-SCNC: 25 MMOL/L (ref 21–32)
CO2 SERPL-SCNC: 28 MMOL/L (ref 21–32)
CREAT SERPL-MCNC: 2.5 MG/DL (ref 0.5–1.3)
CREAT SERPL-MCNC: 2.78 MG/DL (ref 0.5–1.3)
DISPENSE STATUS: NORMAL
EGFRCR SERPLBLD CKD-EPI 2021: 24 ML/MIN/1.73M*2
EGFRCR SERPLBLD CKD-EPI 2021: 27 ML/MIN/1.73M*2
EOSINOPHIL # BLD AUTO: 1.47 X10*3/UL (ref 0–0.4)
EOSINOPHIL # BLD MANUAL: 1.01 X10*3/UL (ref 0–0.4)
EOSINOPHIL NFR BLD AUTO: 15.2 %
EOSINOPHIL NFR BLD MANUAL: 10.4 %
ERYTHROCYTE [DISTWIDTH] IN BLOOD BY AUTOMATED COUNT: 20.6 % (ref 11.5–14.5)
ERYTHROCYTE [DISTWIDTH] IN BLOOD BY AUTOMATED COUNT: 20.7 % (ref 11.5–14.5)
GLUCOSE BLD MANUAL STRIP-MCNC: 104 MG/DL (ref 74–99)
GLUCOSE BLD MANUAL STRIP-MCNC: 121 MG/DL (ref 74–99)
GLUCOSE BLD MANUAL STRIP-MCNC: 146 MG/DL (ref 74–99)
GLUCOSE BLD MANUAL STRIP-MCNC: 69 MG/DL (ref 74–99)
GLUCOSE BLD MANUAL STRIP-MCNC: 71 MG/DL (ref 74–99)
GLUCOSE BLD MANUAL STRIP-MCNC: 89 MG/DL (ref 74–99)
GLUCOSE BLD MANUAL STRIP-MCNC: 91 MG/DL (ref 74–99)
GLUCOSE BLD MANUAL STRIP-MCNC: 99 MG/DL (ref 74–99)
GLUCOSE SERPL-MCNC: 58 MG/DL (ref 74–99)
GLUCOSE SERPL-MCNC: 66 MG/DL (ref 74–99)
HCT VFR BLD AUTO: 19.7 % (ref 41–52)
HCT VFR BLD AUTO: 22.1 % (ref 41–52)
HGB BLD-MCNC: 6.5 G/DL (ref 13.5–17.5)
HGB BLD-MCNC: 7.3 G/DL (ref 13.5–17.5)
HYPOCHROMIA BLD QL SMEAR: ABNORMAL
HYPOCHROMIA BLD QL SMEAR: NORMAL
IMM GRANULOCYTES # BLD AUTO: 0.04 X10*3/UL (ref 0–0.5)
IMM GRANULOCYTES # BLD AUTO: 0.04 X10*3/UL (ref 0–0.5)
IMM GRANULOCYTES NFR BLD AUTO: 0.4 % (ref 0–0.9)
IMM GRANULOCYTES NFR BLD AUTO: 0.4 % (ref 0–0.9)
LYMPHOCYTES # BLD AUTO: 1.84 X10*3/UL (ref 0.8–3)
LYMPHOCYTES # BLD MANUAL: 0.68 X10*3/UL (ref 0.8–3)
LYMPHOCYTES NFR BLD AUTO: 19 %
LYMPHOCYTES NFR BLD MANUAL: 7 %
MAGNESIUM SERPL-MCNC: 2.27 MG/DL (ref 1.6–2.4)
MCH RBC QN AUTO: 24.3 PG (ref 26–34)
MCH RBC QN AUTO: 25.5 PG (ref 26–34)
MCHC RBC AUTO-ENTMCNC: 33 G/DL (ref 32–36)
MCHC RBC AUTO-ENTMCNC: 33 G/DL (ref 32–36)
MCV RBC AUTO: 74 FL (ref 80–100)
MCV RBC AUTO: 77 FL (ref 80–100)
MONOCYTES # BLD AUTO: 0.3 X10*3/UL (ref 0.05–0.8)
MONOCYTES # BLD MANUAL: 0.09 X10*3/UL (ref 0.05–0.8)
MONOCYTES NFR BLD AUTO: 3.1 %
MONOCYTES NFR BLD MANUAL: 0.9 %
NEUTROPHILS # BLD AUTO: 6 X10*3/UL (ref 1.6–5.5)
NEUTROPHILS # BLD MANUAL: 7.92 X10*3/UL (ref 1.6–5.5)
NEUTROPHILS NFR BLD AUTO: 62.1 %
NEUTS BAND # BLD MANUAL: 0.16 X10*3/UL (ref 0–0.5)
NEUTS BAND NFR BLD MANUAL: 1.7 %
NEUTS SEG # BLD MANUAL: 7.76 X10*3/UL (ref 1.6–5)
NEUTS SEG NFR BLD MANUAL: 80 %
NRBC BLD-RTO: 0 /100 WBCS (ref 0–0)
NRBC BLD-RTO: 0 /100 WBCS (ref 0–0)
PHOSPHATE SERPL-MCNC: 3.9 MG/DL (ref 2.5–4.9)
PHOSPHATE SERPL-MCNC: 4.3 MG/DL (ref 2.5–4.9)
PLATELET # BLD AUTO: 312 X10*3/UL (ref 150–450)
PLATELET # BLD AUTO: 319 X10*3/UL (ref 150–450)
POTASSIUM SERPL-SCNC: 3.4 MMOL/L (ref 3.5–5.3)
POTASSIUM SERPL-SCNC: 3.7 MMOL/L (ref 3.5–5.3)
PRODUCT BLOOD TYPE: 5100
PRODUCT CODE: NORMAL
RBC # BLD AUTO: 2.68 X10*6/UL (ref 4.5–5.9)
RBC # BLD AUTO: 2.86 X10*6/UL (ref 4.5–5.9)
RBC MORPH BLD: ABNORMAL
RBC MORPH BLD: NORMAL
RH FACTOR (ANTIGEN D): NORMAL
SODIUM SERPL-SCNC: 135 MMOL/L (ref 136–145)
SODIUM SERPL-SCNC: 140 MMOL/L (ref 136–145)
TARGETS BLD QL SMEAR: NORMAL
TOTAL CELLS COUNTED BLD: 115
TREPONEMA PALLIDUM IGG+IGM AB [PRESENCE] IN SERUM OR PLASMA BY IMMUNOASSAY: REACTIVE
UNIT ABO: NORMAL
UNIT NUMBER: NORMAL
UNIT RH: NORMAL
UNIT VOLUME: 286
WBC # BLD AUTO: 9.7 X10*3/UL (ref 4.4–11.3)
WBC # BLD AUTO: 9.7 X10*3/UL (ref 4.4–11.3)
XM INTEP: NORMAL

## 2024-05-02 PROCEDURE — C9113 INJ PANTOPRAZOLE SODIUM, VIA: HCPCS

## 2024-05-02 PROCEDURE — 2500000004 HC RX 250 GENERAL PHARMACY W/ HCPCS (ALT 636 FOR OP/ED)

## 2024-05-02 PROCEDURE — 85027 COMPLETE CBC AUTOMATED: CPT | Mod: 91

## 2024-05-02 PROCEDURE — 31720 CLEARANCE OF AIRWAYS: CPT

## 2024-05-02 PROCEDURE — 86780 TREPONEMA PALLIDUM: CPT

## 2024-05-02 PROCEDURE — 36415 COLL VENOUS BLD VENIPUNCTURE: CPT | Performed by: STUDENT IN AN ORGANIZED HEALTH CARE EDUCATION/TRAINING PROGRAM

## 2024-05-02 PROCEDURE — 94668 MNPJ CHEST WALL SBSQ: CPT

## 2024-05-02 PROCEDURE — 2500000002 HC RX 250 W HCPCS SELF ADMINISTERED DRUGS (ALT 637 FOR MEDICARE OP, ALT 636 FOR OP/ED)

## 2024-05-02 PROCEDURE — 2060000001 HC INTERMEDIATE ICU ROOM DAILY

## 2024-05-02 PROCEDURE — 85007 BL SMEAR W/DIFF WBC COUNT: CPT

## 2024-05-02 PROCEDURE — 86318 IA INFECTIOUS AGENT ANTIBODY: CPT

## 2024-05-02 PROCEDURE — 93005 ELECTROCARDIOGRAM TRACING: CPT

## 2024-05-02 PROCEDURE — 82947 ASSAY GLUCOSE BLOOD QUANT: CPT

## 2024-05-02 PROCEDURE — 82947 ASSAY GLUCOSE BLOOD QUANT: CPT | Mod: 91,MUE

## 2024-05-02 PROCEDURE — 83735 ASSAY OF MAGNESIUM: CPT

## 2024-05-02 PROCEDURE — 37799 UNLISTED PX VASCULAR SURGERY: CPT

## 2024-05-02 PROCEDURE — 86901 BLOOD TYPING SEROLOGIC RH(D): CPT

## 2024-05-02 PROCEDURE — 2500000001 HC RX 250 WO HCPCS SELF ADMINISTERED DRUGS (ALT 637 FOR MEDICARE OP)

## 2024-05-02 PROCEDURE — 80069 RENAL FUNCTION PANEL: CPT | Mod: 91,MUE

## 2024-05-02 PROCEDURE — 85730 THROMBOPLASTIN TIME PARTIAL: CPT | Performed by: STUDENT IN AN ORGANIZED HEALTH CARE EDUCATION/TRAINING PROGRAM

## 2024-05-02 PROCEDURE — 99291 CRITICAL CARE FIRST HOUR: CPT

## 2024-05-02 PROCEDURE — 85025 COMPLETE CBC W/AUTO DIFF WBC: CPT

## 2024-05-02 PROCEDURE — 87529 HSV DNA AMP PROBE: CPT

## 2024-05-02 PROCEDURE — 99233 SBSQ HOSP IP/OBS HIGH 50: CPT

## 2024-05-02 PROCEDURE — 2500000005 HC RX 250 GENERAL PHARMACY W/O HCPCS

## 2024-05-02 PROCEDURE — 2500000005 HC RX 250 GENERAL PHARMACY W/O HCPCS: Performed by: INTERNAL MEDICINE

## 2024-05-02 PROCEDURE — P9016 RBC LEUKOCYTES REDUCED: HCPCS

## 2024-05-02 PROCEDURE — 80069 RENAL FUNCTION PANEL: CPT

## 2024-05-02 PROCEDURE — 36430 TRANSFUSION BLD/BLD COMPNT: CPT

## 2024-05-02 PROCEDURE — 2500000004 HC RX 250 GENERAL PHARMACY W/ HCPCS (ALT 636 FOR OP/ED): Performed by: STUDENT IN AN ORGANIZED HEALTH CARE EDUCATION/TRAINING PROGRAM

## 2024-05-02 RX ORDER — POTASSIUM CHLORIDE 14.9 MG/ML
20 INJECTION INTRAVENOUS
Status: COMPLETED | OUTPATIENT
Start: 2024-05-02 | End: 2024-05-02

## 2024-05-02 RX ORDER — HEPARIN SODIUM 10000 [USP'U]/100ML
0-4500 INJECTION, SOLUTION INTRAVENOUS CONTINUOUS
Status: DISCONTINUED | OUTPATIENT
Start: 2024-05-02 | End: 2024-05-03

## 2024-05-02 RX ORDER — FUROSEMIDE 10 MG/ML
40 INJECTION INTRAMUSCULAR; INTRAVENOUS ONCE
Status: COMPLETED | OUTPATIENT
Start: 2024-05-02 | End: 2024-05-02

## 2024-05-02 RX ORDER — LORAZEPAM 2 MG/ML
0.2 INJECTION INTRAMUSCULAR ONCE
Status: DISCONTINUED | OUTPATIENT
Start: 2024-05-02 | End: 2024-05-03

## 2024-05-02 RX ORDER — DOXYCYCLINE HYCLATE 100 MG
100 TABLET ORAL EVERY 12 HOURS SCHEDULED
Status: DISCONTINUED | OUTPATIENT
Start: 2024-05-02 | End: 2024-05-07

## 2024-05-02 RX ORDER — LANOLIN ALCOHOL/MO/W.PET/CERES
100 CREAM (GRAM) TOPICAL DAILY
Status: DISCONTINUED | OUTPATIENT
Start: 2024-05-03 | End: 2024-05-07

## 2024-05-02 RX ORDER — INSULIN GLARGINE 100 [IU]/ML
10 INJECTION, SOLUTION SUBCUTANEOUS NIGHTLY
Status: DISCONTINUED | OUTPATIENT
Start: 2024-05-02 | End: 2024-05-15 | Stop reason: HOSPADM

## 2024-05-02 RX ORDER — INSULIN LISPRO 100 [IU]/ML
0-10 INJECTION, SOLUTION INTRAVENOUS; SUBCUTANEOUS EVERY 6 HOURS
Status: DISCONTINUED | OUTPATIENT
Start: 2024-05-02 | End: 2024-05-15 | Stop reason: HOSPADM

## 2024-05-02 RX ADMIN — HEPARIN SODIUM 5000 UNITS: 5000 INJECTION INTRAVENOUS; SUBCUTANEOUS at 22:12

## 2024-05-02 RX ADMIN — THIAMINE HYDROCHLORIDE 500 MG: 100 INJECTION, SOLUTION INTRAMUSCULAR; INTRAVENOUS at 14:50

## 2024-05-02 RX ADMIN — Medication 30 L/MIN: at 04:43

## 2024-05-02 RX ADMIN — ACETAMINOPHEN 650 MG: 650 SOLUTION ORAL at 04:44

## 2024-05-02 RX ADMIN — POTASSIUM CHLORIDE 20 MEQ: 14.9 INJECTION, SOLUTION INTRAVENOUS at 08:27

## 2024-05-02 RX ADMIN — SODIUM BICARBONATE 650 MG: 650 TABLET ORAL at 20:38

## 2024-05-02 RX ADMIN — DOXYCYCLINE HYCLATE 100 MG: 100 TABLET, COATED ORAL at 20:38

## 2024-05-02 RX ADMIN — SALINE NASAL SPRAY 1 SPRAY: 1.5 SOLUTION NASAL at 20:39

## 2024-05-02 RX ADMIN — INSULIN LISPRO 2 UNITS: 100 INJECTION, SOLUTION INTRAVENOUS; SUBCUTANEOUS at 00:52

## 2024-05-02 RX ADMIN — DEXTROSE MONOHYDRATE 25 G: 25 INJECTION, SOLUTION INTRAVENOUS at 03:31

## 2024-05-02 RX ADMIN — HEPARIN SODIUM 5000 UNITS: 5000 INJECTION INTRAVENOUS; SUBCUTANEOUS at 15:12

## 2024-05-02 RX ADMIN — HEPARIN SODIUM 5000 UNITS: 5000 INJECTION INTRAVENOUS; SUBCUTANEOUS at 05:56

## 2024-05-02 RX ADMIN — FUROSEMIDE 40 MG: 10 INJECTION, SOLUTION INTRAVENOUS at 14:50

## 2024-05-02 RX ADMIN — THIAMINE HYDROCHLORIDE 500 MG: 100 INJECTION, SOLUTION INTRAMUSCULAR; INTRAVENOUS at 20:38

## 2024-05-02 RX ADMIN — HEPARIN SODIUM 1200 UNITS/HR: 10000 INJECTION, SOLUTION INTRAVENOUS at 23:35

## 2024-05-02 RX ADMIN — THIAMINE HYDROCHLORIDE 500 MG: 100 INJECTION, SOLUTION INTRAMUSCULAR; INTRAVENOUS at 08:27

## 2024-05-02 RX ADMIN — Medication 1 TABLET: at 08:27

## 2024-05-02 RX ADMIN — PANTOPRAZOLE SODIUM 40 MG: 40 INJECTION, POWDER, FOR SOLUTION INTRAVENOUS at 08:27

## 2024-05-02 RX ADMIN — SODIUM BICARBONATE 650 MG: 650 TABLET ORAL at 08:27

## 2024-05-02 RX ADMIN — MEROPENEM 1000 MG: 1 INJECTION, POWDER, FOR SOLUTION INTRAVENOUS at 00:52

## 2024-05-02 RX ADMIN — DOXYCYCLINE HYCLATE 100 MG: 100 TABLET, COATED ORAL at 12:26

## 2024-05-02 ASSESSMENT — PAIN - FUNCTIONAL ASSESSMENT
PAIN_FUNCTIONAL_ASSESSMENT: CPOT (CRITICAL CARE PAIN OBSERVATION TOOL)
PAIN_FUNCTIONAL_ASSESSMENT: CPOT (CRITICAL CARE PAIN OBSERVATION TOOL)
PAIN_FUNCTIONAL_ASSESSMENT: 0-10
PAIN_FUNCTIONAL_ASSESSMENT: 0-10
PAIN_FUNCTIONAL_ASSESSMENT: CPOT (CRITICAL CARE PAIN OBSERVATION TOOL)
PAIN_FUNCTIONAL_ASSESSMENT: 0-10
PAIN_FUNCTIONAL_ASSESSMENT: 0-10

## 2024-05-02 ASSESSMENT — PAIN SCALES - PAIN ASSESSMENT IN ADVANCED DEMENTIA (PAINAD)
BREATHING: NORMAL
FACIALEXPRESSION: SMILING OR INEXPRESSIVE
BODYLANGUAGE: RELAXED
CONSOLABILITY: NO NEED TO CONSOLE
TOTALSCORE: 0

## 2024-05-02 ASSESSMENT — PAIN SCALES - GENERAL
PAINLEVEL_OUTOF10: 0 - NO PAIN

## 2024-05-02 NOTE — PROGRESS NOTES
"Brian Rodriguez is a 69 y.o. male on day 15 of admission presenting with Pneumonia due to infectious organism, unspecified laterality, unspecified part of lung.      Subjective   Patient did not express any discomfort, he did not have any questions for the neurology team as well.        Objective     Last Recorded Vitals  Blood pressure 110/72, pulse 76, temperature 36.1 °C (97 °F), temperature source Temporal, resp. rate 19, height 1.778 m (5' 10\"), weight 64.6 kg (142 lb 6.7 oz), SpO2 97%.    Physical Exam  Neurological Exam    Awake, alert, and oriented to place (hospital) and self, but not to time.   He is cachectic, has diffuse muscle atrophy, most pronounced in first dorsal interosseous.   Full EOM   Facial strength symmetrical   Tongue protrudes midline, no atrophy noted or fasciculations  Moves all 4 extremities against gravity (for detailed exam, please refer to neurology note from 5/1    Reflexes:   R   L    1   1  biceps   0   0  BR   0   0  knee   0   0  patellar     Planter response:   R         L  Down  Down     Scheduled medications  chlorhexidine, , Topical, Daily  doxycylcine, 100 mg, oral, q12h RANDAL  heparin (porcine), 5,000 Units, subcutaneous, q8h RANDAL  insulin glargine, 10 Units, subcutaneous, Nightly  insulin lispro, 0-10 Units, subcutaneous, q6h  LORazepam, 0.2 mg, intravenous, Once  multivitamin with minerals, 1 tablet, oral, Daily  pantoprazole, 40 mg, intravenous, Daily  sodium bicarbonate, 650 mg, oral, BID  sodium chloride, 1 spray, Each Nostril, 4x daily  [START ON 5/3/2024] thiamine, 100 mg, oral, Daily  thiamine, 500 mg, intravenous, TID      Continuous medications     PRN medications  PRN medications: acetaminophen **OR** acetaminophen **OR** acetaminophen, albuterol, dextrose, dextrose, dextrose, dextrose, glucagon, glucagon, glucagon, glucagon, guaiFENesin, ipratropium-albuteroL, melatonin, oxygen, polyethylene glycol, sennosides-docusate sodium, traMADol      Relevant " Results    Results from last 72 hours   Lab Units 05/02/24  0748 05/02/24  0324 05/01/24  0741 05/01/24  0315   WBC AUTO x10*3/uL 9.7 9.7  --  12.7*   HEMOGLOBIN g/dL 7.3* 6.5* 7.1* 6.9*   HEMATOCRIT % 22.1* 19.7* 20.8* 21.6*   PLATELETS AUTO x10*3/uL 319 312  --  272        Results from last 72 hours   Lab Units 05/02/24  1501 05/02/24  0324 05/01/24  1758 05/01/24  0315 04/30/24  1625 04/30/24  0348   SODIUM mmol/L 135* 140 139 145   < > 145   POTASSIUM mmol/L 3.7 3.4* 3.8 3.9   < > 3.6   CHLORIDE mmol/L 101 103 101 106   < > 112*   CO2 mmol/L 25 28 28 27   < > 25   BUN mg/dL 32* 37* 39* 37*   < > 33*   CREATININE mg/dL 2.50* 2.78* 2.84* 2.82*   < > 2.22*   MAGNESIUM mg/dL  --  2.27  --  2.47*  --  1.92   PHOSPHORUS mg/dL 3.9 4.3 4.7 3.5   < > 3.1    < > = values in this interval not displayed.            Assessment/Plan     Brian Rodriguez is a 69 y.o. male  with a T2DM, HTN, CKD (b/l 1.7-2.2?), HLD, chronic sacral wound due to hydradenitis, bipolar, dementia presenting with altered mental status with concern for infection. General neurology has been consulted for dysphagia.     Significant bulbar weakness and diffuse weakness suggests that this could be due to a myopathic process vs motor neuron disease. However given that the dysphagia is reportedly new it would be pertinent to rule out a brainstem lesion causing dysphagia as well     EMG/NCS was performed today, preliminary concerning for myopathy     B12 1800   B1: pending   TSH: 4.37 (elevated)     Recommendations:  - MRI brain w/o contrast  - Please send for CK, aldolase  - Repeat TSH and T4   - Send for MMA   - Please get a MRI of the left humerus (to evaluate for myositis)     Please page with any further questions or concerns.   General neurology team pager: [phone]     Dori Thomason MD  Neurology Resident PGY3

## 2024-05-02 NOTE — CARE PLAN
Problem: Skin  Goal: Decreased wound size/increased tissue granulation at next dressing change  Outcome: Progressing  Flowsheets (Taken 5/2/2024 1940)  Decreased wound size/increased tissue granulation at next dressing change:   Promote sleep for wound healing   Protective dressings over bony prominences  Goal: Participates in plan/prevention/treatment measures  Outcome: Progressing  Flowsheets (Taken 5/2/2024 1940)  Participates in plan/prevention/treatment measures: Elevate heels  Goal: Prevent/manage excess moisture  Outcome: Progressing  Goal: Prevent/minimize sheer/friction injuries  Outcome: Progressing  Goal: Promote/optimize nutrition  Outcome: Progressing  Goal: Promote skin healing  Outcome: Progressing   The patient's goals for the shift include Pt will be free from falls  use call light  bed alarm remians on    The clinical goals for the shift include patient will maintain pulse ox >90%

## 2024-05-02 NOTE — PROGRESS NOTES
SOCIAL WORK NOTE   SW met with team for updates. EMG planned to evaluate neurological concerns. Patient has been unable to tolerate positioning for MRI. Patient is currently recommended for moderate intensity therapy.  Plan to return to [facility]

## 2024-05-02 NOTE — PROGRESS NOTES
"  Internal Medicine Progress Note   [place]  May 2, 2024    Patient: Brian Rodriguez    Medical Record: [id number]    Brian Rodriguez is a 69 y.o. male  transferred to the MICU for multiple rapids called due to hypotension and increased oxygen requirements.     Subjective   Overnight patient received 1u pRBC for Hgb 6.5. Superficial venous thrombus in L cephalic vein.  This morning patient is more alert and conversant compared to yesterday.  Was able to state his name, location ([place]), reason for being in the hospital \"because I'm sick\".  Denies any other concerns. fevers, chills, shortness of breath, pain.  Medications   Medications:   Scheduled medications  chlorhexidine, , Topical, Daily  doxycylcine, 100 mg, oral, q12h RANDAL  heparin (porcine), 5,000 Units, subcutaneous, q8h RANDAL  insulin glargine, 10 Units, subcutaneous, Nightly  insulin lispro, 0-10 Units, subcutaneous, q6h  multivitamin with minerals, 1 tablet, oral, Daily  pantoprazole, 40 mg, intravenous, Daily  sodium bicarbonate, 650 mg, oral, BID  sodium chloride, 1 spray, Each Nostril, 4x daily  [START ON 5/3/2024] thiamine, 100 mg, oral, Daily  thiamine, 500 mg, intravenous, TID      Continuous medications     PRN medications  PRN medications: acetaminophen **OR** acetaminophen **OR** acetaminophen, albuterol, dextrose, dextrose, dextrose, dextrose, glucagon, glucagon, glucagon, glucagon, guaiFENesin, ipratropium-albuteroL, melatonin, oxygen, polyethylene glycol, sennosides-docusate sodium, traMADol     Objective   Vitals  Visit Vitals  /72   Pulse 76   Temp 36.1 °C (97 °F) (Temporal)   Resp 19        Intake/Output    Intake/Output Summary (Last 24 hours) at 5/2/2024 1323  Last data filed at 5/2/2024 1200  Gross per 24 hour   Intake 2070 ml   Output 2490 ml   Net -420 ml       Physical Exam  Constitutional:       Appearance: He is cachectic. He is ill-appearing. He is not toxic-appearing.   HENT:      Head: " Normocephalic and atraumatic.      Nose: Nose normal.      Mouth/Throat:      Mouth: Mucous membranes are dry.   Cardiovascular:      Rate and Rhythm: Regular rhythm. Tachycardia present.      Heart sounds: Normal heart sounds.   Pulmonary:      Effort: Respiratory distress present.      Breath sounds: Rhonchi present. No wheezing or rales.   Abdominal:      General: Abdomen is flat. Bowel sounds are normal.      Palpations: Abdomen is soft.   Musculoskeletal:      Right lower leg: No edema.      Left lower leg: No edema.   Skin:     General: Skin is warm and dry.      Capillary Refill: Capillary refill takes 2 to 3 seconds.      Findings: Lesion present.      Comments: Subcutaneous nodules noted on R zygomatic bone. No exudate, no erythema.  L buttock wound with new purulent drainage   Neurological:      Mental Status: He is alert and easily aroused. He is disoriented.      Cranial Nerves: Cranial nerve deficit present.      Motor: Weakness present.      Comments: Marginal improvement in encephalopathy compared to yesterday  Attempts to speak  Dysarthric  Opened eyes to command  L sided facial droop likely 2/2 NG tube     Labs  Results from last 7 days   Lab Units 05/02/24  0748 05/02/24  0324 05/01/24  0741 05/01/24  0315   WBC AUTO x10*3/uL 9.7 9.7  --  12.7*   HEMOGLOBIN g/dL 7.3* 6.5* 7.1* 6.9*   HEMATOCRIT % 22.1* 19.7* 20.8* 21.6*   PLATELETS AUTO x10*3/uL 319 312  --  272     Results from last 7 days   Lab Units 05/02/24  0324 05/01/24  1758 05/01/24  0315 04/30/24  1625 04/30/24  0348   SODIUM mmol/L 140 139 145   < > 145   POTASSIUM mmol/L 3.4* 3.8 3.9   < > 3.6   CO2 mmol/L 28 28 27   < > 25   ANION GAP mmol/L 12 14 16   < > 12   BUN mg/dL 37* 39* 37*   < > 33*   CREATININE mg/dL 2.78* 2.84* 2.82*   < > 2.22*   GLUCOSE mg/dL 58* 228* 177*   < > 144*   EGFR mL/min/1.73m*2 24* 23* 23*   < > 31*   MAGNESIUM mg/dL 2.27  --  2.47*  --  1.92   PHOSPHORUS mg/dL 4.3 4.7 3.5   < > 3.1    < > = values in this  interval not displayed.      Results from last 7 days   Lab Units 04/28/24  0439   ALT U/L 10   AST U/L 15   ALK PHOS U/L 89        Micro/ID  Susceptibility data from last 90 days.  Collected Specimen Info Organism   04/29/24 Tissue/Biopsy from Skin Lesion Candida albicans     Mixed Skin Microorganisms    04/26/24 Fluid from Tracheal Aspirate Candida albicans     Gali glabrata              Results from last 7 days   Lab Units 04/26/24  1446   FUNGITELL BETA-D GLUCAN, SERUM pg/mL 231*      Lab Results   Component Value Date    URINECULTURE Normal genitourinary elda 04/26/2024    BLOODCULT No growth at 1 day 04/30/2024    BLOODCULT No growth at 1 day 04/30/2024       Imaging  === 04/17/24 ===    XR ABDOMEN 1 VIEW    - Impression -  1. Enteric tube seen coursing below the level diaphragm with tip  projecting at the gastric pylorus/proximal duodenum.  2. Nonobstructive bowel gas pattern.  3. There is left basilar atelectasis and mild blunting of the left  costophrenic angle that could be compatible with atelectasis/pleural  effusion.    I personally reviewed the images/study and I agree with the findings  as stated by Vernon Sanchez MD. This study was interpreted at  [facility], [place], [place].    MACRO:  None    Signed by: Eliecer Kaiser 5/1/2024 11:09 AM  Dictation workstation:   [id number]   === 04/17/24 ===    CT ABDOMEN PELVIS WO IV CONTRAST    - Impression -  1.  Marked worsening of pulmonary edema, with superimposed multifocal  infection not excluded, with interval development of slightly  septated moderate right-sided pleural effusion and small left pleural  effusion.  2. New peripancreatic haziness around the pancreatic head,  indeterminate and may be due to motion artifact or peripancreatic  inflammation, correlate with concern for pancreatitis.  3. Redemonstration of extensive skin thickening of the left gluteal  soft tissues which appears slightly decreased in size and  extent when  compared to prior, as detailed above. No definitive associated focal  fluid collection however limited evaluation given the absence of IV  contrast.  4. Increased diffuse body wall edema.  5. Fluid contents seen in loops of large bowel, correlate with  diarrhea.  6. Unchanged region of focal ovoid hypoattenuation the right scrotum,  and incompletely characterized on noncontrast CT. Correlate with  recent scrotal ultrasound from 04/27/2024.  7. Additional findings as discussed above.    I personally reviewed the images/study and I agree with the findings  as stated by Richy Mcduffie MD (Radiology Resident).  This study was interpreted at [facility] [place], [place], [place].      VASC US UPPER EXTREMITY VENOUS DUPLEX LEFT 5/1/24  Left Upper Venous: No evidence of acute deep vein thrombus visualized in the left upper extremity. There is acute occlusive superficial venous thrombosis visualized in the proximal cephalic vein. Contralateral right subclavian vein doppler waveform not saved due to hardware error. Additional Findings; IV Line.    VASC US LOWER EXTREMITY VENOUS DUPLEX BILATERAL 5/1/24  Right Lower Venous: No evidence of acute deep vein thrombus visualized in the right lower extremity. Calf veins visualized in segmnets. Additional Findings; Lymph nodes Rt groin lymph node # 1 measures 7.48 mm x 31.64 mm.  Lymph node #2 3.57 mm x 26.93 mm.  Left Lower Venous: No evidence of acute deep vein thrombus visualized in the left lower extremity. Peroneal veins visualized in segmnets. Additional Findings; Lymph nodes Lt groin lymph node measures 5.62 mm x 41.53 mm.     Assessment/Plan   Brian Rodriguez is a 69 y.o. male admitted to the MICU for multiple rapids, hypotension, and acute hypoxic respiratory failure requiring airvo support.  PMHx CKD, DM2, HTN, sacral wound, bipolar disorder who was originally admitted from his SNF 2/2 encephalopathy. On the floor,  the patient had several aspiration events. His pressures responded to fluids and IVC was collapsible. Presentation most consistent with aspiration pna 2/2 dysphagia and shock of unknown etiology which is improving.      Updates 5/2:   - Overnight patient received 1u pRBC for Hgb 6.5, no obvious active sites of bleeding at this time -> incremented to Hgb 7.3 this AM   - Discontinued Meropenem  - Started Doxy 100mg for HS ppx   - Continue to optimize for MRI, tube feeds held and doing breathing trials as patient previously had desaturations to 80s; patient can receive Ativan 0.2 for anxiety for MRI   - US upper ext duplex 5/1: Superficial venous thrombus in L cephalic vein   - US lower ext duplex 5/1: Lymph nodes Lt groin lymph node measures 5.62 mm x 41.53 mm, Rt groin lymph node # 1 measures 7.48 mm x 31.64 mm. Lymph node #2 3.57 mm x 26.93 mm.  - R/o infection with STI testing and plan for POCUS of inguinal LN   - Decrease glargine overnight to 10 units in the setting of held tube feeds  - Continue Lasix 40 mg IV , had 2.9 L UOP with net negative of 872 cc 5/1      Neuro  # Acute Encephalopathy  # Dementia  # Hypoactive Delirium  :: per family baseline AOx1-2, able to converse and follow commands  :: Hyponatremia +/- delirium 2/2 infection and prolonged hospitalization  - Neurology consulted, appreciate recommendations: per neurology would like MRI brain w/o contrast for Wallerian degeneration  -- Limited d/t aspiration when lying flat  -- FU neurology once pt can tolerate MRI  - Continue to optimize for MRI, tube feeds held and doing breathing trials as patient previously had desaturations to 80s; patient can receive Ativan 0.2 for anxiety for MRI      Pulm  # Acute Hypoxic Respiratory Failure  # Multifocal Pneumonia  # Aspiration Pneumonia  :: Imaging suggestive of bacterial pna in the setting of recent covid s/p remdesivir  :: Septic workup negative  - s/p azithromycin 4/18-4/20, Remdesevir 4/18-4/22  -  meropenem 4/26  - sent fungal studies (fungitel 231)  - Sputum culture contaminated, attempt again with TF off  - IV thiamine 500mg TID for 7 days  - aggressive respiratory hygiene with IPV and chest percussion  - duonebs q6hr PRN     # Pleural effusions  :: 2/2 aspiration vs heart failure  - CT may have overestimated  - No window for bedside thoracentesis  - limited echo performed 60% EF with suboptimal image quality d/t patient encephalopathy     Cardiovascular  # Hypotension - improving  :: ?septic shock vs dehydration, fungemia unlikely  - AM cortisol 12.6 (nl); 1500: 18.0; 1800: 25.5; 1835: 28.7  - Procal 2.95  - Fungitel 231  -      # HTN  - Hold home amlodipine     # HLD  - Hold home atorvastatin     GI  # Nutrition  :: C/f persistent aspiration despite NGT  - NPO concern for aspiration pneumonia   - Hold tube feeds  - Stop free water flushes  - Diet recs in     # Dysphagia  :: Suction c/f persistent aspiration despite NG tube  - ENT previously consulted, no anatomic cause  - Neurology consulted as above  - Stop TF and free water flushes pending MRI  - NG tube     Renal/  #TERRI on CKD Stage III - worsening from yesterday  :: most likely prerenal azotemia 2/2 hypovolemia/hemodynamic instability  :: baseline Cr 1.7  - Q12hr RFP  - Stop free water flushes  - Diurese w/ Lasix 40 IV  - Consider removing bunch 5/2     # Hypernatremia - improving     #Inguinal lymphadenopathy   - US upper ext duplex 5/1: Superficial venous thrombus in L cephalic vein   - US lower ext duplex 5/1: Lymph nodes Lt groin lymph node measures 5.62 mm x 41.53 mm, Rt groin lymph node # 1 measures 7.48 mm x 31.64 mm. Lymph node #2 3.57 mm x 26.93 mm.  - R/o infection with STI testing (Chlamydia, neisseria gonorrhea, HSV, previous HIV 1/2 testing this admission neg) and plan for POCUS of inguinal LN     Endo  # DM2  # Hyperglycemia - resolved  :: Was on D5W for Na and steroids, both now stopped  - Decreased glargine to 10 units for  elevated sugars ON (146 this AM)     ID  # Acute Hypoxic Respiratory Failure  # Multifocal Pneumonia  # Aspiration Pneumonia  # Fever  :: Bacterial pna 2/2 aspiration in the setting of dysphagia and recent covid pna  :: Fungemia unlikely considering pt's improvement without treatment  - s/p azithromycin 4/18-4/20, Remdesevir 4/18-4/22  - DC vancomycin 5/1  - Meropenem discontinued (4/26 - 5/2)   - Fungal chem (fungitel 231), blood (negative) and respiratory (contaminated)  - Candida from previous respiratory culture, resent urine culture as micro lab did not have definitive information  - Restarted Doxy 100mg for HS ppx 5/2/24    # Multiple Sacral Wounds  :: R buttock, new exudate noted  - Wound cultures sent  - Meropenem discontinued (4/26 - 5/2)      # Hidradenitis Suppurativa  :: derm has previously recommended humira/minocycline 100 BID but currently w/o lab confirmation, hold off iso septic shock  - will need outpatient dermatology follow up w/ doxycycline 100 BID outpatient   - wound care consulted, appreciate recommendations  - Restarted doxycycline while inpatient     Heme  # Acute on Chronic Anemia  :: Most likely iso iron deficiency anemia and anemia of chronic disease: iron low, TIBC 95, folate WNL, B12 supratherapeutic  - Hold off on iron supplementation iso active infection  - Ferritin 539/TIBC 94/iron 16 => fe deficient + sequestration  - Haptoglobin 228   - Reticulocyte count: 1%, immature 19% suggesting adequate production  - Peripheral smear pending     MSK  # RUE swollen - improved  :: Suspect local fluid overload  - US DVT all four extremities     F Free water 300cc q6hr  E Replete prn  N NPO, hold tf  G None  DVT: subcutaneous heparin  ABX:  Vanc: 4/17-5/1  Zosyn: 4/17-4/26  Skylar: 4/26-  Azithro: 4/18-4/20  Lines: PIV x4, bunch  Drips: None  Pressors: None  Code status: DNR  NoK:  Primary Emergency Contact: DEEPAK RANGEL, Home Phone: [phone]     Dispo: 69 y.o. male admitted to the MICU for  multiple rapids, hypotension, and acute hypoxic respiratory failure requiring airvo support.  PMHx CKD, DM2, HTN, sacral wound, bipolar disorder who was originally admitted from his SNF 2/2 AM. Floors once O2 requirement decreases and pressures stabilize.       Patient and plan discussed with attending physician Dr. Gonzáles.        SIGNATURE: Mai Woodson MD PATIENT NAME: Brian Rodriguez   DATE: May 2, 2024 MRN: [id number]

## 2024-05-02 NOTE — PROGRESS NOTES
"Nutrition Follow Up Assessment:   Nutrition Assessment         Patient is a 69 y.o. male presenting with hypoxic respiratory failure from both COVID and dysphagia with subsequent aspiration PNA.  He is on 30L/30% oxygen via Airvo for support.  Has a history of bipolar disorder r(A&O x 1-2 at baseline) and sacral wound.      Pt with a dobhoff in place for enteral access.  Tip of tube in gastric pylorus/proximal duodenum.  TF was off at visit this AM-- RN reports that he is NPO for brain MRI today.  Had been on Glucerna 1.5@ 55mls/hr to meet needs.  RN also reports that he is having copious amounts of diarrhea with at least 6 BMs yesterday while on TF but only one overnight once made NPO.  Concern is that he is having so much stool output with sacral wound.   Can trial adding fiber to feeding regimen and if this does not work, can trial a new formula with him but will need tight control of blood sugars if formula changed.  Sugars have also been variable-- was low this morning as he got lantus overnight while NPO but had been in the 200s prior to that check.     Met with patient as he is out of COVID precautions.  He reports a great appetite PTA but poor intake as he did not like the food where he was living.  Says he had lost 20# because of this but cannot state a usual body weight for himself.  He had been drinking Ensure/Boost at his facility for extra nutrition.  No N/V/belly issues to report at this time.     Anthropometrics:  Height: 177.8 cm (5' 10\")   Weight: 64.6 kg (142 lb 6.7 oz)   BMI (Calculated): 20.43  IBW/kg (Dietitian Calculated): 75.5 kg  Percent of IBW: 105 %       Weight History:     5/2/24             64.6kg  04/23/24 4/22/24 4/18/24 63.6 kg (140 lb 3.4 oz)  59.2kg  55.3kg (admit)   03/30/23 85.3 kg (188 lb)     Weight Change %:       Nutrition Focused Physical Exam Findings:    Subcutaneous Fat Loss:   Orbital Fat Pads: Severe (dark circles, hollowing and loose skin)  Buccal Fat Pads: Severe " "(hollow, sunken and narrow face)  Triceps: Severe (negligible fat tissue)  Muscle Wasting:  Temporalis: Severe (hollowed scooping depression)  Pectoralis (Clavicular Region): Severe (protruding prominent clavicle)  Deltoid/Trapezius: Severe (squared shoulders, acromion process prominent)  Quadriceps: Severe (depressions on inner and outer thigh)  Gastrocnemius: Severe (minimal muscle definition)  Edema:  Edema: +1 trace  Edema Location: generalized  Physical Findings:  Skin: Positive (sacral wound; MASD to sacrum; L butt wound; L elbow wound)    Nutrition Significant Labs:  BMP Trend:   Results from last 7 days   Lab Units 05/02/24  0324 05/01/24  1758 05/01/24  0315 04/30/24  1625   GLUCOSE mg/dL 58* 228* 177* 291*   CALCIUM mg/dL 7.4* 7.6* 7.7* 7.5*   SODIUM mmol/L 140 139 145 140   POTASSIUM mmol/L 3.4* 3.8 3.9 4.9   CO2 mmol/L 28 28 27 22   CHLORIDE mmol/L 103 101 106 107   BUN mg/dL 37* 39* 37* 36*   CREATININE mg/dL 2.78* 2.84* 2.82* 2.60*    , A1C:  Lab Results   Component Value Date    HGBA1C 8.5 (H) 04/18/2024   , BG POCT trend:   Results from last 7 days   Lab Units 05/02/24  0816 05/02/24  0603 05/02/24  0325 05/02/24  0323 05/01/24  2332   POCT GLUCOSE mg/dL 121* 146* 71* 69* 187*    , Renal Lab Trend:   Results from last 7 days   Lab Units 05/02/24  0324 05/01/24 1758 05/01/24 0315 04/30/24  1625   POTASSIUM mmol/L 3.4* 3.8 3.9 4.9   PHOSPHORUS mg/dL 4.3 4.7 3.5 3.5   SODIUM mmol/L 140 139 145 140   MAGNESIUM mg/dL 2.27  --  2.47*  --    EGFR mL/min/1.73m*2 24* 23* 23* 26*   BUN mg/dL 37* 39* 37* 36*   CREATININE mg/dL 2.78* 2.84* 2.82* 2.60*    , Vit D: No results found for: \"VITD25\"     Nutrition Specific Medications:  Scheduled medications  chlorhexidine, , Topical, Daily  heparin (porcine), 5,000 Units, subcutaneous, q8h RANDAL  insulin glargine, 21 Units, subcutaneous, Nightly  insulin lispro, 0-10 Units, subcutaneous, q6h  meropenem, 1,000 mg, intravenous, q12h  multivitamin with minerals, 1 " tablet, oral, Daily  pantoprazole, 40 mg, intravenous, Daily  potassium chloride, 20 mEq, intravenous, q2h  sodium bicarbonate, 650 mg, oral, BID  sodium chloride, 1 spray, Each Nostril, 4x daily  thiamine, 500 mg, intravenous, TID      Continuous medications     PRN medications  PRN medications: acetaminophen **OR** acetaminophen **OR** acetaminophen, albuterol, dextrose, dextrose, dextrose, dextrose, glucagon, glucagon, glucagon, glucagon, guaiFENesin, ipratropium-albuteroL, melatonin, oxygen, polyethylene glycol, sennosides-docusate sodium, traMADol     I/O:   Last BM Date: 05/01/24; Stool Appearance: Loose (05/02/24 0500)        Dietary Orders (From admission, onward)       Start     Ordered    05/01/24 1331  NPO Diet; Effective now  Diet effective now         05/01/24 1330                     Estimated Needs:   Total Energy Estimated Needs (kCal): 2000 kCal  Method for Estimating Needs: MSJ= 1412 using 64.6kg  Total Protein Estimated Needs (g):  (95+)  Method for Estimating Needs: 1.5 x 64.6kg  Total Fluid Estimated Needs (mL): 2400 mL (or per MD)  Method for Estimating Needs: 30mls/kcal/kg using 79.4 kgs/actual BW        Nutrition Diagnosis   Malnutrition Diagnosis  Patient has Malnutrition Diagnosis: Yes  Diagnosis Status: Ongoing  Malnutrition Diagnosis: Severe malnutrition related to chronic disease or condition  As Evidenced by: pt reports inadequate PO intake PTA along with a noted 25% weight loss in the last year as well as severe fat and muscle losses all over body on physical exam       Nutrition Interventions/Recommendations         Nutrition Prescription:  For tube feed once he is done with MRI, keep Glucerna 1.5 at 55mls/hr.  Start back at goal rate as there is no need to start low and uptitrate to goal.    Would also trial adding one packet of Nutrasource Fiber to regimen TID to see if this helps bulk up stools.  Can go up to two packets TID if he is showing some benefit to the 3 packets daily.    Please check Vitamin D level.  Will need to think about long-term nutrition plan for patient-- ?PEG-- as dobhoff is meant to be short-term.           Nutrition Interventions:   Food and/or Nutrient Delivery Interventions  Interventions: Enteral intake  Goal: TF at goal= 1980kcals, 109grams protein         Nutrition Education:   Not appropriate       Nutrition Monitoring and Evaluation   Food/Nutrient Related History Monitoring  Monitoring and Evaluation Plan: Enteral and parenteral nutrition intake  Criteria: TF to meet 100% nutrition needs       Time Spent/Follow-up Reminder:   Time Spent (min): 60 minutes  Last Date of Nutrition Visit: 05/02/24  Nutrition Follow-Up Needed?: Dietitian to reassess per policy  Follow up Comment: TF via dobhoff

## 2024-05-02 NOTE — CONSULTS
Wound Care Consult     Visit Date: 5/2/2024      Patient Name: Brian Rodriguez         MRN: [id number]           YOB: 1955     Reason for Consult: Reasses wound care              Pertinent Labs:   Albumin   Date Value Ref Range Status   05/02/2024 1.5 (L) 3.4 - 5.0 g/dL Final       Wound Assessment:  Wound 04/18/24 Sacrum (Active)   Wound Image   04/26/24 2140   Shape irregular 04/30/24 2000       Wound 04/18/24 Moisture Associated Skin Damage Scrotum (Active)   Wound Image   04/26/24 2142   Shape irregular 04/30/24 2000       Wound 04/29/24  Buttocks Left (Active)   Wound Image   04/29/24 0400   Shape irregular 04/30/24 2000       Wound Team Summary Assessment: Patient has black/brown discoloration on B/L areas are nontender, without temperature change, or induration. These are not pressure injuries. There is black soft thickened tissue over left hip/buttocks  skin changes r/t hydradenitis .     Hibiclens is not available at this time. Would substitute CHG bath wipes per Dermatology recommendation.(Patient tolerates well)     Recommendation: Daily and as needed  Cleanse wounds with CHG bath wipes   Aquacel Ag and Mepilex bordered foam to open, draining areas of sacrum  Interdry to under scrotum and in between creases of groin/ inner thighs   Turn every 2 hours.         Wound Team Plan: Please review Recommendations. If you agree place in [facility] as orders      Sabrina BECK   5/2/2024  5:52 PM

## 2024-05-03 ENCOUNTER — APPOINTMENT (OUTPATIENT)
Dept: RADIOLOGY | Facility: HOSPITAL | Age: 69
DRG: 871 | End: 2024-05-03
Payer: MEDICARE

## 2024-05-03 LAB
25(OH)D3 SERPL-MCNC: 58 NG/ML (ref 30–100)
ALBUMIN SERPL BCP-MCNC: 1.6 G/DL (ref 3.4–5)
ANION GAP SERPL CALC-SCNC: 13 MMOL/L (ref 10–20)
APTT PPP: 34 SECONDS (ref 27–38)
ASPERGILLUS GALACTOMANNAN EIA,SERUM: 0.04
BASOPHILS # BLD AUTO: 0.03 X10*3/UL (ref 0–0.1)
BASOPHILS NFR BLD AUTO: 0.3 %
BUN SERPL-MCNC: 32 MG/DL (ref 6–23)
CALCIUM SERPL-MCNC: 7.6 MG/DL (ref 8.6–10.6)
CHLORIDE SERPL-SCNC: 99 MMOL/L (ref 98–107)
CK SERPL-CCNC: 14 U/L (ref 0–325)
CO2 SERPL-SCNC: 28 MMOL/L (ref 21–32)
CREAT SERPL-MCNC: 2.53 MG/DL (ref 0.5–1.3)
EGFRCR SERPLBLD CKD-EPI 2021: 26 ML/MIN/1.73M*2
EOSINOPHIL # BLD AUTO: 1.82 X10*3/UL (ref 0–0.4)
EOSINOPHIL NFR BLD AUTO: 17.4 %
ERYTHROCYTE [DISTWIDTH] IN BLOOD BY AUTOMATED COUNT: 20.3 % (ref 11.5–14.5)
GLUCOSE BLD MANUAL STRIP-MCNC: 114 MG/DL (ref 74–99)
GLUCOSE BLD MANUAL STRIP-MCNC: 142 MG/DL (ref 74–99)
GLUCOSE BLD MANUAL STRIP-MCNC: 228 MG/DL (ref 74–99)
GLUCOSE SERPL-MCNC: 111 MG/DL (ref 74–99)
HCT VFR BLD AUTO: 25.2 % (ref 41–52)
HGB BLD-MCNC: 8.4 G/DL (ref 13.5–17.5)
HSV1 DNA BLD QL NAA+PROBE: NOT DETECTED
HSV2 DNA BLD QL NAA+PROBE: NOT DETECTED
HYPOCHROMIA BLD QL SMEAR: NORMAL
IMM GRANULOCYTES # BLD AUTO: 0.04 X10*3/UL (ref 0–0.5)
IMM GRANULOCYTES NFR BLD AUTO: 0.4 % (ref 0–0.9)
LYMPHOCYTES # BLD AUTO: 1.97 X10*3/UL (ref 0.8–3)
LYMPHOCYTES NFR BLD AUTO: 18.8 %
MAGNESIUM SERPL-MCNC: 2.17 MG/DL (ref 1.6–2.4)
MCH RBC QN AUTO: 25.6 PG (ref 26–34)
MCHC RBC AUTO-ENTMCNC: 33.3 G/DL (ref 32–36)
MCV RBC AUTO: 77 FL (ref 80–100)
MONOCYTES # BLD AUTO: 0.36 X10*3/UL (ref 0.05–0.8)
MONOCYTES NFR BLD AUTO: 3.4 %
NEUTROPHILS # BLD AUTO: 6.26 X10*3/UL (ref 1.6–5.5)
NEUTROPHILS NFR BLD AUTO: 59.7 %
NRBC BLD-RTO: 0 /100 WBCS (ref 0–0)
PHOSPHATE SERPL-MCNC: 4.6 MG/DL (ref 2.5–4.9)
PLATELET # BLD AUTO: 439 X10*3/UL (ref 150–450)
POTASSIUM SERPL-SCNC: 3.6 MMOL/L (ref 3.5–5.3)
RBC # BLD AUTO: 3.28 X10*6/UL (ref 4.5–5.9)
RBC MORPH BLD: NORMAL
RPR SER QL: NONREACTIVE
SODIUM SERPL-SCNC: 136 MMOL/L (ref 136–145)
T PALLIDUM AB SER QL AGGL: REACTIVE
T4 FREE SERPL-MCNC: 1.32 NG/DL (ref 0.78–1.48)
TSH SERPL-ACNC: 2.39 MIU/L (ref 0.44–3.98)
UFH PPP CHRO-ACNC: 0.1 IU/ML
UFH PPP CHRO-ACNC: 0.2 IU/ML
UFH PPP CHRO-ACNC: 0.2 IU/ML
UFH PPP CHRO-ACNC: 0.3 IU/ML
VIT B1 PYROPHOSHATE BLD-SCNC: 239 NMOL/L (ref 70–180)
WBC # BLD AUTO: 10.5 X10*3/UL (ref 4.4–11.3)

## 2024-05-03 PROCEDURE — 83921 ORGANIC ACID SINGLE QUANT: CPT | Performed by: NURSE PRACTITIONER

## 2024-05-03 PROCEDURE — 95885 MUSC TST DONE W/NERV TST LIM: CPT | Performed by: PSYCHIATRY & NEUROLOGY

## 2024-05-03 PROCEDURE — 85520 HEPARIN ASSAY: CPT | Mod: 91,MUE | Performed by: NURSE PRACTITIONER

## 2024-05-03 PROCEDURE — 82550 ASSAY OF CK (CPK): CPT | Performed by: NURSE PRACTITIONER

## 2024-05-03 PROCEDURE — 2500000004 HC RX 250 GENERAL PHARMACY W/ HCPCS (ALT 636 FOR OP/ED)

## 2024-05-03 PROCEDURE — 84439 ASSAY OF FREE THYROXINE: CPT | Performed by: NURSE PRACTITIONER

## 2024-05-03 PROCEDURE — 85520 HEPARIN ASSAY: CPT | Performed by: STUDENT IN AN ORGANIZED HEALTH CARE EDUCATION/TRAINING PROGRAM

## 2024-05-03 PROCEDURE — 95886 MUSC TEST DONE W/N TEST COMP: CPT | Performed by: PSYCHIATRY & NEUROLOGY

## 2024-05-03 PROCEDURE — 2500000002 HC RX 250 W HCPCS SELF ADMINISTERED DRUGS (ALT 637 FOR MEDICARE OP, ALT 636 FOR OP/ED)

## 2024-05-03 PROCEDURE — 2500000004 HC RX 250 GENERAL PHARMACY W/ HCPCS (ALT 636 FOR OP/ED): Performed by: NURSE PRACTITIONER

## 2024-05-03 PROCEDURE — 2500000001 HC RX 250 WO HCPCS SELF ADMINISTERED DRUGS (ALT 637 FOR MEDICARE OP)

## 2024-05-03 PROCEDURE — C9113 INJ PANTOPRAZOLE SODIUM, VIA: HCPCS

## 2024-05-03 PROCEDURE — 2060000001 HC INTERMEDIATE ICU ROOM DAILY

## 2024-05-03 PROCEDURE — 70551 MRI BRAIN STEM W/O DYE: CPT | Mod: 52

## 2024-05-03 PROCEDURE — 71045 X-RAY EXAM CHEST 1 VIEW: CPT

## 2024-05-03 PROCEDURE — 84443 ASSAY THYROID STIM HORMONE: CPT | Performed by: NURSE PRACTITIONER

## 2024-05-03 PROCEDURE — 70551 MRI BRAIN STEM W/O DYE: CPT | Mod: REDUCED SERVICES | Performed by: RADIOLOGY

## 2024-05-03 PROCEDURE — 73218 MRI UPPER EXTREMITY W/O DYE: CPT | Mod: LT

## 2024-05-03 PROCEDURE — 95912 NRV CNDJ TEST 11-12 STUDIES: CPT | Performed by: PSYCHIATRY & NEUROLOGY

## 2024-05-03 PROCEDURE — 82085 ASSAY OF ALDOLASE: CPT | Performed by: NURSE PRACTITIONER

## 2024-05-03 PROCEDURE — 80069 RENAL FUNCTION PANEL: CPT

## 2024-05-03 PROCEDURE — 83735 ASSAY OF MAGNESIUM: CPT

## 2024-05-03 PROCEDURE — 85025 COMPLETE CBC W/AUTO DIFF WBC: CPT

## 2024-05-03 PROCEDURE — 71045 X-RAY EXAM CHEST 1 VIEW: CPT | Performed by: RADIOLOGY

## 2024-05-03 PROCEDURE — 82947 ASSAY GLUCOSE BLOOD QUANT: CPT | Mod: 91

## 2024-05-03 RX ORDER — HEPARIN SODIUM 10000 [USP'U]/100ML
0-4500 INJECTION, SOLUTION INTRAVENOUS CONTINUOUS
Status: DISCONTINUED | OUTPATIENT
Start: 2024-05-03 | End: 2024-05-07

## 2024-05-03 RX ORDER — NICOTINE 7MG/24HR
1 PATCH, TRANSDERMAL 24 HOURS TRANSDERMAL DAILY
Status: DISCONTINUED | OUTPATIENT
Start: 2024-05-03 | End: 2024-05-15 | Stop reason: HOSPADM

## 2024-05-03 RX ORDER — POTASSIUM CHLORIDE 14.9 MG/ML
20 INJECTION INTRAVENOUS ONCE
Status: COMPLETED | OUTPATIENT
Start: 2024-05-03 | End: 2024-05-03

## 2024-05-03 RX ADMIN — DOXYCYCLINE HYCLATE 100 MG: 100 TABLET, COATED ORAL at 20:42

## 2024-05-03 RX ADMIN — DOXYCYCLINE HYCLATE 100 MG: 100 TABLET, COATED ORAL at 08:12

## 2024-05-03 RX ADMIN — HEPARIN SODIUM 1400 UNITS/HR: 10000 INJECTION, SOLUTION INTRAVENOUS at 14:40

## 2024-05-03 RX ADMIN — INSULIN LISPRO 4 UNITS: 100 INJECTION, SOLUTION INTRAVENOUS; SUBCUTANEOUS at 18:52

## 2024-05-03 RX ADMIN — SODIUM BICARBONATE 650 MG: 650 TABLET ORAL at 08:13

## 2024-05-03 RX ADMIN — THIAMINE HCL TAB 100 MG 100 MG: 100 TAB at 08:12

## 2024-05-03 RX ADMIN — HEPARIN SODIUM 1400 UNITS/HR: 10000 INJECTION, SOLUTION INTRAVENOUS at 10:22

## 2024-05-03 RX ADMIN — POTASSIUM CHLORIDE 20 MEQ: 14.9 INJECTION, SOLUTION INTRAVENOUS at 06:18

## 2024-05-03 RX ADMIN — PANTOPRAZOLE SODIUM 40 MG: 40 INJECTION, POWDER, FOR SOLUTION INTRAVENOUS at 08:08

## 2024-05-03 RX ADMIN — SODIUM BICARBONATE 650 MG: 650 TABLET ORAL at 20:42

## 2024-05-03 RX ADMIN — Medication 1 TABLET: at 08:12

## 2024-05-03 ASSESSMENT — PAIN SCALES - GENERAL
PAINLEVEL_OUTOF10: 0 - NO PAIN

## 2024-05-03 ASSESSMENT — COGNITIVE AND FUNCTIONAL STATUS - GENERAL
TURNING FROM BACK TO SIDE WHILE IN FLAT BAD: A LOT
MOVING TO AND FROM BED TO CHAIR: A LOT
CLIMB 3 TO 5 STEPS WITH RAILING: A LOT
WALKING IN HOSPITAL ROOM: A LOT
PERSONAL GROOMING: A LOT
EATING MEALS: A LITTLE
DRESSING REGULAR UPPER BODY CLOTHING: A LOT
STANDING UP FROM CHAIR USING ARMS: A LOT
HELP NEEDED FOR BATHING: A LOT
DAILY ACTIVITIY SCORE: 13
DRESSING REGULAR LOWER BODY CLOTHING: A LOT
MOVING FROM LYING ON BACK TO SITTING ON SIDE OF FLAT BED WITH BEDRAILS: A LITTLE
MOBILITY SCORE: 13
TOILETING: A LOT

## 2024-05-03 ASSESSMENT — PAIN - FUNCTIONAL ASSESSMENT
PAIN_FUNCTIONAL_ASSESSMENT: 0-10

## 2024-05-03 NOTE — PROGRESS NOTES
"MEDICAL STEPDOWN UNIT DAILY PROGRESS NOTE    Brian Rodriguez is a 69 y.o. male on day 16 of admission presenting with Pneumonia due to infectious organism, unspecified laterality, unspecified part of lung.    Subjective   Transferred from MICU to SDU overnight  This am, awake, alert, oriented x1-2, able to state name and say he is in [place]  Unable to complete ROS or obtain further info from pt     Objective     Constitutional: elderly male, cachectic, pt in NAD, alert and cooperative  Eyes: PERRL, no icterus   ENMT: mucous membranes moist, no apparent injury, no lesions seen, poor dentition  Head/Neck: Neck supple, no apparent injury  Respiratory/Thorax: Lungs CTA bilaterally, non-labored breathing, no cough, on RA  Cardiovascular: Regular, rate and rhythm, no murmurs, normal S1 and S2  Gastrointestinal: Nondistended, soft, non-tender, BS present x 4  Musculoskeletal: ROM intact, no joint swelling, normal strength  Extremities: normal extremities, no edema, contusions or wounds  Neurological: alert and oriented x 1-2 speech clear, follows commands appropriately, cr. n. II-XII intact, sensation grossly intact, motor 5/5 BUE, 4/5 BLE  Skin: Warm and dry, L hand 3rd digit onychomoycosis    Vital Signs  Blood pressure 96/70, pulse 82, temperature 35.5 °C (95.9 °F), resp. rate 21, height 1.778 m (5' 10\"), weight 60.8 kg (134 lb 0.6 oz), SpO2 95%.  Oxygen Therapy  SpO2: 95 %  Medical Gas Therapy: None (Room air)  O2 Delivery Method: High flow nasal cannula  FiO2 (%):  [30 %] 30 %    Intake/Output last 3 Shifts:  I/O last 3 completed shifts:  In: 2607.5 (32.8 mL/kg) [I.V.:62.5 (0.8 mL/kg); Blood:350; NG/GT:1595; IV Piggyback:600]  Out: 4510 (56.8 mL/kg) [Urine:4510 (1.6 mL/kg/hr)]  Dosing Weight: 79.4 kg     Scheduled medications  chlorhexidine, , Topical, Daily  doxycylcine, 100 mg, oral, q12h RANDAL  insulin glargine, 10 Units, subcutaneous, Nightly  insulin lispro, 0-10 Units, subcutaneous, q6h  LORazepam, 0.2 mg, " intravenous, Once  multivitamin with minerals, 1 tablet, oral, Daily  pantoprazole, 40 mg, intravenous, Daily  potassium chloride, 20 mEq, intravenous, Once  sodium bicarbonate, 650 mg, oral, BID  sodium chloride, 1 spray, Each Nostril, 4x daily  thiamine, 100 mg, oral, Daily      Continuous medications  heparin, 0-4,500 Units/hr, Last Rate: 1,300 Units/hr (05/03/24 0347)      PRN medications  PRN medications: acetaminophen **OR** acetaminophen **OR** acetaminophen, albuterol, dextrose, dextrose, dextrose, dextrose, glucagon, glucagon, glucagon, glucagon, guaiFENesin, heparin, ipratropium-albuteroL, melatonin, oxygen, polyethylene glycol, sennosides-docusate sodium, traMADol    Lines and Tubes:  Peripheral IV 04/26/24 20 G Right Hand (Active)   Placement Date/Time: 04/26/24 1600   Size (Gauge): 20 G  Orientation: Right  Location: Hand  Site Prep: Chlorhexidine   Insertion attempts: 2  Patient Tolerance: Age appropriate   Number of days: 6       Peripheral IV 04/27/24 20 G Proximal;Right;Anterior Forearm (Active)   Placement Date/Time: 04/27/24 1200   Size (Gauge): 20 G  Orientation: Proximal;Right;Anterior  Location: Forearm   Number of days: 5       Peripheral IV 04/28/24 22 G Left;Upper;Anterior Arm (Active)   Placement Date/Time: 04/28/24 0130   Hand Hygiene Completed: Yes  Size (Gauge): (c) 22 G  Orientation: Left;Upper;Anterior  Location: Arm  Site Prep: Chlorhexidine   Technique: Ultrasound guidance  Placed by: Liana Ann  Insertion attempts: 1  Patien...   Number of days: 5       Midline 04/30/24 Single lumen Right (Active)   Placement Date/Time: 04/30/24 1700   Hand Hygiene Completed: Yes  Lumen Type: Single lumen  Orientation: Right   Number of days: 2       NG/OG/Feeding Tube Gastric Left nostril (Active)   Placement Date/Time: 04/19/24 1800   Earliest Known Present: 04/19/24  Placed by External Staff?: Other (Comment)  Placed by: Nutrition Support RN  Hand Hygiene Completed: Yes  Type of Tube: Feeding  Tube  Tube Length: 75 cm  Tube Type: Gastric  Tube L...   Number of days: 13       External Urinary Catheter (Active)   Placement Date/Time: 05/03/24 0558     Number of days: 0         Relevant Results  Results from last 7 days   Lab Units 05/03/24  0305 05/02/24  0748 05/02/24  0324   WBC AUTO x10*3/uL 10.5 9.7 9.7   HEMOGLOBIN g/dL 8.4* 7.3* 6.5*   HEMATOCRIT % 25.2* 22.1* 19.7*   PLATELETS AUTO x10*3/uL 439 319 312     Results from last 7 days   Lab Units 05/03/24  0305 05/02/24  1501 05/02/24  0324 04/29/24  1614 04/29/24  1317 04/28/24  1535 04/28/24  0439   SODIUM mmol/L 136 135* 140   < >  --    < > 146*   POTASSIUM mmol/L 3.6 3.7 3.4*   < >  --    < > 3.9   CHLORIDE mmol/L 99 101 103   < >  --    < > 113*   CO2 mmol/L 28 25 28   < >  --    < > 22   BUN mg/dL 32* 32* 37*   < >  --    < > 46*   CREATININE mg/dL 2.53* 2.50* 2.78*   < >  --    < > 2.66*   CALCIUM mg/dL 7.6* 7.4* 7.4*   < >  --    < > 7.6*   PROTEIN TOTAL g/dL  --   --   --   --  5.3*  --  5.6*   BILIRUBIN TOTAL mg/dL  --   --   --   --   --   --  0.3   ALK PHOS U/L  --   --   --   --   --   --  89   ALT U/L  --   --   --   --   --   --  10   AST U/L  --   --   --   --   --   --  15   GLUCOSE mg/dL 111* 66* 58*   < >  --    < > 184*    < > = values in this interval not displayed.     Results from last 7 days   Lab Units 04/26/24  1010   TROPHS ng/L 12     4/17/2024 CT Head:  1. There is no evidence of acute hemorrhage, mass lesion or acute  infarction.    4/17/2024 CT Chest/Abd/Pelvis:  1. Peribronchovascular patchy ground-glass/consolidative opacities  throughout the right lung and to a lesser degree within the dependent  left lower lobe compatible with multifocal pneumonia. Scattered  endobronchial debris most pronounced within the right lower lobe with  associated bronchial wall thickening and debris within the distal  trachea and right mainstem bronchus concerning for aspiration.  2. Extensive skin thickening of the left gluteal soft tissues  which  appears slightly more pronounced when compared to 03/08/2023, though  with resolution of subcutaneous gas/fluid seen on the prior exam. No  definitive associated focal fluid collection, however evaluation is  limited in the absence of IV contrast.  The skin thickening/fat  stranding extends to the sacrococcygeal bone surface, possibly  reflecting decubitus ulceration.  3. Regions of focal ovoid hypoattenuation within the right inferior  scrotum as seen on images 236-239. The findings are indeterminate and  may be chronic (similar findings were seen on the 03/08/2023  examination). Scrotum is suboptimally evaluated on noncontrast CT and  scrotal ultrasound may be considered for further evaluation.  4. Large rectal stool volume. Please correlate for fecal impaction.  5. There is moderate to severe distention of the urinary bladder.  Please correlate for urinary retention.  6. Coarse pancreatic calcifications compatible with chronic  pancreatitis.  7. Bilateral C7 cervical ribs.  8. Cholelithiasis.    4/27 Scrotal U/S:  1. No abnormal scrotal wall thickening or hyperemia to suggest active  infectious process.  2. Normal bilateral testes with intact flow.  3. Trace right-sided hydrocele with scrotal david.    4/29/2024 CT A/P:  1.  Marked worsening of pulmonary edema, with superimposed multifocal  infection not excluded, with interval development of slightly  septated moderate right-sided pleural effusion and small left pleural  effusion.  2. New peripancreatic haziness around the pancreatic head,  indeterminate and may be due to motion artifact or peripancreatic  inflammation, correlate with concern for pancreatitis.  3. Redemonstration of extensive skin thickening of the left gluteal  soft tissues which appears slightly decreased in size and extent when  compared to prior, as detailed above. No definitive associated focal  fluid collection however limited evaluation given the absence of IV  contrast.  4.  Increased diffuse body wall edema.  5. Fluid contents seen in loops of large bowel, correlate with  diarrhea.  6. Unchanged region of focal ovoid hypoattenuation the right scrotum,  and incompletely characterized on noncontrast CT. Correlate with  recent scrotal ultrasound from 04/27/2024.    4/29/2024 Echo:  CONCLUSIONS:   1. Left ventricular systolic function is low normal with a 60% estimated ejection fraction.   2. No definite valvular vegetations were visualized.   3. Poorly visualized anatomical structures due to suboptimal image quality.    4/29/2024 U/S BUE:  Right Upper Venous: No evidence of acute deep vein thrombus visualized in the right upper extremity. There is acute occlusive superficial venous thrombosis visualized in the cephalic vein in forearm.  Left Upper Venous: The subclavian vein demonstrates a normal spontaneous and phasic flow.     XR chest 1 view 04/30/2024  Impression  1. Similar findings of multifocal pneumonia compared to prior exam.  Overall, there has been a general worsening of the bilateral airspace  opacities when compared to earlier prior exams 04/26/2024 and  04/21/2024.    5/1/2024 U/S BLE:  Right Lower Venous: No evidence of acute deep vein thrombus visualized in the right lower extremity. Calf veins visualized in segmnets. Additional Findings; Lymph nodes Rt groin lymph node # 1 measures 7.48 mm x 31.64 mm.  Lymph node #2 3.57 mm x 26.93 mm.  Left Lower Venous: No evidence of acute deep vein thrombus visualized in the left lower extremity. Peroneal veins visualized in segmnets. Additional Findings; Lymph nodes Lt groin lymph node measures 5.62 mm x 41.53 mm.    5/1/2024 U/S BUE:  Left Upper Venous: No evidence of acute deep vein thrombus visualized in the left upper extremity. There is acute occlusive superficial venous thrombosis visualized in the proximal cephalic vein. Contralateral right subclavian vein doppler waveform not saved due to hardware error. Additional Findings;  IV Line.       2024 EM. 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   2. Myopathic units in the proximal muscles, with sparse active denervation, consistent with a generalized myopathy, not sufficient for definite diagnosis of necrotizing myopathy.   3. Active and chronic, left median neuropathy at the wrist, which in the proper clinical context would be consistent with carpal tunnel syndrome.   4. Active and chronic, left ulnar neuropathy across the elbow.        Assessment/Plan   Principal Problem:    Pneumonia due to infectious organism, unspecified laterality, unspecified part of lung    Brian Rodriguez is a 69 y.o. male with a history of type 2 diabetes, hypertension, CKD, hyperlipidemia, hidradenitis, bipolar who presented to the ED on 2024 for altered mental status and concern of infection.  Admitted to [facility] with multifocal PNA, HyperNa, TERRI on CKD, then transferred to MICU for AMS & acute hypoxic resp failure & c/f sepsis.   Transferred back to [facility] then returned to MICU  for respiratory distress and acute hypoxia.      Neuro/Psych: Hx of Bipolar disorder, Acute Encephalopathy, Hypoactive Delirim  - baseline A&Ox2  - Neurology consulted, appreciate recommendations:  > would like MRI brain w/o contrast for Wallerian degeneration (pending) & MRI L humerus to rule out myositis  > EMG/NCS was performed today, preliminary concerning for myopathy   >  Please send for CK (wnl),  aldolase (, results pending)  > Repeat TSH and T4 (completd , TSH and FT4 wnl)  > Send for MMA (sent , results pending)  - s/p IV Thiamine x 7 days, continue daily Thiamine supp    Pulm: Acute hypoxic resp failure 2/2 Aspiration, Multifocal PNA.  Recent Covid + ()  - weaned to RA  - MBS completed earlier this admit with signs of aspiration.  SLP recs strict NPO with aggressive oral care  - NT Suction PRN    Cardiac:  Hypertension, hyperlipidemia.  New onset Afib  - BP soft, HR controlled.  Currently in NSR  - home Amlodpine, Atorvastatin on hold  - CHADsVasc 3  - continue heparin drip  - PRN Diuresis with IV lasix    FEN/GI: Hypoalbuminemia, Dysphagia, Severe Malnutrition with BMI 19, diarrhea in MICU (5x on May2)  - Diet: Glucerna 1.5 @ 55ml/hr, Nutrasource Fiber TID (can go up to 2 packets TID if showing benefits).  HOLD TF 2/2 recent NT suctioning with c/f TF in sputum and possible aspiration  - needs long term nutrition plan  - strict NPO, SLP following  - Bowel Regimen: add Nutrasource Fiber TID to bulk up stools    Renal: TERRI on CKD (b/l Cr ~1.5-1.7)  - voiding trial today  - bladder scan PRN    Heme: Fe Def Anemia  - Ferritin 539/TIBC 94/iron 16   - holding Fe supp due to recent infection & atb  - DVT prophylaxis: on Heparin drip    Endo:  type 2 diabetes,  - A1C 8.5 (April 2024)  - Blood sugars:   - Continue at bedtime Lantus 10 units & #2 SSI Lispro  - Q4H Accu checks    ID: hx of hidradenitis.  Covid + 4/18, Multifocal PNA, Aspiration PNA  - Tmax: 36.8 C, WBC 10.5  - Micro:  4/17 BC x2 Neg, 4/18, 4/22, 4/26, 4/27, 4/28 & 4/30 Sputum salivary contamination, 4/22 BC x2 Neg, 4/26 sputum 3+ Candida Albicans & 3+ Candida Glabrata, 4/26 Urine Cx Neg, 4/26 Strep pneumoniae Ag Neg, Legionella Ag Neg, 4/26 Fungitell Beta-D Glucan 231, 4/26 BC x2 Neg, 4/29 Tissue cx 1+ Rare Candida Albicans, 4/30 Urine Cx Pending, 4/30 BC x2 NGTD, 5/2 Syphilis Total Ab Reactive==> 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  - 4/17 & 4/29 MRSA PCR +  - 4/30 Procal 2.95  - Antimicrobials: azithromycin 4/18-4/20, Remdesevir 4/18-4/22, Vanc xxx- 5/1, Skylar 4/26- 5/2  - Doxycyline 100 mg for Hidradenitis ppx    MSK/SKIN: Chronic Sacral, scrotum & Buttocks wounds, Hidradenitis  - derm has previously recommended humira/minocycline 100 BID but currently w/o lab confirmation, hold off iso septic shock  - will need outpatient dermatology follow up w/ doxycycline  100 BID outpatient   - seen by wound care RN on 5/2: Patient has black/brown discoloration on B/L areas are nontender, without temperature change, or induration. These are not pressure injuries. There is black soft thickened tissue over left hip/buttocks skin changes r/t hydradenitis  - Recs: Cleanse wounds with CHG bath wipes, Aquacel Ag and Mepilex bordered foam to open, draining areas of sacrum, Interdry to under scrotum and in between creases of groin/ inner thighs, Turn every 2 hours.       : Inguinal lymphadenopathy   - US upper ext duplex 5/1: Superficial venous thrombus in L cephalic vein   - US lower ext duplex 5/1: Lymph nodes Lt groin lymph node measures 5.62 mm x 41.53 mm, Rt groin lymph node # 1 measures 7.48 mm x 31.64 mm. Lymph node #2 3.57 mm x 26.93 mm.  - R/o infection with STI testing (Chlamydia, neisseria gonorrhea, HSV, previous HIV 1/2 testing this admission neg) and plan for POCUS of inguinal LN     Lines: Midline (4/30), PIVx3, NG, External cath    Code Status: DNR  DPOA/Contact Number: DEEPAK RANGEL, Artis Phone: [phone]     Dispo:  continue SDU for hemodynamic monitoring & bronchial hygiene    Discharge planning: [facility]    Pt discussed with Dr. Lea Gonzáles, seen and examined. All labs, VS and previous plan of care reviewed.         Kassy Ingram, APRN-CNP  Pulmonary/Critical Care/Internal Medicine  I spent 45 minutes in the professional and overall care of this patient.

## 2024-05-03 NOTE — PROGRESS NOTES
[facility] to [facility] Transfer Summary     I:  ICU Admission Reason & Brief ICU Course:    Brian Rodriguez is a 69 y.o. male admitted to the MICU for multiple rapids, hypotension, and acute hypoxic respiratory failure requiring airvo support. PMHx CKD, DM2, HTN, sacral wound, bipolar disorder who was originally admitted from his SNF 2/2 encephalopathy. On the floor, the patient had several aspiration events. His pressures responded to fluids and IVC was collapsible. Presentation most consistent with aspiration pna 2/2 dysphagia and shock of unknown etiology which is improving. Was transferred to the MICU on 4/22/2024 following a rapid response with increased O2 to 12L and hypotension at 86/46, BP was responsive to fluids. 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 For his multifocal and aspiration pneumonia, he received azithromycin 4/18-4/20, Remdesevir 4/18-4/22, and meropenem 4/26-5/2. Procal 2.95, Fungitel 231. Bcx -ve, and Sputum culture was contaminated. Doxy 100mg restarted for Hydradenitis suppurativa ppx. NGT was placed for dysphagia, TF and FWF held for Brain MRI. Also had TERRI on CKD stage III (baseline Cr 1.7), likely prerenal azotemia 2/2 hypovolemia/hemodynamic instability. Hospital course complicated by new onset Afib found on 5/2/2024 without tachycardia, patient transitioned to heparin gtt for anticoagulation. Currently breathing in RA with stable BP, ready to be transferred to Stepdown.     C: Code Status/DPOA Info/Goals of Care/ACP Note    DNR  DPOA/Contact Number: DEEPAK RODRIGUEZ, Home Phone: [phone]     U: Unprescribing & Pertinent High-Risk Medications    Changes to home meds: None     Anticoagulation: Yes - Therapeutic Heparin gtt    Antibiotics:   [] N/A - no current planned antimicrobioals  [x]  HS ppx, restarted 5/2/2024    P: Pending Tests at the Time of Transfer   MRI of Brain without contrast       A: Active consultants,  including Rehab:   []  Subspecialty Consultants: neurology  []  PT  []  OT  [x]  SLP  [x]  Wound Care    U: Uncertainty Measure/Diagnostic Pause:    Working diagnosis at the time of transfer aspiration pneumonia     Diagnosis Degree of Certainty: 1. High degree of certainty about the clinical diagnosis.     S: Summary of Major Problems and To-Dos:   # Acute Hypoxic Respiratory Failure  # Multifocal Pneumonia  # Aspiration Pneumonia  # Fever  :: Bacterial pna 2/2 aspiration in the setting of dysphagia and recent covid pna  :: Fungemia unlikely considering pt's improvement without treatment  - s/p azithromycin 4/18-4/20, Remdesevir 4/18-4/22  - DC vancomycin 5/1  - Meropenem discontinued (4/26 - 5/2)   - Fungal chem (fungitel 231), blood (negative) and respiratory (contaminated)  - Candida from previous respiratory culture, resent urine culture as micro lab did not have definitive information  - Restarted Doxy 100mg for HS ppx 5/2/24    #New onset Afib  - found PM 5/2/2024 without tachycardia  - started on heparin gtt for anticoagulation    # Acute Encephalopathy  # Dementia  # Hypoactive Delirium  :: per family baseline AOx1-2, able to converse and follow commands  :: Hyponatremia +/- delirium 2/2 infection and prolonged hospitalization  - Neurology consulted, appreciate recommendations: per neurology would like MRI brain w/o contrast for Wallerian degeneration  -- Limited d/t aspiration when lying flat  -- FU neurology once pt can tolerate MRI  - Continue to optimize for MRI, tube feeds held and doing breathing trials as patient previously had desaturations to 80s; patient can receive Ativan 0.2 for anxiety for MR    # Multiple Sacral Wounds  :: R buttock, new exudate noted  - Wound cultures sent  - Meropenem discontinued (4/26 - 5/2)      # Hidradenitis Suppurativa  :: derm has previously recommended humira/minocycline 100 BID but currently w/o lab confirmation, hold off iso septic shock  - will need outpatient  dermatology follow up w/ doxycycline 100 BID outpatient   - wound care consulted, appreciate recommendations  - Restarted doxycycline while inpatient     # Acute on Chronic Anemia  :: Most likely iso iron deficiency anemia and anemia of chronic disease: iron low, TIBC 95, folate WNL, B12 supratherapeutic  - Hold off on iron supplementation iso active infection  - Ferritin 539/TIBC 94/iron 16 => fe deficient + sequestration  - Haptoglobin 228   - Reticulocyte count: 1%, immature 19% suggesting adequate production  - Peripheral smear pending    #Inguinal lymphadenopathy   - US upper ext duplex 5/1: Superficial venous thrombus in L cephalic vein   - US lower ext duplex 5/1: Lymph nodes Lt groin lymph node measures 5.62 mm x 41.53 mm, Rt groin lymph node # 1 measures 7.48 mm x 31.64 mm. Lymph node #2 3.57 mm x 26.93 mm.  - R/o infection with STI testing (Chlamydia, neisseria gonorrhea, HSV, previous HIV 1/2 testing this admission neg) and plan for POCUS of inguinal LN     To-do list prior to transfer:  [] Pending MRI Brain without contrast  [] Follow up with Neurology recommendations   [] Wound care for his multiple sacral wounds and Hidradenitis Suppurativa  [] Trial of void and remove Barton   [] R/o infection with STI testing (Chlamydia, neisseria gonorrhea, HSV, previous HIV 1/2 testing this admission neg) and plan for POCUS of inguinal LN       E: Exam, including Lines/Drains/Airways & Data Review:   Constitutional:       Appearance: He is cachectic. He is not toxic-appearing.   HENT:      Head: Normocephalic and atraumatic.      Nose: Nose normal.      Mouth: MMM  Cardiovascular:      Rate and Rhythm: Regular rhythm. Tachycardia present.      Heart sounds: Normal heart sounds.   Pulmonary:      Breath sounds: Rhonchi present. No wheezing or rales. Breathing in RA sat'ing SpO2 95%  Abdominal:      General: Abdomen is flat.      Palpations: Abdomen is soft.   Musculoskeletal:      Right lower leg: No edema.      Left  lower leg: No edema.   Skin:     General: Skin is warm and dry.      Findings: Lesion present.      Comments: Subcutaneous nodules noted on R zygomatic bone. No exudate, no erythema.  L buttock wound with new purulent drainage   Neurological:      Mental Status: He is alert and easily aroused.      Motor: Weakness present.   Dysarthric    Difficult airway? No  Lines/drains assessed for removal? Yes, describe: Barton may be removed    Within 30 minutes of the patient physically leaving the floor, a Floor Readiness Note needs to be placed with updated vitals.

## 2024-05-03 NOTE — SIGNIFICANT EVENT
Rapid Response RN Note    Rapid response RN following up for RADAR score 6 due to the following VS: T 35.6 °Celsius; HR 77 ; RR 15; BP 99/52; SPO2 93%.     Reviewed above VS with bedside RN.  VS within patient's current trends.  Patient denied pain, shortness of breath, dizziness or lightheadedness.  No interventions by rapid response team indicated at this time.      Primary RN encouraged to page rapid response for any concerns or acute change in condition/VS.

## 2024-05-03 NOTE — SIGNIFICANT EVENT
05/03/24 1235   Onset Documentation   Rapid Response Initiated By Radar auto page   Location/Room [facility]  ([facility] 3764)   Pager Time 1230   Arrival Time 1235   Event End Time 1240   Level II Called No   Primary Reason for Call Radar auto page     Rapid Response Note    Radar auto-page received for a radar score of 6 with the following vital signs: 36.0, 88, 23, 91/53, 95%.  Vital signs were confirmed and reviewed with primary RN.  He is at his current baseline.  There are no indications for interventions by Rapid Response at this time.  RN to contact Rapid Response with any future concerns or signs of clinical decompensation.

## 2024-05-03 NOTE — SIGNIFICANT EVENT
Floor Readiness Note       I, personally, evaluated Brian Rodriguez prior to transfer to the floor, including reviewing all current laboratory and imaging studies. The patient remains appropriate for transfer to the floor. Bedside nurse and respiratory therapy are also in agreement of patient's readiness for the floor.     Brief summary:  Brian Rodriguez is a 69 y.o. male who was admitted to the MICU on 4/22/2024 for hypotension and increased O2 requirement following a Rapid Response, likely due to aspiration pneumonia. They have been treated with fluids and antibiotics (meropenem and doxycycline 5/2- ).    Updated focused Physical Exam:  GENERAL: cachetic, NAD  HEENT: EOMI, MMM  HEART: RRR, S1/S2+, no murmurs  RESPIRATORY: Rhonchi is present  ABDOMEN: soft, NT/ND  EXTREMITIES: no peripheral edema bilaterally    Current Vital Signs:  Heart Rate: 84 (05/03/24 0000 : Kassy Cote, RN)  BP: 99/62 (05/03/24 0000 : Kassy Cote, RN)  Temp: 35.6 °C (96.1 °F) (05/03/24 0000 : Gennaro Nuñez)  Resp: 19 (05/03/24 0000 : Kassy Cote, RN)  SpO2: 100 % (05/03/24 0000 : Kassy Cote RN) - on 4L, but also sat'ing 95% without NC (the NC is off to the side)    Relevant updates since rounds:  - Pending MRI Brain without contrast (scheduled)  - US upper ext duplex 5/1: Superficial venous thrombus in L cephalic vein   - US lower ext duplex 5/1: Lymph nodes Lt groin lymph node measures 5.62 mm x 41.53 mm, Rt groin lymph node # 1 measures 7.48 mm x 31.64 mm. Lymph node #2 3.57 mm x 26.93 mm.  - R/o infection with STI testing and plan for POCUS of inguinal LN     Accepting team,  SDU , received verbal sign out and the Provider Care team/Attending has been updated. Bedside nurse will now call accepting nurse for report and patient will be transferred to [place] [id number].    Prateek Moura MD       kicking b/l LE's spontaneously

## 2024-05-03 NOTE — PROGRESS NOTES
Physical Therapy                 Therapy Communication Note    Patient Name: Brian Rodriguez  MRN: [id number]  Today's Date: 5/3/2024     Discipline: Physical Therapy    Missed Visit Reason: Missed Visit Reason: Patient in a medical procedure    Missed Time: Attempt    Comment: Pt off division at MRI.

## 2024-05-04 ENCOUNTER — APPOINTMENT (OUTPATIENT)
Dept: RADIOLOGY | Facility: HOSPITAL | Age: 69
DRG: 871 | End: 2024-05-04
Payer: MEDICARE

## 2024-05-04 ENCOUNTER — APPOINTMENT (OUTPATIENT)
Dept: CARDIOLOGY | Facility: HOSPITAL | Age: 69
End: 2024-05-04
Payer: MEDICARE

## 2024-05-04 LAB
ALBUMIN SERPL BCP-MCNC: 1.6 G/DL (ref 3.4–5)
ANION GAP SERPL CALC-SCNC: 17 MMOL/L (ref 10–20)
BACTERIA BLD CULT: NORMAL
BACTERIA BLD CULT: NORMAL
BUN SERPL-MCNC: 32 MG/DL (ref 6–23)
CALCIUM SERPL-MCNC: 7.7 MG/DL (ref 8.6–10.6)
CHLORIDE SERPL-SCNC: 101 MMOL/L (ref 98–107)
CO2 SERPL-SCNC: 24 MMOL/L (ref 21–32)
CREAT SERPL-MCNC: 2.4 MG/DL (ref 0.5–1.3)
EGFRCR SERPLBLD CKD-EPI 2021: 28 ML/MIN/1.73M*2
ERYTHROCYTE [DISTWIDTH] IN BLOOD BY AUTOMATED COUNT: 21 % (ref 11.5–14.5)
GLUCOSE BLD MANUAL STRIP-MCNC: 156 MG/DL (ref 74–99)
GLUCOSE BLD MANUAL STRIP-MCNC: 159 MG/DL (ref 74–99)
GLUCOSE BLD MANUAL STRIP-MCNC: 163 MG/DL (ref 74–99)
GLUCOSE BLD MANUAL STRIP-MCNC: 174 MG/DL (ref 74–99)
GLUCOSE BLD MANUAL STRIP-MCNC: 216 MG/DL (ref 74–99)
GLUCOSE SERPL-MCNC: 135 MG/DL (ref 74–99)
HCT VFR BLD AUTO: 25.5 % (ref 41–52)
HGB BLD-MCNC: 8.1 G/DL (ref 13.5–17.5)
MAGNESIUM SERPL-MCNC: 2.27 MG/DL (ref 1.6–2.4)
MCH RBC QN AUTO: 25.2 PG (ref 26–34)
MCHC RBC AUTO-ENTMCNC: 31.8 G/DL (ref 32–36)
MCV RBC AUTO: 79 FL (ref 80–100)
NRBC BLD-RTO: 0 /100 WBCS (ref 0–0)
PHOSPHATE SERPL-MCNC: 5.3 MG/DL (ref 2.5–4.9)
PLATELET # BLD AUTO: 467 X10*3/UL (ref 150–450)
POTASSIUM SERPL-SCNC: 3.7 MMOL/L (ref 3.5–5.3)
RBC # BLD AUTO: 3.22 X10*6/UL (ref 4.5–5.9)
SODIUM SERPL-SCNC: 138 MMOL/L (ref 136–145)
UFH PPP CHRO-ACNC: 0.3 IU/ML
UFH PPP CHRO-ACNC: 0.4 IU/ML
VIT B1 PYROPHOSHATE BLD-SCNC: 125 NMOL/L (ref 70–180)
WBC # BLD AUTO: 10.7 X10*3/UL (ref 4.4–11.3)

## 2024-05-04 PROCEDURE — 2500000001 HC RX 250 WO HCPCS SELF ADMINISTERED DRUGS (ALT 637 FOR MEDICARE OP): Performed by: NURSE PRACTITIONER

## 2024-05-04 PROCEDURE — 2500000004 HC RX 250 GENERAL PHARMACY W/ HCPCS (ALT 636 FOR OP/ED)

## 2024-05-04 PROCEDURE — 2500000004 HC RX 250 GENERAL PHARMACY W/ HCPCS (ALT 636 FOR OP/ED): Performed by: NURSE PRACTITIONER

## 2024-05-04 PROCEDURE — 2500000001 HC RX 250 WO HCPCS SELF ADMINISTERED DRUGS (ALT 637 FOR MEDICARE OP)

## 2024-05-04 PROCEDURE — 74018 RADEX ABDOMEN 1 VIEW: CPT | Performed by: RADIOLOGY

## 2024-05-04 PROCEDURE — 82947 ASSAY GLUCOSE BLOOD QUANT: CPT | Mod: 91

## 2024-05-04 PROCEDURE — 82947 ASSAY GLUCOSE BLOOD QUANT: CPT

## 2024-05-04 PROCEDURE — 80069 RENAL FUNCTION PANEL: CPT | Performed by: NURSE PRACTITIONER

## 2024-05-04 PROCEDURE — C9113 INJ PANTOPRAZOLE SODIUM, VIA: HCPCS

## 2024-05-04 PROCEDURE — 51702 INSERT TEMP BLADDER CATH: CPT

## 2024-05-04 PROCEDURE — 85027 COMPLETE CBC AUTOMATED: CPT | Performed by: NURSE PRACTITIONER

## 2024-05-04 PROCEDURE — S4991 NICOTINE PATCH NONLEGEND: HCPCS | Performed by: NURSE PRACTITIONER

## 2024-05-04 PROCEDURE — 85520 HEPARIN ASSAY: CPT | Performed by: NURSE PRACTITIONER

## 2024-05-04 PROCEDURE — 74018 RADEX ABDOMEN 1 VIEW: CPT

## 2024-05-04 PROCEDURE — 2500000002 HC RX 250 W HCPCS SELF ADMINISTERED DRUGS (ALT 637 FOR MEDICARE OP, ALT 636 FOR OP/ED)

## 2024-05-04 PROCEDURE — 2060000001 HC INTERMEDIATE ICU ROOM DAILY

## 2024-05-04 PROCEDURE — 2500000002 HC RX 250 W HCPCS SELF ADMINISTERED DRUGS (ALT 637 FOR MEDICARE OP, ALT 636 FOR OP/ED): Performed by: NURSE PRACTITIONER

## 2024-05-04 PROCEDURE — 83735 ASSAY OF MAGNESIUM: CPT | Performed by: NURSE PRACTITIONER

## 2024-05-04 PROCEDURE — 93005 ELECTROCARDIOGRAM TRACING: CPT

## 2024-05-04 RX ORDER — METOCLOPRAMIDE 10 MG/1
5 TABLET ORAL EVERY 6 HOURS
Status: COMPLETED | OUTPATIENT
Start: 2024-05-04 | End: 2024-05-05

## 2024-05-04 RX ORDER — POTASSIUM CHLORIDE 14.9 MG/ML
20 INJECTION INTRAVENOUS ONCE
Status: COMPLETED | OUTPATIENT
Start: 2024-05-04 | End: 2024-05-04

## 2024-05-04 RX ADMIN — METOCLOPRAMIDE 5 MG: 10 TABLET ORAL at 20:54

## 2024-05-04 RX ADMIN — GUAIFENESIN 100 MG: 200 SOLUTION ORAL at 09:06

## 2024-05-04 RX ADMIN — Medication 1 TABLET: at 09:06

## 2024-05-04 RX ADMIN — HEPARIN SODIUM 1500 UNITS/HR: 10000 INJECTION, SOLUTION INTRAVENOUS at 06:10

## 2024-05-04 RX ADMIN — INSULIN LISPRO 2 UNITS: 100 INJECTION, SOLUTION INTRAVENOUS; SUBCUTANEOUS at 15:52

## 2024-05-04 RX ADMIN — Medication: at 09:37

## 2024-05-04 RX ADMIN — NICOTINE 7 MG/24 HR DAILY TRANSDERMAL PATCH 1 PATCH: at 00:36

## 2024-05-04 RX ADMIN — DOXYCYCLINE HYCLATE 100 MG: 100 TABLET, COATED ORAL at 09:06

## 2024-05-04 RX ADMIN — SALINE NASAL SPRAY 1 SPRAY: 1.5 SOLUTION NASAL at 13:13

## 2024-05-04 RX ADMIN — INSULIN GLARGINE 10 UNITS: 100 INJECTION, SOLUTION SUBCUTANEOUS at 20:54

## 2024-05-04 RX ADMIN — SALINE NASAL SPRAY 1 SPRAY: 1.5 SOLUTION NASAL at 16:01

## 2024-05-04 RX ADMIN — PANTOPRAZOLE SODIUM 40 MG: 40 INJECTION, POWDER, FOR SOLUTION INTRAVENOUS at 09:06

## 2024-05-04 RX ADMIN — SALINE NASAL SPRAY 1 SPRAY: 1.5 SOLUTION NASAL at 20:54

## 2024-05-04 RX ADMIN — INSULIN LISPRO 2 UNITS: 100 INJECTION, SOLUTION INTRAVENOUS; SUBCUTANEOUS at 11:18

## 2024-05-04 RX ADMIN — SODIUM BICARBONATE 650 MG: 650 TABLET ORAL at 20:54

## 2024-05-04 RX ADMIN — METOCLOPRAMIDE 5 MG: 10 TABLET ORAL at 14:45

## 2024-05-04 RX ADMIN — SODIUM BICARBONATE 650 MG: 650 TABLET ORAL at 09:06

## 2024-05-04 RX ADMIN — INSULIN LISPRO 4 UNITS: 100 INJECTION, SOLUTION INTRAVENOUS; SUBCUTANEOUS at 22:23

## 2024-05-04 RX ADMIN — DOXYCYCLINE HYCLATE 100 MG: 100 TABLET, COATED ORAL at 20:54

## 2024-05-04 RX ADMIN — POTASSIUM CHLORIDE 20 MEQ: 14.9 INJECTION, SOLUTION INTRAVENOUS at 09:28

## 2024-05-04 RX ADMIN — THIAMINE HCL TAB 100 MG 100 MG: 100 TAB at 09:07

## 2024-05-04 ASSESSMENT — PAIN - FUNCTIONAL ASSESSMENT
PAIN_FUNCTIONAL_ASSESSMENT: 0-10

## 2024-05-04 ASSESSMENT — COGNITIVE AND FUNCTIONAL STATUS - GENERAL
DAILY ACTIVITIY SCORE: 13
WALKING IN HOSPITAL ROOM: A LOT
EATING MEALS: A LITTLE
TOILETING: A LOT
MOBILITY SCORE: 13
TURNING FROM BACK TO SIDE WHILE IN FLAT BAD: A LOT
MOVING FROM LYING ON BACK TO SITTING ON SIDE OF FLAT BED WITH BEDRAILS: A LITTLE
DRESSING REGULAR UPPER BODY CLOTHING: A LOT
HELP NEEDED FOR BATHING: A LOT
DRESSING REGULAR LOWER BODY CLOTHING: A LOT
PERSONAL GROOMING: A LOT
CLIMB 3 TO 5 STEPS WITH RAILING: A LOT
MOVING TO AND FROM BED TO CHAIR: A LOT
STANDING UP FROM CHAIR USING ARMS: A LOT

## 2024-05-04 ASSESSMENT — PAIN SCALES - GENERAL
PAINLEVEL_OUTOF10: 0 - NO PAIN

## 2024-05-04 NOTE — PROGRESS NOTES
"Brian Rodriguez is a 69 y.o. male on day 17 of admission presenting with Pneumonia due to infectious organism, unspecified laterality, unspecified part of lung.    Subjective   No acute events over night.    Objective   Physical Exam:  Constitutional: cachectic, ill appearing male, NAD, alert and cooperative  Eyes: PERRL, EOMI, no icterus   ENMT: moist mucous membranes, no oral lesions seen  Head/Neck: Neck supple, no apparent injury  Respiratory/Thorax: Lungs CTA bilaterally, non-labored breathing, no cough, on RA  Cardiovascular: HRR, no m/g/r  Gastrointestinal: abdomen soft, non-tender, non-distended, Corpak tube in place.   : external catheter (awaiting post-void)  Musculoskeletal: generalized weakness  Extremities: no edema  Neurological: alert and oriented x 1-2, speech clear, follows commands with gen weakness  Skin: Warm and dry, no lesions, no rashes , left hand onychomycosis of 3rd digit    Last Recorded Vitals  Blood pressure 101/62, pulse 75, temperature 36 °C (96.8 °F), temperature source Temporal, resp. rate 18, height 1.778 m (5' 10\"), weight 61.1 kg (134 lb 11.2 oz), SpO2 94%.    Intake/Output last 3 Shifts:  I/O last 3 completed shifts:  In: 1318.7 (16.6 mL/kg) [I.V.:428.7 (5.4 mL/kg); NG/GT:890]  Out: 2750 (34.6 mL/kg) [Urine:2750 (1 mL/kg/hr)]  Dosing Weight: 79.4 kg     Scheduled medications  chlorhexidine, , Topical, Daily  doxycylcine, 100 mg, oral, q12h RANDAL  insulin glargine, 10 Units, subcutaneous, Nightly  insulin lispro, 0-10 Units, subcutaneous, q6h  multivitamin with minerals, 1 tablet, oral, Daily  nicotine, 1 patch, transdermal, Daily  pantoprazole, 40 mg, intravenous, Daily  potassium chloride, 20 mEq, intravenous, Once  sodium bicarbonate, 650 mg, oral, BID  sodium chloride, 1 spray, Each Nostril, 4x daily  thiamine, 100 mg, oral, Daily    Continuous medications  heparin, 0-4,500 Units/hr, Last Rate: 1,500 Units/hr (05/04/24 0610)    PRN medications  PRN medications: acetaminophen " **OR** [DISCONTINUED] acetaminophen **OR** [DISCONTINUED] acetaminophen, albuterol, dextrose, dextrose, glucagon, glucagon, guaiFENesin, heparin, ipratropium-albuteroL, melatonin, polyethylene glycol, sennosides-docusate sodium, traMADol     Relevant Results  Results for orders placed or performed during the hospital encounter of 04/17/24 (from the past 24 hour(s))   Heparin Assay, UFH   Result Value Ref Range    Heparin Unfractionated 0.2 See Comment Below for Therapeutic Ranges IU/mL   POCT GLUCOSE   Result Value Ref Range    POCT Glucose 142 (H) 74 - 99 mg/dL   Heparin Assay, UFH   Result Value Ref Range    Heparin Unfractionated 0.3 See Comment Below for Therapeutic Ranges IU/mL   POCT GLUCOSE   Result Value Ref Range    POCT Glucose 228 (H) 74 - 99 mg/dL   Heparin Assay, UFH   Result Value Ref Range    Heparin Unfractionated 0.1 See Comment Below for Therapeutic Ranges IU/mL   POCT GLUCOSE   Result Value Ref Range    POCT Glucose 114 (H) 74 - 99 mg/dL   Magnesium   Result Value Ref Range    Magnesium 2.27 1.60 - 2.40 mg/dL   Renal Function Panel   Result Value Ref Range    Glucose 135 (H) 74 - 99 mg/dL    Sodium 138 136 - 145 mmol/L    Potassium 3.7 3.5 - 5.3 mmol/L    Chloride 101 98 - 107 mmol/L    Bicarbonate 24 21 - 32 mmol/L    Anion Gap 17 10 - 20 mmol/L    Urea Nitrogen 32 (H) 6 - 23 mg/dL    Creatinine 2.40 (H) 0.50 - 1.30 mg/dL    eGFR 28 (L) >60 mL/min/1.73m*2    Calcium 7.7 (L) 8.6 - 10.6 mg/dL    Phosphorus 5.3 (H) 2.5 - 4.9 mg/dL    Albumin 1.6 (L) 3.4 - 5.0 g/dL   CBC   Result Value Ref Range    WBC 10.7 4.4 - 11.3 x10*3/uL    nRBC 0.0 0.0 - 0.0 /100 WBCs    RBC 3.22 (L) 4.50 - 5.90 x10*6/uL    Hemoglobin 8.1 (L) 13.5 - 17.5 g/dL    Hematocrit 25.5 (L) 41.0 - 52.0 %    MCV 79 (L) 80 - 100 fL    MCH 25.2 (L) 26.0 - 34.0 pg    MCHC 31.8 (L) 32.0 - 36.0 g/dL    RDW 21.0 (H) 11.5 - 14.5 %    Platelets 467 (H) 150 - 450 x10*3/uL   Heparin Assay, UFH   Result Value Ref Range    Heparin Unfractionated  0.4 See Comment Below for Therapeutic Ranges IU/mL   POCT GLUCOSE   Result Value Ref Range    POCT Glucose 156 (H) 74 - 99 mg/dL   Heparin Assay, UFH   Result Value Ref Range    Heparin Unfractionated 0.3 See Comment Below for Therapeutic Ranges IU/mL   POCT GLUCOSE   Result Value Ref Range    POCT Glucose 159 (H) 74 - 99 mg/dL      Imagin2024 CT Head:  1. There is no evidence of acute hemorrhage, mass lesion or acute  infarction.     2024 CT Chest/Abd/Pelvis:  1. Peribronchovascular patchy ground-glass/consolidative opacities  throughout the right lung and to a lesser degree within the dependent  left lower lobe compatible with multifocal pneumonia. Scattered  endobronchial debris most pronounced within the right lower lobe with  associated bronchial wall thickening and debris within the distal  trachea and right mainstem bronchus concerning for aspiration.  2. Extensive skin thickening of the left gluteal soft tissues which  appears slightly more pronounced when compared to 2023, though  with resolution of subcutaneous gas/fluid seen on the prior exam. No  definitive associated focal fluid collection, however evaluation is  limited in the absence of IV contrast.  The skin thickening/fat  stranding extends to the sacrococcygeal bone surface, possibly  reflecting decubitus ulceration.  3. Regions of focal ovoid hypoattenuation within the right inferior  scrotum as seen on images 236-239. The findings are indeterminate and  may be chronic (similar findings were seen on the 2023  examination). Scrotum is suboptimally evaluated on noncontrast CT and  scrotal ultrasound may be considered for further evaluation.  4. Large rectal stool volume. Please correlate for fecal impaction.  5. There is moderate to severe distention of the urinary bladder.  Please correlate for urinary retention.  6. Coarse pancreatic calcifications compatible with chronic  pancreatitis.  7. Bilateral C7 cervical  ribs.  8. Cholelithiasis.     4/27 Scrotal U/S:  1. No abnormal scrotal wall thickening or hyperemia to suggest active  infectious process.  2. Normal bilateral testes with intact flow.  3. Trace right-sided hydrocele with scrotal david.     4/29/2024 CT A/P:  1.  Marked worsening of pulmonary edema, with superimposed multifocal  infection not excluded, with interval development of slightly  septated moderate right-sided pleural effusion and small left pleural  effusion.  2. New peripancreatic haziness around the pancreatic head,  indeterminate and may be due to motion artifact or peripancreatic  inflammation, correlate with concern for pancreatitis.  3. Redemonstration of extensive skin thickening of the left gluteal  soft tissues which appears slightly decreased in size and extent when  compared to prior, as detailed above. No definitive associated focal  fluid collection however limited evaluation given the absence of IV  contrast.  4. Increased diffuse body wall edema.  5. Fluid contents seen in loops of large bowel, correlate with  diarrhea.  6. Unchanged region of focal ovoid hypoattenuation the right scrotum,  and incompletely characterized on noncontrast CT. Correlate with  recent scrotal ultrasound from 04/27/2024.     4/29/2024 Echo:  CONCLUSIONS:   1. Left ventricular systolic function is low normal with a 60% estimated ejection fraction.   2. No definite valvular vegetations were visualized.   3. Poorly visualized anatomical structures due to suboptimal image quality.     4/29/2024 U/S BUE:  Right Upper Venous: No evidence of acute deep vein thrombus visualized in the right upper extremity. There is acute occlusive superficial venous thrombosis visualized in the cephalic vein in forearm.  Left Upper Venous: The subclavian vein demonstrates a normal spontaneous and phasic flow.     XR chest 1 view 04/30/2024  Impression  1. Similar findings of multifocal pneumonia compared to prior exam.  Overall, there  has been a general worsening of the bilateral airspace  opacities when compared to earlier prior exams 2024 and  2024.     2024 U/S BLE:  Right Lower Venous: No evidence of acute deep vein thrombus visualized in the right lower extremity. Calf veins visualized in segmnets. Additional Findings; Lymph nodes Rt groin lymph node # 1 measures 7.48 mm x 31.64 mm.  Lymph node #2 3.57 mm x 26.93 mm.  Left Lower Venous: No evidence of acute deep vein thrombus visualized in the left lower extremity. Peroneal veins visualized in segmnets. Additional Findings; Lymph nodes Lt groin lymph node measures 5.62 mm x 41.53 mm.     2024 U/S BUE:  Left Upper Venous: No evidence of acute deep vein thrombus visualized in the left upper extremity. There is acute occlusive superficial venous thrombosis visualized in the proximal cephalic vein. Contralateral right subclavian vein doppler waveform not saved due to hardware error. Additional Findings; IV Line.         2024 EM. 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   2. Myopathic units in the proximal muscles, with sparse active denervation, consistent with a generalized myopathy, not sufficient for definite diagnosis of necrotizing myopathy.   3. Active and chronic, left median neuropathy at the wrist, which in the proper clinical context would be consistent with carpal tunnel syndrome.   4. Active and chronic, left ulnar neuropathy across the elbow.      5/3/2024 XR chest 1 view     1.  Extensive perihilar edema and effusions and correlate with fluid status.      5/3/2024 MR brain wo IV contrast  Limited, incomplete, and motion degraded MRI of the brain demonstrating no definitive evidence of acute ischemic injury.       2024 MR humerus left wo IV contrast   1. No evidence of myositis in the visualized right upper extremity.   2. Questionable minimal subcutaneous  edema in the anterior mid arm.           Assessment/Plan   Principal Problem:    Pneumonia due to infectious organism, unspecified laterality, unspecified part of lung    Mr Brian Rodriguez is a 70 y/o male with hx of Type 2DM, HTN, CKD, HLD, hidradenitis, bipolar dz who presented to the ED on 4/17/23 with altered mental status with concern for infection.  Was admitted to medicine floor with multifocal PNA, HyperNa, TERRI on CKD.  Required MICU transfer twice d/t acute hypoxic respiratory failure and concern for sepsis likely 2/2 aspiration PNA.  Was transferred to Step down on 5/3    Neuro: hx of bipolar dx, acute encephalopathy, hypoactive delirium.    -A&Ox2 at baseline  -has been followed by neuro  -per neuro recs:  -MRI brain w/o contrast for Wallerian degeneration (done 5/3 - poor quality, but no acute abnormalities)  and MRI L humerus to r/o myositis (done 5/4 -negative)   -5/2 EMG  consistent with generalized myopathy, not sufficient for definite dx of necrotizing myopathy   -aldolase level and MMA sent 5/2, results pending   -Repeat TSH and T4 (completd 5/2, TSH and FT4 wnl)  - s/p IV Thiamine x 7 days, continue daily Thiamine supp    Pulm: acute hypoxic respiratory failure 2/2 Aspiration, multifocal PNA, recent Covid + (4/18)  -stable on RA  -MBS showed silent aspiration.  Continue strict NPO  -airway clearance with Vpep/oxyget TID  -NT suction as needed    Cardio:  HTN, HLD, new onset Afib  -SBP 90s-100s  -holding home Amlodipine and statin   -continue with Heparin gtt (CHADsVasc 3)   -PRN diuresis (most recent Lasix 5/2 40 mg IV)    FEN/GI: hypoalbuminemia, dysphagia, sever malnutrition with BMI 19  -TF Glucerna 1.5 @55/hr , Nutrisource fiber TID.  TF has been on hold 2/2 concern for aspiration of TF  -repeat KUB showing feeding tube post-pyloric- restarting TF today   -added Reglan x3 doses (Qtc 448 on 5/4 ECG)  -will need long term nutrition plan   -strict NPO, SLP following  -bowel  regimen  -PPI    Renal:  TERRI on CKD, HyperNa (resolved)   -creat 2.4 (has been during hospital stay)   -has been failing voiding trials, will replace bunch  -daily RFP     : Inguinal lymphadenopathy   - US upper ext duplex 5/1: Superficial venous thrombus in L cephalic vein   - US lower ext duplex 5/1: Lymph nodes Lt groin lymph node measures 5.62 mm x 41.53 mm, Rt groin lymph node # 1 measures 7.48 mm x 31.64 mm. Lymph node #2 3.57 mm x 26.93 mm.  - R/o infection with STI testing (Chlamydia, neisseria gonorrhea, HSV, previous HIV 1/2 testing this admission neg) and plan for POCUS of inguinal LN     Heme:  NADER  -Ferritin  539/TIBC 94/iron 16   - holding Fe supp due to recent infection & atb  -continue  Heparin drip  -no s/sx bleeding  -daily CBC    Endo: Type 2 DM  -A1c 8.5 ($/2024)  -blood sugars 110s-170s  -continue with Lantus 10 units HS and SSI lispro  -accu checks every 4 hrs and hypoglycemia protocol    ID: hx of hidradenitis.  Covid + 4/18, Multifocal PNA, Aspiration PNA  - afebrile, no leukocytosis   - Micro:  4/17 BC x2 Neg, 4/18, 4/22, 4/26, 4/27, 4/28 & 4/30 Sputum salivary contamination, 4/22 BC x2 Neg, 4/26 sputum 3+ Candida Albicans & 3+ Candida Glabrata, 4/26 Urine Cx Neg, 4/26 Strep pneumoniae Ag Neg, Legionella Ag Neg, 4/26 Fungitell Beta-D Glucan 231, 4/26 BC x2 Neg, 4/29 Tissue cx 1+ Rare Candida Albicans, 4/30 Urine Cx Pending, 4/30 BC x2 NGTD, 5/2 Syphilis Total Ab Reactive==> 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  - 4/17 & 4/29 MRSA PCR +  - 4/30 Procal 2.95  - ABX: azithromycin 4/18-4/20, Remdesevir 4/18-4/22, Vanc 4/18- 5/1, Skylar 4/26- 5/2  - Doxycyline 100 mg for Hidradenitis ppx    MSK/SKIN: Chronic Sacral, scrotum & Buttocks wounds, Hidradenitis  - derm has previously recommended humira/minocycline 100 BID but currently w/o lab confirmation, hold off iso septic shock  - will need outpatient dermatology follow up w/ doxycycline 100 BID outpatient   - seen by wound care RN on 5/2: Patient has  black/brown discoloration on B/L areas are nontender, without temperature change, or induration. These are not pressure injuries. There is black soft thickened tissue over left hip/buttocks skin changes r/t hydradenitis  - Recs: Cleanse wounds with CHG bath wipes, Aquacel Ag and Mepilex bordered foam to open, draining areas of sacrum, Interdry to under scrotum and in between creases of groin/ inner thighs, Turn every 2 hours.    Prophy:  -Heparin gtt  -PPI    Lines: midline 4/30, PIVs, Corpak, external cath (will replace bunch today)    Dispo:  continue with Step down care.  Will return to [facility] when medically ready.  Will need pal care/GOC      D/w Dr Margret DIAZ spent >45 minutes in the professional and overall care of this patient.      Sarah Chandra, RULA-CNP

## 2024-05-04 NOTE — CARE PLAN
The patient's goals for the shift include Pt will be free from falls  use call light  bed alarm remians on    The clinical goals for the shift include HDS      Problem: Pain  Goal: My pain/discomfort is manageable  5/4/2024 1020 by Mary Haas RN  Outcome: Progressing  5/4/2024 1020 by Mary Haas RN  Outcome: Progressing     Problem: Psychosocial Needs  Goal: Demonstrates ability to cope with hospitalization/illness  5/4/2024 1020 by Mary Haas RN  Outcome: Progressing  5/4/2024 1020 by Mary Haas RN  Outcome: Progressing  Goal: Collaborate with me, my family, and caregiver to identify my specific goals  5/4/2024 1020 by Mary Haas RN  Outcome: Progressing  5/4/2024 1020 by Mary Haas RN  Outcome: Progressing     Problem: Discharge Barriers  Goal: My discharge needs are met  5/4/2024 1020 by Mary Haas RN  Outcome: Progressing  5/4/2024 1020 by Mary Haas RN  Outcome: Progressing     Problem: Fall/Injury  Goal: Verbalize understanding of personal risk factors for fall in the hospital  5/4/2024 1020 by Mary Haas RN  Outcome: Progressing  5/4/2024 1020 by Mary Haas RN  Outcome: Progressing  Goal: Verbalize understanding of risk factor reduction measures to prevent injury from fall in the home  5/4/2024 1020 by Mary Haas RN  Outcome: Progressing  5/4/2024 1020 by Mary Haas RN  Outcome: Progressing  Goal: Use assistive devices by end of the shift  5/4/2024 1020 by Mary Haas RN  Outcome: Progressing  5/4/2024 1020 by Mary Haas RN  Outcome: Progressing  Goal: Pace activities to prevent fatigue by end of the shift  5/4/2024 1020 by Mary Haas RN  Outcome: Progressing  5/4/2024 1020 by Mary Haas RN  Outcome: Progressing     Problem: Pain  Goal: Takes deep breaths with improved pain control throughout the shift  5/4/2024 1020 by Mary Haas RN  Outcome: Progressing  5/4/2024 1020 by Mary Zahir, RN  Outcome: Progressing  Goal: Turns in bed with improved pain control  throughout the shift  5/4/2024 1020 by Mary Haas RN  Outcome: Progressing  5/4/2024 1020 by Mary Haas RN  Outcome: Progressing  Goal: Walks with improved pain control throughout the shift  5/4/2024 1020 by Mary Haas RN  Outcome: Progressing  5/4/2024 1020 by Mary Haas RN  Outcome: Progressing  Goal: Performs ADL's with improved pain control throughout shift  5/4/2024 1020 by Mary Haas RN  Outcome: Progressing  5/4/2024 1020 by Mary Haas RN  Outcome: Progressing  Goal: Participates in PT with improved pain control throughout the shift  5/4/2024 1020 by Mary Haas RN  Outcome: Progressing  5/4/2024 1020 by Mary Haas RN  Outcome: Progressing  Goal: Free from opioid side effects throughout the shift  5/4/2024 1020 by Mary Haas RN  Outcome: Progressing  5/4/2024 1020 by Mary Haas RN  Outcome: Progressing  Goal: Free from acute confusion related to pain meds throughout the shift  5/4/2024 1020 by Mary Haas RN  Outcome: Progressing  5/4/2024 1020 by Mray Haas RN  Outcome: Progressing     Problem: Skin  Goal: Decreased wound size/increased tissue granulation at next dressing change  5/4/2024 1020 by Mary Haas RN  Outcome: Progressing  5/4/2024 1020 by Mary Haas RN  Outcome: Progressing  Goal: Participates in plan/prevention/treatment measures  5/4/2024 1020 by Mary Haas RN  Outcome: Progressing  Flowsheets (Taken 5/4/2024 1020)  Participates in plan/prevention/treatment measures:   Discuss with provider PT/OT consult   Increase activity/out of bed for meals   Elevate heels  5/4/2024 1020 by Mary Haas RN  Outcome: Progressing  Flowsheets (Taken 5/4/2024 1020)  Participates in plan/prevention/treatment measures:   Discuss with provider PT/OT consult   Increase activity/out of bed for meals   Elevate heels  Goal: Prevent/manage excess moisture  5/4/2024 1020 by Mary Haas RN  Outcome: Progressing  Flowsheets (Taken 5/4/2024 1020)  Prevent/manage excess  moisture:   Cleanse incontinence/protect with barrier cream   Moisturize dry skin   Use wicking fabric (obtain order)   Follow provider orders for dressing changes   Monitor for/manage infection if present  5/4/2024 1020 by Mary aHas RN  Outcome: Progressing  Flowsheets (Taken 5/4/2024 1020)  Prevent/manage excess moisture:   Cleanse incontinence/protect with barrier cream   Moisturize dry skin   Use wicking fabric (obtain order)   Follow provider orders for dressing changes   Monitor for/manage infection if present  Goal: Prevent/minimize sheer/friction injuries  5/4/2024 1020 by Mary Haas RN  Outcome: Progressing  Flowsheets (Taken 5/4/2024 1020)  Prevent/minimize sheer/friction injuries:   HOB 30 degrees or less   Complete micro-shifts as needed if patient unable. Adjust patient position to relieve pressure points, not a full turn   Increase activity/out of bed for meals   Turn/reposition every 2 hours/use positioning/transfer devices   Use pull sheet   Utilize specialty bed per algorithm  5/4/2024 1020 by Mary Haas RN  Outcome: Progressing  Flowsheets (Taken 5/4/2024 1020)  Prevent/minimize sheer/friction injuries:   HOB 30 degrees or less   Complete micro-shifts as needed if patient unable. Adjust patient position to relieve pressure points, not a full turn   Increase activity/out of bed for meals   Turn/reposition every 2 hours/use positioning/transfer devices   Use pull sheet   Utilize specialty bed per algorithm  Goal: Promote/optimize nutrition  5/4/2024 1020 by Mary Haas RN  Outcome: Progressing  Flowsheets (Taken 5/4/2024 1020)  Promote/optimize nutrition:   Assist with feeding   Discuss with provider if NPO > 2 days   Offer water/supplements/favorite foods   Consume > 50% meals/supplements   Monitor/record intake including meals   Reassess MST if dietician not consulted  5/4/2024 1020 by Mary Haas RN  Outcome: Progressing  Flowsheets (Taken 5/4/2024 1020)  Promote/optimize  nutrition:   Assist with feeding   Discuss with provider if NPO > 2 days   Offer water/supplements/favorite foods   Consume > 50% meals/supplements   Monitor/record intake including meals   Reassess MST if dietician not consulted  Goal: Promote skin healing  5/4/2024 1020 by Mary Haas RN  Outcome: Progressing  Flowsheets (Taken 5/4/2024 1020)  Promote skin healing:   Assess skin/pad under line(s)/device(s)   Ensure correct size (line/device) and apply per  instructions   Protective dressings over bony prominences   Rotate device position/do not position patient on device   Turn/reposition every 2 hours/use positioning/transfer devices  5/4/2024 1020 by Mary Haas RN  Outcome: Progressing  Flowsheets (Taken 5/4/2024 1020)  Promote skin healing:   Assess skin/pad under line(s)/device(s)   Ensure correct size (line/device) and apply per  instructions   Protective dressings over bony prominences   Rotate device position/do not position patient on device   Turn/reposition every 2 hours/use positioning/transfer devices

## 2024-05-05 LAB
ABO GROUP (TYPE) IN BLOOD: NORMAL
ALBUMIN SERPL BCP-MCNC: 1.7 G/DL (ref 3.4–5)
ALDOLASE SERPL-CCNC: 9.1 U/L (ref 1.2–7.6)
ANION GAP SERPL CALC-SCNC: 12 MMOL/L (ref 10–20)
ANTIBODY SCREEN: NORMAL
BUN SERPL-MCNC: 30 MG/DL (ref 6–23)
CALCIUM SERPL-MCNC: 7.5 MG/DL (ref 8.6–10.6)
CHLORIDE SERPL-SCNC: 101 MMOL/L (ref 98–107)
CO2 SERPL-SCNC: 25 MMOL/L (ref 21–32)
CREAT SERPL-MCNC: 2.08 MG/DL (ref 0.5–1.3)
EGFRCR SERPLBLD CKD-EPI 2021: 33 ML/MIN/1.73M*2
ERYTHROCYTE [DISTWIDTH] IN BLOOD BY AUTOMATED COUNT: 20.3 % (ref 11.5–14.5)
ERYTHROCYTE [DISTWIDTH] IN BLOOD BY AUTOMATED COUNT: 20.7 % (ref 11.5–14.5)
GLUCOSE BLD MANUAL STRIP-MCNC: 153 MG/DL (ref 74–99)
GLUCOSE BLD MANUAL STRIP-MCNC: 163 MG/DL (ref 74–99)
GLUCOSE BLD MANUAL STRIP-MCNC: 180 MG/DL (ref 74–99)
GLUCOSE BLD MANUAL STRIP-MCNC: 187 MG/DL (ref 74–99)
GLUCOSE SERPL-MCNC: 186 MG/DL (ref 74–99)
HCT VFR BLD AUTO: 22.6 % (ref 41–52)
HCT VFR BLD AUTO: 24.8 % (ref 41–52)
HGB BLD-MCNC: 7.8 G/DL (ref 13.5–17.5)
HGB BLD-MCNC: 7.8 G/DL (ref 13.5–17.5)
MAGNESIUM SERPL-MCNC: 1.98 MG/DL (ref 1.6–2.4)
MCH RBC QN AUTO: 24.9 PG (ref 26–34)
MCH RBC QN AUTO: 25.5 PG (ref 26–34)
MCHC RBC AUTO-ENTMCNC: 31.5 G/DL (ref 32–36)
MCHC RBC AUTO-ENTMCNC: 34.5 G/DL (ref 32–36)
MCV RBC AUTO: 74 FL (ref 80–100)
MCV RBC AUTO: 79 FL (ref 80–100)
NRBC BLD-RTO: 0 /100 WBCS (ref 0–0)
NRBC BLD-RTO: 0 /100 WBCS (ref 0–0)
PHOSPHATE SERPL-MCNC: 4.5 MG/DL (ref 2.5–4.9)
PLATELET # BLD AUTO: 468 X10*3/UL (ref 150–450)
PLATELET # BLD AUTO: 499 X10*3/UL (ref 150–450)
POTASSIUM SERPL-SCNC: 3.9 MMOL/L (ref 3.5–5.3)
RBC # BLD AUTO: 3.06 X10*6/UL (ref 4.5–5.9)
RBC # BLD AUTO: 3.13 X10*6/UL (ref 4.5–5.9)
RH FACTOR (ANTIGEN D): NORMAL
SODIUM SERPL-SCNC: 134 MMOL/L (ref 136–145)
UFH PPP CHRO-ACNC: 0.2 IU/ML
UFH PPP CHRO-ACNC: 0.4 IU/ML
UFH PPP CHRO-ACNC: 0.5 IU/ML
WBC # BLD AUTO: 9.3 X10*3/UL (ref 4.4–11.3)
WBC # BLD AUTO: 9.9 X10*3/UL (ref 4.4–11.3)

## 2024-05-05 PROCEDURE — 86900 BLOOD TYPING SEROLOGIC ABO: CPT

## 2024-05-05 PROCEDURE — S4991 NICOTINE PATCH NONLEGEND: HCPCS | Performed by: NURSE PRACTITIONER

## 2024-05-05 PROCEDURE — 2500000002 HC RX 250 W HCPCS SELF ADMINISTERED DRUGS (ALT 637 FOR MEDICARE OP, ALT 636 FOR OP/ED): Performed by: NURSE PRACTITIONER

## 2024-05-05 PROCEDURE — 85520 HEPARIN ASSAY: CPT | Performed by: NURSE PRACTITIONER

## 2024-05-05 PROCEDURE — 2500000001 HC RX 250 WO HCPCS SELF ADMINISTERED DRUGS (ALT 637 FOR MEDICARE OP)

## 2024-05-05 PROCEDURE — 2500000004 HC RX 250 GENERAL PHARMACY W/ HCPCS (ALT 636 FOR OP/ED)

## 2024-05-05 PROCEDURE — 2500000001 HC RX 250 WO HCPCS SELF ADMINISTERED DRUGS (ALT 637 FOR MEDICARE OP): Performed by: NURSE PRACTITIONER

## 2024-05-05 PROCEDURE — 80069 RENAL FUNCTION PANEL: CPT | Performed by: NURSE PRACTITIONER

## 2024-05-05 PROCEDURE — 85027 COMPLETE CBC AUTOMATED: CPT | Mod: 91 | Performed by: NURSE PRACTITIONER

## 2024-05-05 PROCEDURE — C9113 INJ PANTOPRAZOLE SODIUM, VIA: HCPCS

## 2024-05-05 PROCEDURE — 2500000004 HC RX 250 GENERAL PHARMACY W/ HCPCS (ALT 636 FOR OP/ED): Performed by: NURSE PRACTITIONER

## 2024-05-05 PROCEDURE — 1200000002 HC GENERAL ROOM WITH TELEMETRY DAILY

## 2024-05-05 PROCEDURE — 83735 ASSAY OF MAGNESIUM: CPT | Performed by: NURSE PRACTITIONER

## 2024-05-05 PROCEDURE — 2500000002 HC RX 250 W HCPCS SELF ADMINISTERED DRUGS (ALT 637 FOR MEDICARE OP, ALT 636 FOR OP/ED)

## 2024-05-05 PROCEDURE — 82947 ASSAY GLUCOSE BLOOD QUANT: CPT | Mod: 91

## 2024-05-05 RX ORDER — METOCLOPRAMIDE 10 MG/1
5 TABLET ORAL 3 TIMES DAILY
Status: DISCONTINUED | OUTPATIENT
Start: 2024-05-05 | End: 2024-05-07

## 2024-05-05 RX ADMIN — METOCLOPRAMIDE 5 MG: 10 TABLET ORAL at 17:12

## 2024-05-05 RX ADMIN — Medication 1 TABLET: at 10:01

## 2024-05-05 RX ADMIN — HEPARIN SODIUM 1600 UNITS/HR: 10000 INJECTION, SOLUTION INTRAVENOUS at 17:12

## 2024-05-05 RX ADMIN — DOXYCYCLINE HYCLATE 100 MG: 100 TABLET, COATED ORAL at 10:01

## 2024-05-05 RX ADMIN — METOCLOPRAMIDE 5 MG: 10 TABLET ORAL at 02:10

## 2024-05-05 RX ADMIN — INSULIN GLARGINE 10 UNITS: 100 INJECTION, SOLUTION SUBCUTANEOUS at 21:03

## 2024-05-05 RX ADMIN — NICOTINE 7 MG/24 HR DAILY TRANSDERMAL PATCH 1 PATCH: at 10:01

## 2024-05-05 RX ADMIN — INSULIN LISPRO 2 UNITS: 100 INJECTION, SOLUTION INTRAVENOUS; SUBCUTANEOUS at 16:09

## 2024-05-05 RX ADMIN — INSULIN LISPRO 2 UNITS: 100 INJECTION, SOLUTION INTRAVENOUS; SUBCUTANEOUS at 11:04

## 2024-05-05 RX ADMIN — SODIUM BICARBONATE 650 MG: 650 TABLET ORAL at 21:02

## 2024-05-05 RX ADMIN — DOXYCYCLINE HYCLATE 100 MG: 100 TABLET, COATED ORAL at 21:02

## 2024-05-05 RX ADMIN — SALINE NASAL SPRAY 1 SPRAY: 1.5 SOLUTION NASAL at 21:19

## 2024-05-05 RX ADMIN — PANTOPRAZOLE SODIUM 40 MG: 40 INJECTION, POWDER, FOR SOLUTION INTRAVENOUS at 10:01

## 2024-05-05 RX ADMIN — Medication: at 10:02

## 2024-05-05 RX ADMIN — SODIUM BICARBONATE 650 MG: 650 TABLET ORAL at 10:01

## 2024-05-05 RX ADMIN — METOCLOPRAMIDE 5 MG: 10 TABLET ORAL at 21:02

## 2024-05-05 RX ADMIN — INSULIN LISPRO 2 UNITS: 100 INJECTION, SOLUTION INTRAVENOUS; SUBCUTANEOUS at 22:32

## 2024-05-05 RX ADMIN — THIAMINE HCL TAB 100 MG 100 MG: 100 TAB at 10:01

## 2024-05-05 RX ADMIN — SALINE NASAL SPRAY 1 SPRAY: 1.5 SOLUTION NASAL at 12:33

## 2024-05-05 RX ADMIN — INSULIN LISPRO 2 UNITS: 100 INJECTION, SOLUTION INTRAVENOUS; SUBCUTANEOUS at 04:43

## 2024-05-05 RX ADMIN — HEPARIN SODIUM 1500 UNITS/HR: 10000 INJECTION, SOLUTION INTRAVENOUS at 01:02

## 2024-05-05 RX ADMIN — SALINE NASAL SPRAY 1 SPRAY: 1.5 SOLUTION NASAL at 17:12

## 2024-05-05 RX ADMIN — SALINE NASAL SPRAY 1 SPRAY: 1.5 SOLUTION NASAL at 06:21

## 2024-05-05 ASSESSMENT — COGNITIVE AND FUNCTIONAL STATUS - GENERAL
WALKING IN HOSPITAL ROOM: A LOT
TOILETING: A LOT
STANDING UP FROM CHAIR USING ARMS: A LOT
MOBILITY SCORE: 13
DRESSING REGULAR UPPER BODY CLOTHING: A LOT
CLIMB 3 TO 5 STEPS WITH RAILING: A LOT
DRESSING REGULAR LOWER BODY CLOTHING: A LOT
PERSONAL GROOMING: A LOT
EATING MEALS: A LITTLE
DAILY ACTIVITIY SCORE: 13
TURNING FROM BACK TO SIDE WHILE IN FLAT BAD: A LOT
MOVING TO AND FROM BED TO CHAIR: A LOT
HELP NEEDED FOR BATHING: A LOT
MOVING FROM LYING ON BACK TO SITTING ON SIDE OF FLAT BED WITH BEDRAILS: A LITTLE

## 2024-05-05 ASSESSMENT — PAIN - FUNCTIONAL ASSESSMENT
PAIN_FUNCTIONAL_ASSESSMENT: 0-10

## 2024-05-05 ASSESSMENT — PAIN SCALES - GENERAL
PAINLEVEL_OUTOF10: 0 - NO PAIN

## 2024-05-05 NOTE — SIGNIFICANT EVENT
[facility] [place]icemail message for Marline that pt now reports that he wants to go back and live with his dad.  Mitali Salvador, MSW, CISW     Neurology Team     Brian Rodriguez is a 69 y.o. male  with a T2DM, HTN, CKD, HLD, chronic sacral wound due to hydradenitis on Humira, bipolar, dementia presenting with altered mental status with concern for infection. General neurology has been consulted for dysphagia.      SLP evaluation on 4/19 via MBS showed profoundly impaired oropharyngeal swallow; for which neurology consult was recommended.     He was also revaluated by ENT on 4/20; due to concern for enlarged arytenoids/ epiglottis. He underwent evaluation with nasopharyngeal laryngoscopy at bedside, which showed no abnormal masses or evidence of structural lesions.     During our assessment earlier 4/23 - 4/1, Mr. Hernandez was obtunded and in acute hypoxic respiratory failure in the setting of pneumonia (COVID with 2ry bacterial pneumonia).     Repeated assessment on 5/2: showed remarkable improvement in his mental status. He could benefit from repeat SLP now after his recovery, which might show improvement in his dysphagia.     However, given his diffuse muscle atrophy and cachexia, he could be with an underlying long-standing bulbar weakness. Initial clinical impression was to rule out motor neuron disease for which EMG/NCS was obtained. EMG showed signs of inflammatory myopathy. CK normal and aldolase mildly elevated.     Muscle biopsy was considered, for which MRI of the left humerus was obtained. It did not show any signs of muscle edema or inflammation.       EMG/NCS 5/3/2024:    1. 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   2. Non-specific motor unit potential configurational changes in the proximal muscles, with sparse short-lived and occasional fibrillation potentials, consistent with a generalized myopathy, but not sufficient for definite diagnosis of necrotizing myopathy.   3. Active and chronic, left median neuropathy at the wrist, which in the proper  clinical context would be consistent with carpal tunnel syndrome.   4. Active and chronic, left ulnar neuropathy best localized at the elbow.       Relevant labs:   B12: 1895 (H)   B1: 239 (H)   TSH: 1.32 (N)   Syphilis Ab: Reactive   Treponema confirm: Reactive   RPR: Negative   HIV: Negative       Impression:   Dysphagia, likely due to an underlying myopathy. Worsened during an acute illness. Myopathy is unspecified yet.   Positive syphilis antibodies and treponema test from serum. Late syphilis +/- neuro-syphilis. Pending clarification of functional baseline by primary team.       Recommendations:  - Please consider repeat of SLP evaluation   - Please clarify patient's baseline functional status  - Depending on functional status, further workup and management of myopathy and neuro-syphilis might be considered   - In any case, patient would benefit from follow up with Neurology as outpatient. Please request follow up with Neurology - (in comment section: with Dori Thomason)   - Please request referral to neuropsych evaluation as outpatient     Case was discussed with Dr. Cortez, Neurology Attending.     Please page with any further questions or concerns.   General neurology team pager: [phone]     Dori Thomason MD  Neurology Resident PGY3

## 2024-05-05 NOTE — PROGRESS NOTES
"Brian Rodriguez is a 69 y.o. male on day 18 of admission presenting with Pneumonia due to infectious organism, unspecified laterality, unspecified part of lung.    Subjective   No acute events over night.      Objective   Physical Exam:  Constitutional: cachectic, ill appearing male, NAD, alert and cooperative  Eyes: PERRL, EOMI, no icterus   ENMT: moist mucous membranes, no oral lesions seen  Head/Neck: Neck supple, no apparent injury  Respiratory/Thorax: Lungs CTA bilaterally, non-labored breathing, no cough, on RA  Cardiovascular: HRR, no m/g/r  Gastrointestinal: abdomen soft, non-tender, non-distended, Corpak tube in place.   : external catheter (awaiting post-void)  Musculoskeletal: generalized weakness  Extremities: no edema  Neurological: alert and oriented x 1-2, speech clear, follows commands with gen weakness  Skin: Warm and dry, no lesions, no rashes , left hand onychomycosis of 3rd digit    Last Recorded Vitals  Blood pressure 95/55, pulse 86, temperature 36.6 °C (97.9 °F), temperature source Temporal, resp. rate 16, height 1.778 m (5' 10\"), weight 61.4 kg (135 lb 5.8 oz), SpO2 95%.    Intake/Output last 3 Shifts:  I/O last 3 completed shifts:  In: 3900.1 (49.1 mL/kg) [I.V.:620.1 (7.8 mL/kg); NG/GT:3180; IV Piggyback:100]  Out: 1550 (19.5 mL/kg) [Urine:1550 (0.5 mL/kg/hr)]  Dosing Weight: 79.4 kg     Scheduled medications  chlorhexidine, , Topical, Daily  doxycylcine, 100 mg, oral, q12h RANDAL  insulin glargine, 10 Units, subcutaneous, Nightly  insulin lispro, 0-10 Units, subcutaneous, q6h  metoclopramide, 5 mg, oral, TID  multivitamin with minerals, 1 tablet, oral, Daily  nicotine, 1 patch, transdermal, Daily  pantoprazole, 40 mg, intravenous, Daily  sodium bicarbonate, 650 mg, oral, BID  sodium chloride, 1 spray, Each Nostril, 4x daily  thiamine, 100 mg, oral, Daily    Continuous medications  heparin, 0-4,500 Units/hr, Last Rate: 1,600 Units/hr (05/05/24 1125)    PRN medications  PRN medications: " acetaminophen **OR** [DISCONTINUED] acetaminophen **OR** [DISCONTINUED] acetaminophen, albuterol, dextrose, dextrose, glucagon, glucagon, guaiFENesin, heparin, ipratropium-albuteroL, melatonin, polyethylene glycol, sennosides-docusate sodium, traMADol     Relevant Results  Results for orders placed or performed during the hospital encounter of 04/17/24 (from the past 24 hour(s))   POCT GLUCOSE   Result Value Ref Range    POCT Glucose 216 (H) 74 - 99 mg/dL   CBC   Result Value Ref Range    WBC 9.9 4.4 - 11.3 x10*3/uL    nRBC 0.0 0.0 - 0.0 /100 WBCs    RBC 3.06 (L) 4.50 - 5.90 x10*6/uL    Hemoglobin 7.8 (L) 13.5 - 17.5 g/dL    Hematocrit 22.6 (L) 41.0 - 52.0 %    MCV 74 (L) 80 - 100 fL    MCH 25.5 (L) 26.0 - 34.0 pg    MCHC 34.5 32.0 - 36.0 g/dL    RDW 20.3 (H) 11.5 - 14.5 %    Platelets 468 (H) 150 - 450 x10*3/uL   POCT GLUCOSE   Result Value Ref Range    POCT Glucose 187 (H) 74 - 99 mg/dL   Type and screen   Result Value Ref Range    ABO TYPE O     Rh TYPE POS     ANTIBODY SCREEN NEG    Magnesium   Result Value Ref Range    Magnesium 1.98 1.60 - 2.40 mg/dL   Renal Function Panel   Result Value Ref Range    Glucose 186 (H) 74 - 99 mg/dL    Sodium 134 (L) 136 - 145 mmol/L    Potassium 3.9 3.5 - 5.3 mmol/L    Chloride 101 98 - 107 mmol/L    Bicarbonate 25 21 - 32 mmol/L    Anion Gap 12 10 - 20 mmol/L    Urea Nitrogen 30 (H) 6 - 23 mg/dL    Creatinine 2.08 (H) 0.50 - 1.30 mg/dL    eGFR 33 (L) >60 mL/min/1.73m*2    Calcium 7.5 (L) 8.6 - 10.6 mg/dL    Phosphorus 4.5 2.5 - 4.9 mg/dL    Albumin 1.7 (L) 3.4 - 5.0 g/dL   CBC   Result Value Ref Range    WBC 9.3 4.4 - 11.3 x10*3/uL    nRBC 0.0 0.0 - 0.0 /100 WBCs    RBC 3.13 (L) 4.50 - 5.90 x10*6/uL    Hemoglobin 7.8 (L) 13.5 - 17.5 g/dL    Hematocrit 24.8 (L) 41.0 - 52.0 %    MCV 79 (L) 80 - 100 fL    MCH 24.9 (L) 26.0 - 34.0 pg    MCHC 31.5 (L) 32.0 - 36.0 g/dL    RDW 20.7 (H) 11.5 - 14.5 %    Platelets 499 (H) 150 - 450 x10*3/uL   Heparin Assay, UFH   Result Value Ref Range     Heparin Unfractionated 0.2 See Comment Below for Therapeutic Ranges IU/mL   POCT GLUCOSE   Result Value Ref Range    POCT Glucose 163 (H) 74 - 99 mg/dL   Heparin Assay, UFH   Result Value Ref Range    Heparin Unfractionated 0.5 See Comment Below for Therapeutic Ranges IU/mL   Heparin Assay, UFH   Result Value Ref Range    Heparin Unfractionated 0.4 See Comment Below for Therapeutic Ranges IU/mL   POCT GLUCOSE   Result Value Ref Range    POCT Glucose 180 (H) 74 - 99 mg/dL      Imagin2024 CT Head:  1. There is no evidence of acute hemorrhage, mass lesion or acute  infarction.     2024 CT Chest/Abd/Pelvis:  1. Peribronchovascular patchy ground-glass/consolidative opacities  throughout the right lung and to a lesser degree within the dependent  left lower lobe compatible with multifocal pneumonia. Scattered  endobronchial debris most pronounced within the right lower lobe with  associated bronchial wall thickening and debris within the distal  trachea and right mainstem bronchus concerning for aspiration.  2. Extensive skin thickening of the left gluteal soft tissues which  appears slightly more pronounced when compared to 2023, though  with resolution of subcutaneous gas/fluid seen on the prior exam. No  definitive associated focal fluid collection, however evaluation is  limited in the absence of IV contrast.  The skin thickening/fat  stranding extends to the sacrococcygeal bone surface, possibly  reflecting decubitus ulceration.  3. Regions of focal ovoid hypoattenuation within the right inferior  scrotum as seen on images 236-239. The findings are indeterminate and  may be chronic (similar findings were seen on the 2023  examination). Scrotum is suboptimally evaluated on noncontrast CT and  scrotal ultrasound may be considered for further evaluation.  4. Large rectal stool volume. Please correlate for fecal impaction.  5. There is moderate to severe distention of the urinary  bladder.  Please correlate for urinary retention.  6. Coarse pancreatic calcifications compatible with chronic  pancreatitis.  7. Bilateral C7 cervical ribs.  8. Cholelithiasis.     4/27 Scrotal U/S:  1. No abnormal scrotal wall thickening or hyperemia to suggest active  infectious process.  2. Normal bilateral testes with intact flow.  3. Trace right-sided hydrocele with scrotal david.     4/29/2024 CT A/P:  1.  Marked worsening of pulmonary edema, with superimposed multifocal  infection not excluded, with interval development of slightly  septated moderate right-sided pleural effusion and small left pleural  effusion.  2. New peripancreatic haziness around the pancreatic head,  indeterminate and may be due to motion artifact or peripancreatic  inflammation, correlate with concern for pancreatitis.  3. Redemonstration of extensive skin thickening of the left gluteal  soft tissues which appears slightly decreased in size and extent when  compared to prior, as detailed above. No definitive associated focal  fluid collection however limited evaluation given the absence of IV  contrast.  4. Increased diffuse body wall edema.  5. Fluid contents seen in loops of large bowel, correlate with  diarrhea.  6. Unchanged region of focal ovoid hypoattenuation the right scrotum,  and incompletely characterized on noncontrast CT. Correlate with  recent scrotal ultrasound from 04/27/2024.     4/29/2024 Echo:  CONCLUSIONS:   1. Left ventricular systolic function is low normal with a 60% estimated ejection fraction.   2. No definite valvular vegetations were visualized.   3. Poorly visualized anatomical structures due to suboptimal image quality.     4/29/2024 U/S BUE:  Right Upper Venous: No evidence of acute deep vein thrombus visualized in the right upper extremity. There is acute occlusive superficial venous thrombosis visualized in the cephalic vein in forearm.  Left Upper Venous: The subclavian vein demonstrates a normal  spontaneous and phasic flow.     XR chest 1 view 2024  Impression  1. Similar findings of multifocal pneumonia compared to prior exam.  Overall, there has been a general worsening of the bilateral airspace  opacities when compared to earlier prior exams 2024 and  2024.     2024 U/S BLE:  Right Lower Venous: No evidence of acute deep vein thrombus visualized in the right lower extremity. Calf veins visualized in segmnets. Additional Findings; Lymph nodes Rt groin lymph node # 1 measures 7.48 mm x 31.64 mm.  Lymph node #2 3.57 mm x 26.93 mm.  Left Lower Venous: No evidence of acute deep vein thrombus visualized in the left lower extremity. Peroneal veins visualized in segmnets. Additional Findings; Lymph nodes Lt groin lymph node measures 5.62 mm x 41.53 mm.     2024 U/S BUE:  Left Upper Venous: No evidence of acute deep vein thrombus visualized in the left upper extremity. There is acute occlusive superficial venous thrombosis visualized in the proximal cephalic vein. Contralateral right subclavian vein doppler waveform not saved due to hardware error. Additional Findings; IV Line.         2024 EM. 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   2. Myopathic units in the proximal muscles, with sparse active denervation, consistent with a generalized myopathy, not sufficient for definite diagnosis of necrotizing myopathy.   3. Active and chronic, left median neuropathy at the wrist, which in the proper clinical context would be consistent with carpal tunnel syndrome.   4. Active and chronic, left ulnar neuropathy across the elbow.      5/3/2024 XR chest 1 view     1.  Extensive perihilar edema and effusions and correlate with fluid status.      5/3/2024 MR brain wo IV contrast  Limited, incomplete, and motion degraded MRI of the brain demonstrating no definitive evidence of acute ischemic injury.        5/4/2024 MR humerus left wo IV contrast   1. No evidence of myositis in the visualized right upper extremity.   2. Questionable minimal subcutaneous edema in the anterior mid arm.           Assessment/Plan   Principal Problem:    Pneumonia due to infectious organism, unspecified laterality, unspecified part of lung    Mr Brian Rodriguez is a 68 y/o male with hx of Type 2DM, HTN, CKD, HLD, hidradenitis, bipolar dz who presented to the ED on 4/17/23 with altered mental status with concern for infection.  Was admitted to medicine floor with multifocal PNA, HyperNa, TERRI on CKD.  Required MICU transfer twice d/t acute hypoxic respiratory failure and concern for sepsis likely 2/2 aspiration PNA.  Was transferred to Step down on 5/3    Neuro: hx of bipolar dx, acute encephalopathy, hypoactive delirium.    -A&Ox2 at baseline  -has been followed by neuro  -per neuro recs:  -MRI brain w/o contrast for Wallerian degeneration (done 5/3 - poor quality, but no acute abnormalities)  and MRI L humerus to r/o myositis (done 5/4 -negative)   -5/2 EMG  consistent with generalized myopathy, not sufficient for definite dx of necrotizing myopathy   -aldolase level and MMA sent 5/2, results pending   -Repeat TSH and T4 (completd 5/2, TSH and FT4 wnl)  - s/p IV Thiamine x 7 days, continue daily Thiamine supp    Pulm: acute hypoxic respiratory failure 2/2 Aspiration, multifocal PNA, recent Covid + (4/18)  -stable on RA  -MBS showed silent aspiration.  Continue strict NPO  -airway clearance with Vpep/oxygen TID  -NT suction as needed    Cardio:  HTN, HLD, new onset Afib  -SBP 90s-110s  -holding home Amlodipine and statin   -continue with Heparin gtt (CHADsVasc 3)   -PRN diuresis (most recent Lasix 5/2 40 mg IV)    FEN/GI: hypoalbuminemia, dysphagia, sever malnutrition with BMI 19  -TF Glucerna 1.5 @55/hr , Nutrisource fiber TID.  TF has been on hold 2/2 concern for aspiration of TF  -repeat KUB showing feeding tube post-pyloric-  restarting TF today   -added Reglan 5mg TID (Qtc 448 on 5/4 ECG)  -will need long term nutrition plan   -strict NPO, SLP following  -bowel regimen  -PPI    Renal:  TERRI on CKD, HyperNa (resolved)   -creat 2.4 (has been during hospital stay)   -has been failing voiding trials, will replace bunch  -daily RFP     : Inguinal lymphadenopathy   - US upper ext duplex 5/1: Superficial venous thrombus in L cephalic vein   - US lower ext duplex 5/1: Lymph nodes Lt groin lymph node measures 5.62 mm x 41.53 mm, Rt groin lymph node # 1 measures 7.48 mm x 31.64 mm. Lymph node #2 3.57 mm x 26.93 mm.  - R/o infection with STI testing (Chlamydia, neisseria gonorrhea, HSV, previous HIV 1/2 testing this admission neg) and plan for POCUS of inguinal LN     Heme:  NADER  -Ferritin  539/TIBC 94/iron 16   - holding Fe supp due to recent infection & atb  -continue  Heparin drip  -no s/sx bleeding  -daily CBC    Endo: Type 2 DM  -A1c 8.5 ($/2024)  -blood sugars 110s-170s  -continue with Lantus 10 units HS and SSI lispro  -accu checks every 4 hrs and hypoglycemia protocol    ID: hx of hidradenitis.  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 pending an LP by Neurology  - 4/17 & 4/29 MRSA PCR +  - 4/30 Procal 2.95  - ABX: azithromycin 4/18-4/20, Remdesevir 4/18-4/22, Vanc 4/18- 5/1, Skylar 4/26- 5/2  - Doxycyline 100 mg for Hidradenitis ppx    MSK/SKIN: Chronic Sacral, scrotum & Buttocks wounds, Hidradenitis  - derm has  previously recommended humira/minocycline 100 BID but currently w/o lab confirmation, hold off iso septic shock  - will need outpatient dermatology follow up w/ doxycycline 100 BID outpatient   - seen by wound care RN on 5/2: Patient has black/brown discoloration on B/L areas are nontender, without temperature change, or induration. These are not pressure injuries. There is black soft thickened tissue over left hip/buttocks skin changes r/t hydradenitis  - Recs: Cleanse wounds with CHG bath wipes, Aquacel Ag and Mepilex bordered foam to open, draining areas of sacrum, Interdry to under scrotum and in between creases of groin/ inner thighs, Turn every 2 hours.    Prophy:  -Heparin gtt  -PPI    Lines: midline 4/30, PIVs, Corpak, external cath (will replace bunch today)    Dispo: Transfer to medicine floor.  Will return to [facility] when medically ready.  Will need pal care/GOC      D/w Dr Margret DIAZ spent >45 minutes in the professional and overall care of this patient.      Neetu Willingham, APRN-CNP

## 2024-05-05 NOTE — CARE PLAN
he patient's goals for the shift include Pt will be free from falls  use call light  bed alarm remians on    The clinical goals for the shift include skin care    Problem: Pain  Goal: My pain/discomfort is manageable  Outcome: Progressing     Problem: Psychosocial Needs  Goal: Demonstrates ability to cope with hospitalization/illness  Outcome: Progressing  Goal: Collaborate with me, my family, and caregiver to identify my specific goals  Outcome: Progressing     Problem: Discharge Barriers  Goal: My discharge needs are met  Outcome: Progressing     Problem: Fall/Injury  Goal: Verbalize understanding of personal risk factors for fall in the hospital  Outcome: Progressing  Goal: Verbalize understanding of risk factor reduction measures to prevent injury from fall in the home  Outcome: Progressing  Goal: Use assistive devices by end of the shift  Outcome: Progressing  Goal: Pace activities to prevent fatigue by end of the shift  Outcome: Progressing     Problem: Pain  Goal: Takes deep breaths with improved pain control throughout the shift  Outcome: Progressing  Goal: Turns in bed with improved pain control throughout the shift  Outcome: Progressing  Goal: Walks with improved pain control throughout the shift  Outcome: Progressing  Goal: Performs ADL's with improved pain control throughout shift  Outcome: Progressing  Goal: Participates in PT with improved pain control throughout the shift  Outcome: Progressing  Goal: Free from opioid side effects throughout the shift  Outcome: Progressing  Goal: Free from acute confusion related to pain meds throughout the shift  Outcome: Progressing     Problem: Skin  Goal: Decreased wound size/increased tissue granulation at next dressing change  Outcome: Progressing  Flowsheets (Taken 5/5/2024 1122)  Decreased wound size/increased tissue granulation at next dressing change:   Promote sleep for wound healing   Protective dressings over bony prominences   Utilize specialty bed per  algorithm  Goal: Participates in plan/prevention/treatment measures  Outcome: Progressing  Flowsheets (Taken 5/5/2024 1122)  Participates in plan/prevention/treatment measures:   Discuss with provider PT/OT consult   Elevate heels   Increase activity/out of bed for meals  Goal: Prevent/manage excess moisture  Outcome: Progressing  Flowsheets (Taken 5/5/2024 1122)  Prevent/manage excess moisture:   Cleanse incontinence/protect with barrier cream   Follow provider orders for dressing changes   Moisturize dry skin   Monitor for/manage infection if present   Use wicking fabric (obtain order)  Goal: Prevent/minimize sheer/friction injuries  Outcome: Progressing  Flowsheets (Taken 5/5/2024 1122)  Prevent/minimize sheer/friction injuries:   Complete micro-shifts as needed if patient unable. Adjust patient position to relieve pressure points, not a full turn   HOB 30 degrees or less   Increase activity/out of bed for meals   Turn/reposition every 2 hours/use positioning/transfer devices   Use pull sheet   Utilize specialty bed per algorithm  Goal: Promote/optimize nutrition  Outcome: Progressing  Flowsheets (Taken 5/5/2024 1122)  Promote/optimize nutrition:   Assist with feeding   Consume > 50% meals/supplements   Discuss with provider if NPO > 2 days   Monitor/record intake including meals   Offer water/supplements/favorite foods   Reassess MST if dietician not consulted  Goal: Promote skin healing  Outcome: Progressing  Flowsheets (Taken 5/4/2024 1020)  Promote skin healing:   Assess skin/pad under line(s)/device(s)   Ensure correct size (line/device) and apply per  instructions   Protective dressings over bony prominences   Rotate device position/do not position patient on device   Turn/reposition every 2 hours/use positioning/transfer devices   T

## 2024-05-06 LAB
ALBUMIN SERPL BCP-MCNC: 1.7 G/DL (ref 3.4–5)
ANION GAP SERPL CALC-SCNC: 13 MMOL/L (ref 10–20)
BUN SERPL-MCNC: 30 MG/DL (ref 6–23)
CALCIUM SERPL-MCNC: 7.7 MG/DL (ref 8.6–10.6)
CHLORIDE SERPL-SCNC: 99 MMOL/L (ref 98–107)
CO2 SERPL-SCNC: 26 MMOL/L (ref 21–32)
CREAT SERPL-MCNC: 1.9 MG/DL (ref 0.5–1.3)
EGFRCR SERPLBLD CKD-EPI 2021: 37 ML/MIN/1.73M*2
ERYTHROCYTE [DISTWIDTH] IN BLOOD BY AUTOMATED COUNT: 20.3 % (ref 11.5–14.5)
GLUCOSE BLD MANUAL STRIP-MCNC: 110 MG/DL (ref 74–99)
GLUCOSE BLD MANUAL STRIP-MCNC: 150 MG/DL (ref 74–99)
GLUCOSE BLD MANUAL STRIP-MCNC: 166 MG/DL (ref 74–99)
GLUCOSE BLD MANUAL STRIP-MCNC: 170 MG/DL (ref 74–99)
GLUCOSE BLD MANUAL STRIP-MCNC: 217 MG/DL (ref 74–99)
GLUCOSE SERPL-MCNC: 151 MG/DL (ref 74–99)
HCT VFR BLD AUTO: 24 % (ref 41–52)
HGB BLD-MCNC: 7.8 G/DL (ref 13.5–17.5)
MAGNESIUM SERPL-MCNC: 1.96 MG/DL (ref 1.6–2.4)
MCH RBC QN AUTO: 24.9 PG (ref 26–34)
MCHC RBC AUTO-ENTMCNC: 32.5 G/DL (ref 32–36)
MCV RBC AUTO: 77 FL (ref 80–100)
NRBC BLD-RTO: 0 /100 WBCS (ref 0–0)
PHOSPHATE SERPL-MCNC: 5.1 MG/DL (ref 2.5–4.9)
PLATELET # BLD AUTO: 522 X10*3/UL (ref 150–450)
POTASSIUM SERPL-SCNC: 4.1 MMOL/L (ref 3.5–5.3)
RBC # BLD AUTO: 3.13 X10*6/UL (ref 4.5–5.9)
SODIUM SERPL-SCNC: 134 MMOL/L (ref 136–145)
UFH PPP CHRO-ACNC: 0.4 IU/ML
WBC # BLD AUTO: 9.7 X10*3/UL (ref 4.4–11.3)

## 2024-05-06 PROCEDURE — 99223 1ST HOSP IP/OBS HIGH 75: CPT | Performed by: STUDENT IN AN ORGANIZED HEALTH CARE EDUCATION/TRAINING PROGRAM

## 2024-05-06 PROCEDURE — 92526 ORAL FUNCTION THERAPY: CPT | Mod: GN

## 2024-05-06 PROCEDURE — 2500000002 HC RX 250 W HCPCS SELF ADMINISTERED DRUGS (ALT 637 FOR MEDICARE OP, ALT 636 FOR OP/ED): Performed by: NURSE PRACTITIONER

## 2024-05-06 PROCEDURE — 80069 RENAL FUNCTION PANEL: CPT | Performed by: NURSE PRACTITIONER

## 2024-05-06 PROCEDURE — 85520 HEPARIN ASSAY: CPT | Performed by: NURSE PRACTITIONER

## 2024-05-06 PROCEDURE — 87493 C DIFF AMPLIFIED PROBE: CPT | Performed by: NURSE PRACTITIONER

## 2024-05-06 PROCEDURE — 2500000004 HC RX 250 GENERAL PHARMACY W/ HCPCS (ALT 636 FOR OP/ED)

## 2024-05-06 PROCEDURE — C9113 INJ PANTOPRAZOLE SODIUM, VIA: HCPCS

## 2024-05-06 PROCEDURE — 82947 ASSAY GLUCOSE BLOOD QUANT: CPT

## 2024-05-06 PROCEDURE — 97535 SELF CARE MNGMENT TRAINING: CPT | Mod: GO

## 2024-05-06 PROCEDURE — 2500000001 HC RX 250 WO HCPCS SELF ADMINISTERED DRUGS (ALT 637 FOR MEDICARE OP)

## 2024-05-06 PROCEDURE — 2500000004 HC RX 250 GENERAL PHARMACY W/ HCPCS (ALT 636 FOR OP/ED): Performed by: NURSE PRACTITIONER

## 2024-05-06 PROCEDURE — 83735 ASSAY OF MAGNESIUM: CPT | Performed by: NURSE PRACTITIONER

## 2024-05-06 PROCEDURE — 1200000002 HC GENERAL ROOM WITH TELEMETRY DAILY

## 2024-05-06 PROCEDURE — 97530 THERAPEUTIC ACTIVITIES: CPT | Mod: GO

## 2024-05-06 PROCEDURE — S4991 NICOTINE PATCH NONLEGEND: HCPCS | Performed by: NURSE PRACTITIONER

## 2024-05-06 PROCEDURE — 82947 ASSAY GLUCOSE BLOOD QUANT: CPT | Mod: 91

## 2024-05-06 PROCEDURE — 2500000001 HC RX 250 WO HCPCS SELF ADMINISTERED DRUGS (ALT 637 FOR MEDICARE OP): Performed by: NURSE PRACTITIONER

## 2024-05-06 PROCEDURE — 94668 MNPJ CHEST WALL SBSQ: CPT

## 2024-05-06 PROCEDURE — 85027 COMPLETE CBC AUTOMATED: CPT | Performed by: NURSE PRACTITIONER

## 2024-05-06 PROCEDURE — 97530 THERAPEUTIC ACTIVITIES: CPT | Mod: GP

## 2024-05-06 PROCEDURE — 2500000002 HC RX 250 W HCPCS SELF ADMINISTERED DRUGS (ALT 637 FOR MEDICARE OP, ALT 636 FOR OP/ED)

## 2024-05-06 RX ADMIN — NICOTINE 7 MG/24 HR DAILY TRANSDERMAL PATCH 1 PATCH: at 08:50

## 2024-05-06 RX ADMIN — SALINE NASAL SPRAY 1 SPRAY: 1.5 SOLUTION NASAL at 16:09

## 2024-05-06 RX ADMIN — SODIUM BICARBONATE 650 MG: 650 TABLET ORAL at 20:59

## 2024-05-06 RX ADMIN — HEPARIN SODIUM 1600 UNITS/HR: 10000 INJECTION, SOLUTION INTRAVENOUS at 08:51

## 2024-05-06 RX ADMIN — SODIUM BICARBONATE 650 MG: 650 TABLET ORAL at 08:50

## 2024-05-06 RX ADMIN — INSULIN LISPRO 2 UNITS: 100 INJECTION, SOLUTION INTRAVENOUS; SUBCUTANEOUS at 04:57

## 2024-05-06 RX ADMIN — Medication 1 TABLET: at 08:49

## 2024-05-06 RX ADMIN — DOXYCYCLINE HYCLATE 100 MG: 100 TABLET, COATED ORAL at 08:50

## 2024-05-06 RX ADMIN — THIAMINE HCL TAB 100 MG 100 MG: 100 TAB at 08:50

## 2024-05-06 RX ADMIN — SALINE NASAL SPRAY 1 SPRAY: 1.5 SOLUTION NASAL at 21:12

## 2024-05-06 RX ADMIN — DOXYCYCLINE HYCLATE 100 MG: 100 TABLET, COATED ORAL at 20:58

## 2024-05-06 RX ADMIN — INSULIN GLARGINE 10 UNITS: 100 INJECTION, SOLUTION SUBCUTANEOUS at 20:59

## 2024-05-06 RX ADMIN — METOCLOPRAMIDE 5 MG: 10 TABLET ORAL at 16:09

## 2024-05-06 RX ADMIN — METOCLOPRAMIDE 5 MG: 10 TABLET ORAL at 08:50

## 2024-05-06 RX ADMIN — INSULIN LISPRO 4 UNITS: 100 INJECTION, SOLUTION INTRAVENOUS; SUBCUTANEOUS at 11:13

## 2024-05-06 RX ADMIN — SALINE NASAL SPRAY 1 SPRAY: 1.5 SOLUTION NASAL at 08:51

## 2024-05-06 RX ADMIN — SALINE NASAL SPRAY 1 SPRAY: 1.5 SOLUTION NASAL at 13:30

## 2024-05-06 RX ADMIN — METOCLOPRAMIDE 5 MG: 10 TABLET ORAL at 20:58

## 2024-05-06 RX ADMIN — PANTOPRAZOLE SODIUM 40 MG: 40 INJECTION, POWDER, FOR SOLUTION INTRAVENOUS at 08:49

## 2024-05-06 ASSESSMENT — PAIN - FUNCTIONAL ASSESSMENT
PAIN_FUNCTIONAL_ASSESSMENT: 0-10

## 2024-05-06 ASSESSMENT — COGNITIVE AND FUNCTIONAL STATUS - GENERAL
STANDING UP FROM CHAIR USING ARMS: A LOT
DRESSING REGULAR LOWER BODY CLOTHING: A LOT
TOILETING: A LOT
STANDING UP FROM CHAIR USING ARMS: TOTAL
WALKING IN HOSPITAL ROOM: A LOT
WALKING IN HOSPITAL ROOM: TOTAL
TURNING FROM BACK TO SIDE WHILE IN FLAT BAD: A LITTLE
MOVING TO AND FROM BED TO CHAIR: A LOT
DRESSING REGULAR UPPER BODY CLOTHING: A LITTLE
DAILY ACTIVITIY SCORE: 15
WALKING IN HOSPITAL ROOM: A LOT
DRESSING REGULAR LOWER BODY CLOTHING: A LITTLE
MOVING TO AND FROM BED TO CHAIR: A LOT
PERSONAL GROOMING: A LOT
EATING MEALS: A LITTLE
DAILY ACTIVITIY SCORE: 12
TOILETING: A LOT
MOVING FROM LYING ON BACK TO SITTING ON SIDE OF FLAT BED WITH BEDRAILS: A LITTLE
PERSONAL GROOMING: A LOT
DRESSING REGULAR UPPER BODY CLOTHING: A LOT
EATING MEALS: A LITTLE
MOBILITY SCORE: 13
PERSONAL GROOMING: A LITTLE
MOBILITY SCORE: 7
STANDING UP FROM CHAIR USING ARMS: A LOT
DAILY ACTIVITIY SCORE: 17
CLIMB 3 TO 5 STEPS WITH RAILING: A LOT
CLIMB 3 TO 5 STEPS WITH RAILING: TOTAL
HELP NEEDED FOR BATHING: A LOT
MOVING FROM LYING ON BACK TO SITTING ON SIDE OF FLAT BED WITH BEDRAILS: A LOT
TURNING FROM BACK TO SIDE WHILE IN FLAT BAD: TOTAL
TURNING FROM BACK TO SIDE WHILE IN FLAT BAD: A LOT
HELP NEEDED FOR BATHING: A LOT
DRESSING REGULAR LOWER BODY CLOTHING: A LITTLE
MOVING TO AND FROM BED TO CHAIR: TOTAL
TOILETING: TOTAL
MOVING FROM LYING ON BACK TO SITTING ON SIDE OF FLAT BED WITH BEDRAILS: A LITTLE
MOBILITY SCORE: 14
CLIMB 3 TO 5 STEPS WITH RAILING: A LOT
HELP NEEDED FOR BATHING: A LITTLE
DRESSING REGULAR UPPER BODY CLOTHING: A LOT

## 2024-05-06 ASSESSMENT — PAIN SCALES - GENERAL
PAINLEVEL_OUTOF10: 0 - NO PAIN

## 2024-05-06 ASSESSMENT — ACTIVITIES OF DAILY LIVING (ADL): HOME_MANAGEMENT_TIME_ENTRY: 15

## 2024-05-06 NOTE — CARE PLAN
The patient's goals for the shift include Pt will be free from falls  use call light  bed alarm remians on    The clinical goals for the shift include no falls    Problem: Pain  Goal: My pain/discomfort is manageable  Outcome: Progressing     Problem: Psychosocial Needs  Goal: Demonstrates ability to cope with hospitalization/illness  Outcome: Progressing  Goal: Collaborate with me, my family, and caregiver to identify my specific goals  Outcome: Progressing     Problem: Discharge Barriers  Goal: My discharge needs are met  Outcome: Progressing     Problem: Fall/Injury  Goal: Verbalize understanding of personal risk factors for fall in the hospital  Outcome: Progressing  Goal: Verbalize understanding of risk factor reduction measures to prevent injury from fall in the home  Outcome: Progressing  Goal: Use assistive devices by end of the shift  Outcome: Progressing  Goal: Pace activities to prevent fatigue by end of the shift  Outcome: Progressing     Problem: Pain  Goal: Takes deep breaths with improved pain control throughout the shift  Outcome: Progressing  Goal: Turns in bed with improved pain control throughout the shift  Outcome: Progressing  Goal: Walks with improved pain control throughout the shift  Outcome: Progressing  Goal: Performs ADL's with improved pain control throughout shift  Outcome: Progressing  Goal: Participates in PT with improved pain control throughout the shift  Outcome: Progressing  Goal: Free from opioid side effects throughout the shift  Outcome: Progressing  Goal: Free from acute confusion related to pain meds throughout the shift  Outcome: Progressing     Problem: Skin  Goal: Decreased wound size/increased tissue granulation at next dressing change  Outcome: Progressing  Flowsheets (Taken 5/6/2024 5115)  Decreased wound size/increased tissue granulation at next dressing change:   Promote sleep for wound healing   Protective dressings over bony prominences   Utilize specialty bed per  algorithm  Goal: Participates in plan/prevention/treatment measures  Outcome: Progressing  Flowsheets (Taken 5/6/2024 0825)  Participates in plan/prevention/treatment measures:   Discuss with provider PT/OT consult   Elevate heels   Increase activity/out of bed for meals  Goal: Prevent/manage excess moisture  Outcome: Progressing  Flowsheets (Taken 5/6/2024 0825)  Prevent/manage excess moisture:   Cleanse incontinence/protect with barrier cream   Follow provider orders for dressing changes   Moisturize dry skin   Monitor for/manage infection if present   Use wicking fabric (obtain order)  Goal: Prevent/minimize sheer/friction injuries  Outcome: Progressing  Flowsheets (Taken 5/6/2024 0825)  Prevent/minimize sheer/friction injuries:   Complete micro-shifts as needed if patient unable. Adjust patient position to relieve pressure points, not a full turn   HOB 30 degrees or less   Increase activity/out of bed for meals   Turn/reposition every 2 hours/use positioning/transfer devices   Use pull sheet   Utilize specialty bed per algorithm  Goal: Promote/optimize nutrition  Outcome: Progressing  Flowsheets (Taken 5/6/2024 0825)  Promote/optimize nutrition:   Assist with feeding   Consume > 50% meals/supplements   Discuss with provider if NPO > 2 days   Monitor/record intake including meals   Offer water/supplements/favorite foods   Reassess MST if dietician not consulted  Goal: Promote skin healing  Outcome: Progressing  Flowsheets (Taken 5/6/2024 0825)  Promote skin healing:   Assess skin/pad under line(s)/device(s)   Ensure correct size (line/device) and apply per  instructions   Protective dressings over bony prominences   Rotate device position/do not position patient on device   Turn/reposition every 2 hours/use positioning/transfer devices

## 2024-05-06 NOTE — PROGRESS NOTES
Physical Therapy    Physical Therapy Treatment    Patient Name: Brian Rodriguez  MRN: [id number]  Today's Date: 5/6/2024  Time Calculation  Start Time: 1445  Stop Time: 1508  Time Calculation (min): 23 min       Assessment/Plan   PT Assessment  Rehab Prognosis: Fair  End of Session Communication: Bedside nurse  Assessment Comment: PT updated and extended time frame of PT goals by an additional 3 weeks. Patient is not making progress towards current functional goals. Remains appropriate for MOD intensity at the discretion of the facility, would benefit from 24/7 skilled assist/supervision.  End of Session Patient Position: Bed, 3 rail up, Alarm off, not on at start of session  PT Plan  Inpatient/Swing Bed or Outpatient: Inpatient  PT Plan  Treatment/Interventions: Bed mobility, Transfer training, Gait training, Balance training, Strengthening, Endurance training, Therapeutic exercise, Therapeutic activity  PT Plan: Skilled PT  PT Frequency: 2 times per week  PT Discharge Recommendations: Moderate intensity level of continued care  PT Recommended Transfer Status: Total assist, Assist x2  PT - OK to Discharge: Yes      General Visit Information:   PT  Visit  PT Received On: 05/06/24  General  Family/Caregiver Present: No  Co-Treatment: OT  Co-Treatment Reason: maximize functional mobility; patient from LTC/SNF with unknown PLOF, AMPAC <10  Prior to Session Communication: Bedside nurse  Patient Position Received: Bed, 3 rail up, Alarm off, not on at start of session  General Comment: patient received supine, HOB elevated, bedsheets saturated in fecal matter; patient required linen change and alyce-care. patient then declined to want to sit EOB after alyce-care was completed. tele, doboff, heparin gtt.    Subjective   Precautions:  Precautions  Medical Precautions: Fall precautions  Vital Signs:       Objective   Pain:  Pain Assessment  Pain Assessment: 0-10  Pain Score: 0 - No pain  Cognition:  Cognition  Overall Cognitive  "Status: Impaired  Arousal/Alertness: Delayed responses to stimuli  Orientation Level:  (stated location as [facility], state month as July, stated \"24\" for year; re-orientation provided.)  Following Commands: Follows one step commands with repetition (75%)            Treatments:            Bed Mobility  Bed Mobility: Yes  Bed Mobility 1  Bed Mobility 1: Rolling right, Rolling left  Level of Assistance 1: Maximum assistance, Moderate verbal cues, Moderate tactile cues  Bed Mobility Comments 1: x1 with increase cueing for task sequencing; increase time required for alyce-care with patient in each sidelying position       Transfers  Transfer: No    Outcome Measures:  [facility] Basic Mobility  Turning from your back to your side while in a flat bed without using bedrails: A lot  Moving from lying on your back to sitting on the side of a flat bed without using bedrails: Total  Moving to and from bed to chair (including a wheelchair): Total  Standing up from a chair using your arms (e.g. wheelchair or bedside chair): Total  To walk in hospital room: Total  Climbing 3-5 steps with railing: Total  Basic Mobility - Total Score: 7    Education Documentation  Mobility Training, taught by Mesah Almanzar PT at 5/6/2024  3:44 PM.  Learner: Patient  Readiness: Nonacceptance  Method: Explanation  Response: Needs Reinforcement  Comment: mobility progression    Education Comments  No comments found.            Encounter Problems       Encounter Problems (Active)       Balance       Patient will complete static (stand by assist) and dynamic (contact guard assist) sitting balance activities using UE support as needed in order to maintain midline without acute LOB.        Start:  05/06/24    Expected End:  05/27/24               Mobility          Mobility       Patient will ambulate >/=3' with Rolling Walker with MODx1 without acute LOB in order to complete functional transfers.        Start:  05/06/24    Expected End:  05/27/24       "      Patient will complete bed mobility with MODx1        Start:  05/06/24    Expected End:  05/27/24               PT Transfers          PT Transfers       Patient will complete STS with MODx1 using LRAD without acute LOB         Start:  05/06/24    Expected End:  05/27/24            Patient will complete bed<->chair transfer with moderate assist x1 using LRD as needed without acute LOB        Start:  05/06/24    Expected End:  05/27/24                 Mesha Almanzar, PT, DPT

## 2024-05-06 NOTE — PROGRESS NOTES
"MEDICAL STEPDOWN UNIT DAILY PROGRESS NOTE    Brian Rodriguez is a 69 y.o. male on day 19 of admission presenting with Pneumonia due to infectious organism, unspecified laterality, unspecified part of lung.    Subjective   No events overnight  Tolerating TF @ goal  Patient much more alert, oriented x2 however unable to obtain ROS 2/2 mental status     Objective     Constitutional: elderly male, cachectic, pt in NAD, alert and cooperative  Eyes: PERRL, no icterus   ENMT: mucous membranes moist, no apparent injury, no lesions seen, poor dentition  Head/Neck: Neck supple, no apparent injury  Respiratory/Thorax: Lungs CTA bilaterally, non-labored breathing, no cough, on RA  Cardiovascular: Regular, rate and rhythm, no murmurs, normal S1 and S2  Gastrointestinal: Nondistended, soft, non-tender, BS present x 4, Cortrak  Musculoskeletal: ROM intact, no joint swelling, normal strength  Extremities: normal extremities, no edema, contusions or wounds  Neurological: alert and oriented x 2 speech clear, follows commands appropriately, cr. n. II-XII intact, sensation grossly intact, motor 5/5 BUE, 4/5 BLE  Skin: Warm and dry, L hand 3rd digit onychomoycosis    Vital Signs  Blood pressure 101/63, pulse 79, temperature 36.2 °C (97.2 °F), temperature source Temporal, resp. rate 17, height 1.778 m (5' 10\"), weight 61.4 kg (135 lb 5.8 oz), SpO2 100%.  Oxygen Therapy  SpO2: 100 %  Medical Gas Therapy: None (Room air)  O2 Delivery Method: High flow nasal cannula  FiO2 (%):  [21 %] 21 %    Intake/Output last 3 Shifts:  I/O last 3 completed shifts:  In: 3743.1 (47.1 mL/kg) [I.V.:563.1 (7.1 mL/kg); NG/GT:3080; IV Piggyback:100]  Out: 1700 (21.4 mL/kg) [Urine:1700 (0.6 mL/kg/hr)]  Dosing Weight: 79.4 kg     Scheduled medications  chlorhexidine, , Topical, Daily  doxycylcine, 100 mg, oral, q12h RANDAL  insulin glargine, 10 Units, subcutaneous, Nightly  insulin lispro, 0-10 Units, subcutaneous, q6h  metoclopramide, 5 mg, oral, TID  multivitamin " with minerals, 1 tablet, oral, Daily  nicotine, 1 patch, transdermal, Daily  pantoprazole, 40 mg, intravenous, Daily  sodium bicarbonate, 650 mg, oral, BID  sodium chloride, 1 spray, Each Nostril, 4x daily  thiamine, 100 mg, oral, Daily      Continuous medications  heparin, 0-4,500 Units/hr, Last Rate: 1,600 Units/hr (05/05/24 2000)      PRN medications  PRN medications: acetaminophen **OR** [DISCONTINUED] acetaminophen **OR** [DISCONTINUED] acetaminophen, albuterol, dextrose, dextrose, glucagon, glucagon, guaiFENesin, heparin, ipratropium-albuteroL, melatonin, polyethylene glycol, sennosides-docusate sodium, traMADol    Lines and Tubes:  Midline 04/30/24 Single lumen Right (Active)   Placement Date/Time: 04/30/24 1700   Hand Hygiene Completed: Yes  Lumen Type: Single lumen  Orientation: Right   Number of days: 5       Urethral Catheter (Active)   Placement Date/Time: 05/04/24 1405   Hand Hygiene Completed: Yes  Catheter Balloon Size: 10 mL  Urine Returned: Yes   Number of days: 1       NG/OG/Feeding Tube Gastric Left nostril (Active)   Placement Date/Time: 04/19/24 1800   Earliest Known Present: 04/19/24  Placed by External Staff?: Other (Comment)  Placed by: Nutrition Support RN  Hand Hygiene Completed: Yes  Type of Tube: Feeding Tube  Tube Length: 75 cm  Tube Type: Gastric  Tube L...   Number of days: 16         Relevant Results  Results from last 7 days   Lab Units 05/06/24 0422 05/05/24 0440 05/05/24  0115   WBC AUTO x10*3/uL 9.7 9.3 9.9   HEMOGLOBIN g/dL 7.8* 7.8* 7.8*   HEMATOCRIT % 24.0* 24.8* 22.6*   PLATELETS AUTO x10*3/uL 522* 499* 468*     Results from last 7 days   Lab Units 05/06/24  0422 05/05/24 0440 05/04/24  0047 04/29/24  1614 04/29/24  1317   SODIUM mmol/L 134* 134* 138   < >  --    POTASSIUM mmol/L 4.1 3.9 3.7   < >  --    CHLORIDE mmol/L 99 101 101   < >  --    CO2 mmol/L 26 25 24   < >  --    BUN mg/dL 30* 30* 32*   < >  --    CREATININE mg/dL 1.90* 2.08* 2.40*   < >  --    CALCIUM mg/dL  7.7* 7.5* 7.7*   < >  --    PROTEIN TOTAL g/dL  --   --   --   --  5.3*   GLUCOSE mg/dL 151* 186* 135*   < >  --     < > = values in this interval not displayed.            This patient has a urinary catheter   Reason for the urinary catheter remaining today? urinary retention/bladder outlet obstruction, acute or chronic  Imagin2024 CT Head:  1. There is no evidence of acute hemorrhage, mass lesion or acute  infarction.     2024 CT Chest/Abd/Pelvis:  1. Peribronchovascular patchy ground-glass/consolidative opacities  throughout the right lung and to a lesser degree within the dependent  left lower lobe compatible with multifocal pneumonia. Scattered  endobronchial debris most pronounced within the right lower lobe with  associated bronchial wall thickening and debris within the distal  trachea and right mainstem bronchus concerning for aspiration.  2. Extensive skin thickening of the left gluteal soft tissues which  appears slightly more pronounced when compared to 2023, though  with resolution of subcutaneous gas/fluid seen on the prior exam. No  definitive associated focal fluid collection, however evaluation is  limited in the absence of IV contrast.  The skin thickening/fat  stranding extends to the sacrococcygeal bone surface, possibly  reflecting decubitus ulceration.  3. Regions of focal ovoid hypoattenuation within the right inferior  scrotum as seen on images 236-239. The findings are indeterminate and  may be chronic (similar findings were seen on the 2023  examination). Scrotum is suboptimally evaluated on noncontrast CT and  scrotal ultrasound may be considered for further evaluation.  4. Large rectal stool volume. Please correlate for fecal impaction.  5. There is moderate to severe distention of the urinary bladder.  Please correlate for urinary retention.  6. Coarse pancreatic calcifications compatible with chronic  pancreatitis.  7. Bilateral C7 cervical ribs.  8.  Cholelithiasis.     4/27 Scrotal U/S:  1. No abnormal scrotal wall thickening or hyperemia to suggest active  infectious process.  2. Normal bilateral testes with intact flow.  3. Trace right-sided hydrocele with scrotal david.     4/29/2024 CT A/P:  1.  Marked worsening of pulmonary edema, with superimposed multifocal  infection not excluded, with interval development of slightly  septated moderate right-sided pleural effusion and small left pleural  effusion.  2. New peripancreatic haziness around the pancreatic head,  indeterminate and may be due to motion artifact or peripancreatic  inflammation, correlate with concern for pancreatitis.  3. Redemonstration of extensive skin thickening of the left gluteal  soft tissues which appears slightly decreased in size and extent when  compared to prior, as detailed above. No definitive associated focal  fluid collection however limited evaluation given the absence of IV  contrast.  4. Increased diffuse body wall edema.  5. Fluid contents seen in loops of large bowel, correlate with  diarrhea.  6. Unchanged region of focal ovoid hypoattenuation the right scrotum,  and incompletely characterized on noncontrast CT. Correlate with  recent scrotal ultrasound from 04/27/2024.     4/29/2024 Echo:  CONCLUSIONS:   1. Left ventricular systolic function is low normal with a 60% estimated ejection fraction.   2. No definite valvular vegetations were visualized.   3. Poorly visualized anatomical structures due to suboptimal image quality.     4/29/2024 U/S BUE:  Right Upper Venous: No evidence of acute deep vein thrombus visualized in the right upper extremity. There is acute occlusive superficial venous thrombosis visualized in the cephalic vein in forearm.  Left Upper Venous: The subclavian vein demonstrates a normal spontaneous and phasic flow.     XR chest 1 view 04/30/2024  Impression  1. Similar findings of multifocal pneumonia compared to prior exam.  Overall, there has been a  general worsening of the bilateral airspace  opacities when compared to earlier prior exams 2024 and  2024.     2024 U/S BLE:  Right Lower Venous: No evidence of acute deep vein thrombus visualized in the right lower extremity. Calf veins visualized in segmnets. Additional Findings; Lymph nodes Rt groin lymph node # 1 measures 7.48 mm x 31.64 mm.  Lymph node #2 3.57 mm x 26.93 mm.  Left Lower Venous: No evidence of acute deep vein thrombus visualized in the left lower extremity. Peroneal veins visualized in segmnets. Additional Findings; Lymph nodes Lt groin lymph node measures 5.62 mm x 41.53 mm.     2024 U/S BUE:  Left Upper Venous: No evidence of acute deep vein thrombus visualized in the left upper extremity. There is acute occlusive superficial venous thrombosis visualized in the proximal cephalic vein. Contralateral right subclavian vein doppler waveform not saved due to hardware error. Additional Findings; IV Line.         2024 EM. 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   2. Myopathic units in the proximal muscles, with sparse active denervation, consistent with a generalized myopathy, not sufficient for definite diagnosis of necrotizing myopathy.   3. Active and chronic, left median neuropathy at the wrist, which in the proper clinical context would be consistent with carpal tunnel syndrome.   4. Active and chronic, left ulnar neuropathy across the elbow.      5/3/2024 XR chest 1 view     1.  Extensive perihilar edema and effusions and correlate with fluid status.       5/3/2024 MR brain wo IV contrast  Limited, incomplete, and motion degraded MRI of the brain demonstrating no definitive evidence of acute ischemic injury.        2024 MR humerus left wo IV contrast   1. No evidence of myositis in the visualized right upper extremity.   2. Questionable minimal subcutaneous edema in  the anterior mid arm.               Assessment/Plan   Principal Problem:    Pneumonia due to infectious organism, unspecified laterality, unspecified part of lung    Brian Rodriguez is a 69 y.o. male with a history of type 2 diabetes, hypertension, CKD, hyperlipidemia, hidradenitis, bipolar who presented to the ED on 4/17/2024 for altered mental status and concern of infection.  Admitted to [facility] with multifocal PNA, HyperNa, TERRI on CKD, then transferred to MICU for AMS & acute hypoxic resp failure & c/f sepsis.   Transferred back to [facility] then returned to MICU 4/26 for respiratory distress and acute hypoxia.       Neuro/Psych: Hx of Bipolar disorder, Acute Encephalopathy, Hypoactive Delirim  - baseline A&Ox2  - Neurology consulted, appreciate recommendations:  > MRI brain w/o contrast for Wallerian degeneration (done 5/3 - poor quality, but no acute abnormalities)  & MRI L humerus to rule out myositis (done 5/4 -negative)   > EMG/NCS was performed today, preliminary concerning for myopathy   >  Please send for CK (wnl),  aldolase  (elevated)  > Repeat TSH and T4 (completd 5/2, TSH and FT4 wnl)  > Send for MMA (sent 5/2, results pending)  > 5/6 Neuro recs via secure chat: His muscle findings on EMG are nonspecific and can actually be seen with disuse atrophy. recommend repeat slp eval (pending today).   At discharge, we recommend referral for neuromuscular follow up for a repeat EMG several weeks after hospitalization (referral sent via [facility]).  Neuropsych referral placed via Epic as well.  NO Plans for LP.  - s/p IV Thiamine x 7 days, continue daily Thiamine supp     Pulm: Acute hypoxic resp failure 2/2 Aspiration, Multifocal PNA.  Recent Covid + (4/18)  - stable on RA  - MBS completed earlier this admit with signs of aspiration.  SLP recs strict NPO with aggressive oral care  - continue bronchial hygiene with VPEP/OxyJet  - NT Suction PRN     Cardiac: Hypertension, hyperlipidemia.  New onset Afib  - BP soft, HR  controlled.  Currently in NSR  - home Amlodpine, Atorvastatin on hold  - CHADsVasc 3  - continue heparin drip  - PRN Diuresis with IV lasix (most recent Lasix 5/2 40 mg IV)      FEN/GI: Hypoalbuminemia, Dysphagia, Severe Malnutrition with BMI 19, diarrhea in MICU (5x on May2)  - Bmx3  - 5/6 repeat SLP eval with ongoing dysphagia and aspiration concern.  Continue NPO with enteral feeds via Cortrak  - Diet: Glucerna 1.5 @ 55ml/hr, Nutrasource Fiber TID (can go up to 2 packets TID if showing benefits).    ml Q6H  - needs long term nutrition plan==> discussed PEG placement with POA today, see below in dispo.  - 5/5 added Reglan 5mg TID (Qtc 448 on 5/4 ECG) - strict NPO, SLP following     Renal: TERRI on CKD (b/l Cr ~1.5-1.7). Urinary Retention  - failed voiding trial.  Barton replaced 5/4     Heme: Fe Def Anemia  - Ferritin 539/TIBC 94/iron 16   - holding Fe supp due to recent infection & atb  - DVT prophylaxis: on Heparin drip (for afib)     Endo:  type 2 diabetes,  - A1C 8.5 (April 2024)  - Blood sugars:   - Continue at bedtime Lantus 10 units & #2 SSI Lispro  - Q4H Accu checks     ID: hx of hidradenitis.  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 (no LP planned per neuro)  - 4/30 Procal 2.95  - Antimicrobials: azithromycin 4/18-4/20, Remdesevir 4/18-4/22, Vanc xxx- 5/1, Skylar 4/26- 5/2  - Doxycyline 100 mg for Hidradenitis ppx  - holding on treatment of syphilis       MSK/SKIN: Chronic Sacral, scrotum & Buttocks wounds, Hidradenitis  - derm has previously recommended humira/minocycline 100 BID but currently w/o lab confirmation, hold off iso septic shock  - will need outpatient dermatology follow up w/ doxycycline 100 BID outpatient   - seen by wound care RN on 5/2: Patient has black/brown discoloration on B/L areas are nontender, without temperature change, or induration. These are not pressure injuries. There is black soft thickened tissue over left hip/buttocks skin changes r/t hydradenitis  - Recs: Cleanse wounds with CHG bath wipes, Aquacel Ag and Mepilex bordered foam to open, draining areas of sacrum, Interdry to under scrotum and in between creases of groin/ inner thighs, Turn every 2 hours.        : Inguinal lymphadenopathy   - US upper ext duplex 5/1: Superficial venous thrombus in L cephalic vein   - US lower ext duplex 5/1: Lymph nodes Lt groin lymph node measures 5.62 mm x 41.53 mm, Rt groin lymph node # 1 measures 7.48 mm x 31.64 mm. Lymph node #2 3.57 mm x 26.93 mm.  - R/o infection with STI testing (Chlamydia, neisseria gonorrhea, HSV, previous HIV 1/2 testing this admission neg)     Lines: Midline (4/30), Dobhoff (4/20),  Barton (5/5)     Code Status: DNR  DPOA/Contact Number: DEEPAK RANGEL, Home Phone: [phone]      Dispo: Stable for transfer to [facility].  Discussed need for PEG with brother Rigoberto via telephone today.  Per Rigoberto patient has stated in the  past that he would NOT want PEG tube or any means of artifical nutrition long term.  I explained if that is the case we can offer hospice care in which he can eat for pleasure knowing he is an aspiration risk and would allow a natural death.  Rigoberto would like to discuss with his other siblings and will get back to us with their decision.  **Update: Rigoberto spoke with sibjeans and would like to continue with PEG.  GI consulted     Discharge planning: [facility]     Pt discussed with   Manuel, seen and examined. All labs, VS and previous plan of care reviewed.          Kassy Ingram, APRN-CNP  Pulmonary/Critical Care/Internal Medicine  I spent 45 minutes in the professional and overall care of this patient.

## 2024-05-06 NOTE — PROGRESS NOTES
"Brian Rodriguez is a 69 y.o. male on day 19 of admission presenting with Pneumonia due to infectious organism, unspecified laterality, unspecified part of lung.      Subjective   No active events overnight, patient mentation improving and is able to participate better with SLP evaluation.        Objective     Last Recorded Vitals  Blood pressure 119/61, pulse 78, temperature 36.5 °C (97.7 °F), temperature source Temporal, resp. rate 17, height 1.778 m (5' 10\"), weight 61.4 kg (135 lb 5.8 oz), SpO2 97%.    Physical Exam  Neurological Exam  Mental status: Alert, Oriented x2 to self and place but not to time. Able to follow commands. Voice is clear and no longer dysarhtic    CN: CANDACE, gaze midline with intact EOMs and no nystagmus, face symmetric  with weak eye closure. Tongue protrudes midline    Motor:   Bulk: Diffuse wasting, no observed fasciculations  Tone: Normal  Tremor: Absent     Strength:  BUE 4+/5  BLE 4-/5        Relevant Results                    Kari Coma Scale  Best Eye Response: Spontaneous  Best Verbal Response: Confused  Best Motor Response: Follows commands  Kari Coma Scale Score: 14                         Assessment/Plan      Principal Problem:    Pneumonia due to infectious organism, unspecified laterality, unspecified part of lung    Brian Rodriguez is a 69 y.o. male  with a T2DM, HTN, CKD, HLD, chronic sacral wound due to hydradenitis on Humira, bipolar, dementia presenting with altered mental status with concern for infection. General neurology has been consulted for dysphagia. He has had a hospital course complicated by acute hypoxic respiratory failure. His neurologic exam demonstrated diffuse atrophy that appears chronic. His EMG shows proximal muscle myopathic units, which is a nonspecific finding. Among other etiologies, this could be suggestive of disuse atrophy and deconditioning. He has had a fluctuating mental status, but per the primary team this has demonstrated improvement " today. Unclear of how acute vs chronic his dysphagia is, but given diffuse atrophy and weakness we suspect a more chronic process with dysphagia possibly relating to an underlying neurodegenrative process.     Patient failed repeat SLP assessment today. He should have longer term speech and neuromuscular follow up for a potential repeat EMG once outpatient and after completing physical therapy.     He does have positive treponemal serum antibodies, but a negative RPR. There is always a concern for neurosyphilis causing dementia, but with his negative RPR and other reasons for dementia, would not recommend a further LP at this time. Syphilis would typically not be related to his myopathy. Thus, his cognitive status is of a separate concern.     Impression:  Generalized weakness multifactorial in setting of malnutrition, deconditioning, and critical illness  Dysphagia possibly related to underlying neurodegenerative process    Recommendations:  - Will need alternative means of nutrition while he recovers at rehab  - At discharge, please place referral for neuromuscular neurology  - Will plan for potential repeat EMG as an outpatient in 4-6 weeks from discharge  - General neurology will sign off at this time; please page [phone] with any further questions or concerns        Jon Tobar MD

## 2024-05-06 NOTE — PROGRESS NOTES
05/06/24 1200   Discharge Planning   Living Arrangements Other (Comment)  (Pt is a resident at [facility])   Support Systems Family members   Type of Residence Nursing home/residential care   Home or Post Acute Services Post acute facilities (Rehab/SNF/etc)   Type of Post Acute Facility Services Long term care   Patient expects to be discharged to: [facility]   Does the patient need discharge transport arranged? Yes   RoundTrip coordination needed? Yes     SW spoke with pt's brother, Jeff Rodriguez [phone].  Brother confirmed pt was at [facility] and pt would return when medically stable.  Family has decided to proceed with PEG placement.  SW updated facility via [employer].  Will follow.  JESUS Williamson

## 2024-05-06 NOTE — SIGNIFICANT EVENT
Updated General Neurology Assessment and Recommendations:    Brian Rodriguez is a 69 y.o. male  with a T2DM, HTN, CKD, HLD, chronic sacral wound due to hydradenitis on Humira, bipolar, dementia presenting with altered mental status with concern for infection. General neurology has been consulted for dysphagia. He has had a hospital course complicated by acute hypoxic respiratory failure. His neurologic exam demonstrated diffuse atrophy that appears chronic. His EMG shows proximal muscle myopathic units, which is a nonspecific finding. Among other etiologies, this could be suggestive of disuse atrophy and deconditioning. He has had a fluctuating mental status, but per the primary team this has demonstrated improvement today. Unclear of how acute vs chronic his dysphagia is. He could benefit from a reassessment if mental status has significantly improved. He should have longer term neuromuscular follow up for a potential repeat EMG once outpatient and after completing physical therapy.    He does have positive treponemal serum antibodies, but a negative RPR. There is always a concern for neurosyphilis causing dementia, but with his negative RPR and other reasons for dementia, would not recommend a further LP at this time. Syphilis would typically not be related to his myopathy. Thus, his cognitive status is of a separate concern.    Recommendations:  - Consider repeat SLP eval if mental status has significantly improved  - At discharge, please place referral for neuromuscular neurology  - Will plan for potential repeat EMG as an outpatient in 4-6 weeks from discharge  - General neurology will sign off at this time; please page [phone] with any further questions or concerns    *Recommendations staffed with attending, Dr. Triston Robles, Neurology PGY-3

## 2024-05-06 NOTE — PROGRESS NOTES
"Occupational Therapy    Occupational Therapy Treatment    Name: Brian Rodriguez  MRN: [id number]  : 1955  Date: 24  Time Calculation  Start Time: 1444  Stop Time: 1508  Time Calculation (min): 24 min    Assessment:  End of Session Communication: Bedside nurse  End of Session Patient Position: Bed, 3 rail up, Alarm off, not on at start of session  Plan:  Treatment Interventions: ADL retraining, Functional transfer training, UE strengthening/ROM, Endurance training, Cognitive reorientation, Patient/family training, Equipment evaluation/education, Neuromuscular reeducation, Compensatory technique education  OT Frequency: 2 times per week  OT Discharge Recommendations: Moderate intensity level of continued care  OT - OK to Discharge: Yes    Subjective   Previous Visit Info:  OT Last Visit  OT Received On: 24  General:  General  Family/Caregiver Present: No  Co-Treatment: PT  Co-Treatment Reason: maximize functional mobility, AMPAC <10  Prior to Session Communication: Bedside nurse  Patient Position Received: Bed, 3 rail up, Alarm off, not on at start of session  General Comment: patient awake and alert, noted to be incontinent of BM on arrival; dobhoff, IV, Heparin gtt, bunch  Precautions:  Medical Precautions: Fall precautions     Pain Assessment:  Pain Assessment  Pain Assessment: 0-10  Pain Score: 0 - No pain     Objective   Cognition:  Overall Cognitive Status: Impaired  Arousal/Alertness:  (occasional delayed/inconsistent response)  Orientation Level:  (oriented to self, states \"[facility]\" for location, \"July\" for month; with choices able to state \"\" for year)  Following Commands:  (follows 75% of one step commands with repetition; inconsistent)  Activities of Daily Living: Grooming  Grooming Comments: mod A for completing hand hygiene for thoroughness as patient with fecal matter under nails    UE Dressing  UE Dressing Comments: max A for doffing and donning gowns    LE Dressing  LE " Dressing Comments: max A for socks, able to bring (L) foot up towards self and reach sock in upright positioning in bed    Toileting  Toileting Comments: dependent A for bowel hygiene in supine     Bed Mobility/Transfers: Bed Mobility  Bed Mobility: Yes  Bed Mobility 1  Bed Mobility 1: Rolling right, Rolling left  Level of Assistance 1: Maximum assistance, Moderate verbal cues    Outcome Measures:  [facility] Daily Activity  Putting on and taking off regular lower body clothing: A lot  Bathing (including washing, rinsing, drying): A lot  Putting on and taking off regular upper body clothing: A lot  Toileting, which includes using toilet, bedpan or urinal: Total  Taking care of personal grooming such as brushing teeth: A lot  Eating Meals: A little  Daily Activity - Total Score: 12     and E = Exercise and Early Mobility  Current Activity: Lying in bed    Education Documentation  ADL Training, taught by Madison Delgado OT at 5/6/2024  3:39 PM.  Learner: Patient  Readiness: Acceptance  Method: Explanation  Response: Needs Reinforcement    Education Comments  No comments found.    Goals:  Encounter Problems       Encounter Problems (Active)       ADLs       Patient with complete upper body dressing with minimal assist  level of assistance donning and doffing all UE clothes with PRN adaptive equipment. (Progressing)       Start:  04/22/24    Expected End:  05/20/24            Patient will complete daily grooming tasks with stand by assist level of assistance and PRN adaptive equipment. (Progressing)       Start:  04/22/24    Expected End:  05/20/24            Patient will complete toileting including hygiene clothing management/hygiene with moderate assist level of assistance. (Progressing)       Start:  04/22/24    Expected End:  05/20/24               BALANCE       Pt will maintain dynamic sitting balance during ADL task with stand by assist level of assistance in order to demonstrate decreased risk of falling and  improved postural control. (Not Progressing)       Start:  04/22/24    Expected End:  05/20/24               EXERCISE/STRENGTHENING       Patient will complete BUE exercises in order to improve strength and activity tolerance for ADL performance.  (Not Progressing)       Start:  04/22/24    Expected End:  05/20/24               TRANSFERS       Patient will perform bed mobility moderate assist level of assistance in order to improve safety and independence with mobility (Progressing)       Start:  04/22/24    Expected End:  05/20/24            Patient will complete functional transfers with least restrictive device with moderate assist x2 level of assistance. (Not Progressing)       Start:  04/22/24    Expected End:  05/20/24               Madison Delgado OTR/L  Inpatient Occupational Therapist   Rehab Office: [phone]

## 2024-05-06 NOTE — PROGRESS NOTES
Speech-Language Pathology  Adult Inpatient Swallow Treatment    Patient Name: Brian Rodriguez  MRN: [id number]  Today's Date: 5/6/2024   Start Time: 1015  Stop Time: 1045  Time Calculation (min): 30 minutes    Impression:   SLP reconsulted to complete a reevaluation of the swallow function given overall improvement in current medical status; however, per neurology note, c/f bulbar weakness (suspect dysphagia is a result of neuro dx). Pt received awake/alert, positioned upright in bed. Managing secretions. Noted overall improvement in speech intelligibility. A&O x2 - self and location. OME significant for mild lingual deviation to R (consistent w/ previous assessment) and lingual weakness. Pt given ice chips x3 - Pt w/ multiple swallows (3-4/bolus) immediate coughing/choking s/p each trial. SLP attempted x1 1/2 tsp sip of water and pt continued to present w/ 5 swallows for small sip and significant coughing/choking. PO trials ceased 2/2 concern for pharyngeal dysphagia/aspiration. Given known severity of dysphagia per MBSS completed 4/19 and current presentation, do not recommend repeat MBSS at this time. ? Need for long-term means of nutrition/hydration. CNP notified.      Recommendations:  NPO  Frequent, aggressive oral care is strongly recommended to improve infection control as well as reduce dental plaque and bacteria on oropharyngeal surfaces which may increase the risk nosocomial infections, including pneumonia.  OK for small amounts of ice chips (3-5/hr) after aggressive oral care is completed.  Recommend continuation of an alternate form of nutrition/hydration  ? Need for long-term means of nutrition/hydration.   Recommend ongoing SLP services w/ repeat MBSS in 4-6 weeks    Goal:   Pt will tolerate least restrictive diet and recall/utilize safe swallow guidelines independently with no clinical s/s of aspiration 100% of time        Plan:  SLP Services Indicated: Yes  Frequency: 2x week  Discussed POC with  patient  SLP - OK to Discharge    Pain:   0-10  0 = No pain.     Inpatient Education:  Extensive education provided to patient regarding current swallow function, recommendations/results, and POC.      Consultations/Referrals/Coordination of Services:   N/A

## 2024-05-06 NOTE — CONSULTS
Gastroenterology Consult Service  Department of Gastroenterology & Hepatology  [facility]    [facility]  Date of Service  May 6, 2024   Patient: Brian Rodriguez    Medical Record: [id number]  Reason for Consult: PEG tube placement  Requesting Service: Medicine service    History of Present Illness:  Brian Rodriguez is a 69 y.o. male with a past medical history of type 2 diabetes, hypertension, CKD, hyperlipidemia, hidradenitis, bipolar who presented to the ED on 4/17/2024 for altered mental status and concern of infection. Admitted to [facility] with multifocal PNA, HyperNa, TERRI on CKD, then transferred to MICU for AMS & acute hypoxic resp failure & c/f sepsis. Transferred back to [facility] then returned to MICU 4/26 for respiratory distress and acute hypoxia. Patient is transferred to the floor and ready to be discharged back to SNF.     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 Patient failed SLP evaluation and has been recommended to have alternate route of long term nutrition. He is getting TF through DHT. Patient denied any prior abdominal surgeries. No surgical scar on the abdomen.     Past Medical History:    Per HPI    Past Surgical History:  Per HPI    Family History:  Per HPI    Social History:  Social History     Tobacco Use    Smoking status: Never     Passive exposure: Never    Smokeless tobacco: Not on file   Substance Use Topics    Alcohol use: Not Currently       Allergies:  No Known Allergies    Medications:  Prior to Admission Medications:  Current Outpatient Medications   Medication Instructions    acetaminophen (TYLENOL) 650 mg, oral, Every 4 hours PRN    amino acids/protein hydrolys (PRO-STAT AWC ORAL) 30 mL, oral, 2 times daily    ascorbic acid (VITAMIN C) 500 mg, oral, 3 times daily    atorvastatin (LIPITOR) 10 mg, oral, Nightly    cholecalciferol (VITAMIN D-3) 1,000  Units, oral, Every morning    dextrose 5 %-0.45 % sod chlord (dextrose 5%-0.45 % sodium chloride) infusion 3,000 mL, intravenous, Every shift, Start 4/17/24    ertapenem (INVANZ) 500 mg, intravenous, Daily, Start 4/17/24, End 4/21/24    ferrous sulfate (325 mg ferrous sulfate) 325 mg, oral, 3 times daily    folic acid (FOLVITE) 1 mg, oral, Every morning    galantamine (RAZADYNE) 4 mg, oral, 2 times daily    glucagon (Glucagen) 1 mg injection subcutaneous, Once as needed    guaiFENesin (ROBITUSSIN) 100 mg, oral, Every 4 hours PRN    Humira(CF) Pen 40 mg, subcutaneous, Once Weekly    insulin lispro (HumaLOG) 100 unit/mL injection subcutaneous, 3 times daily before meals, Per Sliding Scale: 111-150=1; 151-200=3; 201-250=6; 251-300=9; 301-350=12; 351-400=15; 401 or greater call MD.    Lactobacillus acidophilus (ACIDOPHILUS ORAL) 1 tablet, oral, 2 times daily, 0.5 mg (100 million cell) tablet    Lantus U-100 Insulin 10 Units, subcutaneous, Every morning    mirtazapine (REMERON) 7.5 mg, oral, Nightly    multivitamin tablet 1 tablet, oral, Every morning    pantoprazole (PROTONIX) 40 mg, oral, Every morning, Do not crush, chew, or split.    traMADol (ULTRAM) 50 mg, oral, Daily PRN    zinc sulfate (Zincate) 220 (50 Zn) MG capsule 50 mg of elemental zinc, oral, Every morning         Current Facility-Administered Medications:     acetaminophen (Tylenol) tablet 650 mg, 650 mg, oral, q4h PRN **OR** [DISCONTINUED] acetaminophen (Tylenol) oral liquid 650 mg, 650 mg, oral, q4h PRN, 650 mg at 05/02/24 0444 **OR** [DISCONTINUED] acetaminophen (Tylenol) suppository 650 mg, 650 mg, rectal, q4h PRN, Fiona Swanson MD    albuterol 2.5 mg /3 mL (0.083 %) nebulizer solution 2.5 mg, 2.5 mg, nebulization, q6h PRN, Maria Teresa Biggs MD, 2.5 mg at 04/30/24 2025    dextrose 50 % injection 12.5 g, 12.5 g, intravenous, q15 min PRN, Tom Zambrano MD PhD    dextrose 50 % injection 25 g, 25 g, intravenous, q15 min PRN, Tom Zambrano MD  PhD    doxycycline (Vibra-Tabs) tablet 100 mg, 100 mg, oral, q12h RANDAL, Ana Harrison MD, 100 mg at 05/06/24 0850    glucagon (Glucagen) injection 1 mg, 1 mg, intramuscular, q15 min PRN, Tom Zambrano MD PhD    glucagon (Glucagen) injection 1 mg, 1 mg, intramuscular, q15 min PRN, Tom Zambrano MD PhD    guaiFENesin (Robitussin) 100 mg/5 mL syrup 100 mg, 100 mg, oral, q4h PRN, Fiona Swanson MD, 100 mg at 05/04/24 0906    heparin 25,000 Units in dextrose 5% 250 mL (100 Units/mL) infusion (premix), 0-4,500 Units/hr, intravenous, Continuous, RULA Taylor-CNP, Last Rate: 16 mL/hr at 05/06/24 1021, 1,600 Units/hr at 05/06/24 1021    heparin bolus from bag 2,000-4,000 Units, 2,000-4,000 Units, intravenous, q4h PRN, RULA Taylor-CNP, 2,000 Units at 05/05/24 0539    insulin glargine (Lantus) injection 10 Units, 10 Units, subcutaneous, Nightly, Ana Harrison MD, 10 Units at 05/05/24 2103    insulin lispro (HumaLOG) injection 0-10 Units, 0-10 Units, subcutaneous, q6h, Ana Harrison MD, 4 Units at 05/06/24 1113    ipratropium-albuteroL (Duo-Neb) 0.5-2.5 mg/3 mL nebulizer solution 3 mL, 3 mL, nebulization, q6h PRN, Emery Berry MD    melatonin tablet 3 mg, 3 mg, oral, Nightly PRN, Fiona Swanson MD, 3 mg at 05/01/24 1954    metoclopramide (Reglan) tablet 5 mg, 5 mg, oral, TID, Neetu Willingham, APRN-CNP, 5 mg at 05/06/24 0850    multivitamin with minerals 1 tablet, 1 tablet, oral, Daily, Fiona Swanson MD, 1 tablet at 05/06/24 0849    nicotine (Nicoderm CQ) 7 mg/24 hr patch 1 patch, 1 patch, transdermal, Daily, Kassy Diop, APRN-CNP, 1 patch at 05/06/24 0850    pantoprazole (ProtoNix) injection 40 mg, 40 mg, intravenous, Daily, Fiona Swansno MD, 40 mg at 05/06/24 0849    polyethylene glycol (Glycolax, Miralax) packet 17 g, 17 g, oral, Daily PRN, Fiona Swanson MD    sennosides-docusate sodium (Annia-Colace) 8.6-50 mg per tablet 2 tablet, 2 tablet, oral, Nightly PRN,  Fiona Swanson MD    sodium bicarbonate tablet 650 mg, 650 mg, oral, BID, Fiona Swanson MD, 650 mg at 05/06/24 0850    sodium chloride (Ocean) 0.65 % nasal spray 1 spray, 1 spray, Each Nostril, 4x daily, Fiona Swanson MD, 1 spray at 05/06/24 0851    thiamine (Vitamin B-1) tablet 100 mg, 100 mg, oral, Daily, Ana Harrison MD, 100 mg at 05/06/24 0850    traMADol (Ultram) tablet 50 mg, 50 mg, oral, Daily PRN, Fiona Swanson MD, 50 mg at 04/28/24 1636    Pertinent Review of Systems:    His 12 point ROS is negative except as above.     Physical Exam:    Vital Signs:    Vitals:    05/06/24 0400 05/06/24 0600 05/06/24 0800 05/06/24 1200   BP: 101/63  116/56 119/61   BP Location: Left arm  Left arm Left arm   Patient Position: Lying  Lying Lying   Pulse: 79 79 85 78   Resp: 17 17 20 17   Temp: 36.2 °C (97.2 °F)  36.4 °C (97.5 °F) 36.5 °C (97.7 °F)   TempSrc: Temporal  Temporal Temporal   SpO2: 100% 97% 95% 97%   Weight:       Height:           General appearance: lean, DHT in place  Skin: no jaundice  Head: normal  Eyes: anicteric sclera  Lungs: lungs clear to auscultation, no wheezing or rhonchi  Heart: RRR without murmur, gallop, or rubs.  No ectopy  Abdomen: Normal abdominal exam, Abdomen soft, non-tender. Bowel sounds normal. No masses, organomegaly  Extremities: Extremities normal. No lower extremity edema.  Neuro: AOx3.    Diagnostic Testing:    Labs:  Lab Results   Component Value Date    WBC 9.7 05/06/2024    HGB 7.8 (L) 05/06/2024    HCT 24.0 (L) 05/06/2024    MCV 77 (L) 05/06/2024     (H) 05/06/2024     Lab Results   Component Value Date    GLUCOSE 151 (H) 05/06/2024    CALCIUM 7.7 (L) 05/06/2024     (L) 05/06/2024    K 4.1 05/06/2024    CO2 26 05/06/2024    CL 99 05/06/2024    BUN 30 (H) 05/06/2024    CREATININE 1.90 (H) 05/06/2024       Reviewed:  Recent pertinent imaging/procedures    Assessment:     Brian Rodriguez is a 69 y.o. male with a past medical history of type 2 diabetes,  hypertension, CKD, hyperlipidemia, hidradenitis, bipolar who presented to the ED on 4/17/2024 for altered mental status and concern of infection. Admitted to [facility] with multifocal PNA, HyperNa, TERRI on CKD, then transferred to MICU for AMS & acute hypoxic resp failure & c/f sepsis. Transferred back to [facility] then returned to MICU 4/26 for respiratory distress and acute hypoxia. Patient is transferred to the floor and ready to be discharged back to SNF.     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 Patient failed SLP evaluation and has been recommended to have alternate route of long term nutrition. He is getting TF through DHT. Patient denied any prior abdominal surgeries. No surgical scar on the abdomen.     Gastroenterology is consulted for PEG tube    Plan:    -plan for EGD next AM      -please continue to hold heparin drip ~4-6 hours prior to procedure  -please hold TF at midnight preceding PEG  -please order IV cefazolin 2 gram and send it down with patient to the endoscopy lab so GI endoscopy can administer 30 minutes prior to EGD w/ PEG placement (if patient has penicillin or cephalosporin hypersensitivity, can use vancomycin 1 gram [preferably] or clindamycin 900mg IV)   -GI will follow      Patient to be seen and staffed with Dr. Prince.  Thank you for the consultation. Gastroenterology will continue to the follow the patient.   Please do not hesitate to contact me or page [phone] if there are any further questions between the weekday hours of 7 AM - 5 PM.   If there is an urgent concern during the weekend, after-hours, or holidays; then please page [place] [phone]. Thank you.    SIGNATURE: Betsy Harp MD PATIENT NAME: Brian Rodriguez   DATE: May 6, 2024 MRN: [id number]

## 2024-05-07 ENCOUNTER — ANESTHESIA (OUTPATIENT)
Dept: GASTROENTEROLOGY | Facility: HOSPITAL | Age: 69
DRG: 871 | End: 2024-05-07
Payer: MEDICARE

## 2024-05-07 ENCOUNTER — APPOINTMENT (OUTPATIENT)
Dept: GASTROENTEROLOGY | Facility: HOSPITAL | Age: 69
DRG: 871 | End: 2024-05-07
Payer: MEDICARE

## 2024-05-07 ENCOUNTER — ANESTHESIA EVENT (OUTPATIENT)
Dept: GASTROENTEROLOGY | Facility: HOSPITAL | Age: 69
DRG: 871 | End: 2024-05-07
Payer: MEDICARE

## 2024-05-07 PROBLEM — R13.10 DYSPHAGIA: Status: ACTIVE | Noted: 2024-05-07

## 2024-05-07 LAB
ALBUMIN SERPL BCP-MCNC: 1.9 G/DL (ref 3.4–5)
ANION GAP SERPL CALC-SCNC: 16 MMOL/L (ref 10–20)
BUN SERPL-MCNC: 33 MG/DL (ref 6–23)
C DIF TOX TCDA+TCDB STL QL NAA+PROBE: NOT DETECTED
CALCIUM SERPL-MCNC: 7.9 MG/DL (ref 8.6–10.6)
CHLORIDE SERPL-SCNC: 100 MMOL/L (ref 98–107)
CO2 SERPL-SCNC: 24 MMOL/L (ref 21–32)
CREAT SERPL-MCNC: 1.91 MG/DL (ref 0.5–1.3)
EGFRCR SERPLBLD CKD-EPI 2021: 37 ML/MIN/1.73M*2
ERYTHROCYTE [DISTWIDTH] IN BLOOD BY AUTOMATED COUNT: 20.6 % (ref 11.5–14.5)
GLUCOSE BLD MANUAL STRIP-MCNC: 128 MG/DL (ref 74–99)
GLUCOSE BLD MANUAL STRIP-MCNC: 59 MG/DL (ref 74–99)
GLUCOSE BLD MANUAL STRIP-MCNC: 80 MG/DL (ref 74–99)
GLUCOSE BLD MANUAL STRIP-MCNC: 84 MG/DL (ref 74–99)
GLUCOSE BLD MANUAL STRIP-MCNC: 88 MG/DL (ref 74–99)
GLUCOSE SERPL-MCNC: 60 MG/DL (ref 74–99)
HCT VFR BLD AUTO: 24.9 % (ref 41–52)
HGB BLD-MCNC: 8.2 G/DL (ref 13.5–17.5)
MAGNESIUM SERPL-MCNC: 2.01 MG/DL (ref 1.6–2.4)
MCH RBC QN AUTO: 25.5 PG (ref 26–34)
MCHC RBC AUTO-ENTMCNC: 32.9 G/DL (ref 32–36)
MCV RBC AUTO: 78 FL (ref 80–100)
NRBC BLD-RTO: 0 /100 WBCS (ref 0–0)
PHOSPHATE SERPL-MCNC: 5.6 MG/DL (ref 2.5–4.9)
PLATELET # BLD AUTO: 521 X10*3/UL (ref 150–450)
POTASSIUM SERPL-SCNC: 4.7 MMOL/L (ref 3.5–5.3)
RBC # BLD AUTO: 3.21 X10*6/UL (ref 4.5–5.9)
SODIUM SERPL-SCNC: 135 MMOL/L (ref 136–145)
WBC # BLD AUTO: 9.3 X10*3/UL (ref 4.4–11.3)

## 2024-05-07 PROCEDURE — 3700000001 HC GENERAL ANESTHESIA TIME - INITIAL BASE CHARGE

## 2024-05-07 PROCEDURE — 2500000004 HC RX 250 GENERAL PHARMACY W/ HCPCS (ALT 636 FOR OP/ED): Performed by: ANESTHESIOLOGIST ASSISTANT

## 2024-05-07 PROCEDURE — 2500000004 HC RX 250 GENERAL PHARMACY W/ HCPCS (ALT 636 FOR OP/ED): Performed by: NURSE PRACTITIONER

## 2024-05-07 PROCEDURE — 99100 ANES PT EXTEME AGE<1 YR&>70: CPT | Performed by: ANESTHESIOLOGY

## 2024-05-07 PROCEDURE — C9113 INJ PANTOPRAZOLE SODIUM, VIA: HCPCS

## 2024-05-07 PROCEDURE — 83735 ASSAY OF MAGNESIUM: CPT | Performed by: NURSE PRACTITIONER

## 2024-05-07 PROCEDURE — S4991 NICOTINE PATCH NONLEGEND: HCPCS | Performed by: NURSE PRACTITIONER

## 2024-05-07 PROCEDURE — 3700000002 HC GENERAL ANESTHESIA TIME - EACH INCREMENTAL 1 MINUTE

## 2024-05-07 PROCEDURE — 80069 RENAL FUNCTION PANEL: CPT | Performed by: NURSE PRACTITIONER

## 2024-05-07 PROCEDURE — 82947 ASSAY GLUCOSE BLOOD QUANT: CPT | Mod: 91

## 2024-05-07 PROCEDURE — A43246 PR EDG PERCUTANEOUS PLACEMENT GASTROSTOMY TUBE: Performed by: ANESTHESIOLOGIST ASSISTANT

## 2024-05-07 PROCEDURE — 2500000001 HC RX 250 WO HCPCS SELF ADMINISTERED DRUGS (ALT 637 FOR MEDICARE OP): Performed by: NURSE PRACTITIONER

## 2024-05-07 PROCEDURE — 7100000010 HC PHASE TWO TIME - EACH INCREMENTAL 1 MINUTE

## 2024-05-07 PROCEDURE — 43246 EGD PLACE GASTROSTOMY TUBE: CPT | Performed by: INTERNAL MEDICINE

## 2024-05-07 PROCEDURE — 2500000005 HC RX 250 GENERAL PHARMACY W/O HCPCS

## 2024-05-07 PROCEDURE — 2500000002 HC RX 250 W HCPCS SELF ADMINISTERED DRUGS (ALT 637 FOR MEDICARE OP, ALT 636 FOR OP/ED): Performed by: NURSE PRACTITIONER

## 2024-05-07 PROCEDURE — 85027 COMPLETE CBC AUTOMATED: CPT | Performed by: NURSE PRACTITIONER

## 2024-05-07 PROCEDURE — 82947 ASSAY GLUCOSE BLOOD QUANT: CPT

## 2024-05-07 PROCEDURE — 2500000004 HC RX 250 GENERAL PHARMACY W/ HCPCS (ALT 636 FOR OP/ED)

## 2024-05-07 PROCEDURE — A43246 PR EDG PERCUTANEOUS PLACEMENT GASTROSTOMY TUBE: Performed by: ANESTHESIOLOGY

## 2024-05-07 PROCEDURE — 0DH63UZ INSERTION OF FEEDING DEVICE INTO STOMACH, PERCUTANEOUS APPROACH: ICD-10-PCS | Performed by: INTERNAL MEDICINE

## 2024-05-07 PROCEDURE — 2780000003 HC OR 278 NO HCPCS

## 2024-05-07 PROCEDURE — 7100000009 HC PHASE TWO TIME - INITIAL BASE CHARGE

## 2024-05-07 PROCEDURE — 1200000002 HC GENERAL ROOM WITH TELEMETRY DAILY

## 2024-05-07 RX ORDER — METOCLOPRAMIDE 10 MG/1
5 TABLET ORAL 3 TIMES DAILY
Status: DISCONTINUED | OUTPATIENT
Start: 2024-05-07 | End: 2024-05-09

## 2024-05-07 RX ORDER — DOXYCYCLINE HYCLATE 100 MG
100 TABLET ORAL EVERY 12 HOURS SCHEDULED
Status: DISCONTINUED | OUTPATIENT
Start: 2024-05-07 | End: 2024-05-15 | Stop reason: HOSPADM

## 2024-05-07 RX ORDER — MIDAZOLAM HYDROCHLORIDE 1 MG/ML
INJECTION INTRAMUSCULAR; INTRAVENOUS AS NEEDED
Status: DISCONTINUED | OUTPATIENT
Start: 2024-05-07 | End: 2024-05-07

## 2024-05-07 RX ORDER — TALC
3 POWDER (GRAM) TOPICAL NIGHTLY PRN
Status: DISCONTINUED | OUTPATIENT
Start: 2024-05-07 | End: 2024-05-15 | Stop reason: HOSPADM

## 2024-05-07 RX ORDER — FENTANYL CITRATE 50 UG/ML
INJECTION, SOLUTION INTRAMUSCULAR; INTRAVENOUS AS NEEDED
Status: DISCONTINUED | OUTPATIENT
Start: 2024-05-07 | End: 2024-05-07

## 2024-05-07 RX ORDER — SODIUM CHLORIDE, SODIUM LACTATE, POTASSIUM CHLORIDE, CALCIUM CHLORIDE 600; 310; 30; 20 MG/100ML; MG/100ML; MG/100ML; MG/100ML
100 INJECTION, SOLUTION INTRAVENOUS CONTINUOUS
Status: CANCELLED | OUTPATIENT
Start: 2024-05-07

## 2024-05-07 RX ORDER — SODIUM BICARBONATE 650 MG/1
650 TABLET ORAL 2 TIMES DAILY
Status: DISCONTINUED | OUTPATIENT
Start: 2024-05-07 | End: 2024-05-15 | Stop reason: HOSPADM

## 2024-05-07 RX ORDER — ACETAMINOPHEN 325 MG/1
650 TABLET ORAL EVERY 6 HOURS
Status: DISCONTINUED | OUTPATIENT
Start: 2024-05-07 | End: 2024-05-09

## 2024-05-07 RX ORDER — LIDOCAINE HYDROCHLORIDE 10 MG/ML
0.1 INJECTION INFILTRATION; PERINEURAL ONCE
Status: CANCELLED | OUTPATIENT
Start: 2024-05-07 | End: 2024-05-07

## 2024-05-07 RX ORDER — MULTIVIT-MIN/FERROUS GLUCONATE 9 MG/15 ML
15 LIQUID (ML) ORAL DAILY
Status: DISCONTINUED | OUTPATIENT
Start: 2024-05-07 | End: 2024-05-15 | Stop reason: HOSPADM

## 2024-05-07 RX ORDER — TRAMADOL HYDROCHLORIDE 50 MG/1
50 TABLET ORAL DAILY PRN
Status: DISCONTINUED | OUTPATIENT
Start: 2024-05-07 | End: 2024-05-15 | Stop reason: HOSPADM

## 2024-05-07 RX ORDER — ONDANSETRON HYDROCHLORIDE 2 MG/ML
INJECTION, SOLUTION INTRAVENOUS AS NEEDED
Status: DISCONTINUED | OUTPATIENT
Start: 2024-05-07 | End: 2024-05-07

## 2024-05-07 RX ORDER — PROPOFOL 10 MG/ML
INJECTION, EMULSION INTRAVENOUS AS NEEDED
Status: DISCONTINUED | OUTPATIENT
Start: 2024-05-07 | End: 2024-05-07

## 2024-05-07 RX ORDER — ADALIMUMAB 40MG/0.4ML
40 KIT SUBCUTANEOUS
Status: CANCELLED | OUTPATIENT
Start: 2024-05-12

## 2024-05-07 RX ORDER — POLYETHYLENE GLYCOL 3350 17 G/17G
17 POWDER, FOR SOLUTION ORAL DAILY PRN
Status: DISCONTINUED | OUTPATIENT
Start: 2024-05-07 | End: 2024-05-15 | Stop reason: HOSPADM

## 2024-05-07 RX ORDER — LANOLIN ALCOHOL/MO/W.PET/CERES
100 CREAM (GRAM) TOPICAL DAILY
Status: DISCONTINUED | OUTPATIENT
Start: 2024-05-08 | End: 2024-05-15 | Stop reason: HOSPADM

## 2024-05-07 RX ORDER — CEFAZOLIN SODIUM 2 G/100ML
2 INJECTION, SOLUTION INTRAVENOUS ONCE
Status: COMPLETED | OUTPATIENT
Start: 2024-05-07 | End: 2024-05-07

## 2024-05-07 RX ADMIN — SALINE NASAL SPRAY 1 SPRAY: 1.5 SOLUTION NASAL at 16:29

## 2024-05-07 RX ADMIN — ACETAMINOPHEN 650 MG: 325 TABLET ORAL at 21:33

## 2024-05-07 RX ADMIN — PROPOFOL 30 MG: 10 INJECTION, EMULSION INTRAVENOUS at 12:57

## 2024-05-07 RX ADMIN — NICOTINE 7 MG/24 HR DAILY TRANSDERMAL PATCH 1 PATCH: at 09:40

## 2024-05-07 RX ADMIN — ONDANSETRON HYDROCHLORIDE 300 MG: 2 INJECTION, SOLUTION INTRAVENOUS at 12:58

## 2024-05-07 RX ADMIN — METOCLOPRAMIDE 5 MG: 10 TABLET ORAL at 21:33

## 2024-05-07 RX ADMIN — PROPOFOL 50 MG: 10 INJECTION, EMULSION INTRAVENOUS at 12:45

## 2024-05-07 RX ADMIN — APIXABAN 5 MG: 5 TABLET, FILM COATED ORAL at 21:33

## 2024-05-07 RX ADMIN — PROPOFOL 30 MG: 10 INJECTION, EMULSION INTRAVENOUS at 12:53

## 2024-05-07 RX ADMIN — SALINE NASAL SPRAY 1 SPRAY: 1.5 SOLUTION NASAL at 09:40

## 2024-05-07 RX ADMIN — FENTANYL CITRATE 50 MCG: 50 INJECTION, SOLUTION INTRAMUSCULAR; INTRAVENOUS at 12:37

## 2024-05-07 RX ADMIN — SODIUM CHLORIDE, SODIUM LACTATE, POTASSIUM CHLORIDE, AND CALCIUM CHLORIDE: 600; 310; 30; 20 INJECTION, SOLUTION INTRAVENOUS at 12:37

## 2024-05-07 RX ADMIN — DOXYCYCLINE HYCLATE 100 MG: 100 TABLET, COATED ORAL at 21:33

## 2024-05-07 RX ADMIN — MIDAZOLAM HYDROCHLORIDE 1 MG: 1 INJECTION, SOLUTION INTRAMUSCULAR; INTRAVENOUS at 12:37

## 2024-05-07 RX ADMIN — SODIUM BICARBONATE 650 MG: 650 TABLET ORAL at 21:33

## 2024-05-07 RX ADMIN — PANTOPRAZOLE SODIUM 40 MG: 40 INJECTION, POWDER, FOR SOLUTION INTRAVENOUS at 09:35

## 2024-05-07 RX ADMIN — CEFAZOLIN SODIUM 2 G: 2 INJECTION, SOLUTION INTRAVENOUS at 11:23

## 2024-05-07 RX ADMIN — ACETAMINOPHEN 650 MG: 325 TABLET ORAL at 16:29

## 2024-05-07 RX ADMIN — DEXTROSE MONOHYDRATE 12.5 G: 25 INJECTION, SOLUTION INTRAVENOUS at 06:31

## 2024-05-07 RX ADMIN — PROPOFOL 30 MG: 10 INJECTION, EMULSION INTRAVENOUS at 12:48

## 2024-05-07 ASSESSMENT — COGNITIVE AND FUNCTIONAL STATUS - GENERAL
DAILY ACTIVITIY SCORE: 12
CLIMB 3 TO 5 STEPS WITH RAILING: A LOT
TURNING FROM BACK TO SIDE WHILE IN FLAT BAD: A LITTLE
PERSONAL GROOMING: A LOT
EATING MEALS: A LOT
DRESSING REGULAR LOWER BODY CLOTHING: A LOT
TOILETING: A LOT
MOVING TO AND FROM BED TO CHAIR: A LOT
WALKING IN HOSPITAL ROOM: A LOT
MOBILITY SCORE: 14
STANDING UP FROM CHAIR USING ARMS: A LOT
MOVING FROM LYING ON BACK TO SITTING ON SIDE OF FLAT BED WITH BEDRAILS: A LITTLE
HELP NEEDED FOR BATHING: A LOT
DRESSING REGULAR UPPER BODY CLOTHING: A LOT

## 2024-05-07 ASSESSMENT — PAIN SCALES - GENERAL
PAINLEVEL_OUTOF10: 0 - NO PAIN

## 2024-05-07 ASSESSMENT — PAIN - FUNCTIONAL ASSESSMENT
PAIN_FUNCTIONAL_ASSESSMENT: 0-10

## 2024-05-07 NOTE — ANESTHESIA POSTPROCEDURE EVALUATION
Patient: Brian Rodriguez    Procedure Summary       Date: 05/07/24 Room / Location: [facility]    Anesthesia Start: 1237 Anesthesia Stop: 1311    Procedure: EGD Diagnosis: Dysphagia, unspecified type    Scheduled Providers: Pranav Horton MD; Jas Womack RN Responsible Provider: Kaci Chi MD    Anesthesia Type: MAC ASA Status: 3            Anesthesia Type: MAC    Vitals Value Taken Time   BP 95/61 05/07/24 1338   Temp 36 °C (96.8 °F) 05/07/24 1308   Pulse 70 05/07/24 1338   Resp 16 05/07/24 1338   SpO2 97 % 05/07/24 1338       Anesthesia Post Evaluation    Patient location during evaluation: PACU  Patient participation: complete - patient participated  Level of consciousness: awake  Pain management: adequate  Airway patency: patent  Cardiovascular status: acceptable  Respiratory status: acceptable  Hydration status: acceptable  Postoperative Nausea and Vomiting: none        No notable events documented.

## 2024-05-07 NOTE — CARE PLAN
The clinical goals for the shift include Patient will remain free from fall/injury and HDS for entirety of shift.    Over the shift, the patient made progress toward the following goals. Barriers to progression include n/a. Recommendations to address these barriers include n/a.      Problem: Pain  Goal: My pain/discomfort is manageable  Outcome: Progressing     Problem: Psychosocial Needs  Goal: Demonstrates ability to cope with hospitalization/illness  Outcome: Progressing  Goal: Collaborate with me, my family, and caregiver to identify my specific goals  Outcome: Progressing     Problem: Discharge Barriers  Goal: My discharge needs are met  Outcome: Progressing     Problem: Fall/Injury  Goal: Verbalize understanding of personal risk factors for fall in the hospital  Outcome: Progressing  Goal: Verbalize understanding of risk factor reduction measures to prevent injury from fall in the home  Outcome: Progressing  Goal: Use assistive devices by end of the shift  Outcome: Progressing  Goal: Pace activities to prevent fatigue by end of the shift  Outcome: Progressing     Problem: Pain  Goal: Takes deep breaths with improved pain control throughout the shift  Outcome: Progressing  Goal: Turns in bed with improved pain control throughout the shift  Outcome: Progressing  Goal: Walks with improved pain control throughout the shift  Outcome: Progressing  Goal: Performs ADL's with improved pain control throughout shift  Outcome: Progressing     Problem: Skin  Goal: Decreased wound size/increased tissue granulation at next dressing change  Outcome: Progressing  Flowsheets (Taken 5/7/2024 0351)  Decreased wound size/increased tissue granulation at next dressing change:   Promote sleep for wound healing   Protective dressings over bony prominences  Goal: Participates in plan/prevention/treatment measures  Outcome: Progressing  Flowsheets (Taken 5/7/2024 0351)  Participates in plan/prevention/treatment measures: Elevate  heels  Goal: Prevent/manage excess moisture  Outcome: Progressing  Flowsheets (Taken 5/7/2024 0351)  Prevent/manage excess moisture:   Cleanse incontinence/protect with barrier cream   Monitor for/manage infection if present   Follow provider orders for dressing changes   Use wicking fabric (obtain order)   Moisturize dry skin  Goal: Prevent/minimize sheer/friction injuries  Outcome: Progressing  Flowsheets (Taken 5/7/2024 0351)  Prevent/minimize sheer/friction injuries:   Complete micro-shifts as needed if patient unable. Adjust patient position to relieve pressure points, not a full turn   Use pull sheet   Turn/reposition every 2 hours/use positioning/transfer devices  Goal: Promote/optimize nutrition  Outcome: Progressing  Flowsheets (Taken 5/7/2024 0351)  Promote/optimize nutrition:   Monitor/record intake including meals   Consume > 50% meals/supplements  Goal: Promote skin healing  Outcome: Progressing  Flowsheets (Taken 5/7/2024 0351)  Promote skin healing:   Assess skin/pad under line(s)/device(s)   Protective dressings over bony prominences   Turn/reposition every 2 hours/use positioning/transfer devices   Ensure correct size (line/device) and apply per  instructions   Rotate device position/do not position patient on device

## 2024-05-07 NOTE — CARE PLAN
The patient's goals for the shift include Pt will be free from falls  use call light  bed alarm remians on    The clinical goals for the shift include no falls    Problem: Pain  Goal: My pain/discomfort is manageable  Outcome: Progressing     Problem: Psychosocial Needs  Goal: Demonstrates ability to cope with hospitalization/illness  Outcome: Progressing  Goal: Collaborate with me, my family, and caregiver to identify my specific goals  Outcome: Progressing     Problem: Discharge Barriers  Goal: My discharge needs are met  Outcome: Progressing     Problem: Fall/Injury  Goal: Verbalize understanding of personal risk factors for fall in the hospital  Outcome: Progressing  Goal: Verbalize understanding of risk factor reduction measures to prevent injury from fall in the home  Outcome: Progressing  Goal: Use assistive devices by end of the shift  Outcome: Progressing  Goal: Pace activities to prevent fatigue by end of the shift  Outcome: Progressing     Problem: Pain  Goal: Takes deep breaths with improved pain control throughout the shift  Outcome: Progressing  Goal: Turns in bed with improved pain control throughout the shift  Outcome: Progressing  Goal: Walks with improved pain control throughout the shift  Outcome: Progressing  Goal: Performs ADL's with improved pain control throughout shift  Outcome: Progressing  Goal: Participates in PT with improved pain control throughout the shift  Outcome: Progressing  Goal: Free from opioid side effects throughout the shift  Outcome: Progressing  Goal: Free from acute confusion related to pain meds throughout the shift  Outcome: Progressing     Problem: Skin  Goal: Decreased wound size/increased tissue granulation at next dressing change  Outcome: Progressing  Flowsheets (Taken 5/7/2024 0351 by Beba Ventura RN)  Decreased wound size/increased tissue granulation at next dressing change:   Promote sleep for wound healing   Protective dressings over bony  prominences  Goal: Participates in plan/prevention/treatment measures  Outcome: Progressing  Flowsheets (Taken 5/7/2024 1044)  Participates in plan/prevention/treatment measures:   Discuss with provider PT/OT consult   Elevate heels   Increase activity/out of bed for meals  Goal: Prevent/manage excess moisture  Outcome: Progressing  Flowsheets (Taken 5/7/2024 1044)  Prevent/manage excess moisture:   Cleanse incontinence/protect with barrier cream   Follow provider orders for dressing changes   Moisturize dry skin   Monitor for/manage infection if present   Use wicking fabric (obtain order)  Goal: Prevent/minimize sheer/friction injuries  Outcome: Progressing  Flowsheets (Taken 5/7/2024 1044)  Prevent/minimize sheer/friction injuries:   Complete micro-shifts as needed if patient unable. Adjust patient position to relieve pressure points, not a full turn   HOB 30 degrees or less   Increase activity/out of bed for meals   Turn/reposition every 2 hours/use positioning/transfer devices   Use pull sheet   Utilize specialty bed per algorithm  Goal: Promote/optimize nutrition  Outcome: Progressing  Flowsheets (Taken 5/7/2024 1044)  Promote/optimize nutrition:   Assist with feeding   Consume > 50% meals/supplements   Discuss with provider if NPO > 2 days   Monitor/record intake including meals   Offer water/supplements/favorite foods   Reassess MST if dietician not consulted  Goal: Promote skin healing  Outcome: Progressing  Flowsheets (Taken 5/7/2024 1044)  Promote skin healing:   Assess skin/pad under line(s)/device(s)   Ensure correct size (line/device) and apply per  instructions   Protective dressings over bony prominences   Rotate device position/do not position patient on device   Turn/reposition every 2 hours/use positioning/transfer devices

## 2024-05-07 NOTE — PROGRESS NOTES
Subjective Data:  No complaints this am    This am glucose decreased to 59 but then increased to 128    Overnight Events:    C diff ordered for increased liquid stool output     Objective Data:  Last Recorded Vitals:  Vitals:    05/07/24 1310 05/07/24 1323 05/07/24 1338 05/07/24 1353   BP: (!) 70/49 76/54 95/61 115/76   Pulse:  75 70 70   Resp:  16 16 16   Temp:       TempSrc:       SpO2:  96% 97% 99%   Weight:       Height:           Last Labs:  Results for orders placed or performed during the hospital encounter of 04/17/24 (from the past 24 hour(s))   POCT GLUCOSE   Result Value Ref Range    POCT Glucose 110 (H) 74 - 99 mg/dL   POCT GLUCOSE   Result Value Ref Range    POCT Glucose 170 (H) 74 - 99 mg/dL   POCT GLUCOSE   Result Value Ref Range    POCT Glucose 150 (H) 74 - 99 mg/dL   Magnesium   Result Value Ref Range    Magnesium 2.01 1.60 - 2.40 mg/dL   Renal Function Panel   Result Value Ref Range    Glucose 60 (L) 74 - 99 mg/dL    Sodium 135 (L) 136 - 145 mmol/L    Potassium 4.7 3.5 - 5.3 mmol/L    Chloride 100 98 - 107 mmol/L    Bicarbonate 24 21 - 32 mmol/L    Anion Gap 16 10 - 20 mmol/L    Urea Nitrogen 33 (H) 6 - 23 mg/dL    Creatinine 1.91 (H) 0.50 - 1.30 mg/dL    eGFR 37 (L) >60 mL/min/1.73m*2    Calcium 7.9 (L) 8.6 - 10.6 mg/dL    Phosphorus 5.6 (H) 2.5 - 4.9 mg/dL    Albumin 1.9 (L) 3.4 - 5.0 g/dL   CBC   Result Value Ref Range    WBC 9.3 4.4 - 11.3 x10*3/uL    nRBC 0.0 0.0 - 0.0 /100 WBCs    RBC 3.21 (L) 4.50 - 5.90 x10*6/uL    Hemoglobin 8.2 (L) 13.5 - 17.5 g/dL    Hematocrit 24.9 (L) 41.0 - 52.0 %    MCV 78 (L) 80 - 100 fL    MCH 25.5 (L) 26.0 - 34.0 pg    MCHC 32.9 32.0 - 36.0 g/dL    RDW 20.6 (H) 11.5 - 14.5 %    Platelets 521 (H) 150 - 450 x10*3/uL   POCT GLUCOSE   Result Value Ref Range    POCT Glucose 59 (L) 74 - 99 mg/dL   POCT GLUCOSE   Result Value Ref Range    POCT Glucose 128 (H) 74 - 99 mg/dL       Last I/O:  I/O last 3 completed shifts:  In: 3862.4 (48.6 mL/kg) [I.V.:464.4 (5.8 mL/kg);  NG/GT:3398]  Out: 3576 (45 mL/kg) [Urine:3275 (1.1 mL/kg/hr); Stool:301]  Dosing Weight: 79.4 kg     5/3/24 CXR  IMPRESSION:  1.  Extensive perihilar edema and effusions and correlate with fluid  status.    5/4/24 KUB  IMPRESSION:  1. Enteric tube tip projects over the midline upper abdomen over the  expected location of the gastric antrum.  2. Nonspecific nonobstructive bowel gas pattern. Mildly prominent  small large bowel loops and correlate with mild ileus.  3. Persistent pulmonary edema is noted in the visualized lungs.        4/29/24 TTE  CONCLUSIONS:   1. Left ventricular systolic function is low normal with a 60% estimated ejection fraction.   2. No definite valvular vegetations were visualized.   3. Poorly visualized anatomical structures due to suboptimal image quality.    5/3/24 L humerus Xray  1. No evidence of myositis in the visualized right upper extremity.  2. Questionable minimal subcutaneous edema in the anterior mid arm.    5/3/24 MRI without  IMPRESSION:  Limited, incomplete, and motion degraded MRI of the brain  demonstrating no definitive evidence of acute ischemic injury.    5/1/24 US venous duplex study left upper  CONCLUSIONS:  Left Upper Venous: No evidence of acute deep vein thrombus visualized in the left upper extremity. There is acute occlusive superficial venous thrombosis visualized in the proximal cephalic vein. Contralateral right subclavian vein doppler waveform not saved due to hardware error. Additional Findings; IV Line.    5/1/24 US venous duplex study bilat  CONCLUSIONS:  Right Lower Venous: No evidence of acute deep vein thrombus visualized in the right lower extremity. Calf veins visualized in segmnets. Additional Findings; Lymph nodes Rt groin lymph node # 1 measures 7.48 mm x 31.64 mm.  Lymph node #2 3.57 mm x 26.93 mm.  Left Lower Venous: No evidence of acute deep vein thrombus visualized in the left lower extremity. Peroneal veins visualized in segmnets. Additional  Findings; Lymph nodes Lt groin lymph node measures 5.62 mm x 41.53 mm.                        Inpatient Medications:  Scheduled medications   Medication Dose Route Frequency    doxycylcine  100 mg oral q12h RANDAL    insulin glargine  10 Units subcutaneous Nightly    insulin lispro  0-10 Units subcutaneous q6h    metoclopramide  5 mg oral TID    multivitamin with minerals  1 tablet oral Daily    nicotine  1 patch transdermal Daily    pantoprazole  40 mg intravenous Daily    sodium bicarbonate  650 mg oral BID    sodium chloride  1 spray Each Nostril 4x daily    thiamine  100 mg oral Daily     PRN medications   Medication    acetaminophen    albuterol    dextrose    dextrose    glucagon    glucagon    guaiFENesin    heparin    ipratropium-albuteroL    melatonin    polyethylene glycol    sennosides-docusate sodium    traMADol     Continuous Medications   Medication Dose Last Rate    [Held by provider] heparin  0-4,500 Units/hr Stopped (05/07/24 0000)       Physical Exam:  Constitutional: pt in NAD, elderly male, cachectic, slightly drowsy but cooperative  HENT: PERRL, no icterus,   oral mucous membranes moist, poor dentition  Head/Neck: Neck supple, no apparent injury  Respiratory/Thorax: Lrepisrations even, non labored with lungs CTA bilaterally per ausculation, on room air  Cardiovascular: RRR S1 and S2 normal, no M/R/G  Gastrointestinal: soft, non tender, non distended, BS present x 4, dobhoff intact to nares  Musculoskeletal: ROM intact, normal strength bilat  Extremities: no edema of lower ext, warm to touch x4  Neurological: A&Ox 2 speech soft but clear, follows commands   Psych: affect slightly flat,  cooperative     Assessment/Plan   Brian Rodriguez is a 69 y.o. male with a history of type 2 diabetes, hypertension, CKD, hyperlipidemia, hidradenitis, bipolar who presented to the ED on 4/17/2024 for altered mental status and concern of infection.  Admitted to [facility] with multifocal PNA, HyperNa, TERRI on CKD, then  transferred to MICU for AMS & acute hypoxic resp failure & c/f sepsis.   Transferred back to [facility] then returned to MICU 4/26 for respiratory distress and acute hypoxia.       Neuro/Psych: Hx of Bipolar disorder  Acute Encephalopathy  Hypoactive Delirim  - baseline A&Ox2  - Neurology consulted, appreciate recommendations:  > MRI brain w/o contrast for Wallerian degeneration (done 5/3 - poor quality, but no acute abnormalities)  & MRI L humerus to rule out myositis (done 5/4 -negative)   > EMG/NCS was performed,  preliminary concerning for myopathy   >  Please send for CK (wnl),  aldolase  (elevated)  > Repeat TSH and T4 (completd 5/2, TSH and FT4 wnl)  > Send for MMA (sent 5/2, results pending)  > 5/6 Neuro recs via secure chat: His muscle findings on EMG are nonspecific and can actually be seen with disuse atrophy. recommend repeat slp eval (pending today).   At discharge, referral for neuromuscular follow up recommended for a repeat EMG several weeks after hospitalization (referral sent via [employer]).  Neuropsych referral placed via Epic as well.  LP is not waranted  - s/p high dose thiamine IV given 4/29-5/2;  continue daily Thiamine supp     Pulm: Acute hypoxic resp failure 2/2 Aspiration, Multifocal PNA.  Recent Covid + (4/18)  - stable on RA  - MBS completed earlier this admit with signs of aspiration.  SLP recs strict NPO with aggressive oral care  - continue bronchial hygiene with VPEP/OxyJet  - NT Suction PRN  - pt given vancomycin (4/18 - 5/1), meropenum (4/26 - 5/2  ),     Cardiac: Hypertension, hyperlipidemia.  New onset Afib  - BP soft, HR controlled.  Currently in NSR  - today during procedure SBP decreased to 58/72 with repeat 70/49 during procedure  - post procedure 115/76 MAP = 65  - MAP overnight around 78  - home Amlodpine, Atorvastatin on hold  - CHADsVasc 3  - continue heparin drip (held for PEG)  - consider eliquis at discharge  - PRN Diuresis with IV lasix (most recent Lasix 5/2 40 mg IV)       FEN/GI: Hypoalbuminemia, Dysphagia, Severe Malnutrition with BMI 19, diarrhea in MICU (5x on May2)  - Bmx3  - 5/6 repeat SLP eval with ongoing dysphagia and aspiration concern.  Continue NPO with enteral feeds via Cortrak  - Diet: Glucerna 1.5 @ 55ml/hr, Nutrasource Fiber TID (can go up to 2 packets TID if showing benefits).    ml Q6H  - needs long term nutrition plan==> discussed PEG placement with POA today, see below in dispo.  - 5/5 added Reglan 5mg TID (Qtc 448 on 5/4 ECG) - strict NPO, SLP following  - 5/7 PEG placed; ok to use at 1600  - keep NPO     Renal: TERRI on CKD (b/l Cr ~1.5-1.7). Urinary Retention  - failed voiding trial.  Barton replaced 5/4     Heme: Fe Def Anemia  - Ferritin 539/TIBC 94/iron 16   - holding Fe supp due to recent infection & atb  - DVT prophylaxis: on Heparin drip (for afib)     Endo:  type 2 diabetes,  - A1C 8.5 (April 2024)  - Blood sugars:   - Continue at bedtime Lantus 10 units & #2 SSI Lispro  - Q4H Accu checks     ID: hx of hidradenitis.  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 (no LP planned per neuro)  - 4/30 Procal 2.95  - Antimicrobials: azithromycin 4/18-4/20, Remdesevir 4/18-4/22, Vanc 4/18- 5/1, Skylar 4/26- 5/2  - Doxycyline 100 mg for Hidradenitis ppx  - holding on treatment of syphilis      MSK/SKIN: Chronic Sacral, scrotum & Buttocks wounds, Hidradenitis  - derm has previously recommended humira/minocycline 100  BID but currently w/o lab confirmation, hold off iso septic shock  - will need outpatient dermatology follow up w/ doxycycline 100 BID outpatient   - seen by wound care RN on 5/2: Patient has black/brown discoloration on B/L areas are nontender, without temperature change, or induration. These are not pressure injuries. There is black soft thickened tissue over left hip/buttocks skin changes r/t hydradenitis  - Recs: Cleanse wounds with CHG bath wipes, Aquacel Ag and Mepilex bordered foam to open, draining areas of sacrum, Interdry to under scrotum and in between creases of groin/ inner thighs, Turn every 2 hours.        : Inguinal lymphadenopathy   - US upper ext duplex 5/1: Superficial venous thrombus in L cephalic vein   - US lower ext duplex 5/1: Lymph nodes Lt groin lymph node measures 5.62 mm x 41.53 mm, Rt groin lymph node # 1 measures 7.48 mm x 31.64 mm. Lymph node #2 3.57 mm x 26.93 mm.  - pt with (Chlamydia, neisseria gonorrhea, HSV, previous HIV 1/2 testing this admission neg)  - on 5/2 VDRL +     Lines: Midline (4/30), Dobhoff (4/20),  Barton (5/5)     Code Status: DNR  DPOA/Contact Number: DEEPAK RANGEL, Home Phone: [phone]      Dispo: Stable for transfer to [facility].  Discussed need for PEG with brother Rigoberto via telephone today.  Per Rigoberto patient has stated in the  past that he would NOT want PEG tube or any means of artifical nutrition long term.  I explained if that is the case we can offer hospice care in which he can eat for pleasure knowing he is an aspiration risk and would allow a natural death.  Rigoberto would like to discuss with his other siblings and will get back to us with their decision.  **Update: Rigoberto spoke with [employer] and would like to continue with PEG.  GI consulted and PEG was placed on 5/7     Discharge planning: [facility]  Midline 04/30/24 Single lumen Right (Active)   Site Assessment Clean;Dry;Intact 05/07/24 1117   Proximal Lumen Status Blood return  noted;Flushed;Capped 05/07/24 1117   Dressing Type Antimicrobial patch 05/07/24 1117   Dressing Status Clean;Dry;Occlusive 05/07/24 1117   Number of days: 7       Urethral Catheter (Active)   Site Assessment Clean;Skin intact 05/07/24 1119   Collection Container Standard drainage bag 05/07/24 1119   Securement Method Tape 05/07/24 1119   Number of days: 3       Rectal Tube With balloon (Active)   Rectal Tube I/O Output only 05/07/24 1119   Number of days: 1       Code Status:  DNR      Pt seen and examine and plan of care discussed with Dr. Manuel Rosenbaum, APRN-CNP

## 2024-05-07 NOTE — PROGRESS NOTES
Speech-Language Pathology                 Therapy Communication Note    Patient Name: Brian Rodriguez  MRN: [id number]  Today's Date: 5/7/2024     Discipline: Speech Language Pathology    Missed Visit Reason:  Pt NPO for PEG placement this date. SLP to follow-up for ongoing intervention as appropriate.     Missed Time: Attempt    Comment:

## 2024-05-07 NOTE — ANESTHESIA PREPROCEDURE EVALUATION
Patient: Brian Rodriguez    Procedure Information       Date/Time: 05/07/24 1120    Scheduled providers: Pranav Horton MD; aJs Womack RN    Procedure: EGD    Location: [facility]            Relevant Problems   Anesthesia (within normal limits)      Cardiac (within normal limits)      Pulmonary   (+) Pneumonia due to infectious organism, unspecified laterality, unspecified part of lung      Neuro (within normal limits)      GI   (+) Dysphagia      /Renal (within normal limits)      Liver (within normal limits)      Endocrine (within normal limits)      Hematology (within normal limits)      Musculoskeletal (within normal limits)      HEENT (within normal limits)      ID   (+) Pneumonia due to infectious organism, unspecified laterality, unspecified part of lung      Skin (within normal limits)      GYN (within normal limits)     69 y.o. male with a T2DM, HTN, CKD, HLD, chronic sacral wound due to hydradenitis on Humira, bipolar, dementia presenting with altered mental status with concern for infection. General neurology has been consulted for dysphagia.     Generalized weakness multifactorial in setting of malnutrition, deconditioning, and critical illness  Dysphagia possibly related to underlying neurodegenerative process  Clinical information reviewed:   Tobacco  Allergies  Meds   Med Hx  Surg Hx   Fam Hx  Soc Hx        NPO Detail:  NPO/Void Status  Date of Last Liquid: 05/07/24  Time of Last Liquid: 0000  Date of Last Solid: 05/07/24  Time of Last Solid: 0000       CONCLUSIONS:   1. Left ventricular systolic function is low normal with a 60% estimated ejection fraction.   2. No definite valvular vegetations were visualized.   3. Poorly visualized anatomical structures due to suboptimal image quality.     Physical Exam    Airway  Mallampati: unable to assess     Cardiovascular - normal exam     Dental    Pulmonary - normal exam     Abdominal          Vitals:    05/07/24 1114    BP: 105/67   Pulse: 80   Resp: 14   Temp: 36.7 °C (98.1 °F)   SpO2: 96%       History reviewed. No pertinent surgical history.  History reviewed. No pertinent past medical history.    Current Facility-Administered Medications:     acetaminophen (Tylenol) tablet 650 mg, 650 mg, oral, q4h PRN **OR** [DISCONTINUED] acetaminophen (Tylenol) oral liquid 650 mg, 650 mg, oral, q4h PRN, 650 mg at 05/02/24 0444 **OR** [DISCONTINUED] acetaminophen (Tylenol) suppository 650 mg, 650 mg, rectal, q4h PRN, Fiona Swanson MD    albuterol 2.5 mg /3 mL (0.083 %) nebulizer solution 2.5 mg, 2.5 mg, nebulization, q6h PRN, Maria Teresa Biggs MD, 2.5 mg at 04/30/24 2025    dextrose 50 % injection 12.5 g, 12.5 g, intravenous, q15 min PRN, Tom Zambrano MD PhD, 12.5 g at 05/07/24 0631    dextrose 50 % injection 25 g, 25 g, intravenous, q15 min PRN, Tom Zambrano MD PhD    doxycycline (Vibra-Tabs) tablet 100 mg, 100 mg, oral, q12h RANDAL, Ana Harrison MD, 100 mg at 05/06/24 2058    glucagon (Glucagen) injection 1 mg, 1 mg, intramuscular, q15 min PRN, Tom Zambrano MD PhD    glucagon (Glucagen) injection 1 mg, 1 mg, intramuscular, q15 min PRN, Tom Zambrano MD PhD    guaiFENesin (Robitussin) 100 mg/5 mL syrup 100 mg, 100 mg, oral, q4h PRN, Fiona Swanson MD, 100 mg at 05/04/24 0906    [Held by provider] heparin 25,000 Units in dextrose 5% 250 mL (100 Units/mL) infusion (premix), 0-4,500 Units/hr, intravenous, Continuous, Kassy Ingram, APRN-CNP, Stopped at 05/07/24 0000    heparin bolus from bag 2,000-4,000 Units, 2,000-4,000 Units, intravenous, q4h PRN, Kassy Ingram, APRN-CNP, 2,000 Units at 05/05/24 0539    insulin glargine (Lantus) injection 10 Units, 10 Units, subcutaneous, Nightly, Ana Harrison MD, 10 Units at 05/06/24 2059    insulin lispro (HumaLOG) injection 0-10 Units, 0-10 Units, subcutaneous, q6h, Ana Harrison MD, 4 Units at 05/06/24 1113    ipratropium-albuteroL (Duo-Neb) 0.5-2.5 mg/3  mL nebulizer solution 3 mL, 3 mL, nebulization, q6h PRN, Emery Berry MD    melatonin tablet 3 mg, 3 mg, oral, Nightly PRN, Fiona Swanson MD, 3 mg at 05/01/24 1954    metoclopramide (Reglan) tablet 5 mg, 5 mg, oral, TID, Neetu Willingham, APRN-CNP, 5 mg at 05/06/24 2058    multivitamin with minerals 1 tablet, 1 tablet, oral, Daily, Fiona Swanson MD, 1 tablet at 05/06/24 0849    nicotine (Nicoderm CQ) 7 mg/24 hr patch 1 patch, 1 patch, transdermal, Daily, Kassy Diop, APRN-CNP, 1 patch at 05/07/24 0940    pantoprazole (ProtoNix) injection 40 mg, 40 mg, intravenous, Daily, Fiona Swanson MD, 40 mg at 05/07/24 0935    polyethylene glycol (Glycolax, Miralax) packet 17 g, 17 g, oral, Daily PRN, Fiona Swanson MD    sennosides-docusate sodium (Annia-Colace) 8.6-50 mg per tablet 2 tablet, 2 tablet, oral, Nightly PRN, Fiona Swanson MD    sodium bicarbonate tablet 650 mg, 650 mg, oral, BID, Fiona Swanson MD, 650 mg at 05/06/24 2059    sodium chloride (Ocean) 0.65 % nasal spray 1 spray, 1 spray, Each Nostril, 4x daily, Fiona Swanson MD, 1 spray at 05/07/24 0940    thiamine (Vitamin B-1) tablet 100 mg, 100 mg, oral, Daily, Ana Harrison MD, 100 mg at 05/06/24 0850    traMADol (Ultram) tablet 50 mg, 50 mg, oral, Daily PRN, Fiona Swanson MD, 50 mg at 04/28/24 1636  Prior to Admission medications    Medication Sig Start Date End Date Taking? Authorizing Provider   acetaminophen (Tylenol) 325 mg tablet Take 2 tablets (650 mg) by mouth every 4 hours if needed (pain/fever).   Yes Historical Provider, MD   adalimumab (Humira,CF, Pen) 40 mg/0.4 mL pen injector kit pen-injector Inject 1 Pen (40 mg) under the skin 1 (one) time per week. 1/19/24  Yes Brenda Monroy, DO   amino acids/protein hydrolys (PRO-STAT AWC ORAL) Take 30 mL by mouth 2 times a day.   Yes Historical Provider, MD   ascorbic acid (Vitamin C) 500 mg tablet Take 1 tablet (500 mg) by mouth 3 times a day.   Yes Historical Provider, MD    atorvastatin (Lipitor) 10 mg tablet Take 1 tablet (10 mg) by mouth once daily at bedtime.   Yes Historical Provider, MD   cholecalciferol (Vitamin D-3) 25 MCG (1000 UT) tablet Take 1 tablet (1,000 Units) by mouth once daily in the morning.   Yes Historical Provider, MD   ferrous sulfate, 325 mg ferrous sulfate, tablet Take 1 tablet by mouth 3 times a day.   Yes Historical Provider, MD   folic acid (Folvite) 1 mg tablet Take 1 tablet (1 mg) by mouth once daily in the morning.   Yes Historical Provider, MD   galantamine (Razadyne) 4 mg tablet Take 1 tablet (4 mg) by mouth 2 times a day.   Yes Historical Provider, MD   insulin glargine (Lantus U-100 Insulin) 100 unit/mL injection Inject 10 Units under the skin once daily in the morning.   Yes Historical Provider, MD   insulin lispro (HumaLOG) 100 unit/mL injection Inject under the skin 3 times a day before meals. Per Sliding Scale: 111-150=1; 151-200=3; 201-250=6; 251-300=9; 301-350=12; 351-400=15; 401 or greater call MD.   Yes Historical Provider, MD   Lactobacillus acidophilus (ACIDOPHILUS ORAL) Take 1 tablet by mouth 2 times a day. 0.5 mg (100 million cell) tablet   Yes Historical Provider, MD   mirtazapine (Remeron) 7.5 mg tablet Take 1 tablet (7.5 mg) by mouth once daily at bedtime.   Yes Historical Provider, MD   multivitamin tablet Take 1 tablet by mouth once daily in the morning.   Yes Historical Provider, MD   pantoprazole (ProtoNix) 40 mg EC tablet Take 1 tablet (40 mg) by mouth once daily in the morning. Do not crush, chew, or split.   Yes Historical Provider, MD   zinc sulfate (Zincate) 220 (50 Zn) MG capsule Take 1 capsule (50 mg of elemental zinc) by mouth once daily in the morning.   Yes Historical Provider, MD   dextrose 5 %-0.45 % sod chlord (dextrose 5%-0.45 % sodium chloride) infusion Infuse 3,000 mL into a venous catheter. Every shift, Start 4/17/24    Historical Provider, MD   ertapenem (INVanz) IV Infuse 25 mL (500 mg) into a venous catheter  once daily. Start 4/17/24, End 4/21/24    Historical Provider, MD   glucagon (Glucagen) 1 mg injection Inject under the skin 1 time if needed (every hour as needed).    Historical Provider, MD   guaiFENesin (Robitussin) 100 mg/5 mL syrup Take 5 mL (100 mg) by mouth every 4 hours if needed for cough.    Historical Provider, MD   traMADol (Ultram) 50 mg tablet Take 1 tablet (50 mg) by mouth once daily as needed (30 min prior to dressing changes).    Historical Provider, MD     No Known Allergies  Social History     Tobacco Use    Smoking status: Never     Passive exposure: Never    Smokeless tobacco: Not on file   Substance Use Topics    Alcohol use: Not Currently         Chemistry    Lab Results   Component Value Date/Time     (L) 05/07/2024 0419    K 4.7 05/07/2024 0419     05/07/2024 0419    CO2 24 05/07/2024 0419    BUN 33 (H) 05/07/2024 0419    CREATININE 1.91 (H) 05/07/2024 0419    Lab Results   Component Value Date/Time    CALCIUM 7.9 (L) 05/07/2024 0419    ALKPHOS 89 04/28/2024 0439    AST 15 04/28/2024 0439    ALT 10 04/28/2024 0439    BILITOT 0.3 04/28/2024 0439          Lab Results   Component Value Date/Time    WBC 9.3 05/07/2024 0419    HGB 8.2 (L) 05/07/2024 0419    HCT 24.9 (L) 05/07/2024 0419     (H) 05/07/2024 0419     Lab Results   Component Value Date/Time    PROTIME 19.4 (H) 04/23/2024 0732    INR 1.7 (H) 04/23/2024 0732     Encounter Date: 04/17/24   ECG 12 lead   Result Value    Ventricular Rate 82    Atrial Rate 127    QRS Duration 80    QT Interval 384    QTC Calculation(Bazett) 448    R Axis 19    T Axis 17    QRS Count 13    Q Onset 223    T Offset 415    QTC Fredericia 426    Narrative    Atrial fibrillation with premature ventricular or aberrantly conducted complexes  Minimal voltage criteria for LVH, may be normal variant  Abnormal ECG  When compared with ECG of 04-MAY-2024 13:50, (unconfirmed)  Atrial fibrillation has replaced Sinus rhythm   DNR rescinded  perioperatively  All inf from chart patient is poor historian  No results found for this or any previous visit from the past 1095 days.     Anesthesia Plan    History of general anesthesia?: no  History of complications of general anesthesia?: no    ASA 3     MAC     Anesthetic plan and risks discussed with patient.  Use of blood products discussed with patient who.    Plan discussed with CAA.

## 2024-05-07 NOTE — SIGNIFICANT EVENT
GI update     Code Reversal    Spoke to patient's brother who is POA to make patient's code from DNR to Full code temporarily for the EGD and PEG placement procedure.

## 2024-05-07 NOTE — SIGNIFICANT EVENT
GI update    Patient underwent with successful placment of PEG tube, recommend to use it for water and meds after 3 hours. Will reassess PEG in AM

## 2024-05-08 LAB
ABO GROUP (TYPE) IN BLOOD: NORMAL
ALBUMIN SERPL BCP-MCNC: 1.9 G/DL (ref 3.4–5)
ANION GAP SERPL CALC-SCNC: 14 MMOL/L (ref 10–20)
ANTIBODY SCREEN: NORMAL
BUN SERPL-MCNC: 27 MG/DL (ref 6–23)
CALCIUM SERPL-MCNC: 8.2 MG/DL (ref 8.6–10.6)
CHLORIDE SERPL-SCNC: 102 MMOL/L (ref 98–107)
CO2 SERPL-SCNC: 24 MMOL/L (ref 21–32)
CREAT SERPL-MCNC: 1.94 MG/DL (ref 0.5–1.3)
EGFRCR SERPLBLD CKD-EPI 2021: 36 ML/MIN/1.73M*2
ERYTHROCYTE [DISTWIDTH] IN BLOOD BY AUTOMATED COUNT: 21 % (ref 11.5–14.5)
GLUCOSE BLD MANUAL STRIP-MCNC: 142 MG/DL (ref 74–99)
GLUCOSE BLD MANUAL STRIP-MCNC: 168 MG/DL (ref 74–99)
GLUCOSE BLD MANUAL STRIP-MCNC: 80 MG/DL (ref 74–99)
GLUCOSE SERPL-MCNC: 84 MG/DL (ref 74–99)
HCT VFR BLD AUTO: 27.5 % (ref 41–52)
HGB BLD-MCNC: 8.7 G/DL (ref 13.5–17.5)
MAGNESIUM SERPL-MCNC: 1.77 MG/DL (ref 1.6–2.4)
MCH RBC QN AUTO: 25.7 PG (ref 26–34)
MCHC RBC AUTO-ENTMCNC: 31.6 G/DL (ref 32–36)
MCV RBC AUTO: 81 FL (ref 80–100)
METHYLMALONATE SERPL-SCNC: 0.36 UMOL/L (ref 0–0.4)
NRBC BLD-RTO: 0 /100 WBCS (ref 0–0)
PHOSPHATE SERPL-MCNC: 5.4 MG/DL (ref 2.5–4.9)
PLATELET # BLD AUTO: 565 X10*3/UL (ref 150–450)
POTASSIUM SERPL-SCNC: 4.5 MMOL/L (ref 3.5–5.3)
RBC # BLD AUTO: 3.39 X10*6/UL (ref 4.5–5.9)
RH FACTOR (ANTIGEN D): NORMAL
SODIUM SERPL-SCNC: 135 MMOL/L (ref 136–145)
WBC # BLD AUTO: 10.7 X10*3/UL (ref 4.4–11.3)

## 2024-05-08 PROCEDURE — C9113 INJ PANTOPRAZOLE SODIUM, VIA: HCPCS

## 2024-05-08 PROCEDURE — 86901 BLOOD TYPING SEROLOGIC RH(D): CPT

## 2024-05-08 PROCEDURE — 1200000002 HC GENERAL ROOM WITH TELEMETRY DAILY

## 2024-05-08 PROCEDURE — 85027 COMPLETE CBC AUTOMATED: CPT | Performed by: NURSE PRACTITIONER

## 2024-05-08 PROCEDURE — 2500000004 HC RX 250 GENERAL PHARMACY W/ HCPCS (ALT 636 FOR OP/ED): Performed by: NURSE PRACTITIONER

## 2024-05-08 PROCEDURE — 2500000001 HC RX 250 WO HCPCS SELF ADMINISTERED DRUGS (ALT 637 FOR MEDICARE OP): Performed by: NURSE PRACTITIONER

## 2024-05-08 PROCEDURE — 80069 RENAL FUNCTION PANEL: CPT | Performed by: NURSE PRACTITIONER

## 2024-05-08 PROCEDURE — 83735 ASSAY OF MAGNESIUM: CPT | Performed by: NURSE PRACTITIONER

## 2024-05-08 PROCEDURE — 2500000002 HC RX 250 W HCPCS SELF ADMINISTERED DRUGS (ALT 637 FOR MEDICARE OP, ALT 636 FOR OP/ED): Performed by: NURSE PRACTITIONER

## 2024-05-08 PROCEDURE — 2500000005 HC RX 250 GENERAL PHARMACY W/O HCPCS: Performed by: NURSE PRACTITIONER

## 2024-05-08 PROCEDURE — 2500000004 HC RX 250 GENERAL PHARMACY W/ HCPCS (ALT 636 FOR OP/ED)

## 2024-05-08 PROCEDURE — S4991 NICOTINE PATCH NONLEGEND: HCPCS | Performed by: NURSE PRACTITIONER

## 2024-05-08 PROCEDURE — 82947 ASSAY GLUCOSE BLOOD QUANT: CPT

## 2024-05-08 RX ORDER — MAGNESIUM SULFATE HEPTAHYDRATE 40 MG/ML
2 INJECTION, SOLUTION INTRAVENOUS ONCE
Status: COMPLETED | OUTPATIENT
Start: 2024-05-08 | End: 2024-05-08

## 2024-05-08 RX ORDER — ACETAMINOPHEN 325 MG/1
650 TABLET ORAL EVERY 4 HOURS PRN
Qty: 30 TABLET | Refills: 0 | Status: CANCELLED | OUTPATIENT
Start: 2024-05-08

## 2024-05-08 RX ORDER — SILODOSIN 4 MG/1
4 CAPSULE ORAL
Status: DISCONTINUED | OUTPATIENT
Start: 2024-05-08 | End: 2024-05-08

## 2024-05-08 RX ORDER — ADALIMUMAB 40MG/0.4ML
40 KIT SUBCUTANEOUS
Status: CANCELLED
Start: 2024-05-12

## 2024-05-08 RX ADMIN — PANTOPRAZOLE SODIUM 40 MG: 40 INJECTION, POWDER, FOR SOLUTION INTRAVENOUS at 08:31

## 2024-05-08 RX ADMIN — Medication 15 ML: at 08:31

## 2024-05-08 RX ADMIN — SODIUM BICARBONATE 650 MG: 650 TABLET ORAL at 08:31

## 2024-05-08 RX ADMIN — SALINE NASAL SPRAY 1 SPRAY: 1.5 SOLUTION NASAL at 20:27

## 2024-05-08 RX ADMIN — APIXABAN 5 MG: 5 TABLET, FILM COATED ORAL at 20:27

## 2024-05-08 RX ADMIN — ACETAMINOPHEN 650 MG: 325 TABLET ORAL at 09:57

## 2024-05-08 RX ADMIN — MAGNESIUM SULFATE HEPTAHYDRATE 2 G: 40 INJECTION, SOLUTION INTRAVENOUS at 08:31

## 2024-05-08 RX ADMIN — ACETAMINOPHEN 650 MG: 325 TABLET ORAL at 04:06

## 2024-05-08 RX ADMIN — DOXYCYCLINE HYCLATE 100 MG: 100 TABLET, COATED ORAL at 08:31

## 2024-05-08 RX ADMIN — DOXYCYCLINE HYCLATE 100 MG: 100 TABLET, COATED ORAL at 20:27

## 2024-05-08 RX ADMIN — APIXABAN 5 MG: 5 TABLET, FILM COATED ORAL at 08:31

## 2024-05-08 RX ADMIN — ACETAMINOPHEN 650 MG: 325 TABLET ORAL at 22:27

## 2024-05-08 RX ADMIN — INSULIN GLARGINE 10 UNITS: 100 INJECTION, SOLUTION SUBCUTANEOUS at 20:27

## 2024-05-08 RX ADMIN — SILODOSIN 4 MG: 4 CAPSULE ORAL at 09:57

## 2024-05-08 RX ADMIN — NICOTINE 7 MG/24 HR DAILY TRANSDERMAL PATCH 1 PATCH: at 08:31

## 2024-05-08 RX ADMIN — THIAMINE HCL TAB 100 MG 100 MG: 100 TAB at 08:31

## 2024-05-08 RX ADMIN — METOCLOPRAMIDE 5 MG: 10 TABLET ORAL at 20:27

## 2024-05-08 RX ADMIN — METOCLOPRAMIDE 5 MG: 10 TABLET ORAL at 08:31

## 2024-05-08 RX ADMIN — SODIUM BICARBONATE 650 MG: 650 TABLET ORAL at 20:27

## 2024-05-08 RX ADMIN — ACETAMINOPHEN 650 MG: 325 TABLET ORAL at 15:35

## 2024-05-08 RX ADMIN — METOCLOPRAMIDE 5 MG: 10 TABLET ORAL at 15:35

## 2024-05-08 ASSESSMENT — PAIN SCALES - GENERAL
PAINLEVEL_OUTOF10: 0 - NO PAIN

## 2024-05-08 ASSESSMENT — COGNITIVE AND FUNCTIONAL STATUS - GENERAL
WALKING IN HOSPITAL ROOM: TOTAL
MOBILITY SCORE: 14
DAILY ACTIVITIY SCORE: 12
DRESSING REGULAR UPPER BODY CLOTHING: A LOT
DRESSING REGULAR LOWER BODY CLOTHING: A LOT
MOVING FROM LYING ON BACK TO SITTING ON SIDE OF FLAT BED WITH BEDRAILS: A LITTLE
MOVING FROM LYING ON BACK TO SITTING ON SIDE OF FLAT BED WITH BEDRAILS: A LITTLE
DRESSING REGULAR LOWER BODY CLOTHING: A LOT
MOBILITY SCORE: 10
PERSONAL GROOMING: A LOT
TURNING FROM BACK TO SIDE WHILE IN FLAT BAD: A LITTLE
DAILY ACTIVITIY SCORE: 12
PERSONAL GROOMING: A LOT
HELP NEEDED FOR BATHING: A LOT
MOVING TO AND FROM BED TO CHAIR: A LOT
EATING MEALS: A LOT
TOILETING: A LOT
CLIMB 3 TO 5 STEPS WITH RAILING: TOTAL
TURNING FROM BACK TO SIDE WHILE IN FLAT BAD: A LITTLE
STANDING UP FROM CHAIR USING ARMS: A LOT
MOVING TO AND FROM BED TO CHAIR: TOTAL
CLIMB 3 TO 5 STEPS WITH RAILING: A LOT
HELP NEEDED FOR BATHING: A LOT
WALKING IN HOSPITAL ROOM: A LOT
EATING MEALS: A LOT
STANDING UP FROM CHAIR USING ARMS: TOTAL
DRESSING REGULAR UPPER BODY CLOTHING: A LOT
TOILETING: A LOT

## 2024-05-08 ASSESSMENT — PAIN - FUNCTIONAL ASSESSMENT
PAIN_FUNCTIONAL_ASSESSMENT: 0-10

## 2024-05-08 NOTE — PROGRESS NOTES
Subjective Data:  No complaints    Overnight Events:    Tolerated water and meds via PEG overnight     Objective Data:  Last Recorded Vitals:  Vitals:    05/08/24 0000 05/08/24 0400 05/08/24 0600 05/08/24 0800   BP: 104/56 113/64  111/59   BP Location: Left arm Left arm  Left arm   Patient Position: Lying Lying  Lying   Pulse: 81 87  90   Resp: 12 24  20   Temp: 36.5 °C (97.7 °F) 36.8 °C (98.2 °F)  36.9 °C (98.4 °F)   TempSrc: Temporal Temporal  Temporal   SpO2: 93% 97%  99%   Weight:   62.5 kg (137 lb 12.8 oz)    Height:           Last Labs:  Results for orders placed or performed during the hospital encounter of 04/17/24 (from the past 24 hour(s))   POCT GLUCOSE   Result Value Ref Range    POCT Glucose 84 74 - 99 mg/dL   POCT GLUCOSE   Result Value Ref Range    POCT Glucose 80 74 - 99 mg/dL   POCT GLUCOSE   Result Value Ref Range    POCT Glucose 88 74 - 99 mg/dL   Type and screen   Result Value Ref Range    ABO TYPE O     Rh TYPE POS     ANTIBODY SCREEN NEG    Magnesium   Result Value Ref Range    Magnesium 1.77 1.60 - 2.40 mg/dL   CBC   Result Value Ref Range    WBC 10.7 4.4 - 11.3 x10*3/uL    nRBC 0.0 0.0 - 0.0 /100 WBCs    RBC 3.39 (L) 4.50 - 5.90 x10*6/uL    Hemoglobin 8.7 (L) 13.5 - 17.5 g/dL    Hematocrit 27.5 (L) 41.0 - 52.0 %    MCV 81 80 - 100 fL    MCH 25.7 (L) 26.0 - 34.0 pg    MCHC 31.6 (L) 32.0 - 36.0 g/dL    RDW 21.0 (H) 11.5 - 14.5 %    Platelets 565 (H) 150 - 450 x10*3/uL   Renal Function Panel   Result Value Ref Range    Glucose 84 74 - 99 mg/dL    Sodium 135 (L) 136 - 145 mmol/L    Potassium 4.5 3.5 - 5.3 mmol/L    Chloride 102 98 - 107 mmol/L    Bicarbonate 24 21 - 32 mmol/L    Anion Gap 14 10 - 20 mmol/L    Urea Nitrogen 27 (H) 6 - 23 mg/dL    Creatinine 1.94 (H) 0.50 - 1.30 mg/dL    eGFR 36 (L) >60 mL/min/1.73m*2    Calcium 8.2 (L) 8.6 - 10.6 mg/dL    Phosphorus 5.4 (H) 2.5 - 4.9 mg/dL    Albumin 1.9 (L) 3.4 - 5.0 g/dL         Last I/O:  I/O last 3 completed shifts:  In: 1452 (18.3 mL/kg)  [I.V.:214 (2.7 mL/kg); NG/GT:1138; IV Piggyback:100]  Out: 3131 (39.4 mL/kg) [Urine:2830 (1 mL/kg/hr); Stool:301]  Dosing Weight: 79.4 kg     5/3/24 CXR  IMPRESSION:  1.  Extensive perihilar edema and effusions and correlate with fluid  status.    5/4/24 KUB  IMPRESSION:  1. Enteric tube tip projects over the midline upper abdomen over the  expected location of the gastric antrum.  2. Nonspecific nonobstructive bowel gas pattern. Mildly prominent  small large bowel loops and correlate with mild ileus.  3. Persistent pulmonary edema is noted in the visualized lungs.        4/29/24 TTE  CONCLUSIONS:   1. Left ventricular systolic function is low normal with a 60% estimated ejection fraction.   2. No definite valvular vegetations were visualized.   3. Poorly visualized anatomical structures due to suboptimal image quality.    5/3/24 L humerus Xray  1. No evidence of myositis in the visualized right upper extremity.  2. Questionable minimal subcutaneous edema in the anterior mid arm.    5/3/24 MRI without  IMPRESSION:  Limited, incomplete, and motion degraded MRI of the brain  demonstrating no definitive evidence of acute ischemic injury.    5/1/24 US venous duplex study left upper  CONCLUSIONS:  Left Upper Venous: No evidence of acute deep vein thrombus visualized in the left upper extremity. There is acute occlusive superficial venous thrombosis visualized in the proximal cephalic vein. Contralateral right subclavian vein doppler waveform not saved due to hardware error. Additional Findings; IV Line.    5/1/24 US venous duplex study bilat  CONCLUSIONS:  Right Lower Venous: No evidence of acute deep vein thrombus visualized in the right lower extremity. Calf veins visualized in segmnets. Additional Findings; Lymph nodes Rt groin lymph node # 1 measures 7.48 mm x 31.64 mm.  Lymph node #2 3.57 mm x 26.93 mm.  Left Lower Venous: No evidence of acute deep vein thrombus visualized in the left lower extremity. Peroneal veins  visualized in segmnets. Additional Findings; Lymph nodes Lt groin lymph node measures 5.62 mm x 41.53 mm.        Inpatient Medications:  Scheduled medications   Medication Dose Route Frequency    acetaminophen  650 mg g-tube q6h    apixaban  5 mg oral q12h    doxycylcine  100 mg g-tube q12h RANDAL    [Held by provider] insulin glargine  10 Units subcutaneous Nightly    insulin lispro  0-10 Units subcutaneous q6h    metoclopramide  5 mg g-tube TID    multivitamin with iron-minerals  15 mL oral Daily    nicotine  1 patch transdermal Daily    pantoprazole  40 mg intravenous Daily    silodosin  4 mg g-tube Daily with breakfast    sodium bicarbonate  650 mg g-tube BID    sodium chloride  1 spray Each Nostril 4x daily    thiamine  100 mg g-tube Daily     PRN medications   Medication    albuterol    dextrose    dextrose    glucagon    glucagon    guaiFENesin    ipratropium-albuteroL    melatonin    polyethylene glycol    traMADol     Continuous Medications   Medication Dose Last Rate       Physical Exam:  Constitutional: pt in NAD, elderly male, cachectic  HENT: PERRL, no icterus, oral mucous membranes moist, poor dentition  Head/Neck: Neck muscles thinning, no apparent injury  Respiratory/Thorax: respirations even, non labored with lungs CTA bilaterally per ausculation, on room air  Cardiovascular: RRR S1 and S2 normal, no M/R/G  Gastrointestinal: soft, non tender, non distended, BS present x 4, PEG tube intact with binder on  Musculoskeletal: ROM intact, normal strength bilat  Extremities: no edema of lower ext, warm to touch x4  Neurological: became agitated with orientation questions and would not answer, speech soft but clear, follows commands   Psych: affect initially cooperative then agitation with questions     Assessment/Plan   Brian Rodriguez is a 69 y.o. male with a history of type 2 diabetes, hypertension, CKD, hyperlipidemia, hidradenitis, bipolar who presented to the ED on 4/17/2024 for altered mental status  and concern of infection.  Admitted to [facility] with multifocal PNA, HyperNa, TERRI on CKD, then transferred to MICU for AMS & acute hypoxic resp failure & c/f sepsis.   Transferred back to [facility] then returned to MICU 4/26 for respiratory distress and acute hypoxia.       Neuro/Psych: Hx of Bipolar disorder  Acute Encephalopathy  Hypoactive Delirim  - baseline A&Ox2  - Neurology consulted, appreciate recommendations:  > MRI brain w/o contrast for Wallerian degeneration (done 5/3 - poor quality, but no acute abnormalities)  & MRI L humerus to rule out myositis (done 5/4 -negative)   > EMG/NCS was performed,  preliminary concerning for myopathy   >  Please send for CK (wnl),  aldolase  (elevated)  > Repeat TSH and T4 (completd 5/2, TSH and FT4 wnl)  > Send for MMA (sent 5/2, results pending)  > 5/6 Neuro recs via secure chat: His muscle findings on EMG are nonspecific and can actually be seen with disuse atrophy. recommend repeat slp eval (pending today).   At discharge, referral for neuromuscular follow up recommended for a repeat EMG several weeks after hospitalization (referral sent via [employer]).  Neuropsych referral placed via Epic as well.  LP is not waranted  - s/p high dose thiamine IV given 4/29-5/2;  continue daily Thiamine supp  - cont total care for ADLs     Pulm:   Acute hypoxic resp failure 2/2 Aspiration  Multifocal PNA  Recent Covid + (4/18)  - stable on RA  - MBS completed earlier this admit with signs of aspiration.  SLP recs strict NPO with aggressive oral care  - continue bronchial hygiene  - pt given vancomycin (4/18 - 5/1), meropenum (4/26 - 5/2),     Cardiac:   Hypertension  Hyperlipidemia  New onset Afib  - BP soft, HR controlled.  Currently in NSR  - today during procedure SBP decreased to 58/72 with repeat 70/49 during procedure  - post procedure 115/76 MAP = 65  - MAP 69-80  - home Amlodpine, Atorvastatin on hold  - cont to monitor BP and restart above meds when able  - silodosin started per urine  -  CHADsVasc 3  - Eliquis started for a fib  - eliquis was started and will cont at discharge  - PRN Diuresis with IV lasix (most recent Lasix 5/2 40 mg IV)      FEN/GI:   Hypoalbuminemia  Dysphagia  Severe Malnutrition with BMI 19  diarrhea in MICU (5x on May2)-  - Bmx3  - 5/6 repeat SLP eval with ongoing dysphagia and aspiration concern.  Continue NPO with enteral feeds via Cortrak  - Diet: Glucerna 1.5 @ 55ml/hr, Nutrasource Fiber TID (can go up to 2 packets TID if showing benefits).    ml Q6H  - needs long term nutrition plan==> discussed PEG placement with POA today, see below in dispo.  - 5/5 added Reglan 5mg TID (Qtc 448 on 5/4 ECG) - strict NPO, SLP following  - 5/7 PEG placed  - restarted TF today after GI checked this am      Renal:   TERRI on CKD (b/l Cr ~1.5-1.7)  Urinary Retention  - failed voiding trial.  Bunch replaced 5/4  - cont bunch care     Heme:   Fe Def Anemia  - Ferritin 539/TIBC 94/iron 16   - holding Fe supp due to recent infection & atb  - DVT prophylaxis: on Heparin drip (for afib)     Endo:    Type 2 diabetes,  - A1C 8.5 (April 2024)  - Blood sugars:   - Continue at bedtime Lantus 10 units & #2 SSI Lispro  - Q4H Accu checks     ID:   Hx of hidradenitis  Covid + 4/18  Multifocal PNA  Aspiration PNA  - Tmax: 36.6 C, WBC 9.7  - Micro:  4/17 BC x2 Neg, 4/18, 4/22, 4/26, 4/27, 4/28 & 4/30 Sputum salivary contamination, 4/22 BC x2 Neg, 4/26 sputum 3+ Candida Albicans & 3+ Candida Glabrata, 4/26 Urine Cx Neg, 4/26 Strep pneumoniae Ag Neg, Legionella Ag Neg, 4/26 Fungitell Beta-D Glucan 231, 4/26 BC x2 Neg, 4/29 Tissue cx 1+ Rare Candida Albicans, 4/30 Urine Cx Pending, 4/30 BC x2 NGTD, 5/2 Syphilis Total Ab Reactive==> 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  - 4/17 & 4/29 MRSA PCR +  - 5/5 will hold off 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 (no LP planned per neuro)  - 4/30 Procal 2.95  - Antimicrobials: azithromycin 4/18-4/20, Remdesevir 4/18-4/22, Vanc 4/18- 5/1, Skylar 4/26- 5/2  -  Doxycyline 100 mg for Hidradenitis ppx  - holding on treatment of syphilis      MSK/SKIN:   Chronic Sacral  scrotum & Buttocks wounds  Hidradenitis  - derm has previously recommended humira/minocycline 100 BID but currently w/o lab confirmation, hold off iso septic shock  - will need outpatient dermatology follow up w/ doxycycline 100 BID outpatient   - seen by wound care RN on 5/2: Patient has black/brown discoloration on B/L areas are nontender, without temperature change, or induration. These are not pressure injuries. There is black soft thickened tissue over left hip/buttocks skin changes r/t hydradenitis  - Recs: Cleanse wounds with CHG bath wipes, Aquacel Ag and Mepilex bordered foam to open, draining areas of sacrum, Interdry to under scrotum and in between creases of groin/ inner thighs, Turn every 2 hours.        :   Inguinal lymphadenopathy   - US upper ext duplex 5/1: Superficial venous thrombus in L cephalic vein   - US lower ext duplex 5/1: Lymph nodes Lt groin lymph node measures 5.62 mm x 41.53 mm, Rt groin lymph node # 1 measures 7.48 mm x 31.64 mm. Lymph node #2 3.57 mm x 26.93 mm.  - pt with (Chlamydia, neisseria gonorrhea, HSV, previous HIV 1/2 testing this admission neg)  - on 5/2 VDRL +     Lines: Midline (4/30), Dobhoff (4/20),  Barton (5/5)     Code Status: DNR  DPOA/Contact Number: DEEPAK RANGEL, Artis Phone: [phone]      Dispo: Stable for transfer to [facility]. SW aware of need to return to facility          Discharge planning: [facility]  Midline 04/30/24 Single lumen Right (Active)   Site Assessment Clean;Dry;Intact 05/07/24 1117   Proximal Lumen Status Blood return noted;Flushed;Capped 05/07/24 1117   Dressing Type Antimicrobial patch 05/07/24 1117   Dressing Status Clean;Dry;Occlusive 05/07/24 1117   Number of days: 7       Urethral Catheter (Active)   Site Assessment Clean;Skin intact 05/07/24 1119   Collection Container Standard drainage bag 05/07/24 1119   Securement  Method Tape 05/07/24 1119   Number of days: 3       Rectal Tube With balloon (Active)   Rectal Tube I/O Output only 05/07/24 1119   Number of days: 1       Code Status:  DNR      Pt seen and examine and plan of care discussed with Dr. Manuel Rosenbaum, APRN-CNP

## 2024-05-08 NOTE — PROGRESS NOTES
05/08/24 1000   Discharge Planning   Living Arrangements Other (Comment)  (Pt is a resident at [facility])   Support Systems Family members   Type of Residence Nursing home/residential care   Home or Post Acute Services Post acute facilities (Rehab/SNF/etc)   Type of Post Acute Facility Services Skilled nursing   Patient expects to be discharged to: [facility]   Does the patient need discharge transport arranged? Yes   RoundTrip coordination needed? Yes     Updates were sent to [facility].  Precert has been initiated for pt to return.  Discharge is anticipated for 5/9 or 5/10.  Will follow.  JESUS Williamson

## 2024-05-08 NOTE — SIGNIFICANT EVENT
GI update.     Checked PEG tube at bedside,     -PEG site clean, dry, and nonerythematous  -minimal pain to palpation  -PEG measured ~3cm with ~1 cm between bumper and skin  -PEG spins 360 degrees without difficulty  -Able to withdraw gastric contents and infuse water without resistance  -OK to administer meds, water and enteral feeds through the PEG now  -Would avoid placing any gauze or barriers between the skin and external bumper to avoid buried bumper syndrome  -Monitor site daily for bleeding  -Ensure that patient has an abdominal binder on at all times to prevent self-extubation  -Nutrition consult for enteral feeding  -PEG supplies should be provided at discharge    Thank you for the consultation.  The consulting team will sign off now.  Please do not hesitate to contact us again on by paging the consultation team again between the weekday hours of 7 AM - 5 PM.  If there is an urgent concern during the weekend, after-hours, or holidays; then please page the on-call GI fellow at [phone]. Thank you.

## 2024-05-08 NOTE — CARE PLAN
Problem: Pain  Goal: My pain/discomfort is manageable  Outcome: Progressing     Problem: Psychosocial Needs  Goal: Demonstrates ability to cope with hospitalization/illness  Outcome: Progressing  Goal: Collaborate with me, my family, and caregiver to identify my specific goals  Outcome: Progressing     Problem: Discharge Barriers  Goal: My discharge needs are met  Outcome: Progressing     Problem: Fall/Injury  Goal: Verbalize understanding of personal risk factors for fall in the hospital  Outcome: Progressing  Goal: Verbalize understanding of risk factor reduction measures to prevent injury from fall in the home  Outcome: Progressing  Goal: Use assistive devices by end of the shift  Outcome: Progressing  Goal: Pace activities to prevent fatigue by end of the shift  Outcome: Progressing     Problem: Pain  Goal: Takes deep breaths with improved pain control throughout the shift  Outcome: Progressing  Goal: Turns in bed with improved pain control throughout the shift  Outcome: Progressing  Goal: Walks with improved pain control throughout the shift  Outcome: Progressing  Goal: Performs ADL's with improved pain control throughout shift  Outcome: Progressing  Goal: Participates in PT with improved pain control throughout the shift  Outcome: Progressing  Goal: Free from opioid side effects throughout the shift  Outcome: Progressing  Goal: Free from acute confusion related to pain meds throughout the shift  Outcome: Progressing     Problem: Skin  Goal: Decreased wound size/increased tissue granulation at next dressing change  Outcome: Progressing  Flowsheets (Taken 5/8/2024 0903)  Decreased wound size/increased tissue granulation at next dressing change: Promote sleep for wound healing  Goal: Participates in plan/prevention/treatment measures  Outcome: Progressing  Flowsheets (Taken 5/7/2024 1044 by Mary Haas RN)  Participates in plan/prevention/treatment measures:   Discuss with provider PT/OT consult   Elevate  heels   Increase activity/out of bed for meals  Goal: Prevent/manage excess moisture  Outcome: Progressing  Flowsheets (Taken 5/8/2024 0903)  Prevent/manage excess moisture: Monitor for/manage infection if present  Goal: Prevent/minimize sheer/friction injuries  Outcome: Progressing  Flowsheets (Taken 5/8/2024 0903)  Prevent/minimize sheer/friction injuries:   Increase activity/out of bed for meals   Use pull sheet  Goal: Promote/optimize nutrition  Outcome: Progressing  Flowsheets (Taken 5/8/2024 0903)  Promote/optimize nutrition:   Discuss with provider if NPO > 2 days   Reassess MST if dietician not consulted  Goal: Promote skin healing  Outcome: Progressing  Flowsheets (Taken 5/8/2024 0903)  Promote skin healing:   Assess skin/pad under line(s)/device(s)   Protective dressings over bony prominences   Turn/reposition every 2 hours/use positioning/transfer devices

## 2024-05-09 ENCOUNTER — APPOINTMENT (OUTPATIENT)
Dept: RADIOLOGY | Facility: HOSPITAL | Age: 69
DRG: 871 | End: 2024-05-09
Payer: MEDICARE

## 2024-05-09 LAB
ALBUMIN SERPL BCP-MCNC: 1.9 G/DL (ref 3.4–5)
ALBUMIN SERPL BCP-MCNC: 2 G/DL (ref 3.4–5)
ALBUMIN SERPL BCP-MCNC: 2.1 G/DL (ref 3.4–5)
ANION GAP SERPL CALC-SCNC: 12 MMOL/L (ref 10–20)
ANION GAP SERPL CALC-SCNC: 15 MMOL/L (ref 10–20)
ANION GAP SERPL CALC-SCNC: 16 MMOL/L (ref 10–20)
ATRIAL RATE: 127 BPM
ATRIAL RATE: 47 BPM
BUN SERPL-MCNC: 32 MG/DL (ref 6–23)
CALCIUM SERPL-MCNC: 8.2 MG/DL (ref 8.6–10.6)
CALCIUM SERPL-MCNC: 8.4 MG/DL (ref 8.6–10.6)
CALCIUM SERPL-MCNC: 8.5 MG/DL (ref 8.6–10.6)
CHLORIDE SERPL-SCNC: 100 MMOL/L (ref 98–107)
CHLORIDE SERPL-SCNC: 100 MMOL/L (ref 98–107)
CHLORIDE SERPL-SCNC: 101 MMOL/L (ref 98–107)
CO2 SERPL-SCNC: 27 MMOL/L (ref 21–32)
CO2 SERPL-SCNC: 27 MMOL/L (ref 21–32)
CO2 SERPL-SCNC: 28 MMOL/L (ref 21–32)
CREAT SERPL-MCNC: 2.22 MG/DL (ref 0.5–1.3)
CREAT SERPL-MCNC: 2.26 MG/DL (ref 0.5–1.3)
CREAT SERPL-MCNC: 2.29 MG/DL (ref 0.5–1.3)
EGFRCR SERPLBLD CKD-EPI 2021: 30 ML/MIN/1.73M*2
EGFRCR SERPLBLD CKD-EPI 2021: 30 ML/MIN/1.73M*2
EGFRCR SERPLBLD CKD-EPI 2021: 31 ML/MIN/1.73M*2
ERYTHROCYTE [DISTWIDTH] IN BLOOD BY AUTOMATED COUNT: 20.8 % (ref 11.5–14.5)
GLUCOSE BLD MANUAL STRIP-MCNC: 143 MG/DL (ref 74–99)
GLUCOSE BLD MANUAL STRIP-MCNC: 161 MG/DL (ref 74–99)
GLUCOSE BLD MANUAL STRIP-MCNC: 161 MG/DL (ref 74–99)
GLUCOSE BLD MANUAL STRIP-MCNC: 163 MG/DL (ref 74–99)
GLUCOSE BLD MANUAL STRIP-MCNC: 174 MG/DL (ref 74–99)
GLUCOSE BLD MANUAL STRIP-MCNC: 243 MG/DL (ref 74–99)
GLUCOSE SERPL-MCNC: 136 MG/DL (ref 74–99)
GLUCOSE SERPL-MCNC: 169 MG/DL (ref 74–99)
GLUCOSE SERPL-MCNC: 95 MG/DL (ref 74–99)
HCT VFR BLD AUTO: 25 % (ref 41–52)
HGB BLD-MCNC: 8.1 G/DL (ref 13.5–17.5)
MAGNESIUM SERPL-MCNC: 2.38 MG/DL (ref 1.6–2.4)
MCH RBC QN AUTO: 25.8 PG (ref 26–34)
MCHC RBC AUTO-ENTMCNC: 32.4 G/DL (ref 32–36)
MCV RBC AUTO: 80 FL (ref 80–100)
NRBC BLD-RTO: 0 /100 WBCS (ref 0–0)
PHOSPHATE SERPL-MCNC: 4.6 MG/DL (ref 2.5–4.9)
PHOSPHATE SERPL-MCNC: 4.9 MG/DL (ref 2.5–4.9)
PHOSPHATE SERPL-MCNC: 5 MG/DL (ref 2.5–4.9)
PLATELET # BLD AUTO: 566 X10*3/UL (ref 150–450)
POTASSIUM SERPL-SCNC: 4.3 MMOL/L (ref 3.5–5.3)
POTASSIUM SERPL-SCNC: 5.6 MMOL/L (ref 3.5–5.3)
POTASSIUM SERPL-SCNC: 5.7 MMOL/L (ref 3.5–5.3)
Q ONSET: 218 MS
Q ONSET: 223 MS
QRS COUNT: 13 BEATS
QRS COUNT: 19 BEATS
QRS DURATION: 76 MS
QRS DURATION: 80 MS
QT INTERVAL: 314 MS
QT INTERVAL: 384 MS
QTC CALCULATION(BAZETT): 428 MS
QTC CALCULATION(BAZETT): 448 MS
QTC FREDERICIA: 386 MS
QTC FREDERICIA: 426 MS
R AXIS: 19 DEGREES
R AXIS: 57 DEGREES
RBC # BLD AUTO: 3.14 X10*6/UL (ref 4.5–5.9)
SODIUM SERPL-SCNC: 136 MMOL/L (ref 136–145)
SODIUM SERPL-SCNC: 136 MMOL/L (ref 136–145)
SODIUM SERPL-SCNC: 138 MMOL/L (ref 136–145)
T AXIS: 17 DEGREES
T AXIS: 26 DEGREES
T OFFSET: 375 MS
T OFFSET: 415 MS
VENTRICULAR RATE: 112 BPM
VENTRICULAR RATE: 82 BPM
WBC # BLD AUTO: 12.6 X10*3/UL (ref 4.4–11.3)

## 2024-05-09 PROCEDURE — 99223 1ST HOSP IP/OBS HIGH 75: CPT | Performed by: STUDENT IN AN ORGANIZED HEALTH CARE EDUCATION/TRAINING PROGRAM

## 2024-05-09 PROCEDURE — 2500000006 HC RX 250 W HCPCS SELF ADMINISTERED DRUGS (ALT 637 FOR ALL PAYERS): Performed by: NURSE PRACTITIONER

## 2024-05-09 PROCEDURE — 82947 ASSAY GLUCOSE BLOOD QUANT: CPT

## 2024-05-09 PROCEDURE — 1200000002 HC GENERAL ROOM WITH TELEMETRY DAILY

## 2024-05-09 PROCEDURE — 2500000004 HC RX 250 GENERAL PHARMACY W/ HCPCS (ALT 636 FOR OP/ED): Performed by: NURSE PRACTITIONER

## 2024-05-09 PROCEDURE — C9113 INJ PANTOPRAZOLE SODIUM, VIA: HCPCS

## 2024-05-09 PROCEDURE — 85027 COMPLETE CBC AUTOMATED: CPT | Performed by: NURSE PRACTITIONER

## 2024-05-09 PROCEDURE — 83735 ASSAY OF MAGNESIUM: CPT | Performed by: NURSE PRACTITIONER

## 2024-05-09 PROCEDURE — 2500000004 HC RX 250 GENERAL PHARMACY W/ HCPCS (ALT 636 FOR OP/ED)

## 2024-05-09 PROCEDURE — 2500000002 HC RX 250 W HCPCS SELF ADMINISTERED DRUGS (ALT 637 FOR MEDICARE OP, ALT 636 FOR OP/ED): Performed by: NURSE PRACTITIONER

## 2024-05-09 PROCEDURE — S4991 NICOTINE PATCH NONLEGEND: HCPCS | Performed by: NURSE PRACTITIONER

## 2024-05-09 PROCEDURE — 80069 RENAL FUNCTION PANEL: CPT | Performed by: NURSE PRACTITIONER

## 2024-05-09 PROCEDURE — 80069 RENAL FUNCTION PANEL: CPT | Mod: 91,MUE | Performed by: NURSE PRACTITIONER

## 2024-05-09 PROCEDURE — 2500000002 HC RX 250 W HCPCS SELF ADMINISTERED DRUGS (ALT 637 FOR MEDICARE OP, ALT 636 FOR OP/ED)

## 2024-05-09 PROCEDURE — 2500000001 HC RX 250 WO HCPCS SELF ADMINISTERED DRUGS (ALT 637 FOR MEDICARE OP): Performed by: NURSE PRACTITIONER

## 2024-05-09 RX ORDER — FERROUS SULFATE 300 MG/5ML
60 LIQUID (ML) ORAL
Status: CANCELLED
Start: 2024-05-09

## 2024-05-09 RX ORDER — ALBUTEROL SULFATE 0.83 MG/ML
2.5 SOLUTION RESPIRATORY (INHALATION) EVERY 6 HOURS PRN
Qty: 75 ML | Refills: 11 | Status: CANCELLED | OUTPATIENT
Start: 2024-05-09

## 2024-05-09 RX ORDER — IPRATROPIUM BROMIDE AND ALBUTEROL SULFATE 2.5; .5 MG/3ML; MG/3ML
3 SOLUTION RESPIRATORY (INHALATION) EVERY 6 HOURS PRN
Qty: 180 ML | Refills: 11 | Status: CANCELLED | OUTPATIENT
Start: 2024-05-09

## 2024-05-09 RX ORDER — ATORVASTATIN CALCIUM 10 MG/1
10 TABLET, FILM COATED ORAL NIGHTLY
Status: CANCELLED
Start: 2024-05-09

## 2024-05-09 RX ORDER — MULTIVIT-MIN/FERROUS GLUCONATE 9 MG/15 ML
15 LIQUID (ML) ORAL DAILY
Status: CANCELLED
Start: 2024-05-10

## 2024-05-09 RX ORDER — MIRTAZAPINE 7.5 MG/1
7.5 TABLET, FILM COATED ORAL NIGHTLY
Status: CANCELLED
Start: 2024-05-09

## 2024-05-09 RX ORDER — ASCORBIC ACID 500 MG
250 TABLET ORAL DAILY
Status: CANCELLED
Start: 2024-05-09

## 2024-05-09 RX ORDER — SODIUM CHLORIDE 9 MG/ML
5 INJECTION, SOLUTION INTRAVENOUS CONTINUOUS
Status: CANCELLED | OUTPATIENT
Start: 2024-05-09

## 2024-05-09 RX ORDER — ACETAMINOPHEN 325 MG/1
650 TABLET ORAL EVERY 6 HOURS
Status: CANCELLED
Start: 2024-05-09

## 2024-05-09 RX ORDER — DEXTROSE 50 % IN WATER (D50W) INTRAVENOUS SYRINGE
12.5
Status: CANCELLED
Start: 2024-05-09

## 2024-05-09 RX ORDER — DEXTROSE 50 % IN WATER (D50W) INTRAVENOUS SYRINGE
25
Status: CANCELLED
Start: 2024-05-09

## 2024-05-09 RX ORDER — ASCORBIC ACID 500 MG
500 TABLET ORAL 3 TIMES DAILY
Status: CANCELLED
Start: 2024-05-09

## 2024-05-09 RX ORDER — CAYENNE 450 MG
1 CAPSULE ORAL 2 TIMES DAILY
Status: CANCELLED
Start: 2024-05-09

## 2024-05-09 RX ORDER — LANOLIN ALCOHOL/MO/W.PET/CERES
100 CREAM (GRAM) TOPICAL DAILY
Status: CANCELLED
Start: 2024-05-10

## 2024-05-09 RX ORDER — ESOMEPRAZOLE MAGNESIUM 40 MG/1
40 GRANULE, DELAYED RELEASE ORAL
Status: CANCELLED
Start: 2024-05-09

## 2024-05-09 RX ORDER — ESOMEPRAZOLE MAGNESIUM 40 MG/1
40 GRANULE, DELAYED RELEASE ORAL
Status: DISCONTINUED | OUTPATIENT
Start: 2024-05-10 | End: 2024-05-15 | Stop reason: HOSPADM

## 2024-05-09 RX ORDER — NICOTINE 7MG/24HR
1 PATCH, TRANSDERMAL 24 HOURS TRANSDERMAL DAILY
Status: CANCELLED
Start: 2024-05-10

## 2024-05-09 RX ORDER — FOLIC ACID 1 MG/1
1 TABLET ORAL EVERY MORNING
Status: CANCELLED
Start: 2024-05-09

## 2024-05-09 RX ORDER — INSULIN GLARGINE 100 [IU]/ML
10 INJECTION, SOLUTION SUBCUTANEOUS NIGHTLY
Status: CANCELLED
Start: 2024-05-09

## 2024-05-09 RX ORDER — POLYETHYLENE GLYCOL 3350 17 G/17G
17 POWDER, FOR SOLUTION ORAL DAILY PRN
Status: CANCELLED
Start: 2024-05-09

## 2024-05-09 RX ORDER — ADALIMUMAB 40MG/0.4ML
40 KIT SUBCUTANEOUS
Status: CANCELLED
Start: 2024-05-12

## 2024-05-09 RX ORDER — SODIUM BICARBONATE 650 MG/1
650 TABLET ORAL 2 TIMES DAILY
Status: CANCELLED
Start: 2024-05-09

## 2024-05-09 RX ORDER — GUAIFENESIN 100 MG/5ML
100 SOLUTION ORAL EVERY 4 HOURS PRN
Qty: 120 ML | Refills: 0 | Status: CANCELLED | OUTPATIENT
Start: 2024-05-09

## 2024-05-09 RX ORDER — AMINO ACIDS/PROTEIN HYDROLYS 15G-100/30
17 LIQUID (ML) ORAL 2 TIMES DAILY
Status: CANCELLED
Start: 2024-05-09

## 2024-05-09 RX ORDER — INSULIN LISPRO 100 [IU]/ML
0-10 INJECTION, SOLUTION INTRAVENOUS; SUBCUTANEOUS EVERY 6 HOURS
Status: CANCELLED
Start: 2024-05-09

## 2024-05-09 RX ORDER — ACETAMINOPHEN 325 MG/1
650 TABLET ORAL EVERY 6 HOURS PRN
Status: CANCELLED
Start: 2024-05-09

## 2024-05-09 RX ORDER — TALC
3 POWDER (GRAM) TOPICAL NIGHTLY PRN
Status: CANCELLED
Start: 2024-05-09

## 2024-05-09 RX ORDER — TRAMADOL HYDROCHLORIDE 50 MG/1
50 TABLET ORAL DAILY PRN
Qty: 10 TABLET | Refills: 0 | Status: CANCELLED | OUTPATIENT
Start: 2024-05-09

## 2024-05-09 RX ORDER — DOXYCYCLINE 100 MG/1
100 CAPSULE ORAL EVERY 12 HOURS SCHEDULED
Status: CANCELLED
Start: 2024-05-09

## 2024-05-09 RX ORDER — ACETAMINOPHEN 325 MG/1
650 TABLET ORAL EVERY 6 HOURS PRN
Status: DISCONTINUED | OUTPATIENT
Start: 2024-05-09 | End: 2024-05-15 | Stop reason: HOSPADM

## 2024-05-09 RX ADMIN — DOXYCYCLINE HYCLATE 100 MG: 100 TABLET, COATED ORAL at 08:32

## 2024-05-09 RX ADMIN — NICOTINE 7 MG/24 HR DAILY TRANSDERMAL PATCH 1 PATCH: at 08:33

## 2024-05-09 RX ADMIN — SODIUM BICARBONATE 650 MG: 650 TABLET ORAL at 08:33

## 2024-05-09 RX ADMIN — PANTOPRAZOLE SODIUM 40 MG: 40 INJECTION, POWDER, FOR SOLUTION INTRAVENOUS at 08:33

## 2024-05-09 RX ADMIN — METOCLOPRAMIDE 5 MG: 10 TABLET ORAL at 08:33

## 2024-05-09 RX ADMIN — INSULIN LISPRO 2 UNITS: 100 INJECTION, SOLUTION INTRAVENOUS; SUBCUTANEOUS at 11:30

## 2024-05-09 RX ADMIN — SODIUM ZIRCONIUM CYCLOSILICATE 10 G: 10 POWDER, FOR SUSPENSION ORAL at 08:33

## 2024-05-09 RX ADMIN — INSULIN LISPRO 2 UNITS: 100 INJECTION, SOLUTION INTRAVENOUS; SUBCUTANEOUS at 17:34

## 2024-05-09 RX ADMIN — SODIUM BICARBONATE 650 MG: 650 TABLET ORAL at 20:26

## 2024-05-09 RX ADMIN — THIAMINE HCL TAB 100 MG 100 MG: 100 TAB at 08:33

## 2024-05-09 RX ADMIN — SALINE NASAL SPRAY 1 SPRAY: 1.5 SOLUTION NASAL at 06:44

## 2024-05-09 RX ADMIN — DOXYCYCLINE HYCLATE 100 MG: 100 TABLET, COATED ORAL at 20:26

## 2024-05-09 RX ADMIN — Medication 15 ML: at 08:33

## 2024-05-09 RX ADMIN — SODIUM ZIRCONIUM CYCLOSILICATE 10 G: 10 POWDER, FOR SUSPENSION ORAL at 20:26

## 2024-05-09 RX ADMIN — INSULIN LISPRO 2 UNITS: 100 INJECTION, SOLUTION INTRAVENOUS; SUBCUTANEOUS at 06:44

## 2024-05-09 RX ADMIN — INSULIN LISPRO 4 UNITS: 100 INJECTION, SOLUTION INTRAVENOUS; SUBCUTANEOUS at 00:20

## 2024-05-09 RX ADMIN — INSULIN GLARGINE 10 UNITS: 100 INJECTION, SOLUTION SUBCUTANEOUS at 20:26

## 2024-05-09 RX ADMIN — APIXABAN 5 MG: 5 TABLET, FILM COATED ORAL at 08:33

## 2024-05-09 RX ADMIN — ACETAMINOPHEN 650 MG: 325 TABLET ORAL at 04:22

## 2024-05-09 ASSESSMENT — ENCOUNTER SYMPTOMS
DIZZINESS: 0
TREMORS: 0
FEVER: 0
ARTHRALGIAS: 0
HEADACHES: 0
COLOR CHANGE: 0
SHORTNESS OF BREATH: 0
LIGHT-HEADEDNESS: 0
FATIGUE: 0
WEAKNESS: 0
NUMBNESS: 0
AGITATION: 0
PALPITATIONS: 0
SEIZURES: 0
COUGH: 0
ABDOMINAL PAIN: 0
CHEST TIGHTNESS: 0
CHILLS: 0

## 2024-05-09 ASSESSMENT — PAIN SCALES - PAIN ASSESSMENT IN ADVANCED DEMENTIA (PAINAD)
CONSOLABILITY: NO NEED TO CONSOLE
TOTALSCORE: 0
BREATHING: NORMAL
BODYLANGUAGE: RELAXED
FACIALEXPRESSION: SMILING OR INEXPRESSIVE

## 2024-05-09 ASSESSMENT — PAIN - FUNCTIONAL ASSESSMENT
PAIN_FUNCTIONAL_ASSESSMENT: 0-10
PAIN_FUNCTIONAL_ASSESSMENT: PAINAD (PAIN ASSESSMENT IN ADVANCED DEMENTIA SCALE)
PAIN_FUNCTIONAL_ASSESSMENT: 0-10
PAIN_FUNCTIONAL_ASSESSMENT: 0-10

## 2024-05-09 ASSESSMENT — PAIN SCALES - GENERAL
PAINLEVEL_OUTOF10: 0 - NO PAIN

## 2024-05-09 NOTE — CONSULTS
Inpatient consult to Infectious Diseases  Consult performed by: Karthik Moura MD  Consult ordered by: KLEBER Villanueva        Referred by KLEBER Villanueva     Primary MD: Trever Wilkins MD    Reason For Consult  ? Neurosyphilis     History Of Present Illness  Brian Rodriguez is a 69 y.o. male with a history of type 2 diabetes, nicotine dependency ,hypertension, CKD (creatinine last year around 1.8), hide chronic diabetes , hidradenitis suppurativa , bipolar disorder.   He presented to ER from nursing home with altered mental status (more forgetful) on April 17.  At the ER he was afebrile but WBC was 21 K with sodium 154 and creatinine 3.38.  CT chest abdomen showed evidence of multifocal pneumonia, he has chronic l sacral wound there was no subcutaneous gas or fluid on CT scan.  He had significant bladder and Barton catheter was placed.   His urinalysis was unremarkable.  He was given IV fluid and vancomycin/Piperacillin-tazobactam.   He had positive COVID, positive MRSA screen.  Swallow evaluation showed severe swallowing dysfunction, subsequently had PEG tube placed.  Remdesivir and azithromycin was added for pneumonia.  And they also added doxycycline for sacral wound purulent discharge.  He developed worsening hypoxia with low blood pressure on April 22 and transferred to ICU and 10-day course of dexamethasone was added.  Neurologist saw him on 23rd for persistent altered mental status.  Brain MRI was obtained which showed prominent ventricles with a diffuse parenchymal volume loss.  As part of the dementia workup, syphilis screening test was performed which showed reactive total antibody and TP PA antibody and negative RPR.   ID was called for possible neurosyphilis.  Patient has never been .  Used to be quite sexually active with women when he was younger.  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  Never had syphilis or any STD in the past.       Surgical History  He has no past surgical history on file.     Social History     Occupational History    Not on file   Tobacco Use    Smoking status: Never     Passive exposure: Never    Smokeless tobacco: Not on file   Vaping Use    Vaping status: Unknown   Substance and Sexual Activity    Alcohol use: Not Currently    Drug use: Defer    Sexual activity: Defer     Travel History   Travel since 04/09/24    No documented travel since 04/09/24            Pets: unknown  Hobbies: unknown    Family History  No family history on file.  Allergies  Patient has no known allergies.     Immunization History   Administered Date(s) Administered    Rosita SARS-CoV-2 Vaccination 02/15/2022     Medications  Home medications:  Medications Prior to Admission   Medication Sig Dispense Refill Last Dose    acetaminophen (Tylenol) 325 mg tablet Take 2 tablets (650 mg) by mouth every 4 hours if needed (pain/fever).   4/17/2024    adalimumab (Humira,CF, Pen) 40 mg/0.4 mL pen injector kit pen-injector Inject 1 Pen (40 mg) under the skin 1 (one) time per week. 4 each 11 Past Week    amino acids/protein hydrolys (PRO-STAT AWC ORAL) Take 30 mL by mouth 2 times a day.   4/17/2024    ascorbic acid (Vitamin C) 500 mg tablet Take 1 tablet (500 mg) by mouth 3 times a day.   4/17/2024    atorvastatin (Lipitor) 10 mg tablet Take 1 tablet (10 mg) by mouth once daily at bedtime.   Past Week    cholecalciferol (Vitamin D-3) 25 MCG (1000 UT) tablet Take 1 tablet (1,000 Units) by mouth once daily in the morning.   4/17/2024    ferrous sulfate, 325 mg ferrous sulfate, tablet Take 1 tablet by mouth 3 times a day.   4/17/2024    folic acid (Folvite) 1 mg tablet Take 1 tablet (1 mg) by mouth once daily in the morning.   4/17/2024    galantamine (Razadyne) 4 mg tablet Take 1 tablet (4 mg) by mouth 2 times a day.   4/17/2024    insulin glargine (Lantus U-100 Insulin) 100 unit/mL injection Inject 10 Units under the skin once daily in the morning.    2024    insulin lispro (HumaLOG) 100 unit/mL injection Inject under the skin 3 times a day before meals. Per Sliding Scale: 111-150=1; 151-200=3; 201-250=6; 251-300=9; 301-350=12; 351-400=15; 401 or greater call MD.   Past Week    Lactobacillus acidophilus (ACIDOPHILUS ORAL) Take 1 tablet by mouth 2 times a day. 0.5 mg (100 million cell) tablet   2024    [] minocycline 100 mg capsule Take 1 capsule (100 mg) by mouth 2 times a day. 60 capsule 2 2024    mirtazapine (Remeron) 7.5 mg tablet Take 1 tablet (7.5 mg) by mouth once daily at bedtime.   Past Week    multivitamin tablet Take 1 tablet by mouth once daily in the morning.   2024    pantoprazole (ProtoNix) 40 mg EC tablet Take 1 tablet (40 mg) by mouth once daily in the morning. Do not crush, chew, or split.   2024    zinc sulfate (Zincate) 220 (50 Zn) MG capsule Take 1 capsule (50 mg of elemental zinc) by mouth once daily in the morning.   2024    dextrose 5 %-0.45 % sod chlord (dextrose 5%-0.45 % sodium chloride) infusion Infuse 3,000 mL into a venous catheter. Every shift, Start 24   Unknown    ertapenem (INVanz) IV Infuse 25 mL (500 mg) into a venous catheter once daily. Start 24, End 24   Unknown    glucagon (Glucagen) 1 mg injection Inject under the skin 1 time if needed (every hour as needed).   Unknown    guaiFENesin (Robitussin) 100 mg/5 mL syrup Take 5 mL (100 mg) by mouth every 4 hours if needed for cough.   Unknown    traMADol (Ultram) 50 mg tablet Take 1 tablet (50 mg) by mouth once daily as needed (30 min prior to dressing changes).   Unknown     Current medications:  Scheduled medications  [Held by provider] apixaban, 5 mg, oral, q12h  doxycylcine, 100 mg, g-tube, q12h RANDAL  [START ON 5/10/2024] esomeprazole, 40 mg, g-tube, Daily before breakfast  insulin glargine, 10 Units, subcutaneous, Nightly  insulin lispro, 0-10 Units, subcutaneous, q6h  multivitamin with iron-minerals, 15 mL, oral,  Daily  nicotine, 1 patch, transdermal, Daily  sodium bicarbonate, 650 mg, g-tube, BID  sodium chloride, 1 spray, Each Nostril, 4x daily  thiamine, 100 mg, g-tube, Daily      Continuous medications     PRN medications  PRN medications: acetaminophen **OR** [DISCONTINUED] acetaminophen **OR** [DISCONTINUED] acetaminophen, albuterol, dextrose, dextrose, glucagon, glucagon, guaiFENesin, ipratropium-albuteroL, melatonin, polyethylene glycol, traMADol    Review of Systems   Constitutional:  Negative for chills, fatigue and fever.   HENT:  Negative for hearing loss.    Eyes:  Negative for visual disturbance.   Respiratory:  Negative for cough, chest tightness and shortness of breath.    Cardiovascular:  Negative for chest pain and palpitations.   Gastrointestinal:  Negative for abdominal pain.   Musculoskeletal:  Negative for arthralgias.   Skin:  Negative for color change and rash.   Neurological:  Negative for dizziness, tremors, seizures, syncope, weakness, light-headedness, numbness and headaches.   Psychiatric/Behavioral:  Negative for agitation and behavioral problems.         Objective  Range of Vitals (last 24 hours)  Heart Rate:  [80-90]   Temp:  [35.9 °C (96.6 °F)-36.6 °C (97.9 °F)]   Resp:  [15-19]   BP: (101-124)/(51-72)   Weight:  [61.9 kg (136 lb 7.4 oz)]   SpO2:  [96 %-100 %]   Daily Weight  05/09/24 : 61.9 kg (136 lb 7.4 oz)    Body mass index is 19.58 kg/m².     Physical Exam  Vitals reviewed.   Constitutional:       General: He is not in acute distress.     Appearance: He is not ill-appearing.   HENT:      Head: Normocephalic.      Comments: Pupils reactive bilaterally and appropriate constrict with accomodation.     Mouth/Throat:      Mouth: Mucous membranes are moist.      Pharynx: Oropharynx is clear.   Eyes:      Conjunctiva/sclera: Conjunctivae normal.   Cardiovascular:      Rate and Rhythm: Normal rate and regular rhythm.      Pulses: Normal pulses.      Heart sounds: Normal heart sounds. No murmur  heard.  Pulmonary:      Effort: Pulmonary effort is normal.      Breath sounds: Normal breath sounds.   Abdominal:      General: Abdomen is flat. Bowel sounds are normal.   Musculoskeletal:         General: No swelling.      Cervical back: Normal range of motion. No rigidity.   Lymphadenopathy:      Cervical: No cervical adenopathy.   Skin:     General: Skin is warm.      Findings: No rash.   Neurological:      General: No focal deficit present.      Mental Status: He is alert. He is disoriented.      Cranial Nerves: No cranial nerve deficit.      Comments: Evidence of marked thenar atrophy   Psychiatric:         Mood and Affect: Mood normal.          Relevant Results  Outside Hospital Results  No  Labs  Results from last 72 hours   Lab Units 05/09/24  0421 05/08/24  0421 05/07/24  0419   WBC AUTO x10*3/uL 12.6* 10.7 9.3   HEMOGLOBIN g/dL 8.1* 8.7* 8.2*   HEMATOCRIT % 25.0* 27.5* 24.9*   PLATELETS AUTO x10*3/uL 566* 565* 521*     Results from last 72 hours   Lab Units 05/09/24 0728 05/09/24 0421 05/08/24 0421   SODIUM mmol/L 136 136 135*   POTASSIUM mmol/L 4.3 5.7* 4.5   CHLORIDE mmol/L 101 100 102   CO2 mmol/L 27 27 24   BUN mg/dL 32* 32* 27*   CREATININE mg/dL 2.26* 2.29* 1.94*   GLUCOSE mg/dL 136* 95 84   CALCIUM mg/dL 8.2* 8.4* 8.2*   ANION GAP mmol/L 12 15 14   EGFR mL/min/1.73m*2 30* 30* 36*   PHOSPHORUS mg/dL 4.6 5.0* 5.4*     Results from last 72 hours   Lab Units 05/09/24 0728 05/09/24 0421 05/08/24 0421   ALBUMIN g/dL 1.9* 2.0* 1.9*     Estimated Creatinine Clearance: 26.2 mL/min (A) (by C-G formula based on SCr of 2.26 mg/dL (H)).  C-Reactive Protein   Date Value Ref Range Status   04/24/2024 20.03 (H) <1.00 mg/dL Final     Sedimentation Rate   Date Value Ref Range Status   04/24/2024 >130 (H) 0 - 20 mm/h Final     HIV 1/2 Antigen/Antibody Screen with Reflex to Confirmation   Date Value Ref Range Status   04/18/2024 Nonreactive Nonreactive Final     Hepatitis C AB   Date Value Ref Range Status    04/18/2024 Nonreactive Nonreactive Final     Comment:     Results from patients taking biotin supplements or receiving high-dose biotin therapy should be interpreted with caution due to possible interference with this test. Providers may contact their local laboratory for further information.     Microbiology  Susceptibility data from last 90 days.  Collected Specimen Info Organism   04/29/24 Tissue/Biopsy from Skin Lesion Candida albicans     Mixed Skin Microorganisms    04/26/24 Fluid from Tracheal Aspirate Candida albicans     Gali glabrata       5/2/24   brain MRI without contrast  The examination is incomplete and motion degraded. Diffusion-imaging  demonstrates no evidence of diffusion restriction to suggest the  presence of acute ischemic injury.      There is prominence of ventricles and sulci compatible with diffuse  parenchymal volume loss with a temporal and frontal predominance. The  degree of ventriculomegaly is somewhat disproportionate with the size  of the sulci. There is no gross evidence of mass effect or midline  shift.      There is opacification of the left frontal sinus. Mucosal thickening  is noted along the right maxillary sinus floor. There is  opacification of a few mastoid air cells, right greater than left.      There are cystic lesions in the left periauricular region which are  nonspecific.    5/3 chest x-ray   Extensive perihilar edema and effusions and correlate with fluid  status.    4/29 trans-thoracic echocardiogram   Left ventricular systolic function is low normal with a 60% estimated ejection fraction.   2. No definite valvular vegetations were visualized.   3. Poorly visualized anatomical structures due to suboptimal image quality.      5/2 RPR negative .   TPPA reactive,   syphilis total Antibody  positive    MICRO  4/17 blood culture negative  4/17 MRSA screen positive  4/18 pneumo legionella  urine Antigen negative  4/18, 4/22, 4/26  sputum culture  contaminated  4/26  sputum culture  Candida albicans .  Candida glabrata  4/26 urine culture no growth   4/29 MRSA screen positive   5/6 c diff negative    Antibiotic  Piperacillin-tazobactam  4/17~ 4/26  Vancomycin 4/17~ 4/30  Remdesivir   4/18~ 4/22  Meropenem  4/26~5/1  Doxycycline 5/2~   Azithromycin  4/20        Temp Readings from Last 5 Encounters:   05/09/24 36.6 °C (97.9 °F) (Temporal)   03/30/23 36.9 °C (98.4 °F)       Estimated Creatinine Clearance: 26.2 mL/min (A) (by C-G formula based on SCr of 2.26 mg/dL (H)).      INFECTIOUS DISEASE SYNOPSIS AND ASSESSMENT  69-year-old nursing home resident with no known past medical history of dementia presented with altered mental status (forgetfulness and disorientation) in the setting of COVID-19 infection and pneumonia which has been treated appropriately  But has persistent delirium/dementia/dysphagia requiring tube feeding and recurrent hypoxia with a possible aspiration.  His TP PA was positive with nonreactive RPR which is consistent with remote exposure to syphilis.   He lived in [place] throughout his life.   We called the department of public health of [place] and there is no history of him being diagnosed or treated for syphilis in the past in their database.   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   CSF VDRL is only 50% positive in neurosyphilis but we typically see pleocytosis.  And it would be worthwhile to try CSF study.  It is unsure how long he has had this altered memories but it will be worth worthwhile to check other infectious etiology through meningitis panel from CSF study.    Premature dementia with a worsened mental status from acute pneumonia  Positive treponemal antibody with a negative RPR  COVID-19 pneumonia  Acute on chronic kidney disease      RECOMMENDATION  Consider lumbar puncture with CSF cell count, differential, protein,  glucose, CSF VDRL      ID will follow.   The case was discussed with my attending , Dr. John Flowers  who agreed with my assessment and plan.    AMA NIXON M.D /  Infectious disease consult team A   Infectious disease fellow PGY-4  Reach me through [employer] or Page [phone] ( EpicChat preferable especially during noon conference )    I saw and evaluated the patient. I personally obtained the key and critical portions of the history and physical exam or was physically present for key and critical portions performed by the resident/fellow. I reviewed the resident/fellow's documentation and discussed the patient with the resident/fellow. I agree with the resident/fellow's medical decision making as documented in the note.    Agree with Dr. Moura's note. Spoke with Mr. 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 He has had a progressive dementia for over a decade. It sounds from his brother's report that Mr. Rodriguez has had a history that could be consistent with syphilis exposure, but we have no prior knowledge of treatment either by Mr. Rodriguez's brother or Mr. Rodriguez (when asked if he ever received a big shot in his muscle). We called the [facility] since he's lived in [place] for his entire life per his brother, and they didn't have record of prior treatment.    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 I reviewed with Mr. Rodriguez that this situation (positive syphilis testing with dementia) almost never results in a general paresis diagnosis; nonetheless we typically recommend testing with an LP if no prior treatment history is known given it's a potentially treatable cause of dementia (though not likely reversible, might at least halt progression), and even if we treat, his dementia may not improve (especially in setting of other reasons for his dementia like prior ETOH use, ?CTE from  boxing, etc) since LP generally is used to rule out rather than rule in neurosyphilis assuming a negative CSF VDRL. I don't think syphilis would be responsible for his myopathy with a negative RPR. He wanted to talk the risks/benefits with the proceduralist but was favoring proceeding.

## 2024-05-09 NOTE — PROGRESS NOTES
"Nutrition Follow Up Assessment:   Nutrition Assessment         Patient is a 69 y.o. male presenting with change in mental status and sepsis initially.  Has been back and forth to the ICU and SDU during the course of his admission.  Treated for multifocal PNA from aspiration and COVID.  In light of aspiration, had PEG placed on 5/7.    Pt is on Glucerna 1.5@ 55mls/hr as goal rate feeds.  Noted that discharge is being prepped.  He will need to transition to bolus or nocturnal feeds to aid in discharge process.  Appears that team started him on Reglan over the weekend on 5/5 and subsequently had rectal tube placed on 5/6.  Unclear need for Reglan at this time-- this medication can have a side effect of diarrhea.  Noted that it was stopped this morning. Had diarrhea at baseline at RDN visit last week as well.  He has an order for Nutrasource Fiber but no place to check to see if this is being given as it does not appear on RN MAR and there is no worklist for it to be added to and checked off.     Pt was sleeping with lights off and blinds closed at visit.  Did not disturb.    Anthropometrics:  Height: 177.8 cm (5' 10\")   Weight: 61.9 kg (136 lb 7.4 oz)   BMI (Calculated): 19.58  IBW/kg (Dietitian Calculated): 75.5 kg  Percent of IBW: 105 %       Weight History:     5/9/24            61.9kg  5/2/24             64.6kg  04/23/24 4/22/24 4/18/24 63.6 kg (140 lb 3.4 oz)  59.2kg  55.3kg (admit)   03/30/23 85.3 kg (188 lb)     Weight Change %:       Nutrition Focused Physical Exam Findings:  Defer-- remains severely wasted     Subcutaneous Fat Loss:      Muscle Wasting:     Edema:  Edema: none  Physical Findings:  Skin: Positive (wounds to sacrum, scrotum and L butt)    Nutrition Significant Labs:  BMP Trend:   Results from last 7 days   Lab Units 05/09/24  0728 05/09/24  0421 05/08/24  0421 05/07/24  0419   GLUCOSE mg/dL 136* 95 84 60*   CALCIUM mg/dL 8.2* 8.4* 8.2* 7.9*   SODIUM mmol/L 136 136 135* 135*   POTASSIUM mmol/L " 4.3 5.7* 4.5 4.7   CO2 mmol/L 27 27 24 24   CHLORIDE mmol/L 101 100 102 100   BUN mg/dL 32* 32* 27* 33*   CREATININE mg/dL 2.26* 2.29* 1.94* 1.91*    , BG POCT trend:   Results from last 7 days   Lab Units 05/09/24  0642 05/09/24  0014 05/08/24  1826 05/08/24  1611 05/08/24  1207   POCT GLUCOSE mg/dL 161* 243* 168* 142* 80    , Renal Lab Trend:   Results from last 7 days   Lab Units 05/09/24  0728 05/09/24  0421 05/08/24  0421 05/07/24  0419   POTASSIUM mmol/L 4.3 5.7* 4.5 4.7   PHOSPHORUS mg/dL 4.6 5.0* 5.4* 5.6*   SODIUM mmol/L 136 136 135* 135*   MAGNESIUM mg/dL  --  2.38 1.77 2.01   EGFR mL/min/1.73m*2 30* 30* 36* 37*   BUN mg/dL 32* 32* 27* 33*   CREATININE mg/dL 2.26* 2.29* 1.94* 1.91*    , Vit D:   Lab Results   Component Value Date    VITD25 58 04/30/2024        Nutrition Specific Medications:  Scheduled medications  apixaban, 5 mg, oral, q12h  doxycylcine, 100 mg, g-tube, q12h [facility]  [START ON 5/10/2024] esomeprazole, 40 mg, g-tube, Daily before breakfast  insulin glargine, 10 Units, subcutaneous, Nightly  insulin lispro, 0-10 Units, subcutaneous, q6h  multivitamin with iron-minerals, 15 mL, oral, Daily  nicotine, 1 patch, transdermal, Daily  sodium bicarbonate, 650 mg, g-tube, BID  sodium chloride, 1 spray, Each Nostril, 4x daily  thiamine, 100 mg, g-tube, Daily      Continuous medications     PRN medications  PRN medications: acetaminophen **OR** [DISCONTINUED] acetaminophen **OR** [DISCONTINUED] acetaminophen, albuterol, dextrose, dextrose, glucagon, glucagon, guaiFENesin, ipratropium-albuteroL, melatonin, polyethylene glycol, traMADol     I/O:   Last BM Date: 05/09/24; Stool Appearance: Watery (05/09/24 0900)        Dietary Orders (From admission, onward)       Start     Ordered    05/08/24 1053  Enteral feeding with NPO Glucerna 1.5; GT (gastric tube); 55; 60; Water; (after medication)  Diet effective now        Question Answer Comment   Tube feeding formula: Glucerna 1.5    Feeding route: GT (gastric  tube)    Tube feeding continuous rate (mL/hr): 55    Tube feeding flush (mL): 60    Flush type: Water    Water type:  after medication       05/08/24 1053    05/03/24 1823  Oral nutritional supplements  Until discontinued        Question Answer Comment   Deliver with Breakfast    Deliver with Lunch    Deliver with Dinner    Select supplement: Nutrasource Fiber        05/03/24 1822                     Estimated Needs:   Total Energy Estimated Needs (kCal):  (5890-8018)  Method for Estimating Needs: MSJ= 1385  Total Protein Estimated Needs (g):  (95+)  Method for Estimating Needs: 1.5 x 64.6kg  Total Fluid Estimated Needs (mL): 2400 mL (or per MD)  Method for Estimating Needs: 30mls/kcal/kg using 79.4 kgs/actual BW        Nutrition Diagnosis   Malnutrition Diagnosis  Patient has Malnutrition Diagnosis: Yes  Diagnosis Status: Ongoing  Malnutrition Diagnosis: Severe malnutrition related to chronic disease or condition  As Evidenced by: pt reports inadequate PO intake PTA along with a noted 25% weight loss in the last year as well as severe fat and muscle losses all over body on physical exam         Nutrition Interventions/Recommendations         Nutrition Prescription:   Can continue with current TF or trial a semi-elemental formula to see if this helps diarrhea.  Can do Vital 1.5@ 55mls/hr.  This will give patient more carbs daily so blood sugar will likely go up and need tighter control (147grams carbs daily vs 175grams carbs daily in Glucerna )  With either the Glucerna 1.5 or the Vital 1.5, can trial boluses: 350mls given 4 times daily.    This will be an increase in volume and calories than he was previously receiving but concern is for continued low body weight and would benefit from extra calories.  Can stop Fiber supplements if pt changes to Vital 1.5 formula.           Nutrition Interventions:   Food and/or Nutrient Delivery Interventions  Interventions: Enteral intake  Goal:   TF at goal with continuous  feeds= 1980kcals, 109grams protein vs 90grams protein    TF at goal with boluses: 2100kcals, 116grams protein vs 95grams protein         Nutrition Education:   Not appropriate       Nutrition Monitoring and Evaluation   Food/Nutrient Related History Monitoring  Monitoring and Evaluation Plan: Enteral and parenteral nutrition intake  Criteria: TF to meet 100% estimated needs.  Diarrhea will improve         Time Spent/Follow-up Reminder:   Time Spent (min): 60 minutes  Last Date of Nutrition Visit: 05/09/24  Nutrition Follow-Up Needed?: Dietitian to reassess per policy  Follow up Comment: TF via new PEG; diarrhea

## 2024-05-09 NOTE — CARE PLAN
Problem: Pain  Goal: My pain/discomfort is manageable  Outcome: Progressing     Problem: Psychosocial Needs  Goal: Demonstrates ability to cope with hospitalization/illness  Outcome: Progressing  Goal: Collaborate with me, my family, and caregiver to identify my specific goals  Outcome: Progressing     Problem: Discharge Barriers  Goal: My discharge needs are met  Outcome: Progressing     Problem: Fall/Injury  Goal: Verbalize understanding of personal risk factors for fall in the hospital  Outcome: Progressing  Goal: Verbalize understanding of risk factor reduction measures to prevent injury from fall in the home  Outcome: Progressing  Goal: Use assistive devices by end of the shift  Outcome: Progressing  Goal: Pace activities to prevent fatigue by end of the shift  Outcome: Progressing     Problem: Pain  Goal: Takes deep breaths with improved pain control throughout the shift  Outcome: Progressing  Goal: Turns in bed with improved pain control throughout the shift  Outcome: Progressing  Goal: Walks with improved pain control throughout the shift  Outcome: Progressing  Goal: Performs ADL's with improved pain control throughout shift  Outcome: Progressing  Goal: Participates in PT with improved pain control throughout the shift  Outcome: Progressing  Goal: Free from opioid side effects throughout the shift  Outcome: Progressing  Goal: Free from acute confusion related to pain meds throughout the shift  Outcome: Progressing     Problem: Skin  Goal: Decreased wound size/increased tissue granulation at next dressing change  Outcome: Progressing  Flowsheets (Taken 5/9/2024 0943)  Decreased wound size/increased tissue granulation at next dressing change: Utilize specialty bed per algorithm  Goal: Participates in plan/prevention/treatment measures  Outcome: Progressing  Flowsheets (Taken 5/9/2024 0943)  Participates in plan/prevention/treatment measures: Discuss with provider PT/OT consult  Goal: Prevent/manage excess  moisture  Outcome: Progressing  Flowsheets (Taken 5/9/2024 0943)  Prevent/manage excess moisture:   Use wicking fabric (obtain order)   Moisturize dry skin  Goal: Prevent/minimize sheer/friction injuries  Outcome: Progressing  Flowsheets (Taken 5/9/2024 0943)  Prevent/minimize sheer/friction injuries:   Use pull sheet   Increase activity/out of bed for meals   Complete micro-shifts as needed if patient unable. Adjust patient position to relieve pressure points, not a full turn  Goal: Promote/optimize nutrition  Outcome: Progressing  Flowsheets (Taken 5/9/2024 0943)  Promote/optimize nutrition: Monitor/record intake including meals  Goal: Promote skin healing  Outcome: Progressing  Flowsheets (Taken 5/9/2024 0943)  Promote skin healing:   Turn/reposition every 2 hours/use positioning/transfer devices   Protective dressings over bony prominences

## 2024-05-09 NOTE — PROGRESS NOTES
Subjective Data:  No complaints; keeps asking for a cigarette and candy    Refused to cooperate with CXR    Overnight Events:    uneventful     Objective Data:  Last Recorded Vitals:  Vitals:    05/09/24 0200 05/09/24 0400 05/09/24 0600 05/09/24 0800   BP:  124/72  110/64   BP Location:  Left arm  Left arm   Patient Position:  Lying  Lying   Pulse: 88 81  82   Resp: 17 15  16   Temp:  36.2 °C (97.2 °F)  35.9 °C (96.6 °F)   TempSrc:  Temporal  Temporal   SpO2: 98% 99%  100%   Weight:   61.9 kg (136 lb 7.4 oz)    Height:           Last Labs:  Results for orders placed or performed during the hospital encounter of 04/17/24 (from the past 24 hour(s))   POCT GLUCOSE   Result Value Ref Range    POCT Glucose 80 74 - 99 mg/dL   POCT GLUCOSE   Result Value Ref Range    POCT Glucose 142 (H) 74 - 99 mg/dL   POCT GLUCOSE   Result Value Ref Range    POCT Glucose 168 (H) 74 - 99 mg/dL   POCT GLUCOSE   Result Value Ref Range    POCT Glucose 243 (H) 74 - 99 mg/dL   Magnesium   Result Value Ref Range    Magnesium 2.38 1.60 - 2.40 mg/dL   Renal Function Panel   Result Value Ref Range    Glucose 95 74 - 99 mg/dL    Sodium 136 136 - 145 mmol/L    Potassium 5.7 (H) 3.5 - 5.3 mmol/L    Chloride 100 98 - 107 mmol/L    Bicarbonate 27 21 - 32 mmol/L    Anion Gap 15 10 - 20 mmol/L    Urea Nitrogen 32 (H) 6 - 23 mg/dL    Creatinine 2.29 (H) 0.50 - 1.30 mg/dL    eGFR 30 (L) >60 mL/min/1.73m*2    Calcium 8.4 (L) 8.6 - 10.6 mg/dL    Phosphorus 5.0 (H) 2.5 - 4.9 mg/dL    Albumin 2.0 (L) 3.4 - 5.0 g/dL   CBC   Result Value Ref Range    WBC 12.6 (H) 4.4 - 11.3 x10*3/uL    nRBC 0.0 0.0 - 0.0 /100 WBCs    RBC 3.14 (L) 4.50 - 5.90 x10*6/uL    Hemoglobin 8.1 (L) 13.5 - 17.5 g/dL    Hematocrit 25.0 (L) 41.0 - 52.0 %    MCV 80 80 - 100 fL    MCH 25.8 (L) 26.0 - 34.0 pg    MCHC 32.4 32.0 - 36.0 g/dL    RDW 20.8 (H) 11.5 - 14.5 %    Platelets 566 (H) 150 - 450 x10*3/uL   POCT GLUCOSE   Result Value Ref Range    POCT Glucose 161 (H) 74 - 99 mg/dL   Renal  Function Panel   Result Value Ref Range    Glucose 136 (H) 74 - 99 mg/dL    Sodium 136 136 - 145 mmol/L    Potassium 4.3 3.5 - 5.3 mmol/L    Chloride 101 98 - 107 mmol/L    Bicarbonate 27 21 - 32 mmol/L    Anion Gap 12 10 - 20 mmol/L    Urea Nitrogen 32 (H) 6 - 23 mg/dL    Creatinine 2.26 (H) 0.50 - 1.30 mg/dL    eGFR 30 (L) >60 mL/min/1.73m*2    Calcium 8.2 (L) 8.6 - 10.6 mg/dL    Phosphorus 4.6 2.5 - 4.9 mg/dL    Albumin 1.9 (L) 3.4 - 5.0 g/dL     Repeat K+ 4.3 (confirmation of earlier findings    Last I/O:  I/O last 3 completed shifts:  In: 615 (7.7 mL/kg) [I.V.:50 (0.6 mL/kg); NG/GT:565]  Out: 2430 (30.6 mL/kg) [Urine:1880 (0.7 mL/kg/hr); Stool:550]  Dosing Weight: 79.4 kg     5/9 CXR:   pt refused    5/3/24 CXR  IMPRESSION:  1.  Extensive perihilar edema and effusions and correlate with fluid  status.    5/4/24 KUB  IMPRESSION:  1. Enteric tube tip projects over the midline upper abdomen over the  expected location of the gastric antrum.  2. Nonspecific nonobstructive bowel gas pattern. Mildly prominent  small large bowel loops and correlate with mild ileus.  3. Persistent pulmonary edema is noted in the visualized lungs.        4/29/24 TTE  CONCLUSIONS:   1. Left ventricular systolic function is low normal with a 60% estimated ejection fraction.   2. No definite valvular vegetations were visualized.   3. Poorly visualized anatomical structures due to suboptimal image quality.    5/3/24 L humerus Xray  1. No evidence of myositis in the visualized right upper extremity.  2. Questionable minimal subcutaneous edema in the anterior mid arm.    5/3/24 MRI without  IMPRESSION:  Limited, incomplete, and motion degraded MRI of the brain  demonstrating no definitive evidence of acute ischemic injury.    5/1/24 US venous duplex study left upper  CONCLUSIONS:  Left Upper Venous: No evidence of acute deep vein thrombus visualized in the left upper extremity. There is acute occlusive superficial venous thrombosis visualized  in the proximal cephalic vein. Contralateral right subclavian vein doppler waveform not saved due to hardware error. Additional Findings; IV Line.    5/1/24 US venous duplex study bilat  CONCLUSIONS:  Right Lower Venous: No evidence of acute deep vein thrombus visualized in the right lower extremity. Calf veins visualized in segmnets. Additional Findings; Lymph nodes Rt groin lymph node # 1 measures 7.48 mm x 31.64 mm.  Lymph node #2 3.57 mm x 26.93 mm.  Left Lower Venous: No evidence of acute deep vein thrombus visualized in the left lower extremity. Peroneal veins visualized in segmnets. Additional Findings; Lymph nodes Lt groin lymph node measures 5.62 mm x 41.53 mm.        Inpatient Medications:  Scheduled medications   Medication Dose Route Frequency    apixaban  5 mg oral q12h    doxycylcine  100 mg g-tube q12h [facility]    [START ON 5/10/2024] esomeprazole  40 mg g-tube Daily before breakfast    insulin glargine  10 Units subcutaneous Nightly    insulin lispro  0-10 Units subcutaneous q6h    multivitamin with iron-minerals  15 mL oral Daily    nicotine  1 patch transdermal Daily    sodium bicarbonate  650 mg g-tube BID    sodium chloride  1 spray Each Nostril 4x daily    thiamine  100 mg g-tube Daily     PRN medications   Medication    acetaminophen    albuterol    dextrose    dextrose    glucagon    glucagon    guaiFENesin    ipratropium-albuteroL    melatonin    polyethylene glycol    traMADol     Continuous Medications   Medication Dose Last Rate       Physical Exam:  Constitutional: pt in NAD, elderly male, cachectic  HENT: PERRL, no icterus, oral mucous membranes moist, poor dentition  Head/Neck: Neck muscles thinning, no apparent injury  Respiratory/Thorax: respirations even, non labored with lungs CTA bilaterally per ausculation, on room air  Cardiovascular: RRR S1 and S2 normal, no M/R/G  Gastrointestinal: soft, non tender, non distended, BS present x 4, PEG tube intact with tube feeding infusing, no  redness or drainage at   Musculoskeletal: ROM intact, normal strength bilat  Extremities: no edema of lower ext, warm to touch x4  Neurological: became agitated with orientation questions. He is not oriented to place or date. At times cooperative.  Psych: affect initially cooperative then agitation with questions     Assessment/Plan   Brian Rodriguez is a 69 y.o. male with a history of type 2 diabetes, hypertension, CKD, hyperlipidemia, hidradenitis, bipolar who presented to the ED on 4/17/2024 for altered mental status and concern of infection.  Admitted to [facility] with multifocal PNA, HyperNa, TERRI on CKD, then transferred to MICU for AMS & acute hypoxic resp failure & c/f sepsis.   Transferred back to [facility] then returned to MICU 4/26 for respiratory distress and acute hypoxia.       Neuro/Psych: Hx of Bipolar disorder  Acute Encephalopathy  Hypoactive Delirim  - baseline A&Ox2  - Neurology consulted, appreciate recommendations:  > MRI brain w/o contrast for Wallerian degeneration (done 5/3 - poor quality, but no acute abnormalities)  & MRI L humerus to rule out myositis (done 5/4 -negative)   > EMG/NCS was performed,  preliminary concerning for myopathy   >  Please send for CK (wnl),  aldolase  (elevated)  > Repeat TSH and T4 (completd 5/2, TSH and FT4 wnl)  > Send for MMA (sent 5/2, results pending)  > 5/6 Neuro recs via secure chat: His muscle findings on EMG are nonspecific and can actually be seen with disuse atrophy. recommend repeat slp eval (pending today).   At discharge, referral for neuromuscular follow up recommended for a repeat EMG several weeks after hospitalization (referral sent via Rets[employer]).  Neuropsych referral placed via Epic as well.  LP is not waranted  - s/p high dose thiamine IV given 4/29-5/2;  continue daily Thiamine supp  - cont total care for ADLs     Pulm:   Acute hypoxic resp failure 2/2 Aspiration  Multifocal PNA  Recent Covid + (4/18)  - stable on RA  - MBS completed earlier this admit with  signs of aspiration.  SLP recs strict NPO with aggressive oral care  - continue bronchial hygiene  - pt given vancomycin (4/18 - 5/1), meropenum (4/26 - 5/2),     Cardiac:   Hypertension  Hyperlipidemia  New onset Afib  - BP soft, HR controlled.  Currently in NSR  - today during procedure SBP decreased to 58/72 with repeat 70/49 during procedure  - post procedure 115/76 MAP = 65  - MAP 69-80  - home Amlodpine, Atorvastatin on hold  - cont to monitor BP and restart above meds when able  - silodosin started per urine  - CHADsVasc 3  - Eliquis started for a fib  - eliquis was started and will cont at discharge  - PRN Diuresis with IV lasix (most recent Lasix 5/2 40 mg IV)      FEN/GI:   Hypoalbuminemia  Dysphagia  Severe Malnutrition with BMI 19  diarrhea in MICU (5x on May2)-  -has FMS, plan to remove today as min stool  - 5/6 repeat SLP eval with ongoing dysphagia and aspiration concern.  Continue NPO with enteral feeds via Cortrak  - Diet: Glucerna 1.5 @ 55ml/hr, Nutrasource Fiber TID (can go up to 2 packets TID if showing benefits).   FWF 60 ml Q6H (serum sodium 136)  - needs long term nutrition plan, PEG placed.  - 5/5 added Reglan 5mg TID (Qtc 448 on 5/4 ECG) - strict NPO, SLP following  - 5/7 PEG placed  - restarted TF today after GI checked this am   - cont abd binder to protect tube     Renal:   TERRI on CKD (b/l Cr ~1.5-1.7)  Creatinine 2.29 (1.94, 1.91, 2.08, 2.40, 2,53,  was 3.35 on admission)  Unclear baseline as no previous records  Urinary Retention  - failed voiding trial.  Bunch replaced 5/4  - cont bunch care     Heme:   Fe Def Anemia  - Ferritin 539/TIBC 94/iron 16   - holding Fe supp due to recent infection & atb  - DVT prophylaxis: on Heparin drip (for afib)     Endo:    Type 2 diabetes,  - A1C 8.5 (April 2024)  - Blood sugars: today 102-135  - yesterday BG   - Continue at bedtime Lantus 10 units & #2 SSI Lispro  - Q4H Accu checks     ID:   Hx of hidradenitis  Covid + 4/18  Multifocal  PNA  Aspiration PNA  - Tmax: 36.6 C, WBC 9.7  - Micro:  4/17 BC x2 Neg, 4/18, 4/22, 4/26, 4/27, 4/28 & 4/30 Sputum salivary contamination, 4/22 BC x2 Neg, 4/26 sputum 3+ Candida Albicans & 3+ Candida Glabrata, 4/26 Urine Cx Neg, 4/26 Strep pneumoniae Ag Neg, Legionella Ag Neg, 4/26 Fungitell Beta-D Glucan 231, 4/26 BC x2 Neg, 4/29 Tissue cx 1+ Rare Candida Albicans, 4/30 Urine Cx Pending, 4/30 BC x2 NGTD, 5/2 Syphilis Total Ab Reactive==> 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  - 4/17 & 4/29 MRSA PCR +  - 5/5 will hold off 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  - 4/30 Procal 2.95  - Antimicrobials: azithromycin 4/18-4/20, Remdesevir 4/18-4/22, Vanc 4/18- 5/1, Skylar 4/26- 5/2  - Doxycyline 100 mg for Hidradenitis ppx (minocycline cannot be given via PEG)  - holding on treatment of syphilis   - 5/6 C diff neg  - 5?8 ID consulted about syphilis but LP needed so defer to neuro follow up     MSK/SKIN:   Chronic Sacral  scrotum & Buttocks wounds  Hidradenitis  - derm has previously recommended humira/minocycline 100 BID but currently w/o lab confirmation, hold off iso septic shock  - will need outpatient dermatology follow up w/ doxycycline 100 BID outpatient   - seen by wound care RN on 5/2: Patient has black/brown discoloration on B/L areas are nontender, without temperature change, or induration. These are not pressure injuries. There is black soft thickened tissue over left hip/buttocks skin changes r/t hydradenitis  - Recs: Cleanse wounds with CHG bath wipes, Aquacel Ag and Mepilex bordered foam to open, draining areas of sacrum, Interdry to under scrotum and in between creases of groin/ inner thighs, Turn every 2 hours.        :   Inguinal lymphadenopathy   - US upper ext duplex 5/1: Superficial venous thrombus in L  cephalic vein   - US lower ext duplex 5/1: Lymph nodes Lt groin lymph node measures 5.62 mm x 41.53 mm, Rt groin lymph node # 1 measures 7.48 mm x 31.64 mm. Lymph node #2 3.57 mm x 26.93 mm.  - pt with (Chlamydia, neisseria gonorrhea, HSV, previous HIV 1/2 testing this admission neg)  - on 5/2 VDRL +     Lines: Midline (4/30), Dobhoff (4/20),  Barton (5/5)     Code Status: DNR  DPOA/Contact Number: DEEPAK RANGEL, Home Phone: [phone]      Dispo: Stable for transfer to [facility]. SW aware of need to return to facility   Brother Deepak updated on pt condition and          Discharge planning: [facility]  Midline 04/30/24 Single lumen Right (Active)   Site Assessment Clean;Dry;Intact 05/07/24 1117   Proximal Lumen Status Blood return noted;Flushed;Capped 05/07/24 1117   Dressing Type Antimicrobial patch 05/07/24 1117   Dressing Status Clean;Dry;Occlusive 05/07/24 1117   Number of days: 7       Urethral Catheter (Active)   Site Assessment Clean;Skin intact 05/07/24 1119   Collection Container Standard drainage bag 05/07/24 1119   Securement Method Tape 05/07/24 1119   Number of days: 3       Rectal Tube With balloon (Active)   Rectal Tube I/O Output only 05/07/24 1119   Number of days: 1       Code Status:  DNR      Pt seen and examine and plan of care discussed with Dr. Manuel Rosenbaum, APRN-CNP

## 2024-05-10 ENCOUNTER — TELEPHONE (OUTPATIENT)
Dept: DERMATOLOGY | Facility: CLINIC | Age: 69
End: 2024-05-10
Payer: MEDICARE

## 2024-05-10 LAB
ALBUMIN SERPL BCP-MCNC: 2.4 G/DL (ref 3.4–5)
ANION GAP SERPL CALC-SCNC: 14 MMOL/L (ref 10–20)
BUN SERPL-MCNC: 36 MG/DL (ref 6–23)
CALCIUM SERPL-MCNC: 9 MG/DL (ref 8.6–10.6)
CHLORIDE SERPL-SCNC: 100 MMOL/L (ref 98–107)
CO2 SERPL-SCNC: 27 MMOL/L (ref 21–32)
CREAT SERPL-MCNC: 2.18 MG/DL (ref 0.5–1.3)
EGFRCR SERPLBLD CKD-EPI 2021: 32 ML/MIN/1.73M*2
ERYTHROCYTE [DISTWIDTH] IN BLOOD BY AUTOMATED COUNT: 20.6 % (ref 11.5–14.5)
GLUCOSE BLD MANUAL STRIP-MCNC: 170 MG/DL (ref 74–99)
GLUCOSE BLD MANUAL STRIP-MCNC: 186 MG/DL (ref 74–99)
GLUCOSE BLD MANUAL STRIP-MCNC: 203 MG/DL (ref 74–99)
GLUCOSE BLD MANUAL STRIP-MCNC: 70 MG/DL (ref 74–99)
GLUCOSE SERPL-MCNC: 192 MG/DL (ref 74–99)
HCT VFR BLD AUTO: 21.9 % (ref 41–52)
HGB BLD-MCNC: 7.5 G/DL (ref 13.5–17.5)
MAGNESIUM SERPL-MCNC: 2.2 MG/DL (ref 1.6–2.4)
MCH RBC QN AUTO: 26.4 PG (ref 26–34)
MCHC RBC AUTO-ENTMCNC: 34.2 G/DL (ref 32–36)
MCV RBC AUTO: 77 FL (ref 80–100)
NRBC BLD-RTO: 0 /100 WBCS (ref 0–0)
PHOSPHATE SERPL-MCNC: 4.9 MG/DL (ref 2.5–4.9)
PLATELET # BLD AUTO: 600 X10*3/UL (ref 150–450)
POTASSIUM SERPL-SCNC: 4.8 MMOL/L (ref 3.5–5.3)
RBC # BLD AUTO: 2.84 X10*6/UL (ref 4.5–5.9)
SODIUM SERPL-SCNC: 136 MMOL/L (ref 136–145)
WBC # BLD AUTO: 12.2 X10*3/UL (ref 4.4–11.3)

## 2024-05-10 PROCEDURE — S4991 NICOTINE PATCH NONLEGEND: HCPCS | Performed by: NURSE PRACTITIONER

## 2024-05-10 PROCEDURE — 99231 SBSQ HOSP IP/OBS SF/LOW 25: CPT | Performed by: INTERNAL MEDICINE

## 2024-05-10 PROCEDURE — 82947 ASSAY GLUCOSE BLOOD QUANT: CPT

## 2024-05-10 PROCEDURE — 2500000004 HC RX 250 GENERAL PHARMACY W/ HCPCS (ALT 636 FOR OP/ED): Performed by: NURSE PRACTITIONER

## 2024-05-10 PROCEDURE — 2500000002 HC RX 250 W HCPCS SELF ADMINISTERED DRUGS (ALT 637 FOR MEDICARE OP, ALT 636 FOR OP/ED): Performed by: NURSE PRACTITIONER

## 2024-05-10 PROCEDURE — 80069 RENAL FUNCTION PANEL: CPT | Performed by: NURSE PRACTITIONER

## 2024-05-10 PROCEDURE — 51702 INSERT TEMP BLADDER CATH: CPT

## 2024-05-10 PROCEDURE — 82947 ASSAY GLUCOSE BLOOD QUANT: CPT | Mod: 91

## 2024-05-10 PROCEDURE — 2500000002 HC RX 250 W HCPCS SELF ADMINISTERED DRUGS (ALT 637 FOR MEDICARE OP, ALT 636 FOR OP/ED)

## 2024-05-10 PROCEDURE — 83735 ASSAY OF MAGNESIUM: CPT | Performed by: NURSE PRACTITIONER

## 2024-05-10 PROCEDURE — 2500000001 HC RX 250 WO HCPCS SELF ADMINISTERED DRUGS (ALT 637 FOR MEDICARE OP): Performed by: NURSE PRACTITIONER

## 2024-05-10 PROCEDURE — 85027 COMPLETE CBC AUTOMATED: CPT | Performed by: NURSE PRACTITIONER

## 2024-05-10 PROCEDURE — 2060000001 HC INTERMEDIATE ICU ROOM DAILY

## 2024-05-10 RX ADMIN — SODIUM BICARBONATE 650 MG: 650 TABLET ORAL at 09:29

## 2024-05-10 RX ADMIN — INSULIN LISPRO 2 UNITS: 100 INJECTION, SOLUTION INTRAVENOUS; SUBCUTANEOUS at 12:36

## 2024-05-10 RX ADMIN — ACETAMINOPHEN 650 MG: 325 TABLET ORAL at 20:12

## 2024-05-10 RX ADMIN — THIAMINE HCL TAB 100 MG 100 MG: 100 TAB at 09:29

## 2024-05-10 RX ADMIN — INSULIN GLARGINE 10 UNITS: 100 INJECTION, SOLUTION SUBCUTANEOUS at 20:17

## 2024-05-10 RX ADMIN — DOXYCYCLINE HYCLATE 100 MG: 100 TABLET, COATED ORAL at 20:12

## 2024-05-10 RX ADMIN — INSULIN LISPRO 2 UNITS: 100 INJECTION, SOLUTION INTRAVENOUS; SUBCUTANEOUS at 05:08

## 2024-05-10 RX ADMIN — DOXYCYCLINE HYCLATE 100 MG: 100 TABLET, COATED ORAL at 09:29

## 2024-05-10 RX ADMIN — NICOTINE 7 MG/24 HR DAILY TRANSDERMAL PATCH 1 PATCH: at 09:31

## 2024-05-10 RX ADMIN — SODIUM BICARBONATE 650 MG: 650 TABLET ORAL at 20:12

## 2024-05-10 RX ADMIN — Medication 15 ML: at 09:30

## 2024-05-10 ASSESSMENT — ENCOUNTER SYMPTOMS
ABDOMINAL PAIN: 0
COUGH: 0
FEVER: 0
CONFUSION: 1
CHILLS: 0
DIAPHORESIS: 0
SHORTNESS OF BREATH: 0
DIARRHEA: 0

## 2024-05-10 ASSESSMENT — PAIN SCALES - GENERAL
PAINLEVEL_OUTOF10: 0 - NO PAIN

## 2024-05-10 ASSESSMENT — PAIN SCALES - PAIN ASSESSMENT IN ADVANCED DEMENTIA (PAINAD)
BREATHING: NORMAL
FACIALEXPRESSION: SMILING OR INEXPRESSIVE
BREATHING: NORMAL
NEGVOCALIZATION: OCCASIONAL MOAN/GROAN, LOW SPEECH, NEGATIVE/DISAPPROVING QUALITY
FACIALEXPRESSION: SMILING OR INEXPRESSIVE
BODYLANGUAGE: RELAXED
FACIALEXPRESSION: SAD, FRIGHTENED, FROWN
FACIALEXPRESSION: SMILING OR INEXPRESSIVE
TOTALSCORE: 3
BREATHING: NORMAL
CONSOLABILITY: NO NEED TO CONSOLE
BODYLANGUAGE: RELAXED
TOTALSCORE: 0
CONSOLABILITY: NO NEED TO CONSOLE
TOTALSCORE: 0
CONSOLABILITY: NO NEED TO CONSOLE
BODYLANGUAGE: RELAXED
TOTALSCORE: 0
BODYLANGUAGE: TENSE, DISTRESSED PACING, FIDGETING
CONSOLABILITY: NO NEED TO CONSOLE
BREATHING: NORMAL

## 2024-05-10 ASSESSMENT — PAIN - FUNCTIONAL ASSESSMENT
PAIN_FUNCTIONAL_ASSESSMENT: 0-10
PAIN_FUNCTIONAL_ASSESSMENT: 0-10
PAIN_FUNCTIONAL_ASSESSMENT: PAINAD (PAIN ASSESSMENT IN ADVANCED DEMENTIA SCALE)
PAIN_FUNCTIONAL_ASSESSMENT: PAINAD (PAIN ASSESSMENT IN ADVANCED DEMENTIA SCALE)
PAIN_FUNCTIONAL_ASSESSMENT: 0-10
PAIN_FUNCTIONAL_ASSESSMENT: PAINAD (PAIN ASSESSMENT IN ADVANCED DEMENTIA SCALE)

## 2024-05-10 NOTE — PROGRESS NOTES
05/10/24 1400   Discharge Planning   Living Arrangements Other (Comment)  (Pt lives at [facility])   Support Systems Family members   Type of Residence Nursing home/residential care   Home or Post Acute Services Post acute facilities (Rehab/SNF/etc)   Type of Post Acute Facility Services Skilled nursing   Patient expects to be discharged to: [facility]   Does the patient need discharge transport arranged? Yes   RoundTrip coordination needed? Yes     Precert has been obtained.  Pt having an LP today.  SW updated [facility].  Pt can be return to facility over the weekend.  Pt placed in will call in RoundTrip.  JESUS Williamson

## 2024-05-10 NOTE — TELEPHONE ENCOUNTER
Received the follow message below:  ------------------------------------------------  MD FERNANDO Rhodes [id number] Derm Clerical  Cc: Ember Zamora, HAJA  Good morning,    We were consulted on this patient while he was in the hospital over the last month. Plan was to have him follow up outpatient for management of his HS. I saw he had a visit with Dr. Pablo back in 01/2024, so was hoping to get him a follow up scheduled in the next 2-4 weeks. Per primary, he will be discharged today or tomorrow.    Thanks,    Ashley

## 2024-05-10 NOTE — PROGRESS NOTES
"MEDICAL STEPDOWN UNIT DAILY PROGRESS NOTE    Brian Rodriguez is a 69 y.o. male on day 23 of admission presenting with Pneumonia due to infectious organism, unspecified laterality, unspecified part of lung.    Subjective   No events overnight  NPO for LP     Objective     Constitutional: elderly male, cachectic, pt in NAD, alert and cooperative  Eyes: PERRL, no icterus   ENMT: mucous membranes moist, no apparent injury, no lesions seen, poor dentition  Head/Neck: Neck supple, no apparent injury  Respiratory/Thorax: Lungs CTA bilaterally, non-labored breathing, no cough, on RA  Cardiovascular: Regular, rate and rhythm, no murmurs, normal S1 and S2  Gastrointestinal: Nondistended, soft, non-tender, BS present x 4, Cortrak  Musculoskeletal: ROM intact, no joint swelling, normal strength  Extremities: normal extremities, no edema, contusions or wounds  Neurological: alert and oriented x 2 speech clear, follows commands appropriately, cr. n. II-XII intact, sensation grossly intact, motor 5/5 BUE, 4/5 BLE  Skin: Warm and dry, L hand 3rd digit onychomoycosis    Vital Signs  Blood pressure 109/55, pulse 79, temperature 36.4 °C (97.5 °F), temperature source Temporal, resp. rate 19, height 1.778 m (5' 10\"), weight 60.2 kg (132 lb 11.2 oz), SpO2 100%.  Oxygen Therapy  SpO2: 100 %  Medical Gas Therapy: None (Room air)  O2 Delivery Method: Nasal cannula       Intake/Output last 3 Shifts:  I/O last 3 completed shifts:  In: 1575 (19.8 mL/kg) [I.V.:50 (0.6 mL/kg); NG/GT:1525]  Out: 2625 (33.1 mL/kg) [Urine:1975 (0.7 mL/kg/hr); Stool:650]  Dosing Weight: 79.4 kg     Scheduled medications  [Held by provider] apixaban, 5 mg, oral, q12h  doxycylcine, 100 mg, g-tube, q12h RANDAL  esomeprazole, 40 mg, g-tube, Daily before breakfast  insulin glargine, 10 Units, subcutaneous, Nightly  insulin lispro, 0-10 Units, subcutaneous, q6h  multivitamin with iron-minerals, 15 mL, oral, Daily  nicotine, 1 patch, transdermal, Daily  sodium bicarbonate, " 650 mg, g-tube, BID  sodium chloride, 1 spray, Each Nostril, 4x daily  thiamine, 100 mg, g-tube, Daily      Continuous medications     PRN medications  PRN medications: acetaminophen **OR** [DISCONTINUED] acetaminophen **OR** [DISCONTINUED] acetaminophen, albuterol, dextrose, dextrose, glucagon, glucagon, guaiFENesin, ipratropium-albuteroL, melatonin, polyethylene glycol, traMADol    Lines and Tubes:  Midline 04/30/24 Single lumen Right (Active)   Placement Date/Time: 04/30/24 1700   Hand Hygiene Completed: Yes  Lumen Type: Single lumen  Orientation: Right   Number of days: 9       Urethral Catheter (Active)   Placement Date/Time: 05/04/24 1405   Hand Hygiene Completed: Yes  Catheter Balloon Size: 10 mL  Urine Returned: Yes   Number of days: 5       Gastrostomy/Enterostomy Percutaneous endoscopic gastrostomy (PEG) 20 Fr. (Active)   Placement Date/Time: 05/07/24 1230   Hand Hygiene Completed: Yes  Type: Percutaneous endoscopic gastrostomy (PEG)  Tube Size (Fr.): 20 Fr.   Number of days: 2         Relevant Results  Results from last 7 days   Lab Units 05/10/24  0501 05/09/24  0421 05/08/24  0421   WBC AUTO x10*3/uL 12.2* 12.6* 10.7   HEMOGLOBIN g/dL 7.5* 8.1* 8.7*   HEMATOCRIT % 21.9* 25.0* 27.5*   PLATELETS AUTO x10*3/uL 600* 566* 565*     Results from last 7 days   Lab Units 05/10/24  0501 05/09/24  1629 05/09/24  0728   SODIUM mmol/L 136 138 136   POTASSIUM mmol/L 4.8 5.6* 4.3   CHLORIDE mmol/L 100 100 101   CO2 mmol/L 27 28 27   BUN mg/dL 36* 32* 32*   CREATININE mg/dL 2.18* 2.22* 2.26*   CALCIUM mg/dL 9.0 8.5* 8.2*   GLUCOSE mg/dL 192* 169* 136*            This patient has a urinary catheter   Reason for the urinary catheter remaining today? urinary retention/bladder outlet obstruction, acute or chronic    XR chest 1 view 05/03/2024  CARDIOMEDIASTINAL SILHOUETTE:  Cardiomediastinal silhouette is normal in size and configuration.    LUNGS:  There is extensive perihilar airspace disease/edema.  Pneumothorax.    ABDOMEN:  Dobbhoff tube overlies the body of stomach.    BONES:  No acute osseous changes.    Impression  1.  Extensive perihilar edema and effusions and correlate with fluid  status.     Assessment/Plan   Principal Problem:    Pneumonia due to infectious organism, unspecified laterality, unspecified part of lung  Active Problems:    Dysphagia    Brian Rodriguez is a 69 y.o. male with a history of type 2 diabetes, hypertension, CKD, hyperlipidemia, hidradenitis, bipolar who presented to the ED on 4/17/2024 for altered mental status and concern of infection.  Admitted to [facility] with multifocal PNA, HyperNa, TERRI on CKD, then transferred to MICU for AMS & acute hypoxic resp failure & c/f sepsis.   Transferred back to [facility] then returned to MICU 4/26 for respiratory distress and acute hypoxia.       Neuro/Psych: Hx of Bipolar disorder, Acute Encephalopathy, Hypoactive Delirim  - baseline A&Ox2.  Today A&Ox1-2, drowsy but awakes to voice  - Neurology consulted, appreciate recommendations:  > MRI brain w/o contrast for Wallerian degeneration (done 5/3 - poor quality, but no acute abnormalities)  & MRI L humerus to rule out myositis (done 5/4 -negative)   > EMG/NCS was performed,  preliminary concerning for myopathy   >  Please send for CK (wnl),  aldolase  (elevated)  > Repeat TSH and T4 (completd 5/2, TSH and FT4 wnl)  > Send for MMA (sent 5/2, results pending)  > 5/6 Neuro recs via secure chat: His muscle findings on EMG are nonspecific and can actually be seen with disuse atrophy. recommend repeat slp eval (pending today).   At discharge, referral for neuromuscular follow up recommended for a repeat EMG several weeks after hospitalization (referral sent via [facility]).  Neuropsych referral placed via Epic as well.  LP is not waranted  - s/p high dose thiamine IV given 4/29-5/2;  continue daily Thiamine supp  - cont total care for ADLs     Pulm:  Acute hypoxic resp failure 2/2 Aspiration,  Multifocal PNA, Recent  Covid + (4/18)  - stable on RA  - MBS completed earlier this admit with signs of aspiration.  SLP recs strict NPO with aggressive oral care  - continue bronchial hygiene     Cardiac: Hypertension, Hyperlipidemia, New onset Afib  - BP acceptable.  HR controlled.  Currently in NSR  - home Amlodpine, Atorvastatin on hold  - CHADsVasc 3  - Eliquis started for a fib  - PRN Diuresis with IV lasix (most recent Lasix 5/2 40 mg IV)      FEN/GI:  Hypoalbuminemia, Dysphagia s/p PEG (5.7), Severe Malnutrition with BMI 19, diarrhea in MICU (5x on May2)  - 5/6 repeat SLP eval with ongoing dysphagia and aspiration concern.  Continue NPO with enteral feeds via Cortrak  - Diet: Glucerna 1.5 @ 55ml/hr, Nutrasource Fiber TID (can go up to 2 packets TID if showing benefits).   FWF 60 ml Q6H (serum sodium 136)  - needs long term nutrition plan, PEG placed.  - 5/5 added Reglan 5mg TID (Qtc 448 on 5/4 ECG) - strict NPO, SLP following  - 5/7 PEG placed  - restarted TF today after GI checked this am   - cont abd binder to protect tube     Renal: TERRI on CKD (b/l Cr ~1.5-1.7), Urinary Retention  Creatinine 2.29 (1.94, 1.91, 2.08, 2.40, 2,53,  was 3.35 on admission)  Unclear baseline as no previous records  - failed voiding trial.  Bunch replaced 5/4  - cont bunch care     Heme:  Fe Def Anemia  - Ferritin 539/TIBC 94/iron 16   - holding Fe supp due to recent infection & atb  - DVT prophylaxis: on Heparin drip (for afib)     Endo:   Type 2 diabetes,  - A1C 8.5 (April 2024)  - Blood sugars: today 143-186  - Continue at bedtime Lantus 10 units & #2 SSI Lispro  - Q4H Accu checks     ID: Hx of hidradenitis, Covid + 4/18, Multifocal PNA. 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  - Antimicrobials: azithromycin 4/18-4/20, Remdesevir 4/18-4/22, Vanc 4/18- 5/1, Skylar 4/26- 5/2  - Doxycyline 100 mg for Hidradenitis ppx (minocycline cannot be given via PEG)  - holding on treatment of syphilis. LP consulted, asking for LP to be completed.  Hold Apixiban and plan for LP tomorrow     MSK/SKIN: Chronic Sacral scrotum & Buttocks wounds, Hidradenitis  - derm has previously recommended humira/minocycline 100 BID but currently w/o lab confirmation, hold off iso septic shock  - will need outpatient dermatology follow up w/ doxycycline 100 BID outpatient   - seen by wound care RN on 5/2: Patient has black/brown discoloration on B/L areas are nontender, without temperature change, or induration. These are not pressure injuries. There is black soft thickened tissue over left hip/buttocks skin changes r/t hydradenitis  - Recs: Cleanse wounds with CHG bath wipes, Aquacel Ag and Mepilex bordered foam to open, draining areas of sacrum, Interdry to under scrotum and in between creases of groin/ inner thighs, Turn every 2 hours.        :  Inguinal lymphadenopathy   - US upper ext duplex 5/1: Superficial venous thrombus in L cephalic vein   - US lower ext duplex 5/1: Lymph nodes Lt groin lymph node measures 5.62 mm x 41.53 mm, Rt groin lymph node # 1 measures 7.48 mm x 31.64 mm. Lymph node #2 3.57 mm x 26.93 mm.  - pt with (Chlamydia, neisseria gonorrhea, HSV, previous HIV 1/2 testing this admission neg)  - on 5/2 VDRL +     Lines: Midline (4/30), Dobhoff (4/20),   Mick (5/5)     Code Status: DNR  DPOA/Contact Number: DEEPAK RANGEL, Artis Phone: [phone]      Dispo:  continue SDU care, plan for LP tomorrow  Discharge Planning: From [facility]       Kassy Ingram, APRN-CNP  Pulmonary/Critical Care/Internal Medicine  I spent 45 minutes in the professional and overall care of this patient.

## 2024-05-10 NOTE — CARE PLAN
Patient a&o to self. Patient tolerating bolus feeds. Waiting for MS bed. No acute events overnight.     The clinical goals for the shift include pt will remain free from injury this shift.      Problem: Pain  Goal: My pain/discomfort is manageable  5/10/2024 0544 by Candelaria Fournier RN  Outcome: Progressing  5/9/2024 2047 by Candelaria Fournier RN  Outcome: Progressing     Problem: Psychosocial Needs  Goal: Demonstrates ability to cope with hospitalization/illness  5/10/2024 0544 by Candelaria Fournier RN  Outcome: Progressing  5/9/2024 2047 by Candelaria Fournier RN  Outcome: Progressing  Goal: Collaborate with me, my family, and caregiver to identify my specific goals  5/10/2024 0544 by Candelaria Fournier RN  Outcome: Progressing  5/9/2024 2047 by Candelaria Fournier RN  Outcome: Progressing     Problem: Discharge Barriers  Goal: My discharge needs are met  5/10/2024 0544 by Candelaria Fournier RN  Outcome: Progressing  5/9/2024 2047 by Candelaria Fournier RN  Outcome: Progressing     Problem: Fall/Injury  Goal: Verbalize understanding of personal risk factors for fall in the hospital  5/10/2024 0544 by Candelaria Fournier RN  Outcome: Progressing  5/9/2024 2047 by Candelaria Fournier RN  Outcome: Progressing  Goal: Verbalize understanding of risk factor reduction measures to prevent injury from fall in the home  5/10/2024 0544 by Candelaria Fournier RN  Outcome: Progressing  5/9/2024 2047 by Candelaria Fournier RN  Outcome: Progressing  Goal: Use assistive devices by end of the shift  5/10/2024 0544 by Candelaria Fournier RN  Outcome: Progressing  5/9/2024 2047 by Candelaria Fournier RN  Outcome: Progressing  Goal: Pace activities to prevent fatigue by end of the shift  5/10/2024 0544 by Candelaria Fournier RN  Outcome: Progressing  5/9/2024 2047 by Candelaria Fournier RN  Outcome: Progressing     Problem: Pain  Goal: Takes deep breaths with improved pain control throughout the shift  5/10/2024 0544 by  Candelaria Fournier RN  Outcome: Progressing  5/9/2024 2047 by Candelaria Fournier RN  Outcome: Progressing  Goal: Turns in bed with improved pain control throughout the shift  5/10/2024 0544 by Candelaria Fournier RN  Outcome: Progressing  5/9/2024 2047 by Candelaria Fournier RN  Outcome: Progressing  Goal: Walks with improved pain control throughout the shift  5/10/2024 0544 by Candelaria Fournier RN  Outcome: Progressing  5/9/2024 2047 by Candelaria Fournier RN  Outcome: Progressing  Goal: Performs ADL's with improved pain control throughout shift  5/10/2024 0544 by Candelaria Fournier RN  Outcome: Progressing  5/9/2024 2047 by Candelaria Fournier RN  Outcome: Progressing  Goal: Participates in PT with improved pain control throughout the shift  5/10/2024 0544 by Candelaria Fournier RN  Outcome: Progressing  5/9/2024 2047 by Candelaria Fournier RN  Outcome: Progressing  Goal: Free from opioid side effects throughout the shift  5/10/2024 0544 by Candelaria Fournier RN  Outcome: Progressing  5/9/2024 2047 by Candelaria Fournier RN  Outcome: Progressing  Goal: Free from acute confusion related to pain meds throughout the shift  5/10/2024 0544 by Candelaria Fournier RN  Outcome: Progressing  5/9/2024 2047 by Candelaria Fournier RN  Outcome: Progressing     Problem: Skin  Goal: Decreased wound size/increased tissue granulation at next dressing change  5/10/2024 0544 by Candelaria Fournier RN  Outcome: Progressing  Flowsheets (Taken 5/10/2024 0544)  Decreased wound size/increased tissue granulation at next dressing change:   Promote sleep for wound healing   Utilize specialty bed per algorithm   Protective dressings over bony prominences  5/9/2024 2047 by Candelaria Fournier RN  Outcome: Progressing  Goal: Participates in plan/prevention/treatment measures  5/10/2024 0544 by Candelaria Fournier RN  Outcome: Progressing  Flowsheets (Taken 5/10/2024 0544)  Participates in plan/prevention/treatment measures:   Elevate  heels   Discuss with provider PT/OT consult   Increase activity/out of bed for meals  5/9/2024 2047 by Candelaria Fournier RN  Outcome: Progressing  Goal: Prevent/manage excess moisture  5/10/2024 0544 by Candelaria Fournier RN  Outcome: Progressing  Flowsheets (Taken 5/10/2024 0544)  Prevent/manage excess moisture:   Cleanse incontinence/protect with barrier cream   Moisturize dry skin   Use wicking fabric (obtain order)   Follow provider orders for dressing changes   Monitor for/manage infection if present  5/9/2024 2047 by Candelaria Fournier RN  Outcome: Progressing  Goal: Prevent/minimize sheer/friction injuries  5/10/2024 0544 by Candelaria Fournier RN  Outcome: Progressing  Flowsheets (Taken 5/10/2024 0544)  Prevent/minimize sheer/friction injuries:   Complete micro-shifts as needed if patient unable. Adjust patient position to relieve pressure points, not a full turn   Increase activity/out of bed for meals   Use pull sheet   HOB 30 degrees or less   Turn/reposition every 2 hours/use positioning/transfer devices   Utilize specialty bed per algorithm  5/9/2024 2047 by Candelaria Fournier RN  Outcome: Progressing  Goal: Promote/optimize nutrition  5/10/2024 0544 by Candelaria Fournier RN  Outcome: Progressing  Flowsheets (Taken 5/10/2024 0544)  Promote/optimize nutrition:   Assist with feeding   Monitor/record intake including meals   Consume > 50% meals/supplements   Offer water/supplements/favorite foods   Discuss with provider if NPO > 2 days   Reassess MST if dietician not consulted  5/9/2024 2047 by Candelaria Fournier RN  Outcome: Progressing  Goal: Promote skin healing  5/10/2024 0544 by Candelaria Fournier RN  Outcome: Progressing  Flowsheets (Taken 5/10/2024 0544)  Promote skin healing:   Assess skin/pad under line(s)/device(s)   Protective dressings over bony prominences   Turn/reposition every 2 hours/use positioning/transfer devices   Ensure correct size (line/device) and apply per   instructions   Rotate device position/do not position patient on device  5/9/2024 2047 by Candelaria Fournier RN  Outcome: Progressing

## 2024-05-11 LAB
ABO GROUP (TYPE) IN BLOOD: NORMAL
ALBUMIN SERPL BCP-MCNC: 2.4 G/DL (ref 3.4–5)
ANION GAP SERPL CALC-SCNC: 16 MMOL/L (ref 10–20)
ANTIBODY SCREEN: NORMAL
BUN SERPL-MCNC: 39 MG/DL (ref 6–23)
CALCIUM SERPL-MCNC: 9.4 MG/DL (ref 8.6–10.6)
CHLORIDE SERPL-SCNC: 100 MMOL/L (ref 98–107)
CO2 SERPL-SCNC: 26 MMOL/L (ref 21–32)
CREAT SERPL-MCNC: 2.21 MG/DL (ref 0.5–1.3)
EGFRCR SERPLBLD CKD-EPI 2021: 31 ML/MIN/1.73M*2
ERYTHROCYTE [DISTWIDTH] IN BLOOD BY AUTOMATED COUNT: 21.7 % (ref 11.5–14.5)
GLUCOSE BLD MANUAL STRIP-MCNC: 149 MG/DL (ref 74–99)
GLUCOSE BLD MANUAL STRIP-MCNC: 161 MG/DL (ref 74–99)
GLUCOSE BLD MANUAL STRIP-MCNC: 168 MG/DL (ref 74–99)
GLUCOSE BLD MANUAL STRIP-MCNC: 194 MG/DL (ref 74–99)
GLUCOSE BLD MANUAL STRIP-MCNC: 266 MG/DL (ref 74–99)
GLUCOSE BLD MANUAL STRIP-MCNC: 272 MG/DL (ref 74–99)
GLUCOSE SERPL-MCNC: 147 MG/DL (ref 74–99)
HCT VFR BLD AUTO: 28.5 % (ref 41–52)
HGB BLD-MCNC: 8.6 G/DL (ref 13.5–17.5)
MAGNESIUM SERPL-MCNC: 2.22 MG/DL (ref 1.6–2.4)
MCH RBC QN AUTO: 25.6 PG (ref 26–34)
MCHC RBC AUTO-ENTMCNC: 30.2 G/DL (ref 32–36)
MCV RBC AUTO: 85 FL (ref 80–100)
NRBC BLD-RTO: 0 /100 WBCS (ref 0–0)
PHOSPHATE SERPL-MCNC: 5.5 MG/DL (ref 2.5–4.9)
PLATELET # BLD AUTO: 430 X10*3/UL (ref 150–450)
POTASSIUM SERPL-SCNC: 4.7 MMOL/L (ref 3.5–5.3)
RBC # BLD AUTO: 3.36 X10*6/UL (ref 4.5–5.9)
RH FACTOR (ANTIGEN D): NORMAL
SODIUM SERPL-SCNC: 137 MMOL/L (ref 136–145)
WBC # BLD AUTO: 10 X10*3/UL (ref 4.4–11.3)

## 2024-05-11 PROCEDURE — 80069 RENAL FUNCTION PANEL: CPT | Performed by: NURSE PRACTITIONER

## 2024-05-11 PROCEDURE — 83735 ASSAY OF MAGNESIUM: CPT | Performed by: NURSE PRACTITIONER

## 2024-05-11 PROCEDURE — 62270 DX LMBR SPI PNXR: CPT | Performed by: STUDENT IN AN ORGANIZED HEALTH CARE EDUCATION/TRAINING PROGRAM

## 2024-05-11 PROCEDURE — 2500000002 HC RX 250 W HCPCS SELF ADMINISTERED DRUGS (ALT 637 FOR MEDICARE OP, ALT 636 FOR OP/ED)

## 2024-05-11 PROCEDURE — 85027 COMPLETE CBC AUTOMATED: CPT | Performed by: NURSE PRACTITIONER

## 2024-05-11 PROCEDURE — 82947 ASSAY GLUCOSE BLOOD QUANT: CPT | Mod: 91

## 2024-05-11 PROCEDURE — 2500000001 HC RX 250 WO HCPCS SELF ADMINISTERED DRUGS (ALT 637 FOR MEDICARE OP): Performed by: NURSE PRACTITIONER

## 2024-05-11 PROCEDURE — 2500000004 HC RX 250 GENERAL PHARMACY W/ HCPCS (ALT 636 FOR OP/ED): Performed by: NURSE PRACTITIONER

## 2024-05-11 PROCEDURE — 86901 BLOOD TYPING SEROLOGIC RH(D): CPT

## 2024-05-11 PROCEDURE — 1210000001 HC SEMI-PRIVATE ROOM DAILY

## 2024-05-11 PROCEDURE — S4991 NICOTINE PATCH NONLEGEND: HCPCS | Performed by: NURSE PRACTITIONER

## 2024-05-11 PROCEDURE — 82947 ASSAY GLUCOSE BLOOD QUANT: CPT

## 2024-05-11 PROCEDURE — 2500000002 HC RX 250 W HCPCS SELF ADMINISTERED DRUGS (ALT 637 FOR MEDICARE OP, ALT 636 FOR OP/ED): Performed by: NURSE PRACTITIONER

## 2024-05-11 RX ORDER — LORAZEPAM 2 MG/ML
0.5 INJECTION INTRAMUSCULAR ONCE
Status: DISCONTINUED | OUTPATIENT
Start: 2024-05-11 | End: 2024-05-11

## 2024-05-11 RX ORDER — HYDROMORPHONE HYDROCHLORIDE 1 MG/ML
0.2 INJECTION, SOLUTION INTRAMUSCULAR; INTRAVENOUS; SUBCUTANEOUS ONCE
Status: DISCONTINUED | OUTPATIENT
Start: 2024-05-11 | End: 2024-05-11

## 2024-05-11 RX ORDER — MIDAZOLAM HYDROCHLORIDE 1 MG/ML
2 INJECTION INTRAMUSCULAR; INTRAVENOUS ONCE
Status: COMPLETED | OUTPATIENT
Start: 2024-05-11 | End: 2024-05-11

## 2024-05-11 RX ADMIN — INSULIN LISPRO 2 UNITS: 100 INJECTION, SOLUTION INTRAVENOUS; SUBCUTANEOUS at 05:51

## 2024-05-11 RX ADMIN — SODIUM BICARBONATE 650 MG: 650 TABLET ORAL at 22:55

## 2024-05-11 RX ADMIN — MIDAZOLAM HYDROCHLORIDE 2 MG: 1 INJECTION, SOLUTION INTRAMUSCULAR; INTRAVENOUS at 13:35

## 2024-05-11 RX ADMIN — DOXYCYCLINE HYCLATE 100 MG: 100 TABLET, COATED ORAL at 22:55

## 2024-05-11 RX ADMIN — INSULIN GLARGINE 10 UNITS: 100 INJECTION, SOLUTION SUBCUTANEOUS at 22:45

## 2024-05-11 RX ADMIN — INSULIN LISPRO 2 UNITS: 100 INJECTION, SOLUTION INTRAVENOUS; SUBCUTANEOUS at 00:07

## 2024-05-11 RX ADMIN — Medication 15 ML: at 08:07

## 2024-05-11 RX ADMIN — INSULIN LISPRO 6 UNITS: 100 INJECTION, SOLUTION INTRAVENOUS; SUBCUTANEOUS at 11:28

## 2024-05-11 RX ADMIN — DOXYCYCLINE HYCLATE 100 MG: 100 TABLET, COATED ORAL at 08:07

## 2024-05-11 RX ADMIN — Medication 3 MG: at 22:55

## 2024-05-11 RX ADMIN — SALINE NASAL SPRAY 1 SPRAY: 1.5 SOLUTION NASAL at 07:50

## 2024-05-11 RX ADMIN — NICOTINE 7 MG/24 HR DAILY TRANSDERMAL PATCH 1 PATCH: at 08:08

## 2024-05-11 RX ADMIN — INSULIN LISPRO 2 UNITS: 100 INJECTION, SOLUTION INTRAVENOUS; SUBCUTANEOUS at 18:14

## 2024-05-11 RX ADMIN — ESOMEPRAZOLE MAGNESIUM 40 MG: 40 FOR SUSPENSION ORAL at 06:53

## 2024-05-11 RX ADMIN — THIAMINE HCL TAB 100 MG 100 MG: 100 TAB at 08:07

## 2024-05-11 RX ADMIN — SODIUM BICARBONATE 650 MG: 650 TABLET ORAL at 08:07

## 2024-05-11 ASSESSMENT — PAIN - FUNCTIONAL ASSESSMENT
PAIN_FUNCTIONAL_ASSESSMENT: PAINAD (PAIN ASSESSMENT IN ADVANCED DEMENTIA SCALE)

## 2024-05-11 ASSESSMENT — COGNITIVE AND FUNCTIONAL STATUS - GENERAL
MOBILITY SCORE: 17
MOVING TO AND FROM BED TO CHAIR: A LITTLE
CLIMB 3 TO 5 STEPS WITH RAILING: A LOT
DRESSING REGULAR LOWER BODY CLOTHING: A LOT
TURNING FROM BACK TO SIDE WHILE IN FLAT BAD: A LITTLE
HELP NEEDED FOR BATHING: A LOT
TOILETING: A LOT
WALKING IN HOSPITAL ROOM: A LITTLE
EATING MEALS: A LITTLE
DRESSING REGULAR UPPER BODY CLOTHING: A LOT
STANDING UP FROM CHAIR USING ARMS: A LITTLE
MOVING FROM LYING ON BACK TO SITTING ON SIDE OF FLAT BED WITH BEDRAILS: A LITTLE
PERSONAL GROOMING: TOTAL
DAILY ACTIVITIY SCORE: 12

## 2024-05-11 ASSESSMENT — PAIN SCALES - GENERAL
PAINLEVEL_OUTOF10: 0 - NO PAIN

## 2024-05-11 NOTE — NURSING NOTE
Patient transferred to [place] [address] from [facility] this evening. He was oriented to the call light system and the room on arrival. His bed alarm was turned on. A picture was taken of his sacral wound and the dressing was changed. He has a beaded necklace on and came with a bag of hospital supplies/medications. No other belongings were brought with him. He is currently resting in bed at this time watching tv.

## 2024-05-11 NOTE — PROGRESS NOTES
Spiritual Care Visit    Clinical Encounter Type  Visited With: Patient  Routine Visit: Follow-up  Continue Visiting: Yes       Follow up visit.  Pt expressed discomfort, wanting sucker used for moistening mouth.  Pt nurse informed.  Pt appreciated.

## 2024-05-11 NOTE — CARE PLAN
The patient's goals for the shift include Pt will be free from falls  use call light  bed alarm remians on    The clinical goals for the shift include pt will remain free from injury this shift.    Over the shift, remained injury free and compliant with staying in bed throughout the shift

## 2024-05-11 NOTE — CARE PLAN
The patient's goals for the shift include Pt will be free from falls  use call light  bed alarm remians on    The clinical goals for the shift include pt will remain free from injury this shift.      Problem: Pain  Goal: Walks with improved pain control throughout the shift  Outcome: Progressing  Goal: Performs ADL's with improved pain control throughout shift  Outcome: Progressing  Goal: Participates in PT with improved pain control throughout the shift  Outcome: Progressing  Goal: Free from opioid side effects throughout the shift  Outcome: Progressing  Goal: Free from acute confusion related to pain meds throughout the shift  Outcome: Progressing     Problem: Skin  Goal: Decreased wound size/increased tissue granulation at next dressing change  Outcome: Progressing  Flowsheets (Taken 5/11/2024 1013)  Decreased wound size/increased tissue granulation at next dressing change:   Promote sleep for wound healing   Protective dressings over bony prominences  Goal: Participates in plan/prevention/treatment measures  Outcome: Progressing  Flowsheets (Taken 5/11/2024 1013)  Participates in plan/prevention/treatment measures:   Elevate heels   Increase activity/out of bed for meals   Discuss with provider PT/OT consult  Goal: Promote skin healing  Outcome: Progressing  Flowsheets (Taken 5/11/2024 1013)  Promote skin healing:   Assess skin/pad under line(s)/device(s)   Protective dressings over bony prominences   Turn/reposition every 2 hours/use positioning/transfer devices   Ensure correct size (line/device) and apply per  instructions   Rotate device position/do not position patient on device

## 2024-05-11 NOTE — PROGRESS NOTES
Brian Rodriguez is a 69 y.o. male on day 23 of admission presenting with Pneumonia due to infectious organism, unspecified laterality, unspecified part of lung.    Subjective   Interval History: sleeping in the morning .  No change          Review of Systems   Constitutional:  Negative for chills, diaphoresis and fever.   Respiratory:  Negative for cough and shortness of breath.    Gastrointestinal:  Negative for abdominal pain and diarrhea.   Skin:  Negative for rash.   Psychiatric/Behavioral:  Positive for confusion.        Objective   Range of Vitals (last 24 hours)  Heart Rate:  [78-88]   Temp:  [35.9 °C (96.6 °F)-36.4 °C (97.5 °F)]   Resp:  [16-19]   BP: ()/(54-67)   Weight:  [60.2 kg (132 lb 11.2 oz)]   SpO2:  [96 %-100 %]   Daily Weight  05/10/24 : 60.2 kg (132 lb 11.2 oz)    Body mass index is 19.04 kg/m².    Physical Exam  Vitals reviewed.   Constitutional:       General: He is not in acute distress.     Appearance: He is not ill-appearing.   Pulmonary:      Effort: Pulmonary effort is normal.      Breath sounds: Normal breath sounds.   Musculoskeletal:      Cervical back: Neck supple.   Skin:     General: Skin is warm.   Neurological:      General: No focal deficit present.      Mental Status: He is alert.         Relevant Results  Labs  Results from last 72 hours   Lab Units 05/10/24  0501 05/09/24  0421 05/08/24  0421   WBC AUTO x10*3/uL 12.2* 12.6* 10.7   HEMOGLOBIN g/dL 7.5* 8.1* 8.7*   HEMATOCRIT % 21.9* 25.0* 27.5*   PLATELETS AUTO x10*3/uL 600* 566* 565*     Results from last 72 hours   Lab Units 05/10/24  0501 05/09/24  1629 05/09/24  0728   SODIUM mmol/L 136 138 136   POTASSIUM mmol/L 4.8 5.6* 4.3   CHLORIDE mmol/L 100 100 101   CO2 mmol/L 27 28 27   BUN mg/dL 36* 32* 32*   CREATININE mg/dL 2.18* 2.22* 2.26*   GLUCOSE mg/dL 192* 169* 136*   CALCIUM mg/dL 9.0 8.5* 8.2*   ANION GAP mmol/L 14 16 12   EGFR mL/min/1.73m*2 32* 31* 30*   PHOSPHORUS mg/dL 4.9 4.9 4.6     Results from last 72 hours    Lab Units 05/10/24  0501 05/09/24  1629 05/09/24  0728   ALBUMIN g/dL 2.4* 2.1* 1.9*     Estimated Creatinine Clearance: 26.5 mL/min (A) (by C-G formula based on SCr of 2.18 mg/dL (H)).  C-Reactive Protein   Date Value Ref Range Status   04/24/2024 20.03 (H) <1.00 mg/dL Final     Microbiology  Susceptibility data from last 14 days.  Collected Specimen Info Organism   04/29/24 Tissue/Biopsy from Skin Lesion Candida albicans     Mixed Skin Microorganisms    04/26/24 Fluid from Tracheal Aspirate Candida albicans     Gali glabrata       5/2/24   brain MRI without contrast  The examination is incomplete and motion degraded. Diffusion-imaging  demonstrates no evidence of diffusion restriction to suggest the  presence of acute ischemic injury.      There is prominence of ventricles and sulci compatible with diffuse  parenchymal volume loss with a temporal and frontal predominance. The  degree of ventriculomegaly is somewhat disproportionate with the size  of the sulci. There is no gross evidence of mass effect or midline  shift.      There is opacification of the left frontal sinus. Mucosal thickening  is noted along the right maxillary sinus floor. There is  opacification of a few mastoid air cells, right greater than left.      There are cystic lesions in the left periauricular region which are  nonspecific.     5/3 chest x-ray   Extensive perihilar edema and effusions and correlate with fluid  status.       5/3 EMG  Generalized sensorimotor peripheral polyneuropathy, axon loss in type, chronic in duration, moderate to severe in degree electrically.   2. Non-specific motor unit potential configurational changes in the proximal muscles, with sparse short-lived and occasional fibrillation potentials, consistent with a generalized myopathy, but not sufficient for definite diagnosis of necrotizing myopathy.   3. Active and chronic, left median neuropathy at the wrist, which in the proper clinical context would be  consistent with carpal tunnel syndrome.   4. Active and chronic, left ulnar neuropathy best localized at the elbow.       4/29 trans-thoracic echocardiogram   Left ventricular systolic function is low normal with a 60% estimated ejection fraction.   2. No definite valvular vegetations were visualized.   3. Poorly visualized anatomical structures due to suboptimal image quality.        5/2 RPR negative .   TPPA reactive,   syphilis total Antibody  positive        Temp Readings from Last 5 Encounters:   05/10/24 36.1 °C (97 °F) (Temporal)   03/30/23 36.9 °C (98.4 °F)       Estimated Creatinine Clearance: 26.5 mL/min (A) (by C-G formula based on SCr of 2.18 mg/dL (H)).    Microbiology  4/17 blood culture negative  4/17 MRSA screen positive  4/18 pneumo legionella  urine Antigen negative  4/18, 4/22, 4/26  sputum culture  contaminated  4/26 sputum culture  Candida albicans .  Candida glabrata  4/26 urine culture no growth   4/29 MRSA screen positive   5/6 c diff negative        Antibiotic history   Piperacillin-tazobactam  4/17~ 4/26  Vancomycin 4/17~ 4/30  Remdesivir   4/18~ 4/22  Meropenem  4/26~5/1  Doxycycline 5/2~   Azithromycin  4/20           INFECTIOUS DISEASE SYNOPSIS AND ASSESSMENT  69-year-old nursing home resident with no known past medical history of dementia presented with altered mental status (forgetfulness and disorientation) in the setting of COVID-19 infection and pneumonia which has been treated appropriately  But has persistent delirium/dementia/dysphagia requiring tube feeding and recurrent hypoxia with a possible aspiration.  His TP PA was positive with nonreactive RPR which is consistent with remote exposure to syphilis.   He lived in [facility] throughout his life.   We called the department of public health of [facility] and there is no history of him being diagnosed or treated for syphilis in the past in their database.   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     CSF VDRL is only 50% positive in neurosyphilis but we typically see pleocytosis.  And it would be worthwhile to try CSF study.  It is unsure how long he has had this altered memories but if he has neurosyphilis,  treating it can prevent further progression of dementia although it won't reverse his condition or explains his myopathy.      Premature dementia with a worsened mental status in the setting of  acute pneumonia / myopathy/ polyneuropathy  Positive treponemal antibody with a negative RPR  COVID-19 pneumonia/ multifocal pneumonia / acute hypoxic respiratory failure - resolved   Decubitus ulcer       RECOMMENDATION  Pt is scheduled for Lumbar puncture tomorrow  Will follow CSF study result         ID will follow.   The case was discussed with my attending , Dr. Kayden Adames who agreed with my assessment and plan.    AMA NIXON M.D /  Infectious disease consult team A   Infectious disease fellow PGY-4  Reach me through [employer] or Page [phone] ( EpicChat preferable especially during noon conference )

## 2024-05-11 NOTE — PROGRESS NOTES
SDU to Floor Transfer Summary & Floor Readiness Note     I, personally, evaluated Brian Rodriguez prior to transfer to the floor, including reviewing all current laboratory and imaging studies. The patient remains appropriate for transfer to the floor. Bedside nurse and respiratory therapy are also in agreement of patient's readiness for the floor.        I:  Hospital Course   Brian Rodriguez is a 69 y.o. male with PMH of CKD, DM2, HTN, sacral wound, bipolar disorder who was admitted on 4/17 from his SNF 2/2 encephalopathy. On the floor, the patient had several aspiration events. His pressures responded to fluids and IVC was collapsible. Presentation most consistent with aspiration pna 2/2 dysphagia and shock of unknown etiology which is improving. Was transferred to the MICU on 4/22/2024 following a rapid response with increased O2 to 12L and hypotension at 86/46, BP was responsive to fluids, and acute hypoxic respiratory failure requiring airvo support. 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 For his multifocal and aspiration pneumonia, he received azithromycin 4/18-4/20 and meropenem 4/26-5/2. Procal 2.95 Additionally, received Remdesevir 4/18-4/22 (covid + 4/18).  Fungitel 231. Bcx -ve, and Sputum culture was contaminated. Doxy 100mg restarted for Hydradenitis suppurativa ppx. NGT was placed for dysphagia, TF and FWF held for Brain MRI. Also had TERRI on CKD stage III (baseline Cr 1.7), likely prerenal azotemia 2/2 hypovolemia and hemodynamic instability. Hospital course complicated by Afib found on 5/2/2024 without tachycardia, patient transitioned to heparin gtt for anticoagulation. Currently breathing in RA with stable BP, ready to be transferred to Stepdown.  Patient with increase loose stool on 5/6, C-diff PCR negative and FMS inserted.  Patient s/p PEG placement on 5/7; Eliquis started on 5/7.   ID c/s for Syphilis Total Ab Reactive  on 5/2 >RPR Neg> Treponoma Reactive on 5/8; f.up rec's.  Continue Doxycyline 100 mg for Hidradenitis ppx treatment.   ID was consulted for interpretations for syphils as pt on doxycycline (cannot break apart minocycline capsules.  Continous enteral feeding was changed to bolus per nutrition.     Bedside LP attempted 5/11, given 2 mg IV Versed.  Patient agitated and not able to lay still.  Will need LP in IR with anesthesia.        C: Code Status/DPOA Info/Goals of Care/ACP Note    DNR    DPOA/Contact Number: DEEPAK RANGEL, Artis Phone: [phone]     U: Unprescribing & Pertinent High-Risk Medications   Changes to home meds: home Amlodpine, Atorvastatin on hold      Anticoagulation: Eliquis (A-fib)    Antibiotics:   [x]  Doxycline 100 mg BID indication Hidradenitis ppx     P: Pending Tests at the Time of Transfer   Lumbar Puncture  with IR- will need anesthesia      A: Active consultants, including Rehab:   [x]  Subspecialty Consultants: infectious disease  [x]  PT  [x]  OT  [x]  SLP  []  Wound Care    U: Uncertainty Measure/Diagnostic Pause:    Working diagnosis at the time of transfer Acute hypoxic resp failure 2/2 Aspiration vs multifocal PNA.  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    Discharge planning: return to [facility] (will return there as skilled, needs precert to return-- started today)  - Follow up outpatient with Dermatology (Dr. Hdz) within 2 weeks of discharge   - In any case, patient would benefit from follow up with Neurology as outpatient. Please request follow up with Neurology - (in comment section: with Dori Thomason)        E: Exam, including Lines/Drains/Airways & Data Review:   Constitutional: elderly male, cachectic, pt in NAD, alert and cooperative  Eyes: PERRL, no icterus   ENMT: mucous membranes  moist, no apparent injury, no lesions seen, poor dentition  Head/Neck: Neck supple, no apparent injury  Respiratory/Thorax: Lungs CTA bilaterally, non-labored breathing, no cough, on RA  Cardiovascular: Regular, rate and rhythm, no murmurs, normal S1 and S2  Gastrointestinal: Nondistended, soft, non-tender, BS present x 4, Cortrak  Musculoskeletal: ROM intact, no joint swelling, normal strength  Extremities: normal extremities, no edema, contusions or wounds  Neurological: alert and oriented x 2 speech clear, follows commands appropriately, cr. n. II-XII intact, sensation grossly intact, motor 5/5 BUE, 4/5 BLE  Skin: Warm and dry, L hand 3rd digit onychomoycosis       Current Vital Signs:  Heart Rate: 88 (05/11/24 1202 : Peace Galicia, HAJA)  BP: 114/68 (05/11/24 1202 : Peace Galicia, RN)  Temp: 36.3 °C (97.3 °F) (05/11/24 1202 : Ashley Uriostegui)  Resp: 17 (05/11/24 1202 : Peace Galicia, RN)  SpO2: 97 % (05/11/24 1202 : Peace Galicia, HAJA)    Accepting team, Hospitalist SHAHAB, received sign out and the Provider Care team/Attending has been updated. Bedside nurse will now call accepting nurse for report and patient will be transferred to [place] [id number].    RULA Taylor-CNP

## 2024-05-11 NOTE — PROGRESS NOTES
"MEDICAL STEPDOWN UNIT DAILY PROGRESS NOTE    Brian Rodriguez is a 69 y.o. male on day 24 of admission presenting with Pneumonia due to infectious organism, unspecified laterality, unspecified part of lung.    Subjective   No overnight events     Objective     Constitutional: elderly male, cachectic, pt in NAD, alert and cooperative  Eyes: PERRL, no icterus   ENMT: mucous membranes moist, no apparent injury, no lesions seen, poor dentition  Head/Neck: Neck supple, no apparent injury  Respiratory/Thorax: Lungs CTA bilaterally, non-labored breathing, no cough, on RA  Cardiovascular: Regular, rate and rhythm, no murmurs, normal S1 and S2  Gastrointestinal: Nondistended, soft, non-tender, BS present x 4, Cortrak  Musculoskeletal: ROM intact, no joint swelling, normal strength  Extremities: normal extremities, no edema, contusions or wounds  Neurological: alert and oriented x 2 speech clear, follows commands appropriately, cr. n. II-XII intact, sensation grossly intact, motor 5/5 BUE, 4/5 BLE  Skin: Warm and dry, L hand 3rd digit onychomoycosis       Vital Signs  Blood pressure 120/88, pulse 87, temperature 36.6 °C (97.9 °F), temperature source Temporal, resp. rate 18, height 1.778 m (5' 10\"), weight 59.3 kg (130 lb 11.7 oz), SpO2 99%.  Oxygen Therapy  SpO2: 99 %  Medical Gas Therapy: None (Room air)  O2 Delivery Method: Nasal cannula       Intake/Output last 3 Shifts:  I/O last 3 completed shifts:  In: 2350 (29.6 mL/kg) [NG/GT:2350]  Out: 2475 (31.2 mL/kg) [Urine:2375 (0.8 mL/kg/hr); Stool:100]  Dosing Weight: 79.4 kg     Scheduled medications  [Held by provider] apixaban, 5 mg, oral, q12h  doxycylcine, 100 mg, g-tube, q12h RANDAL  esomeprazole, 40 mg, g-tube, Daily before breakfast  insulin glargine, 10 Units, subcutaneous, Nightly  insulin lispro, 0-10 Units, subcutaneous, q6h  multivitamin with iron-minerals, 15 mL, oral, Daily  nicotine, 1 patch, transdermal, Daily  sodium bicarbonate, 650 mg, g-tube, BID  sodium " chloride, 1 spray, Each Nostril, 4x daily  thiamine, 100 mg, g-tube, Daily      Continuous medications     PRN medications  PRN medications: acetaminophen **OR** [DISCONTINUED] acetaminophen **OR** [DISCONTINUED] acetaminophen, albuterol, dextrose, dextrose, glucagon, glucagon, guaiFENesin, ipratropium-albuteroL, melatonin, polyethylene glycol, traMADol    Lines and Tubes:  Midline 04/30/24 Single lumen Right (Active)   Placement Date/Time: 04/30/24 1700   Hand Hygiene Completed: Yes  Lumen Type: Single lumen  Orientation: Right   Number of days: 10       Urethral Catheter (Active)   Placement Date/Time: 05/04/24 1405   Hand Hygiene Completed: Yes  Catheter Balloon Size: 10 mL  Urine Returned: Yes   Number of days: 6       Gastrostomy/Enterostomy Percutaneous endoscopic gastrostomy (PEG) 20 Fr. (Active)   Placement Date/Time: 05/07/24 1230   Hand Hygiene Completed: Yes  Type: Percutaneous endoscopic gastrostomy (PEG)  Tube Size (Fr.): 20 Fr.   Number of days: 3         Relevant Results  Results from last 7 days   Lab Units 05/10/24  0501 05/09/24  0421 05/08/24  0421   WBC AUTO x10*3/uL 12.2* 12.6* 10.7   HEMOGLOBIN g/dL 7.5* 8.1* 8.7*   HEMATOCRIT % 21.9* 25.0* 27.5*   PLATELETS AUTO x10*3/uL 600* 566* 565*     Results from last 7 days   Lab Units 05/10/24  0501 05/09/24  1629 05/09/24  0728   SODIUM mmol/L 136 138 136   POTASSIUM mmol/L 4.8 5.6* 4.3   CHLORIDE mmol/L 100 100 101   CO2 mmol/L 27 28 27   BUN mg/dL 36* 32* 32*   CREATININE mg/dL 2.18* 2.22* 2.26*   CALCIUM mg/dL 9.0 8.5* 8.2*   GLUCOSE mg/dL 192* 169* 136*          XR chest 1 view 05/03/2024    CARDIOMEDIASTINAL SILHOUETTE:  Cardiomediastinal silhouette is normal in size and configuration.    LUNGS:  There is extensive perihilar airspace disease/edema. Pneumothorax.    ABDOMEN:  Dobbhoff tube overlies the body of stomach.    BONES:  No acute osseous changes.    Impression  1.  Extensive perihilar edema and effusions and correlate with  fluid  status.      Assessment/Plan   Principal Problem:    Pneumonia due to infectious organism, unspecified laterality, unspecified part of lung  Active Problems:    Dysphagia    Brian Rodriguez is a 69 y.o. male with a history of type 2 diabetes, hypertension, CKD, hyperlipidemia, hidradenitis, bipolar who presented to the ED on 4/17/2024 for altered mental status and concern of infection.  Admitted to [facility] with multifocal PNA, HyperNa, TERRI on CKD, then transferred to MICU for AMS & acute hypoxic resp failure & c/f sepsis.   Transferred back to [facility] then returned to MICU 4/26 for respiratory distress and acute hypoxia.       Neuro/Psych: Hx of Bipolar disorder, Acute Encephalopathy, Hypoactive Delirim  - baseline A&Ox2.  Today A&Ox1-2, drowsy but awakes to voice  - Neurology consulted, appreciate recommendations:  > MRI brain w/o contrast for Wallerian degeneration (done 5/3 - poor quality, but no acute abnormalities)  & MRI L humerus to rule out myositis (done 5/4 -negative)   > EMG/NCS was performed,  preliminary concerning for myopathy   >  Please send for CK (wnl),  aldolase  (elevated)  > Repeat TSH and T4 (completd 5/2, TSH and FT4 wnl)  > Send for MMA (sent 5/2, results pending)  > 5/6 Neuro recs via secure chat: His muscle findings on EMG are nonspecific and can actually be seen with disuse atrophy. recommend repeat slp eval (pending today).   At discharge, referral for neuromuscular follow up recommended for a repeat EMG several weeks after hospitalization (referral sent via [facility]).  Neuropsych referral placed via Epic as well.  LP is not waranted.  Plan for LP 5/11 per ID recs (see below)  - s/p high dose thiamine IV given 4/29-5/2;  continue daily Thiamine supp  - cont total care for ADLs     Pulm:  Acute hypoxic resp failure 2/2 Aspiration,  Multifocal PNA, Recent Covid + (4/18)  - stable on RA  - MBS completed earlier this admit with signs of aspiration.  SLP recs strict NPO with aggressive oral  care  - continue bronchial hygiene     Cardiac: Hypertension, Hyperlipidemia, New onset Afib  - BP acceptable.  HR controlled.  Currently in NSR  - home Amlodpine, Atorvastatin on hold  - CHADsVasc 3  - Eliquis started for a fib  - PRN Diuresis with IV lasix (most recent Lasix 5/2 40 mg IV)      FEN/GI:  Hypoalbuminemia, Dysphagia s/p PEG (5.7), Severe Malnutrition with BMI 19, diarrhea in MICU (5x on May2)  - BM x1 this am  - 5/6 repeat SLP eval with ongoing dysphagia and aspiration concern.  Continue NPO with enteral feeds via PEG  - Diet: Glucerna 1.5 @ 55ml/hr, Nutrasource Fiber TID (can go up to 2 packets TID if showing benefits).   FWF 60 ml Q6H (serum sodium 136)  - needs long term nutrition plan, PEG placed.  - 5/5 added Reglan 5mg TID (Qtc 448 on 5/4 ECG) - strict NPO, SLP following  - cont abd binder to protect tube     Renal: TERRI on CKD (b/l Cr ~1.5-1.7), Urinary Retention  Creatinine 2.29 (1.94, 1.91, 2.08, 2.40, 2,53,  was 3.35 on admission)  Unclear baseline as no previous records  - failed voiding trial.  Bunch replaced 5/4  - cont bunch care     Heme:  Fe Def Anemia  - Ferritin 539/TIBC 94/iron 16   - holding Fe supp due to recent infection & atb  - DVT prophylaxis: on Heparin drip (for afib)     Endo:   Type 2 diabetes,  - A1C 8.5 (April 2024)  - Blood sugars: today 143-186  - Continue at bedtime Lantus 10 units & #2 SSI Lispro  - Q4H Accu checks     ID: Hx of hidradenitis, Covid + 4/18, Multifocal PNA. Aspiration PNA  - Tmax: 36.6 C, WBC 9.7==> 12.2  - Micro:  4/17 BC x2 Neg, 4/18, 4/22, 4/26, 4/27, 4/28 & 4/30 Sputum salivary contamination, 4/22 BC x2 Neg, 4/26 sputum 3+ Candida Albicans & 3+ Candida Glabrata, 4/26 Urine Cx Neg, 4/26 Strep pneumoniae Ag Neg, Legionella Ag Neg, 4/26 Fungitell Beta-D Glucan 231, 4/26 BC x2 Neg, 4/29 Tissue cx 1+ Rare Candida Albicans, 4/30 Urine Cx Pending, 4/30 BC x2 NGTD, 5/2 Syphilis Total Ab Reactive==> 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  - 4/17 & 4/29 MRSA PCR +  -  5/5 will hold off 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  - Antimicrobials: azithromycin 4/18-4/20, Remdesevir 4/18-4/22, Vanc 4/18- 5/1, Skylar 4/26- 5/2  - Doxycyline 100 mg for Hidradenitis ppx (minocycline cannot be given via PEG)  - holding on treatment of syphilis. LP consulted, asking for LP to be completed.  Hold Apixiban and plan for LP tomorrow     MSK/SKIN: Chronic Sacral scrotum & Buttocks wounds, Hidradenitis  - derm has previously recommended humira/minocycline 100 BID but currently w/o lab confirmation, hold off iso septic shock  - will need outpatient dermatology follow up w/ doxycycline 100 BID outpatient  (minocycline cannot be crushed)  - seen by wound care RN on 5/2: Patient has black/brown discoloration on B/L areas are nontender, without temperature change, or induration. These are not pressure injuries. There is black soft thickened tissue over left hip/buttocks skin changes r/t hydradenitis  - Recs: Cleanse wounds with CHG bath wipes, Aquacel Ag and Mepilex bordered foam to open, draining areas of sacrum, Interdry to under scrotum and in between creases of groin/ inner thighs, Turn every 2 hours.        :  Inguinal lymphadenopathy   - US upper ext duplex 5/1: Superficial venous thrombus in L cephalic vein   - US lower ext duplex 5/1: Lymph nodes Lt groin lymph node measures 5.62 mm x 41.53 mm, Rt groin lymph node # 1 measures 7.48 mm x 31.64 mm. Lymph node #2 3.57 mm x 26.93 mm.  - pt with (Chlamydia, neisseria gonorrhea, HSV, previous HIV 1/2 testing this admission neg)  - on 5/2 VDRL +     Lines: Midline (4/30), Dobhoff (4/20),  Barton (5/5)     Code Status: DNR  DPOA/Contact Number: DEEPAK RANGEL, Home Phone: [phone]      Dispo:  continue SDU care, plan for LP today.  Stable for  transfer to [facility]  Discharge Planning: From [facility]             RULA Taylor-CNP  Pulmonary/Critical Care/Internal Medicine  I spent 45 minutes in the professional and overall care of this patient.

## 2024-05-11 NOTE — PROCEDURES
Lumbar Puncture    Date/Time: 5/11/2024 3:25 PM    Performed by: Pranav Jackson MD  Authorized by: Pranav Jackson MD    Consent:     Consent obtained:  Written    Consent given by:  Spouse    Risks, benefits, and alternatives were discussed: yes      Risks discussed:  Bleeding, infection, pain, headache, repeat procedure and nerve damage    Alternatives discussed:  No treatment and delayed treatment  Universal protocol:     Procedure explained and questions answered to patient or proxy's satisfaction: yes      Relevant documents present and verified: yes      Test results available: yes      Imaging studies available: yes      Required blood products, implants, devices, and special equipment available: yes      Immediately prior to procedure a time out was called: yes      Site/side marked: yes      Patient identity confirmed:  Arm band  Pre-procedure details:     Procedure purpose:  Diagnostic    Preparation: Patient was prepped and draped in usual sterile fashion    Sedation:     Sedation type:  Anxiolysis  Anesthesia:     Anesthesia method:  Local infiltration    Local anesthetic:  Lidocaine 1% w/o epi  Procedure details:     Lumbar space:  L4-L5 interspace    Patient position:  L lateral decubitus    Needle gauge:  24    Needle type:  Spinal needle - Quincke tip    Following local infiltration with lidocain patient was unable to lay still actively moving and becoming more agitated and unable to follow directions. Patient was given a total of 2mg of versed for anxiolysis without significant improvement. Given inability to lay still and ongoing agitation the procedure was aborted. Given this patient will need moderate sedation to compete procedure safely.     Pranav Jackson MD

## 2024-05-12 LAB
ALBUMIN SERPL BCP-MCNC: 2.6 G/DL (ref 3.4–5)
ANION GAP SERPL CALC-SCNC: 14 MMOL/L (ref 10–20)
BUN SERPL-MCNC: 47 MG/DL (ref 6–23)
CALCIUM SERPL-MCNC: 9.5 MG/DL (ref 8.6–10.6)
CHLORIDE SERPL-SCNC: 101 MMOL/L (ref 98–107)
CO2 SERPL-SCNC: 27 MMOL/L (ref 21–32)
CREAT SERPL-MCNC: 2.4 MG/DL (ref 0.5–1.3)
EGFRCR SERPLBLD CKD-EPI 2021: 28 ML/MIN/1.73M*2
ERYTHROCYTE [DISTWIDTH] IN BLOOD BY AUTOMATED COUNT: 21.2 % (ref 11.5–14.5)
GLUCOSE BLD MANUAL STRIP-MCNC: 118 MG/DL (ref 74–99)
GLUCOSE BLD MANUAL STRIP-MCNC: 153 MG/DL (ref 74–99)
GLUCOSE BLD MANUAL STRIP-MCNC: 199 MG/DL (ref 74–99)
GLUCOSE BLD MANUAL STRIP-MCNC: 246 MG/DL (ref 74–99)
GLUCOSE BLD MANUAL STRIP-MCNC: 59 MG/DL (ref 74–99)
GLUCOSE SERPL-MCNC: 137 MG/DL (ref 74–99)
HCT VFR BLD AUTO: 30.3 % (ref 41–52)
HGB BLD-MCNC: 9.6 G/DL (ref 13.5–17.5)
MAGNESIUM SERPL-MCNC: 2.23 MG/DL (ref 1.6–2.4)
MCH RBC QN AUTO: 25.5 PG (ref 26–34)
MCHC RBC AUTO-ENTMCNC: 31.7 G/DL (ref 32–36)
MCV RBC AUTO: 80 FL (ref 80–100)
NRBC BLD-RTO: 0 /100 WBCS (ref 0–0)
PHOSPHATE SERPL-MCNC: 5.3 MG/DL (ref 2.5–4.9)
PLATELET # BLD AUTO: 678 X10*3/UL (ref 150–450)
POTASSIUM SERPL-SCNC: 4.4 MMOL/L (ref 3.5–5.3)
RBC # BLD AUTO: 3.77 X10*6/UL (ref 4.5–5.9)
SODIUM SERPL-SCNC: 138 MMOL/L (ref 136–145)
WBC # BLD AUTO: 10.8 X10*3/UL (ref 4.4–11.3)

## 2024-05-12 PROCEDURE — 82947 ASSAY GLUCOSE BLOOD QUANT: CPT | Mod: 91

## 2024-05-12 PROCEDURE — 82947 ASSAY GLUCOSE BLOOD QUANT: CPT

## 2024-05-12 PROCEDURE — 2500000002 HC RX 250 W HCPCS SELF ADMINISTERED DRUGS (ALT 637 FOR MEDICARE OP, ALT 636 FOR OP/ED): Performed by: NURSE PRACTITIONER

## 2024-05-12 PROCEDURE — 1210000001 HC SEMI-PRIVATE ROOM DAILY

## 2024-05-12 PROCEDURE — 83735 ASSAY OF MAGNESIUM: CPT | Performed by: NURSE PRACTITIONER

## 2024-05-12 PROCEDURE — 2500000004 HC RX 250 GENERAL PHARMACY W/ HCPCS (ALT 636 FOR OP/ED): Performed by: NURSE PRACTITIONER

## 2024-05-12 PROCEDURE — 85027 COMPLETE CBC AUTOMATED: CPT | Performed by: NURSE PRACTITIONER

## 2024-05-12 PROCEDURE — 36415 COLL VENOUS BLD VENIPUNCTURE: CPT | Performed by: NURSE PRACTITIONER

## 2024-05-12 PROCEDURE — 80069 RENAL FUNCTION PANEL: CPT | Performed by: NURSE PRACTITIONER

## 2024-05-12 PROCEDURE — 2500000002 HC RX 250 W HCPCS SELF ADMINISTERED DRUGS (ALT 637 FOR MEDICARE OP, ALT 636 FOR OP/ED)

## 2024-05-12 PROCEDURE — S4991 NICOTINE PATCH NONLEGEND: HCPCS | Performed by: NURSE PRACTITIONER

## 2024-05-12 PROCEDURE — 99232 SBSQ HOSP IP/OBS MODERATE 35: CPT

## 2024-05-12 PROCEDURE — 99233 SBSQ HOSP IP/OBS HIGH 50: CPT | Performed by: INTERNAL MEDICINE

## 2024-05-12 PROCEDURE — 2500000001 HC RX 250 WO HCPCS SELF ADMINISTERED DRUGS (ALT 637 FOR MEDICARE OP): Performed by: NURSE PRACTITIONER

## 2024-05-12 RX ADMIN — INSULIN LISPRO 4 UNITS: 100 INJECTION, SOLUTION INTRAVENOUS; SUBCUTANEOUS at 17:58

## 2024-05-12 RX ADMIN — Medication 15 ML: at 10:18

## 2024-05-12 RX ADMIN — NICOTINE 7 MG/24 HR DAILY TRANSDERMAL PATCH 1 PATCH: at 10:18

## 2024-05-12 RX ADMIN — DOXYCYCLINE HYCLATE 100 MG: 100 TABLET, COATED ORAL at 10:18

## 2024-05-12 RX ADMIN — INSULIN GLARGINE 10 UNITS: 100 INJECTION, SOLUTION SUBCUTANEOUS at 21:22

## 2024-05-12 RX ADMIN — ESOMEPRAZOLE MAGNESIUM 40 MG: 40 FOR SUSPENSION ORAL at 06:06

## 2024-05-12 RX ADMIN — SODIUM BICARBONATE 650 MG: 650 TABLET ORAL at 10:18

## 2024-05-12 RX ADMIN — THIAMINE HCL TAB 100 MG 100 MG: 100 TAB at 10:18

## 2024-05-12 RX ADMIN — DOXYCYCLINE HYCLATE 100 MG: 100 TABLET, COATED ORAL at 21:22

## 2024-05-12 ASSESSMENT — COGNITIVE AND FUNCTIONAL STATUS - GENERAL
DRESSING REGULAR UPPER BODY CLOTHING: A LOT
HELP NEEDED FOR BATHING: A LOT
TOILETING: TOTAL
PERSONAL GROOMING: A LOT
WALKING IN HOSPITAL ROOM: TOTAL
PERSONAL GROOMING: A LOT
TURNING FROM BACK TO SIDE WHILE IN FLAT BAD: A LOT
MOVING FROM LYING ON BACK TO SITTING ON SIDE OF FLAT BED WITH BEDRAILS: A LITTLE
EATING MEALS: TOTAL
DAILY ACTIVITIY SCORE: 11
MOBILITY SCORE: 9
WALKING IN HOSPITAL ROOM: TOTAL
TOILETING: TOTAL
CLIMB 3 TO 5 STEPS WITH RAILING: TOTAL
TURNING FROM BACK TO SIDE WHILE IN FLAT BAD: A LOT
CLIMB 3 TO 5 STEPS WITH RAILING: A LOT
MOVING TO AND FROM BED TO CHAIR: TOTAL
DRESSING REGULAR UPPER BODY CLOTHING: A LOT
MOVING TO AND FROM BED TO CHAIR: TOTAL
MOBILITY SCORE: 12
MOVING FROM LYING ON BACK TO SITTING ON SIDE OF FLAT BED WITH BEDRAILS: A LITTLE
STANDING UP FROM CHAIR USING ARMS: TOTAL
TURNING FROM BACK TO SIDE WHILE IN FLAT BAD: A LOT
MOVING FROM LYING ON BACK TO SITTING ON SIDE OF FLAT BED WITH BEDRAILS: A LOT
DRESSING REGULAR LOWER BODY CLOTHING: A LOT
WALKING IN HOSPITAL ROOM: A LOT
MOBILITY SCORE: 9
HELP NEEDED FOR BATHING: A LOT
DAILY ACTIVITIY SCORE: 10
EATING MEALS: A LOT
DRESSING REGULAR LOWER BODY CLOTHING: A LOT
MOVING TO AND FROM BED TO CHAIR: A LOT
CLIMB 3 TO 5 STEPS WITH RAILING: TOTAL
EATING MEALS: TOTAL
HELP NEEDED FOR BATHING: A LOT
DRESSING REGULAR UPPER BODY CLOTHING: A LOT
STANDING UP FROM CHAIR USING ARMS: TOTAL
PERSONAL GROOMING: A LOT
STANDING UP FROM CHAIR USING ARMS: A LOT
TOILETING: TOTAL
DRESSING REGULAR LOWER BODY CLOTHING: A LOT
DAILY ACTIVITIY SCORE: 10

## 2024-05-12 ASSESSMENT — PAIN - FUNCTIONAL ASSESSMENT
PAIN_FUNCTIONAL_ASSESSMENT: 0-10
PAIN_FUNCTIONAL_ASSESSMENT: PAINAD (PAIN ASSESSMENT IN ADVANCED DEMENTIA SCALE)

## 2024-05-12 ASSESSMENT — PAIN SCALES - GENERAL
PAINLEVEL_OUTOF10: 0 - NO PAIN

## 2024-05-12 NOTE — PROGRESS NOTES
"Brian Rodriguez is a 69 y.o. male on day 25 of admission presenting with Pneumonia due to infectious organism, unspecified laterality, unspecified part of lung.    Subjective   No acute events overnight. Patient seen and examined at bedside. Denies any shortness of breath, chest pain, nausea/vomiting.        Objective     Physical Exam  Constitutional:       Appearance: He is cachectic.   HENT:      Head: Normocephalic and atraumatic.      Mouth/Throat:      Mouth: Mucous membranes are moist.      Comments: Poor dentition  Eyes:      Conjunctiva/sclera: Conjunctivae normal.      Pupils: Pupils are equal, round, and reactive to light.   Cardiovascular:      Rate and Rhythm: Normal rate and regular rhythm.      Heart sounds: Normal heart sounds.   Pulmonary:      Effort: Pulmonary effort is normal. No respiratory distress.      Breath sounds: Normal breath sounds.   Abdominal:      General: Abdomen is flat. Bowel sounds are normal.      Palpations: Abdomen is soft.      Comments: Cortrak   Musculoskeletal:         General: Normal range of motion.      Cervical back: Normal range of motion and neck supple.   Neurological:      Mental Status: He is alert.      Comments: alert and oriented x 2 speech clear, follows commands appropriately   Psychiatric:         Behavior: Behavior is cooperative.       Last Recorded Vitals  Blood pressure 105/64, pulse 84, temperature 36 °C (96.8 °F), resp. rate 16, height 1.778 m (5' 10\"), weight 59.3 kg (130 lb 11.7 oz), SpO2 98%.  Intake/Output last 3 Shifts:  I/O last 3 completed shifts:  In: 1480 (18.6 mL/kg) [NG/GT:1480]  Out: 1700 (21.4 mL/kg) [Urine:1700 (0.6 mL/kg/hr)]  Dosing Weight: 79.4 kg     Relevant Results  Scheduled medications  [Held by provider] apixaban, 5 mg, oral, q12h  doxycylcine, 100 mg, g-tube, q12h RANDAL  esomeprazole, 40 mg, g-tube, Daily before breakfast  insulin glargine, 10 Units, subcutaneous, Nightly  insulin lispro, 0-10 Units, subcutaneous, " q6h  multivitamin with iron-minerals, 15 mL, oral, Daily  nicotine, 1 patch, transdermal, Daily  sodium bicarbonate, 650 mg, g-tube, BID  sodium chloride, 1 spray, Each Nostril, 4x daily  thiamine, 100 mg, g-tube, Daily      Continuous medications     PRN medications  PRN medications: acetaminophen **OR** [DISCONTINUED] acetaminophen **OR** [DISCONTINUED] acetaminophen, albuterol, dextrose, dextrose, glucagon, glucagon, guaiFENesin, ipratropium-albuteroL, melatonin, polyethylene glycol, traMADol  Results for orders placed or performed during the hospital encounter of 04/17/24 (from the past 24 hour(s))   POCT GLUCOSE   Result Value Ref Range    POCT Glucose 194 (H) 74 - 99 mg/dL   POCT GLUCOSE   Result Value Ref Range    POCT Glucose 59 (L) 74 - 99 mg/dL   POCT GLUCOSE   Result Value Ref Range    POCT Glucose 118 (H) 74 - 99 mg/dL   POCT GLUCOSE   Result Value Ref Range    POCT Glucose 153 (H) 74 - 99 mg/dL   Magnesium   Result Value Ref Range    Magnesium 2.23 1.60 - 2.40 mg/dL   Renal Function Panel   Result Value Ref Range    Glucose 137 (H) 74 - 99 mg/dL    Sodium 138 136 - 145 mmol/L    Potassium 4.4 3.5 - 5.3 mmol/L    Chloride 101 98 - 107 mmol/L    Bicarbonate 27 21 - 32 mmol/L    Anion Gap 14 10 - 20 mmol/L    Urea Nitrogen 47 (H) 6 - 23 mg/dL    Creatinine 2.40 (H) 0.50 - 1.30 mg/dL    eGFR 28 (L) >60 mL/min/1.73m*2    Calcium 9.5 8.6 - 10.6 mg/dL    Phosphorus 5.3 (H) 2.5 - 4.9 mg/dL    Albumin 2.6 (L) 3.4 - 5.0 g/dL   CBC   Result Value Ref Range    WBC 10.8 4.4 - 11.3 x10*3/uL    nRBC 0.0 0.0 - 0.0 /100 WBCs    RBC 3.77 (L) 4.50 - 5.90 x10*6/uL    Hemoglobin 9.6 (L) 13.5 - 17.5 g/dL    Hematocrit 30.3 (L) 41.0 - 52.0 %    MCV 80 80 - 100 fL    MCH 25.5 (L) 26.0 - 34.0 pg    MCHC 31.7 (L) 32.0 - 36.0 g/dL    RDW 21.2 (H) 11.5 - 14.5 %    Platelets 678 (H) 150 - 450 x10*3/uL   POCT GLUCOSE   Result Value Ref Range    POCT Glucose 199 (H) 74 - 99 mg/dL       This patient has a urinary catheter   Reason for  the urinary catheter remaining today? incontinent patients with an open sacral wound or perineal wounds         Assessment/Plan   Principal Problem:    Pneumonia due to infectious organism, unspecified laterality, unspecified part of lung  Active Problems:    Dysphagia    Brian Rodriguez is a 69 y.o. male with a history of type 2 diabetes, hypertension, CKD, hyperlipidemia, hidradenitis, bipolar who presented to the ED on 4/17/2024 for altered mental status and concern of infection.  Admitted to [facility] with multifocal PNA, HyperNa, TERRI on CKD, then transferred to MICU for AMS & acute hypoxic resp failure & c/f sepsis.   Transferred back to [facility] then returned to MICU 4/26 for respiratory distress and acute hypoxia, now back in floor for monitoring.      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   - needs LP with IR & anesthesia to r/o neurosyphilis (attempted in SDU today with 2 mg Versed, unsuccessful as pt unable to cooperated)  - s/p high dose thiamine IV given 4/29-5/2;  continue daily Thiamine supp  - cont total care for ADLs     #Acute Hypoxic Respiratory failure  #Multifocal PNA  #Aspiration PNA, worsening  #Recent Covid + (4/18)   :: stable on RA  :: MBS completed earlier this admit with signs of aspiration.    - SLP recs strict NPO with aggressive oral care  - continue bronchial hygiene     #Chronic HTN  #Hyperlipidemia   - home Amlodpine, Atorvastatin on hold  - CHADsVasc 3  - Eliquis started for a fib    #New onset Afib   - CHADsVasc 3  - Eliquis started for a fib     #Dysphagia  :: s/p PEG (5/7)  -  5/6 repeat SLP eval with ongoing dysphagia and aspiration concern.  Continue NPO with enteral feeds via PEG   - Diet: Glucerna 1.5 @ 60ml/hr, Q6H (serum sodium 136) Nutrasource Fiber TID (can go  up to 2 packets TID if showing benefits).   - needs long term nutrition plan, PEG placed.  - 5/5 added Reglan 5mg TID (Qtc 448 on 5/4 ECG) - strict NPO, SLP following  - cont abd binder to protect tube    #TERRI on CKD Stage III  #Urinary Retention  :: Unclear baseline as no previous records  - failed voiding trial.  Bunch replaced 5/4  - cont bunch care  - cont to monitor w RFP  - fluids as needed    #Iron Deficiency Anemia  :: Ferritin 539/TIBC 94/iron 16   - holding Fe supp due to recent infection & atb    # Type 2 diabetes,   :: A1C 8.5 (April 2024)  - Continue at bedtime Lantus 10 units & #2 SSI Lispro  - Q4H Accu checks    #Chronic Sacral scrotum & Buttocks wounds   #hidradenitis suppurativa  :: derm has previously recommended humira/minocycline 100 BID but currently w/o lab confirmation, hold off iso septic shock  :: seen by wound care RN on 5/2: Patient has black/brown discoloration on B/L areas are nontender, without temperature change, or induration. These are not pressure injuries. There is black soft thickened tissue over left hip/buttocks skin changes r/t hydradenitis  - Recs: Cleanse wounds with CHG bath wipes, Aquacel Ag and Mepilex bordered foam to open, draining areas of sacrum, Interdry to under scrotum and in between creases of groin/ inner thighs, Turn every 2 hours.  - will need outpatient dermatology follow up w/ doxycycline 100 BID outpatient  (minocycline cannot be crushed)    #Inguinal lymphadenopathy   - US upper ext duplex 5/1: Superficial venous thrombus in L cephalic vein   - US lower ext duplex 5/1: Lymph nodes Lt groin lymph node measures 5.62 mm x 41.53 mm, Rt groin lymph node # 1 measures 7.48 mm x 31.64 mm. Lymph node #2 3.57 mm x 26.93 mm.  - pt with (Chlamydia, neisseria gonorrhea, HSV, previous HIV 1/2 testing this admission neg)  - on 5/2 VDRL +     #Nutrition  - NPO while concern for aspiration  - Diet consulted appreciate recs  - Tube feeds held  - Head of bed elevated, frequent  suctioning, oral hygiene.     #Depression  - Mirtazapine 7.5 mg nightly, holding d/t persistent AMS       F D5W @ 125ml/hr  E Replete as needed  N Trickle feeds tube NPO  G PEG 17g  DVT: SubQ heparin  Abx: Zosyn  Drips: None  Pressors: None  Code status: Full Code  NoK: DEEPAK RANGEL, Home Phone: [phone]    This patient was seen and staffed with Dr. Cabzeas.    Evelyn De La Cruz DO  PGY 1  Family Medicine

## 2024-05-12 NOTE — PROGRESS NOTES
Subjective   Interval History:        Patient seen briefly during mid afternoon rounds.       Patient unable to cooperate for LP.  Team planning LP with sedation.         Again approach patient today who was not cooperative and somewhat angry and belligerent.  I was able to listen to his lungs only before patient became distressed    Objective   Range of Vitals (last 24 hours)  Heart Rate:  [83-95]   Temp:  [36 °C (96.8 °F)-36.5 °C (97.7 °F)]   Resp:  [16-19]   BP: (102-120)/(56-70)   SpO2:  [94 %-99 %]   Daily Weight  05/11/24 : 59.3 kg (130 lb 11.7 oz)    Body mass index is 18.76 kg/m².    Physical Exam  Angry  No acute distress  Anterior chest clear      Results from last 72 hours   Lab Units 05/12/24  0906 05/11/24  0539 05/10/24  0501   WBC AUTO x10*3/uL 10.8 10.0 12.2*   HEMOGLOBIN g/dL 9.6* 8.6* 7.5*   HEMATOCRIT % 30.3* 28.5* 21.9*   PLATELETS AUTO x10*3/uL 678* 430 600*     Results from last 72 hours   Lab Units 05/12/24  0906 05/11/24  0539 05/10/24  0501   SODIUM mmol/L 138 137 136   POTASSIUM mmol/L 4.4 4.7 4.8   CHLORIDE mmol/L 101 100 100   CO2 mmol/L 27 26 27   BUN mg/dL 47* 39* 36*   CREATININE mg/dL 2.40* 2.21* 2.18*   GLUCOSE mg/dL 137* 147* 192*   CALCIUM mg/dL 9.5 9.4 9.0   ANION GAP mmol/L 14 16 14   EGFR mL/min/1.73m*2 28* 31* 32*   PHOSPHORUS mg/dL 5.3* 5.5* 4.9     Results from last 72 hours   Lab Units 05/12/24  0906 05/11/24  0539 05/10/24  0501   ALBUMIN g/dL 2.6* 2.4* 2.4*     Estimated Creatinine Clearance: 23.7 mL/min (A) (by C-G formula based on SCr of 2.4 mg/dL (H)).  C-Reactive Protein   Date Value Ref Range Status   04/24/2024 20.03 (H) <1.00 mg/dL Final     Microbiology  Susceptibility data from last 14 days.  Collected Specimen Info Organism   04/29/24 Tissue/Biopsy from Skin Lesion Candida albicans     Mixed Skin Microorganisms        Assessment/Plan     4/17 blood culture negative  4/17 MRSA screen positive  4/18 pneumo legionella  urine Antigen negative  4/18, 4/22, 4/26   sputum culture  contaminated  4/26 sputum culture  Candida albicans .  Candida glabrata  4/26 urine culture no growth   4/29 MRSA screen positive   5/6 c diff negative      Antibiotic history   Piperacillin-tazobactam  4/17~ 4/26  Vancomycin 4/17~ 4/30  Remdesivir   4/18~ 4/22  Meropenem  4/26~5/1  Doxycycline 5/2~   Azithromycin  4/20          69-year-old nursing home resident with no known past medical history of dementia presented with altered mental status (forgetfulness and disorientation) in the setting of COVID-19 infection and pneumonia which has been treated appropriately       But has persistent delirium/dementia/dysphagia requiring tube feeding and recurrent hypoxia with a possible aspiration.  His TP PA was positive with nonreactive RPR which is consistent with remote exposure to syphilis.   He lived in [facility] throughout his life.   We called the department of public health of [facility] and there is no history of him being diagnosed or treated for syphilis in the past in their database.   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     CSF VDRL is only 50% positive in neurosyphilis but we typically see pleocytosis.  And it would be worthwhile to try CSF study.  It is unsure how long he has had this altered memories but if he has neurosyphilis,  treating it can prevent further progression of dementia although it won't reverse his condition or explains his myopathy.      Premature dementia with a worsened   mental status in the setting of  acute   pneumonia / myopathy/ polyneuropathy    Positive treponemal antibody with negative   RPR    3.  COVID-19 pneumonia/ multifocal pneumonia / acute hypoxic respiratory failure - resolved   Decubitus ulcer      05/12/24 Update:       Patient unable to tolerate LP procedure 5/12/2024.  Still  need critical CSF analysis to rule out neurosyphilis.       Will need additional sedation for LP.  Will need CSF examined for cell count, cell count and differential, glucose, total protein, and CSF VDRL.         ID team will sign off but please call ID team back when CSF obtained and results returned.           If unable to obtain CSF then may be forced to do 2-week course of IV penicillin empirically without definitive diagnosis.    Recommendations:  1.  Awaiting repeat LP and CSF examination.  Probably need under sedation with IR    2.  Need CSF for cell count with differential,   glucose, total protein, and VDRL, encephalitis panel, meningitis panel, HSV PCR    3.  Please call ID consult team back when CSF studies available    4.  Defer to neurology and primary team regarding myopathy/neuropathy    5.  If unable to obtain LP then would just treat empirically with high-dose penicillin x 2 weeks while evaluating other causes of patient's dementia and neuropathy.    ID team A  will sign off  ID Team A pager [phone]     Kayden Adames MD   [employer]

## 2024-05-12 NOTE — CARE PLAN
The patient's goals for the shift include Pt will be free from falls  use call light  bed alarm remians on    The clinical goals for the shift include Pt safety will be maintained

## 2024-05-13 ENCOUNTER — APPOINTMENT (OUTPATIENT)
Dept: RADIOLOGY | Facility: HOSPITAL | Age: 69
DRG: 871 | End: 2024-05-13
Payer: MEDICARE

## 2024-05-13 LAB
ALBUMIN SERPL BCP-MCNC: 2.9 G/DL (ref 3.4–5)
ANION GAP SERPL CALC-SCNC: 19 MMOL/L (ref 10–20)
APPEARANCE UR: CLEAR
BASOPHILS # BLD MANUAL: 0 X10*3/UL (ref 0–0.1)
BASOPHILS NFR BLD MANUAL: 0 %
BILIRUB UR STRIP.AUTO-MCNC: NEGATIVE MG/DL
BUN SERPL-MCNC: 52 MG/DL (ref 6–23)
CALCIUM SERPL-MCNC: 10.4 MG/DL (ref 8.6–10.6)
CHLORIDE SERPL-SCNC: 101 MMOL/L (ref 98–107)
CO2 SERPL-SCNC: 23 MMOL/L (ref 21–32)
COLOR UR: YELLOW
CREAT SERPL-MCNC: 2.55 MG/DL (ref 0.5–1.3)
EGFRCR SERPLBLD CKD-EPI 2021: 26 ML/MIN/1.73M*2
EOSINOPHIL # BLD MANUAL: 2.52 X10*3/UL (ref 0–0.4)
EOSINOPHIL NFR BLD MANUAL: 12.4 %
ERYTHROCYTE [DISTWIDTH] IN BLOOD BY AUTOMATED COUNT: 21.4 % (ref 11.5–14.5)
ERYTHROCYTE [DISTWIDTH] IN BLOOD BY AUTOMATED COUNT: 21.4 % (ref 11.5–14.5)
GLUCOSE BLD MANUAL STRIP-MCNC: 251 MG/DL (ref 74–99)
GLUCOSE BLD MANUAL STRIP-MCNC: 324 MG/DL (ref 74–99)
GLUCOSE BLD MANUAL STRIP-MCNC: 71 MG/DL (ref 74–99)
GLUCOSE SERPL-MCNC: 132 MG/DL (ref 74–99)
GLUCOSE UR STRIP.AUTO-MCNC: NORMAL MG/DL
HCT VFR BLD AUTO: 33.4 % (ref 41–52)
HCT VFR BLD AUTO: 33.4 % (ref 41–52)
HGB BLD-MCNC: 10.8 G/DL (ref 13.5–17.5)
HGB BLD-MCNC: 10.8 G/DL (ref 13.5–17.5)
IMM GRANULOCYTES # BLD AUTO: 0.11 X10*3/UL (ref 0–0.5)
IMM GRANULOCYTES NFR BLD AUTO: 0.5 % (ref 0–0.9)
INR PPP: 1.1 (ref 0.9–1.1)
KETONES UR STRIP.AUTO-MCNC: NEGATIVE MG/DL
LEUKOCYTE ESTERASE UR QL STRIP.AUTO: ABNORMAL
LYMPHOCYTES # BLD MANUAL: 5.99 X10*3/UL (ref 0.8–3)
LYMPHOCYTES NFR BLD MANUAL: 29.5 %
MAGNESIUM SERPL-MCNC: 2.55 MG/DL (ref 1.6–2.4)
MCH RBC QN AUTO: 25.8 PG (ref 26–34)
MCH RBC QN AUTO: 25.8 PG (ref 26–34)
MCHC RBC AUTO-ENTMCNC: 32.3 G/DL (ref 32–36)
MCHC RBC AUTO-ENTMCNC: 32.3 G/DL (ref 32–36)
MCV RBC AUTO: 80 FL (ref 80–100)
MCV RBC AUTO: 80 FL (ref 80–100)
MONOCYTES # BLD MANUAL: 1.75 X10*3/UL (ref 0.05–0.8)
MONOCYTES NFR BLD MANUAL: 8.6 %
NEUTS SEG # BLD MANUAL: 9.28 X10*3/UL (ref 1.6–5)
NEUTS SEG NFR BLD MANUAL: 45.7 %
NITRITE UR QL STRIP.AUTO: NEGATIVE
NRBC BLD-RTO: 0 /100 WBCS (ref 0–0)
NRBC BLD-RTO: 0 /100 WBCS (ref 0–0)
PH UR STRIP.AUTO: 8 [PH]
PHOSPHATE SERPL-MCNC: 4.7 MG/DL (ref 2.5–4.9)
PLATELET # BLD AUTO: 626 X10*3/UL (ref 150–450)
PLATELET # BLD AUTO: 626 X10*3/UL (ref 150–450)
POTASSIUM SERPL-SCNC: 4.9 MMOL/L (ref 3.5–5.3)
PROT UR STRIP.AUTO-MCNC: ABNORMAL MG/DL
PROTHROMBIN TIME: 12.5 SECONDS (ref 9.8–12.8)
RBC # BLD AUTO: 4.18 X10*6/UL (ref 4.5–5.9)
RBC # BLD AUTO: 4.18 X10*6/UL (ref 4.5–5.9)
RBC # UR STRIP.AUTO: NEGATIVE /UL
RBC #/AREA URNS AUTO: ABNORMAL /HPF
RBC MORPH BLD: ABNORMAL
SODIUM SERPL-SCNC: 138 MMOL/L (ref 136–145)
SP GR UR STRIP.AUTO: 1.02
TOTAL CELLS COUNTED BLD: 105
UROBILINOGEN UR STRIP.AUTO-MCNC: NORMAL MG/DL
VARIANT LYMPHS # BLD MANUAL: 0.77 X10*3/UL (ref 0–0.3)
VARIANT LYMPHS NFR BLD: 3.8 %
WBC # BLD AUTO: 20.3 X10*3/UL (ref 4.4–11.3)
WBC # BLD AUTO: 20.3 X10*3/UL (ref 4.4–11.3)
WBC #/AREA URNS AUTO: >50 /HPF

## 2024-05-13 PROCEDURE — 36573 INSJ PICC RS&I 5 YR+: CPT

## 2024-05-13 PROCEDURE — C1751 CATH, INF, PER/CENT/MIDLINE: HCPCS

## 2024-05-13 PROCEDURE — 85027 COMPLETE CBC AUTOMATED: CPT | Performed by: NURSE PRACTITIONER

## 2024-05-13 PROCEDURE — 82947 ASSAY GLUCOSE BLOOD QUANT: CPT

## 2024-05-13 PROCEDURE — 81001 URINALYSIS AUTO W/SCOPE: CPT

## 2024-05-13 PROCEDURE — 1210000001 HC SEMI-PRIVATE ROOM DAILY

## 2024-05-13 PROCEDURE — 2500000001 HC RX 250 WO HCPCS SELF ADMINISTERED DRUGS (ALT 637 FOR MEDICARE OP): Performed by: NURSE PRACTITIONER

## 2024-05-13 PROCEDURE — 71046 X-RAY EXAM CHEST 2 VIEWS: CPT | Performed by: RADIOLOGY

## 2024-05-13 PROCEDURE — 99232 SBSQ HOSP IP/OBS MODERATE 35: CPT

## 2024-05-13 PROCEDURE — 2500000001 HC RX 250 WO HCPCS SELF ADMINISTERED DRUGS (ALT 637 FOR MEDICARE OP)

## 2024-05-13 PROCEDURE — 2500000002 HC RX 250 W HCPCS SELF ADMINISTERED DRUGS (ALT 637 FOR MEDICARE OP, ALT 636 FOR OP/ED)

## 2024-05-13 PROCEDURE — 2500000004 HC RX 250 GENERAL PHARMACY W/ HCPCS (ALT 636 FOR OP/ED): Performed by: NURSE PRACTITIONER

## 2024-05-13 PROCEDURE — 85007 BL SMEAR W/DIFF WBC COUNT: CPT | Performed by: INTERNAL MEDICINE

## 2024-05-13 PROCEDURE — 36569 INSJ PICC 5 YR+ W/O IMAGING: CPT

## 2024-05-13 PROCEDURE — 87086 URINE CULTURE/COLONY COUNT: CPT

## 2024-05-13 PROCEDURE — 2780000003 HC OR 278 NO HCPCS

## 2024-05-13 PROCEDURE — 80069 RENAL FUNCTION PANEL: CPT | Performed by: NURSE PRACTITIONER

## 2024-05-13 PROCEDURE — 71046 X-RAY EXAM CHEST 2 VIEWS: CPT

## 2024-05-13 PROCEDURE — 2500000002 HC RX 250 W HCPCS SELF ADMINISTERED DRUGS (ALT 637 FOR MEDICARE OP, ALT 636 FOR OP/ED): Performed by: NURSE PRACTITIONER

## 2024-05-13 PROCEDURE — 82947 ASSAY GLUCOSE BLOOD QUANT: CPT | Mod: 91

## 2024-05-13 PROCEDURE — 36415 COLL VENOUS BLD VENIPUNCTURE: CPT | Performed by: NURSE PRACTITIONER

## 2024-05-13 PROCEDURE — 85610 PROTHROMBIN TIME: CPT

## 2024-05-13 PROCEDURE — S4991 NICOTINE PATCH NONLEGEND: HCPCS | Performed by: NURSE PRACTITIONER

## 2024-05-13 PROCEDURE — 85027 COMPLETE CBC AUTOMATED: CPT | Performed by: INTERNAL MEDICINE

## 2024-05-13 PROCEDURE — 87081 CULTURE SCREEN ONLY: CPT

## 2024-05-13 PROCEDURE — 83735 ASSAY OF MAGNESIUM: CPT | Performed by: NURSE PRACTITIONER

## 2024-05-13 RX ORDER — LIDOCAINE HYDROCHLORIDE 10 MG/ML
5 INJECTION INFILTRATION; PERINEURAL ONCE
Status: DISCONTINUED | OUTPATIENT
Start: 2024-05-13 | End: 2024-05-15 | Stop reason: HOSPADM

## 2024-05-13 RX ORDER — OLANZAPINE 10 MG/2ML
5 INJECTION, POWDER, FOR SOLUTION INTRAMUSCULAR EVERY 6 HOURS PRN
Status: DISCONTINUED | OUTPATIENT
Start: 2024-05-13 | End: 2024-05-15 | Stop reason: HOSPADM

## 2024-05-13 RX ADMIN — SODIUM BICARBONATE 650 MG: 650 TABLET ORAL at 22:16

## 2024-05-13 RX ADMIN — SALINE NASAL SPRAY 1 SPRAY: 1.5 SOLUTION NASAL at 22:16

## 2024-05-13 RX ADMIN — INSULIN LISPRO 6 UNITS: 100 INJECTION, SOLUTION INTRAVENOUS; SUBCUTANEOUS at 06:40

## 2024-05-13 RX ADMIN — THIAMINE HCL TAB 100 MG 100 MG: 100 TAB at 08:58

## 2024-05-13 RX ADMIN — DOXYCYCLINE HYCLATE 100 MG: 100 TABLET, COATED ORAL at 22:16

## 2024-05-13 RX ADMIN — DOXYCYCLINE HYCLATE 100 MG: 100 TABLET, COATED ORAL at 08:58

## 2024-05-13 RX ADMIN — SODIUM BICARBONATE 650 MG: 650 TABLET ORAL at 08:58

## 2024-05-13 RX ADMIN — INSULIN GLARGINE 10 UNITS: 100 INJECTION, SOLUTION SUBCUTANEOUS at 22:17

## 2024-05-13 RX ADMIN — INSULIN LISPRO 8 UNITS: 100 INJECTION, SOLUTION INTRAVENOUS; SUBCUTANEOUS at 13:04

## 2024-05-13 RX ADMIN — Medication 15 ML: at 08:58

## 2024-05-13 RX ADMIN — ESOMEPRAZOLE MAGNESIUM 40 MG: 40 FOR SUSPENSION ORAL at 06:20

## 2024-05-13 RX ADMIN — NICOTINE 7 MG/24 HR DAILY TRANSDERMAL PATCH 1 PATCH: at 08:58

## 2024-05-13 ASSESSMENT — PAIN SCALES - GENERAL: PAINLEVEL_OUTOF10: 0 - NO PAIN

## 2024-05-13 NOTE — CARE PLAN
The patient's goals for the shift include Pt will be free from falls  use call light  bed alarm remians on    The clinical goals for the shift include Patient will be free from falls      Problem: Skin  Goal: Decreased wound size/increased tissue granulation at next dressing change  Outcome: Progressing  Flowsheets (Taken 5/13/2024 1501)  Decreased wound size/increased tissue granulation at next dressing change: Promote sleep for wound healing  Goal: Participates in plan/prevention/treatment measures  Outcome: Progressing  Flowsheets (Taken 5/13/2024 1501)  Participates in plan/prevention/treatment measures: Elevate heels  Goal: Promote skin healing  Outcome: Progressing  Flowsheets (Taken 5/13/2024 1501)  Promote skin healing: Turn/reposition every 2 hours/use positioning/transfer devices

## 2024-05-13 NOTE — CARE PLAN
The patient's goals for the shift include Pt will be free from falls  use call light  bed alarm remians on    The clinical goals for the shift include Maintain PEG Tube and Tube Feed per orders throughout shift.

## 2024-05-13 NOTE — PROGRESS NOTES
"Brian Rodriguez is a 69 y.o. male on day 26 of admission presenting with Pneumonia due to infectious organism, unspecified laterality, unspecified part of lung.    Subjective   No acute events overnight. Patient seen and examined at bedside. Patient easily agitated and not cooperative.        Objective     Physical Exam  Constitutional:       Appearance: He is cachectic.   HENT:      Head: Normocephalic and atraumatic.      Mouth/Throat:      Mouth: Mucous membranes are moist.      Comments: Poor dentition  Eyes:      Conjunctiva/sclera: Conjunctivae normal.      Pupils: Pupils are equal, round, and reactive to light.   Cardiovascular:      Rate and Rhythm: Normal rate and regular rhythm.      Heart sounds: Normal heart sounds.   Pulmonary:      Effort: Pulmonary effort is normal. No respiratory distress.      Breath sounds: Normal breath sounds.   Abdominal:      General: Abdomen is flat. Bowel sounds are normal.      Palpations: Abdomen is soft.      Comments: Cortrak   Musculoskeletal:         General: Normal range of motion.      Cervical back: Normal range of motion and neck supple.   Neurological:      Mental Status: He is alert.      Comments: A&Ox1, able to follow commands   Psychiatric:         Behavior: Behavior is cooperative.       Last Recorded Vitals  Blood pressure 120/69, pulse 93, temperature 36.3 °C (97.3 °F), resp. rate 16, height 1.778 m (5' 10\"), weight 59.3 kg (130 lb 11.7 oz), SpO2 94%.  Intake/Output last 3 Shifts:  I/O last 3 completed shifts:  In: 780 (9.8 mL/kg) [NG/GT:780]  Out: - (0 mL/kg)   Dosing Weight: 79.4 kg     Relevant Results  Scheduled medications  [Held by provider] apixaban, 5 mg, oral, q12h  doxycylcine, 100 mg, g-tube, q12h RANDAL  esomeprazole, 40 mg, g-tube, Daily before breakfast  insulin glargine, 10 Units, subcutaneous, Nightly  insulin lispro, 0-10 Units, subcutaneous, q6h  multivitamin with iron-minerals, 15 mL, oral, Daily  nicotine, 1 patch, transdermal, " Daily  sodium bicarbonate, 650 mg, g-tube, BID  sodium chloride, 1 spray, Each Nostril, 4x daily  thiamine, 100 mg, g-tube, Daily      Continuous medications     PRN medications  PRN medications: acetaminophen **OR** [DISCONTINUED] acetaminophen **OR** [DISCONTINUED] acetaminophen, albuterol, dextrose, dextrose, glucagon, glucagon, guaiFENesin, ipratropium-albuteroL, melatonin, polyethylene glycol, traMADol  Results for orders placed or performed during the hospital encounter of 04/17/24 (from the past 24 hour(s))   POCT GLUCOSE   Result Value Ref Range    POCT Glucose 153 (H) 74 - 99 mg/dL   Magnesium   Result Value Ref Range    Magnesium 2.23 1.60 - 2.40 mg/dL   Renal Function Panel   Result Value Ref Range    Glucose 137 (H) 74 - 99 mg/dL    Sodium 138 136 - 145 mmol/L    Potassium 4.4 3.5 - 5.3 mmol/L    Chloride 101 98 - 107 mmol/L    Bicarbonate 27 21 - 32 mmol/L    Anion Gap 14 10 - 20 mmol/L    Urea Nitrogen 47 (H) 6 - 23 mg/dL    Creatinine 2.40 (H) 0.50 - 1.30 mg/dL    eGFR 28 (L) >60 mL/min/1.73m*2    Calcium 9.5 8.6 - 10.6 mg/dL    Phosphorus 5.3 (H) 2.5 - 4.9 mg/dL    Albumin 2.6 (L) 3.4 - 5.0 g/dL   CBC   Result Value Ref Range    WBC 10.8 4.4 - 11.3 x10*3/uL    nRBC 0.0 0.0 - 0.0 /100 WBCs    RBC 3.77 (L) 4.50 - 5.90 x10*6/uL    Hemoglobin 9.6 (L) 13.5 - 17.5 g/dL    Hematocrit 30.3 (L) 41.0 - 52.0 %    MCV 80 80 - 100 fL    MCH 25.5 (L) 26.0 - 34.0 pg    MCHC 31.7 (L) 32.0 - 36.0 g/dL    RDW 21.2 (H) 11.5 - 14.5 %    Platelets 678 (H) 150 - 450 x10*3/uL   POCT GLUCOSE   Result Value Ref Range    POCT Glucose 199 (H) 74 - 99 mg/dL   POCT GLUCOSE   Result Value Ref Range    POCT Glucose 246 (H) 74 - 99 mg/dL   POCT GLUCOSE   Result Value Ref Range    POCT Glucose 251 (H) 74 - 99 mg/dL       This patient has a urinary catheter   Reason for the urinary catheter remaining today? incontinent patients with an open sacral wound or perineal wounds         Assessment/Plan   Principal Problem:    Pneumonia due  to infectious organism, unspecified laterality, unspecified part of lung  Active Problems:    Dysphagia    Brian Rodriguez is a 69 y.o. male with a history of type 2 diabetes, hypertension, CKD, hyperlipidemia, hidradenitis, bipolar who presented to the ED on 4/17/2024 for altered mental status and concern of infection.  Admitted to [facility] with multifocal PNA, HyperNa, TERRI on CKD, then transferred to MICU for AMS & acute hypoxic resp failure & c/f sepsis.   Transferred back to [facility] then returned to MICU 4/26 for respiratory distress and acute hypoxia, now back in floor for monitoring.      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   - s/p high dose thiamine IV given 4/29-5/2;  continue daily Thiamine supp  - cont total care for ADLs  - needs LP with IR & anesthesia to r/o neurosyphilis  - If unable to obtain LP then may treat empirically with high-dose penicillin x 2 weeks  - PICC ordered for 2 weeks antibiotics upon discharge     #Acute Hypoxic Respiratory failure  #Multifocal PNA  #Aspiration PNA  #Recent Covid + (4/18)   :: stable on RA  :: MBS completed earlier this admit with signs of aspiration.    - SLP recs strict NPO with aggressive oral care  - continue bronchial hygiene     #New onset Afib   - CHADsVasc 3  - Eliquis started for a fib on 5/7     #Dysphagia  :: s/p PEG (5/7)  :: 5/6 repeat SLP eval with ongoing dysphagia and aspiration concern.  Continue NPO with enteral feeds via PEG   :: on reglan (5/5-5/9) (Qtc 448 on 5/4 ECG)  - Diet: Glucerna 1.5 @ 60ml/hr, Q6H (serum sodium 136) Nutrasource Fiber TID (can go up to 2 packets TID if showing benefits).   - strict  NPO, SLP following, recommended repeat MBBS 4-6 weeks  - cont abd binder to protect tube     #TERRI on CKD Stage III  #Urinary Retention  :: Unclear baseline as no previous records  - failed voiding trial.  Bunch replaced 5/4  - cont bunch care  - cont to monitor w RFP  - fluids as needed     # Type 2 diabetes,   :: A1C 8.5 (April 2024)  - Continue at bedtime Lantus 10 units & #2 SSI Lispro  - Q4H Accu checks    #Chronic HTN  #Hyperlipidemia   - home Amlodpine, Atorvastatin on hold    #Nutrition  - NPO while concern for aspiration  - Diet consulted appreciate recs  - Tube feeds held  - Head of bed elevated, frequent suctioning, oral hygiene.     #Iron Deficiency Anemia  :: Ferritin 539/TIBC 94/iron 16   - holding Fe supp due to recent infection & atb    #Chronic Sacral scrotum & Buttocks wounds   #hidradenitis suppurativa  :: derm has previously recommended humira/minocycline 100 BID but currently w/o lab confirmation, hold off iso septic shock  :: seen by wound care RN on 5/2: Patient has black/brown discoloration on B/L areas are nontender, without temperature change, or induration. These are not pressure injuries. There is black soft thickened tissue over left hip/buttocks skin changes r/t hydradenitis  - Recs: Cleanse wounds with CHG bath wipes, Aquacel Ag and Mepilex bordered foam to open, draining areas of sacrum, Interdry to under scrotum and in between creases of groin/ inner thighs, Turn every 2 hours.  - will need outpatient dermatology follow up w/ doxycycline 100 BID outpatient  (minocycline cannot be crushed)     #Inguinal lymphadenopathy   - US upper ext duplex 5/1: Superficial venous thrombus in L cephalic vein   - US lower ext duplex 5/1: Lymph nodes Lt groin lymph node measures 5.62 mm x 41.53 mm, Rt groin lymph node # 1 measures 7.48 mm x 31.64 mm. Lymph node #2 3.57 mm x 26.93 mm.  - pt with (Chlamydia, neisseria gonorrhea, HSV, previous HIV 1/2 testing this admission neg)  - on 5/2 VDRL +        F  D5W @ 125ml/hr  E Replete as needed  N Trickle feeds tube NPO  G PEG 17g  DVT: SubQ heparin  Abx: Zosyn  Drips: None  Pressors: None  Code status: Full Code  NoK: DEEPAK RANGEL, Home Phone: [phone]    This patient was seen and staffed with Dr. Watson.    Evelyn De La Cruz DO  PGY 1  Family Medicine

## 2024-05-13 NOTE — PROGRESS NOTES
5/13/24 9702 Transitional Care Coordinator Notes:    Updated notes sent to [facility]. Anticipating discharge in 1-2 days.                    Assessment/Plan   Principal Problem:    Pneumonia due to infectious organism, unspecified laterality, unspecified part of lung  Active Problems:    Dysphagia                Talia Arias RN

## 2024-05-13 NOTE — CARE PLAN
Problem: Skin  Goal: Participates in plan/prevention/treatment measures  Outcome: Progressing     Problem: Skin  Goal: Promote skin healing  Outcome: Progressing       The clinical goals for the shift include Maintain PEG Tube and Tube Feed per orders throughout shift.

## 2024-05-14 PROBLEM — J18.9 PNEUMONIA DUE TO INFECTIOUS ORGANISM, UNSPECIFIED LATERALITY, UNSPECIFIED PART OF LUNG: Status: RESOLVED | Noted: 2024-04-17 | Resolved: 2024-05-14

## 2024-05-14 PROBLEM — J18.9 PNEUMONIA DUE TO INFECTIOUS ORGANISM, UNSPECIFIED LATERALITY, UNSPECIFIED PART OF LUNG: Status: ACTIVE | Noted: 2024-05-14

## 2024-05-14 PROBLEM — R13.10 DYSPHAGIA: Status: RESOLVED | Noted: 2024-05-07 | Resolved: 2024-05-14

## 2024-05-14 LAB
ABO GROUP (TYPE) IN BLOOD: NORMAL
ALBUMIN SERPL BCP-MCNC: 2.6 G/DL (ref 3.4–5)
ALBUMIN SERPL BCP-MCNC: 2.8 G/DL (ref 3.4–5)
ANION GAP SERPL CALC-SCNC: 14 MMOL/L (ref 10–20)
ANION GAP SERPL CALC-SCNC: 17 MMOL/L (ref 10–20)
ANTIBODY SCREEN: NORMAL
ATRIAL RATE: 96 BPM
BACTERIA UR CULT: NO GROWTH
BASOPHILS # BLD AUTO: 0.13 X10*3/UL (ref 0–0.1)
BASOPHILS NFR BLD AUTO: 0.9 %
BUN SERPL-MCNC: 59 MG/DL (ref 6–23)
BUN SERPL-MCNC: 59 MG/DL (ref 6–23)
CALCIUM SERPL-MCNC: 9.6 MG/DL (ref 8.6–10.6)
CALCIUM SERPL-MCNC: 9.7 MG/DL (ref 8.6–10.6)
CHLORIDE SERPL-SCNC: 100 MMOL/L (ref 98–107)
CHLORIDE SERPL-SCNC: 101 MMOL/L (ref 98–107)
CO2 SERPL-SCNC: 24 MMOL/L (ref 21–32)
CO2 SERPL-SCNC: 29 MMOL/L (ref 21–32)
CREAT SERPL-MCNC: 2.44 MG/DL (ref 0.5–1.3)
CREAT SERPL-MCNC: 2.45 MG/DL (ref 0.5–1.3)
EGFRCR SERPLBLD CKD-EPI 2021: 27 ML/MIN/1.73M*2
EGFRCR SERPLBLD CKD-EPI 2021: 28 ML/MIN/1.73M*2
EOSINOPHIL # BLD AUTO: 2.24 X10*3/UL (ref 0–0.4)
EOSINOPHIL NFR BLD AUTO: 14.9 %
ERYTHROCYTE [DISTWIDTH] IN BLOOD BY AUTOMATED COUNT: 21.4 % (ref 11.5–14.5)
GLUCOSE BLD MANUAL STRIP-MCNC: 168 MG/DL (ref 74–99)
GLUCOSE BLD MANUAL STRIP-MCNC: 187 MG/DL (ref 74–99)
GLUCOSE BLD MANUAL STRIP-MCNC: 191 MG/DL (ref 74–99)
GLUCOSE BLD MANUAL STRIP-MCNC: 212 MG/DL (ref 74–99)
GLUCOSE BLD MANUAL STRIP-MCNC: 217 MG/DL (ref 74–99)
GLUCOSE BLD MANUAL STRIP-MCNC: 234 MG/DL (ref 74–99)
GLUCOSE BLD MANUAL STRIP-MCNC: 260 MG/DL (ref 74–99)
GLUCOSE BLD MANUAL STRIP-MCNC: 263 MG/DL (ref 74–99)
GLUCOSE SERPL-MCNC: 197 MG/DL (ref 74–99)
GLUCOSE SERPL-MCNC: 213 MG/DL (ref 74–99)
HCT VFR BLD AUTO: 29.5 % (ref 41–52)
HGB BLD-MCNC: 8.8 G/DL (ref 13.5–17.5)
HOLD SPECIMEN: NORMAL
IMM GRANULOCYTES # BLD AUTO: 0.06 X10*3/UL (ref 0–0.5)
IMM GRANULOCYTES NFR BLD AUTO: 0.4 % (ref 0–0.9)
LYMPHOCYTES # BLD AUTO: 3.09 X10*3/UL (ref 0.8–3)
LYMPHOCYTES NFR BLD AUTO: 20.5 %
MAGNESIUM SERPL-MCNC: 2.34 MG/DL (ref 1.6–2.4)
MCH RBC QN AUTO: 25.5 PG (ref 26–34)
MCHC RBC AUTO-ENTMCNC: 29.8 G/DL (ref 32–36)
MCV RBC AUTO: 86 FL (ref 80–100)
MONOCYTES # BLD AUTO: 1.24 X10*3/UL (ref 0.05–0.8)
MONOCYTES NFR BLD AUTO: 8.2 %
NEUTROPHILS # BLD AUTO: 8.32 X10*3/UL (ref 1.6–5.5)
NEUTROPHILS NFR BLD AUTO: 55.1 %
NRBC BLD-RTO: 0 /100 WBCS (ref 0–0)
PHOSPHATE SERPL-MCNC: 4.8 MG/DL (ref 2.5–4.9)
PHOSPHATE SERPL-MCNC: 5.3 MG/DL (ref 2.5–4.9)
PLATELET # BLD AUTO: 640 X10*3/UL (ref 150–450)
POTASSIUM SERPL-SCNC: 3.9 MMOL/L (ref 3.5–5.3)
POTASSIUM SERPL-SCNC: 4.1 MMOL/L (ref 3.5–5.3)
Q ONSET: 220 MS
QRS COUNT: 15 BEATS
QRS DURATION: 80 MS
QT INTERVAL: 416 MS
QTC CALCULATION(BAZETT): 514 MS
QTC FREDERICIA: 479 MS
R AXIS: 48 DEGREES
RBC # BLD AUTO: 3.45 X10*6/UL (ref 4.5–5.9)
RH FACTOR (ANTIGEN D): NORMAL
SODIUM SERPL-SCNC: 138 MMOL/L (ref 136–145)
SODIUM SERPL-SCNC: 139 MMOL/L (ref 136–145)
T AXIS: 56 DEGREES
T OFFSET: 428 MS
VENTRICULAR RATE: 92 BPM
WBC # BLD AUTO: 15.1 X10*3/UL (ref 4.4–11.3)

## 2024-05-14 PROCEDURE — 2500000002 HC RX 250 W HCPCS SELF ADMINISTERED DRUGS (ALT 637 FOR MEDICARE OP, ALT 636 FOR OP/ED)

## 2024-05-14 PROCEDURE — 86901 BLOOD TYPING SEROLOGIC RH(D): CPT

## 2024-05-14 PROCEDURE — 05HY33Z INSERTION OF INFUSION DEVICE INTO UPPER VEIN, PERCUTANEOUS APPROACH: ICD-10-PCS | Performed by: INTERNAL MEDICINE

## 2024-05-14 PROCEDURE — 1210000001 HC SEMI-PRIVATE ROOM DAILY

## 2024-05-14 PROCEDURE — 2500000001 HC RX 250 WO HCPCS SELF ADMINISTERED DRUGS (ALT 637 FOR MEDICARE OP): Performed by: NURSE PRACTITIONER

## 2024-05-14 PROCEDURE — 97530 THERAPEUTIC ACTIVITIES: CPT | Mod: GP

## 2024-05-14 PROCEDURE — 85025 COMPLETE CBC W/AUTO DIFF WBC: CPT

## 2024-05-14 PROCEDURE — 2500000004 HC RX 250 GENERAL PHARMACY W/ HCPCS (ALT 636 FOR OP/ED): Performed by: STUDENT IN AN ORGANIZED HEALTH CARE EDUCATION/TRAINING PROGRAM

## 2024-05-14 PROCEDURE — S4991 NICOTINE PATCH NONLEGEND: HCPCS | Performed by: NURSE PRACTITIONER

## 2024-05-14 PROCEDURE — 99231 SBSQ HOSP IP/OBS SF/LOW 25: CPT

## 2024-05-14 PROCEDURE — 97166 OT EVAL MOD COMPLEX 45 MIN: CPT | Mod: GO

## 2024-05-14 PROCEDURE — 2500000004 HC RX 250 GENERAL PHARMACY W/ HCPCS (ALT 636 FOR OP/ED)

## 2024-05-14 PROCEDURE — 83735 ASSAY OF MAGNESIUM: CPT

## 2024-05-14 PROCEDURE — 82947 ASSAY GLUCOSE BLOOD QUANT: CPT | Mod: 91,MUE

## 2024-05-14 PROCEDURE — 2500000002 HC RX 250 W HCPCS SELF ADMINISTERED DRUGS (ALT 637 FOR MEDICARE OP, ALT 636 FOR OP/ED): Performed by: NURSE PRACTITIONER

## 2024-05-14 PROCEDURE — 84100 ASSAY OF PHOSPHORUS: CPT | Performed by: NURSE PRACTITIONER

## 2024-05-14 PROCEDURE — 80069 RENAL FUNCTION PANEL: CPT | Mod: 91,MUE | Performed by: STUDENT IN AN ORGANIZED HEALTH CARE EDUCATION/TRAINING PROGRAM

## 2024-05-14 PROCEDURE — 2500000004 HC RX 250 GENERAL PHARMACY W/ HCPCS (ALT 636 FOR OP/ED): Performed by: NURSE PRACTITIONER

## 2024-05-14 RX ORDER — SODIUM CHLORIDE 9 MG/ML
75 INJECTION, SOLUTION INTRAVENOUS CONTINUOUS
Status: ACTIVE | OUTPATIENT
Start: 2024-05-14 | End: 2024-05-15

## 2024-05-14 RX ORDER — PENICILLIN G 3000000 [IU]/50ML
3 INJECTION, SOLUTION INTRAVENOUS EVERY 4 HOURS
Status: DISCONTINUED | OUTPATIENT
Start: 2024-05-14 | End: 2024-05-15 | Stop reason: HOSPADM

## 2024-05-14 RX ADMIN — NICOTINE 7 MG/24 HR DAILY TRANSDERMAL PATCH 1 PATCH: at 08:48

## 2024-05-14 RX ADMIN — INSULIN LISPRO 4 UNITS: 100 INJECTION, SOLUTION INTRAVENOUS; SUBCUTANEOUS at 20:37

## 2024-05-14 RX ADMIN — SODIUM BICARBONATE 650 MG: 650 TABLET ORAL at 22:03

## 2024-05-14 RX ADMIN — INSULIN LISPRO 4 UNITS: 100 INJECTION, SOLUTION INTRAVENOUS; SUBCUTANEOUS at 06:34

## 2024-05-14 RX ADMIN — PENICILLIN G 3 MILLION UNITS: 3000000 INJECTION, SOLUTION INTRAVENOUS at 17:17

## 2024-05-14 RX ADMIN — DOXYCYCLINE HYCLATE 100 MG: 100 TABLET, COATED ORAL at 22:03

## 2024-05-14 RX ADMIN — INSULIN LISPRO 2 UNITS: 100 INJECTION, SOLUTION INTRAVENOUS; SUBCUTANEOUS at 12:00

## 2024-05-14 RX ADMIN — THIAMINE HCL TAB 100 MG 100 MG: 100 TAB at 08:49

## 2024-05-14 RX ADMIN — Medication 15 ML: at 08:48

## 2024-05-14 RX ADMIN — DOXYCYCLINE HYCLATE 100 MG: 100 TABLET, COATED ORAL at 08:49

## 2024-05-14 RX ADMIN — ESOMEPRAZOLE MAGNESIUM 40 MG: 40 FOR SUSPENSION ORAL at 06:35

## 2024-05-14 RX ADMIN — PENICILLIN G 3 MILLION UNITS: 3000000 INJECTION, SOLUTION INTRAVENOUS at 23:01

## 2024-05-14 RX ADMIN — SODIUM CHLORIDE 75 ML/HR: 9 INJECTION, SOLUTION INTRAVENOUS at 17:23

## 2024-05-14 RX ADMIN — SODIUM BICARBONATE 650 MG: 650 TABLET ORAL at 08:49

## 2024-05-14 ASSESSMENT — PAIN - FUNCTIONAL ASSESSMENT
PAIN_FUNCTIONAL_ASSESSMENT: WONG-BAKER FACES
PAIN_FUNCTIONAL_ASSESSMENT: WONG-BAKER FACES

## 2024-05-14 ASSESSMENT — COGNITIVE AND FUNCTIONAL STATUS - GENERAL
TURNING FROM BACK TO SIDE WHILE IN FLAT BAD: A LOT
DAILY ACTIVITIY SCORE: 11
HELP NEEDED FOR BATHING: A LOT
WALKING IN HOSPITAL ROOM: A LOT
EATING MEALS: A LOT
MOVING FROM LYING ON BACK TO SITTING ON SIDE OF FLAT BED WITH BEDRAILS: A LOT
PERSONAL GROOMING: A LOT
TOILETING: TOTAL
CLIMB 3 TO 5 STEPS WITH RAILING: TOTAL
DRESSING REGULAR UPPER BODY CLOTHING: A LOT
MOBILITY SCORE: 11
DRESSING REGULAR LOWER BODY CLOTHING: A LOT
MOVING TO AND FROM BED TO CHAIR: A LOT
STANDING UP FROM CHAIR USING ARMS: A LOT

## 2024-05-14 ASSESSMENT — PAIN SCALES - GENERAL
PAINLEVEL_OUTOF10: 0 - NO PAIN
PAINLEVEL_OUTOF10: 0 - NO PAIN

## 2024-05-14 ASSESSMENT — ACTIVITIES OF DAILY LIVING (ADL): BATHING_ASSISTANCE: MAXIMAL

## 2024-05-14 ASSESSMENT — PAIN SCALES - WONG BAKER: WONGBAKER_NUMERICALRESPONSE: HURTS LITTLE MORE

## 2024-05-14 NOTE — PROGRESS NOTES
"Brian Rodriguez is a 69 y.o. male on day 27 of admission presenting with Pneumonia due to infectious organism, unspecified laterality, unspecified part of lung.    Subjective   No acute events overnight. Patient seen and examined at bedside. Patient easily agitated and not cooperative.        Objective     Physical Exam  Constitutional:       Appearance: He is cachectic.   HENT:      Head: Normocephalic and atraumatic.      Mouth/Throat:      Mouth: Mucous membranes are moist.      Comments: Poor dentition  Eyes:      Conjunctiva/sclera: Conjunctivae normal.      Pupils: Pupils are equal, round, and reactive to light.   Cardiovascular:      Rate and Rhythm: Normal rate and regular rhythm.      Heart sounds: Normal heart sounds.   Pulmonary:      Effort: Pulmonary effort is normal. No respiratory distress.      Breath sounds: Normal breath sounds.   Abdominal:      General: Abdomen is flat. Bowel sounds are normal.      Palpations: Abdomen is soft.      Comments: Cortrak   Musculoskeletal:         General: Normal range of motion.      Cervical back: Normal range of motion and neck supple.   Neurological:      Mental Status: He is alert.      Comments: A&Ox1, able to follow commands   Psychiatric:         Behavior: Behavior is cooperative.       Last Recorded Vitals  Blood pressure 106/65, pulse 89, temperature 36.4 °C (97.5 °F), resp. rate 16, height 1.778 m (5' 10\"), weight 59.3 kg (130 lb 11.7 oz), SpO2 94%.  Intake/Output last 3 Shifts:  I/O last 3 completed shifts:  In: 720 (9.1 mL/kg) [NG/GT:720]  Out: 1050 (13.2 mL/kg) [Urine:1050 (0.4 mL/kg/hr)]  Dosing Weight: 79.4 kg     Relevant Results  Scheduled medications  [Held by provider] apixaban, 5 mg, oral, q12h  doxycylcine, 100 mg, g-tube, q12h RANDAL  esomeprazole, 40 mg, g-tube, Daily before breakfast  insulin glargine, 10 Units, subcutaneous, Nightly  insulin lispro, 0-10 Units, subcutaneous, q6h  lidocaine, 5 mL, infiltration, Once  multivitamin with " iron-minerals, 15 mL, oral, Daily  nicotine, 1 patch, transdermal, Daily  penicillin G, 3 Million Units, intravenous, q4h  sodium bicarbonate, 650 mg, g-tube, BID  sodium chloride, 1 spray, Each Nostril, 4x daily  thiamine, 100 mg, g-tube, Daily      Continuous medications  sodium chloride 0.9%, 75 mL/hr      PRN medications  PRN medications: acetaminophen **OR** [DISCONTINUED] acetaminophen **OR** [DISCONTINUED] acetaminophen, albuterol, alteplase, dextrose, dextrose, glucagon, glucagon, guaiFENesin, ipratropium-albuteroL, melatonin, OLANZapine, polyethylene glycol, traMADol  Results for orders placed or performed during the hospital encounter of 04/17/24 (from the past 24 hour(s))   POCT GLUCOSE   Result Value Ref Range    POCT Glucose 71 (L) 74 - 99 mg/dL   Urinalysis with Reflex Culture and Microscopic   Result Value Ref Range    Color, Urine Yellow Light-Yellow, Yellow, Dark-Yellow    Appearance, Urine Clear Clear    Specific Gravity, Urine 1.017 1.005 - 1.035    pH, Urine 8.0 5.0, 5.5, 6.0, 6.5, 7.0, 7.5, 8.0    Protein, Urine 50 (1+) (A) NEGATIVE, 10 (TRACE), 20 (TRACE) mg/dL    Glucose, Urine Normal Normal mg/dL    Blood, Urine NEGATIVE NEGATIVE    Ketones, Urine NEGATIVE NEGATIVE mg/dL    Bilirubin, Urine NEGATIVE NEGATIVE    Urobilinogen, Urine Normal Normal mg/dL    Nitrite, Urine NEGATIVE NEGATIVE    Leukocyte Esterase, Urine 250 Yfn/µL (A) NEGATIVE   Extra Urine Gray Tube   Result Value Ref Range    Extra Tube Hold for add-ons.    Microscopic Only, Urine   Result Value Ref Range    WBC, Urine >50 (A) 1-5, NONE /HPF    RBC, Urine 11-20 (A) NONE, 1-2, 3-5 /HPF   POCT GLUCOSE   Result Value Ref Range    POCT Glucose 260 (H) 74 - 99 mg/dL   POCT GLUCOSE   Result Value Ref Range    POCT Glucose 263 (H) 74 - 99 mg/dL   Type and screen   Result Value Ref Range    ABO TYPE O     Rh TYPE POS     ANTIBODY SCREEN NEG    Renal Function Panel   Result Value Ref Range    Glucose 213 (H) 74 - 99 mg/dL    Sodium 139  136 - 145 mmol/L    Potassium 3.9 3.5 - 5.3 mmol/L    Chloride 100 98 - 107 mmol/L    Bicarbonate 29 21 - 32 mmol/L    Anion Gap 14 10 - 20 mmol/L    Urea Nitrogen 59 (H) 6 - 23 mg/dL    Creatinine 2.45 (H) 0.50 - 1.30 mg/dL    eGFR 27 (L) >60 mL/min/1.73m*2    Calcium 9.6 8.6 - 10.6 mg/dL    Phosphorus 5.3 (H) 2.5 - 4.9 mg/dL    Albumin 2.6 (L) 3.4 - 5.0 g/dL   CBC and Auto Differential   Result Value Ref Range    WBC 15.1 (H) 4.4 - 11.3 x10*3/uL    nRBC 0.0 0.0 - 0.0 /100 WBCs    RBC 3.45 (L) 4.50 - 5.90 x10*6/uL    Hemoglobin 8.8 (L) 13.5 - 17.5 g/dL    Hematocrit 29.5 (L) 41.0 - 52.0 %    MCV 86 80 - 100 fL    MCH 25.5 (L) 26.0 - 34.0 pg    MCHC 29.8 (L) 32.0 - 36.0 g/dL    RDW 21.4 (H) 11.5 - 14.5 %    Platelets 640 (H) 150 - 450 x10*3/uL    Neutrophils % 55.1 40.0 - 80.0 %    Immature Granulocytes %, Automated 0.4 0.0 - 0.9 %    Lymphocytes % 20.5 13.0 - 44.0 %    Monocytes % 8.2 2.0 - 10.0 %    Eosinophils % 14.9 0.0 - 6.0 %    Basophils % 0.9 0.0 - 2.0 %    Neutrophils Absolute 8.32 (H) 1.60 - 5.50 x10*3/uL    Immature Granulocytes Absolute, Automated 0.06 0.00 - 0.50 x10*3/uL    Lymphocytes Absolute 3.09 (H) 0.80 - 3.00 x10*3/uL    Monocytes Absolute 1.24 (H) 0.05 - 0.80 x10*3/uL    Eosinophils Absolute 2.24 (H) 0.00 - 0.40 x10*3/uL    Basophils Absolute 0.13 (H) 0.00 - 0.10 x10*3/uL   Magnesium   Result Value Ref Range    Magnesium 2.34 1.60 - 2.40 mg/dL   POCT GLUCOSE   Result Value Ref Range    POCT Glucose 217 (H) 74 - 99 mg/dL   POCT GLUCOSE   Result Value Ref Range    POCT Glucose 168 (H) 74 - 99 mg/dL   POCT GLUCOSE   Result Value Ref Range    POCT Glucose 234 (H) 74 - 99 mg/dL       This patient has a urinary catheter   Reason for the urinary catheter remaining today? incontinent patients with an open sacral wound or perineal wounds         Assessment/Plan   Active Problems:    Pneumonia due to infectious organism, unspecified laterality, unspecified part of lung    Brian Rodriguez is a 69 y.o.  male with a history of type 2 diabetes, hypertension, CKD, hyperlipidemia, hidradenitis, bipolar who presented to the ED on 4/17/2024 for altered mental status and concern of infection.  Admitted to [facility] with multifocal PNA, HyperNa, TERRI on CKD, then transferred to MICU for AMS & acute hypoxic resp failure & c/f sepsis.   Transferred back to [facility] then returned to MICU 4/26 for respiratory distress and acute hypoxia, now back in floor for monitoring.      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   - s/p high dose thiamine IV given 4/29-5/2;  continue daily Thiamine supp  - cont total care for ADLs  - needs LP with IR & anesthesia to r/o neurosyphilis, having trouble coordinating  - Per ID, If unable to obtain LP then may treat empirically with high-dose penicillin x 2 weeks  - PICC ordered, will likely get today  - started on penicillin G IVPB 3 million units q4h     #Acute Hypoxic Respiratory failure  #Multifocal PNA  #Aspiration PNA  #Recent Covid + (4/18)   :: stable on RA  :: MBS completed earlier this admit with signs of aspiration.    - SLP recs strict NPO with aggressive oral care  - continue bronchial hygiene     #New onset Afib   - CHADsVasc 3  - Eliquis started for a fib on 5/7     #Dysphagia  :: s/p PEG (5/7)  :: 5/6 repeat SLP eval with ongoing dysphagia and aspiration concern.  Continue NPO with enteral feeds via PEG   :: on reglan (5/5-5/9) (Qtc 448 on 5/4 ECG)  - Diet: Glucerna 1.5 @ 60ml/hr, Q6H (serum sodium 136) Nutrasource Fiber TID (can go up to 2 packets TID if showing benefits).   - strict NPO, SLP following, recommended repeat MBBS 4-6 weeks  - cont abd binder to protect  tube     #TERRI on CKD Stage III  #Urinary Retention  :: Unclear baseline as no previous records  - failed voiding trial.    - cont bunch care  - cont to monitor w RFP  - fluids as needed  - urine cx pending     # Type 2 diabetes,   :: A1C 8.5 (April 2024)  - Continue at bedtime Lantus 10 units & #2 SSI Lispro  - Q4H Accu checks    #Chronic HTN  #Hyperlipidemia   - home Amlodpine, Atorvastatin on hold    #Nutrition  - NPO while concern for aspiration  - Diet consulted appreciate recs  - Tube feeds held  - Head of bed elevated, frequent suctioning, oral hygiene.     #Iron Deficiency Anemia  :: Ferritin 539/TIBC 94/iron 16   - holding Fe supp due to recent infection & atb    #Chronic Sacral scrotum & Buttocks wounds   #hidradenitis suppurativa  :: derm has previously recommended humira/minocycline 100 BID but currently w/o lab confirmation, hold off iso septic shock  :: seen by wound care RN on 5/2: Patient has black/brown discoloration on B/L areas are nontender, without temperature change, or induration. These are not pressure injuries. There is black soft thickened tissue over left hip/buttocks skin changes r/t hydradenitis  - Recs: Cleanse wounds with CHG bath wipes, Aquacel Ag and Mepilex bordered foam to open, draining areas of sacrum, Interdry to under scrotum and in between creases of groin/ inner thighs, Turn every 2 hours.  - will need outpatient dermatology follow up w/ doxycycline 100 BID outpatient  (minocycline cannot be crushed)     #Inguinal lymphadenopathy   - US upper ext duplex 5/1: Superficial venous thrombus in L cephalic vein   - US lower ext duplex 5/1: Lymph nodes Lt groin lymph node measures 5.62 mm x 41.53 mm, Rt groin lymph node # 1 measures 7.48 mm x 31.64 mm. Lymph node #2 3.57 mm x 26.93 mm.  - pt with (Chlamydia, neisseria gonorrhea, HSV, previous HIV 1/2 testing this admission neg)  - on 5/2 VDRL +        F D5W @ 125ml/hr  E Replete as needed  N Trickle feeds tube NPO  G PEG 17g  DVT:  SubQ heparin  Abx: Zosyn  Drips: None  Pressors: None  Code status: Full Code  NoK: DEEPAK RANGEL, Home Phone: [phone]    This patient was seen and staffed with Dr. Watson.    Evelyn De La Cruz DO  PGY 1  Family Medicine

## 2024-05-14 NOTE — SIGNIFICANT EVENT
Patient seen and examined today. Okay to place PICC with GFR of 27. The benefits outweigh the risk of AMADOR. In addition, will start patient on IVF to help reduce risk of contrast induced nephropathy.

## 2024-05-14 NOTE — POST-PROCEDURE NOTE
Pre-Procedure Checklist:  Emergent Line Insertion: No  Type of Line to be Placed: PICC  Consent Obtained: Yes  Emergency Medication Necessary: No  Patient Identified with 2 Independent Identifiers: Yes  Review of Allergies, Anticoagulation, Relevant Labs, ECG/Telemetry: Yes  Risks/Benefits/Alternatives Discussed with Patient/POA/Legal Representative: Yes  Stop Sign on Door: Yes  Time Out Performed: Yes  Catheter Exchange: No    Positioning Checklist:  All People, Including Patient, in the Room with Cap and Mask: Yes  Fluoroscopy Used to Identify Vessel and Guide Insertion: No   Sterile Cover Used: Yes  Full Barrier Precautions Followed (Mask, Cap, Gown, Gloves): Yes  Hands Washed: Yes  Monitors Attached with Sound Alarms On: No  Full Body Sterile Drape (Head-to-Toe) Used to Cover Patient: Yes  Trendelenburg Position (For IJ and Subclavian): No  CHG Skin Prep Used and Allowed to Air Dry to Skin Procedure: Yes    Procedure Checklist:  Blood Aspirated From All Lumens, All Ports Subsequently Flushed: Yes  Catheter Caps Placed on All Lumens; Lumens Clamped: Yes  Maintain Guidewire Control Throughout, Ensuring Guidewire Removal: Yes  Maintain Sterile Field Throughout Insertion: Yes  Catheter Secured: Yes  Confirmatory Test of Venous Placement: Non-Pulsatile Blood    Post Procedure Checklist:  Date and Time Written on Dressing: Yes  Sharp and Wire Count and Safe Disposal of all Sharps/Wires: Yes  Sterile Dressing Applied Per Protocol: Yes  X-ray Ordered or ECG Image: Yes    PICC Insertion Details:  Size (Fr): 4  Lumen Type:SL  Catheter to Vein Ratio Less Than 50%: Yes  Total Length (cm): 41  External Length (cm): 0  Orientation: left  Location: brachial  Site Prep: Chlorohexidine; Usual sterile procedure followed  Local Anesthetic: Injectable/Subcutaneous  Indication:antibiotics  Insertion Team Members in the Room: NurseSANJUANA--Idania Saleh LPN  Initial Extremity Circumference (cm): 23  Insertion Attempts:1  Patient  Tolerance: Tolerated Well, Age Appropriate  Comfort Measures: Subcutaneous anesthetic; Verbal  Procedure Location: Bedside--timeout with Annamaria SMYTH  Safety Measures: Patient specific safety measures addressed with RN  Estimated Blood Loss (mL): 0  Vessel Fully Compressible Proximally and Distally to Insertion Site: Yes  Brisk Blood Return Obtained and Line Draws Easily: Yes  Tip Location:SVC  Line Confirmation: ECG  Lot #:[id number]  : Bard  PICC Line Exp Date:04/30/2025  Securement: Stat Lock  Post Procedure Checklist: Handoff with RN; Obtain all new IV tubing prior to use; Bed at lowest level and wheels locked; Line discharge information at bedside.  Additional Details: Line was inserted using Modified Seldinger's Technique.   Placed by: Thu Bean RN-[facility]

## 2024-05-14 NOTE — PROGRESS NOTES
Occupational Therapy    Evaluation    Patient Name: Brian Rodriguez  MRN: [id number]  Today's Date: 5/14/2024  Time Calculation  Start Time: 1415  Stop Time: 1436  Time Calculation (min): 21 min        Assessment:  OT Assessment: Pt presents with decreased strength, endurance, impaired cognition which impacts pt ADLs and IADLs as compared to POF.  Prognosis: Fair  Barriers to Discharge: None  Evaluation/Treatment Tolerance: Treatment limited secondary to medical complications (Comment), Treatment limited secondary to agitation  Medical Staff Made Aware: Yes  End of Session Communication: Bedside nurse  End of Session Patient Position: Bed, 3 rail up, Alarm on  OT Assessment Results: Decreased ADL status, Decreased upper extremity strength, Decreased cognition, Decreased endurance, Decreased functional mobility  Prognosis: Fair  Barriers to Discharge: None  Evaluation/Treatment Tolerance: Treatment limited secondary to medical complications (Comment), Treatment limited secondary to agitation  Medical Staff Made Aware: Yes  Strengths: Living arrangement secure, Support of Caregivers  Barriers to Participation: Ability to acquire knowledge, Insight into problems  Plan:  Treatment Interventions: ADL retraining, Functional transfer training, UE strengthening/ROM, Endurance training, Cognitive reorientation, Patient/family training  OT Frequency: 2 times per week  OT Discharge Recommendations: Moderate intensity level of continued care  OT Recommended Transfer Status: Moderate assist, Assist of 1  OT - OK to Discharge: Yes  Treatment Interventions: ADL retraining, Functional transfer training, UE strengthening/ROM, Endurance training, Cognitive reorientation, Patient/family training    Subjective   Current Problem:  1. Pneumonia due to infectious organism, unspecified laterality, unspecified part of lung  Referral to Primary Care    Catheter Care    Monitor intake and output      2. TERRI (acute kidney injury) (CMS-[facility])   Referral to Primary Care    Catheter Care    Monitor intake and output    Transthoracic Echo (TTE) Complete    Transthoracic Echo (TTE) Complete    Transthoracic Echo (TTE) Limited    Transthoracic Echo (TTE) Limited    CANCELED: Transthoracic Echo (TTE) Complete    CANCELED: Transthoracic Echo (TTE) Complete    CANCELED: Transthoracic Echo (TTE) Complete    CANCELED: Transthoracic Echo (TTE) Complete      3. Hypernatremia  Referral to Primary Care    Catheter Care    Monitor intake and output      4. Hidradenitis suppurativa  Referral to Dermatology      5. Hypertensive heart and chronic kidney disease with heart failure and stage 1 through stage 4 chronic kidney disease, or unspecified chronic kidney disease (Multi)  Transthoracic Echo (TTE) Complete    Transthoracic Echo (TTE) Complete      6. Encounter for screening for cardiovascular disorders  Transthoracic Echo (TTE) Complete    Transthoracic Echo (TTE) Limited      7. Arm swelling  Vascular US upper extremity venous duplex right    Vascular US upper extremity venous duplex right    Vascular US upper extremity venous duplex left    Vascular US upper extremity venous duplex left    CANCELED: Upper extremity venous duplex bilateral    CANCELED: Upper extremity venous duplex bilateral    CANCELED: Lower extremity venous duplex bilateral    CANCELED: Lower extremity venous duplex bilateral      8. Leg swelling  Vascular US lower extremity venous duplex bilateral    Vascular US lower extremity venous duplex bilateral      9. Localized edema  Vascular US lower extremity venous duplex bilateral      10. Encephalopathy  Referral to Neurology    Referral to Neuropsychology    Lumbar Puncture    Lumbar Puncture      11. Dysphagia, unspecified type  EGD w PEG Tube Placement    EGD w PEG Tube Placement        General:  General  Reason for Referral: AMS, to MICU for hypoxic RF and sepsis; Covid (+)(  Past Medical History Relevant to Rehab: type 2 diabetes, hypertension,  CKD, hyperlipidemia, hidradenitis, bipolar  Co-Treatment: OT  Prior to Session Communication: Bedside nurse  Patient Position Received: Bed, 3 rail up, Alarm off, not on at start of session  Preferred Learning Style: verbal  General Comment: Pt sidelying asleep upon OT entry. Pt RN team assists in arrousing pt. Pt agreeable to OT eval as RN is placing IV.  OT eval terminated early as PIC line team enters. OT exits leaving pt in care of nursing team.  Precautions:  Hearing/Visual Limitations: difficult to formally assess as patient with altered mental status, appears grossly WFL  Medical Precautions: Fall precautions  Precautions Comment: Falls, Alarms  Vital Signs:     Pain:  Pain Assessment  Pain Assessment: Nicholson-Baker FACES  Pain Score: 0 - No pain    Objective   Cognition:  Overall Cognitive Status: Impaired  Orientation Level: Disoriented to situation, Disoriented to place, Disoriented to time (Pt is only oriented to self.)  Following Commands: Follows one step commands with repetition  Safety Judgment: Decreased awareness of need for assistance  Problem Solving: Assistance required to implement solutions  Cognition Comments: Pt perseverates on urgency to void bladder despite education from nursing and therapy that pt has a bunch placed.     Confusion Assessment Method (CAM)  Acute Onset and Fluctuating Course (1A): Yes  Acute Onset and Fluctuating Course (1B): Yes  Inattention (2): Yes  Disorganized Thinking (3): Yes  Rate Patient's Level of Consciousness (4): Vigilant (Hyperalert), Yes  Delirium Present: Yes  [place] Agitation Sedation Scale  [place] Agitation Sedation Scale (RASS): Agitated  Home Living:  Type of Home: Skilled Nursing facility (Per pt's brother, pt [facility] at [facility]  Prior Function:  Level of [place]:  (Per pt's brother, pt has assist for ADLs and IADLs.)  Hand Dominance: Left  Prior Function Comments: Per pt's brother, pt has assist for all ADLs and IADLS.  IADL  History:     ADL:  Eating Assistance: Moderate (anticipate)  Grooming Assistance: Moderate  Grooming Deficit:  (patient is able to reach up to face however did not follow commands for using Dec[employer])  Bathing Assistance: Maximal  Bathing Deficit:  (anticipated)  UE Dressing Assistance: Maximal  UE Dressing Deficit:  (to manage gown)  LE Dressing Assistance: Total  LE Dressing Deficit: Don/doff R sock, Don/doff L sock (anticipate)  Toileting Assistance with Device: Total  Toileting Deficit:  (anticipated)  Activity Tolerance:  Endurance: Decreased tolerance for upright activites  Bed Mobility/Transfers: Bed Mobility  Bed Mobility: Yes  Bed Mobility 1  Bed Mobility 1: Supine to sitting  Level of Assistance 1: Maximum verbal cues, Close supervision, Moderate assistance (x1)  Bed Mobility Comments 1: Pt completes supine in bed <> seated at EOB with mod A to transition trunk and for safety.  Bed Mobility 3  Level of Assistance 3:  (x1)        Vision:Vision - Basic Assessment  Current Vision:  (patient did not report if he uses corrective lenses)  Sensation:  Sensation Comment: patient did not report  Strength:  Strength Comments: Grossly observed to be at least 2+/5  Perception:  Initiation: Cues to initiate tasks  Motor Planning: Cues to use objects appropriately  Perseveration: Perseverates during conversation        Hand Function:  Gross Grasp: Functional (Grossly observed)      Outcome Measures:[facility] Daily Activity  Putting on and taking off regular lower body clothing: A lot  Bathing (including washing, rinsing, drying): A lot  Putting on and taking off regular upper body clothing: A lot  Toileting, which includes using toilet, bedpan or urinal: Total  Taking care of personal grooming such as brushing teeth: A lot  Eating Meals: A lot  Daily Activity - Total Score: 11            Goals:  Encounter Problems       Encounter Problems (Active)       ADLs       Patient with complete upper body dressing with minimal assist   level of assistance donning and doffing all UE clothes with PRN adaptive equipment. (Progressing)       Start:  04/22/24    Expected End:  05/09/24            Patient will complete daily grooming tasks with stand by assist level of assistance and PRN adaptive equipment. (Progressing)       Start:  04/22/24    Expected End:  05/09/24            Patient will complete toileting including hygiene clothing management/hygiene with moderate assist level of assistance. (Progressing)       Start:  04/22/24    Expected End:  05/09/24               BALANCE       Pt will maintain dynamic sitting balance during ADL task with stand by assist level of assistance in order to demonstrate decreased risk of falling and improved postural control. (Not Progressing)       Start:  04/22/24    Expected End:  05/09/24               EXERCISE/STRENGTHENING       Patient will complete BUE exercises in order to improve strength and activity tolerance for ADL performance.  (Not Progressing)       Start:  04/22/24    Expected End:  05/09/24               TRANSFERS       Patient will perform bed mobility moderate assist level of assistance in order to improve safety and independence with mobility (Progressing)       Start:  04/22/24    Expected End:  05/09/24            Patient will complete functional transfers with least restrictive device with moderate assist x2 level of assistance. (Not Progressing)       Start:  04/22/24    Expected End:  05/09/24                  Encounter Problems (Resolved)       COGNITION/SAFETY       Patient will follow >/= 50% Simple commands to allow improved ADL performance. (Met)       Start:  04/22/24    Expected End:  05/06/24    Resolved:  05/06/24

## 2024-05-14 NOTE — PROGRESS NOTES
"Physical Therapy    Physical Therapy Treatment    Patient Name: Brian Rodriguez  MRN: [id number]  Today's Date: 5/14/2024  Time Calculation  Start Time: 0930  Stop Time: 0956  Time Calculation (min): 26 min       Assessment/Plan   PT Assessment  PT Assessment Results: Decreased strength, Decreased endurance, Impaired balance, Decreased mobility  End of Session Communication: Bedside nurse  Assessment Comment: Pt completes mobility tasks with assist at this time, will continue to follow to encourage OOB activity.  End of Session Patient Position: Bed, 3 rail up, Alarm on     PT Plan  Treatment/Interventions: Bed mobility, Transfer training, Gait training, Balance training, Strengthening, Endurance training, Therapeutic exercise, Therapeutic activity  PT Plan: Skilled PT  PT Frequency: 2 times per week  PT Discharge Recommendations: Moderate intensity level of continued care  PT Recommended Transfer Status: Total assist, Assist x2  PT - OK to Discharge: Yes     05/14/24 0930   PT  Visit   PT Received On 05/14/24   General   Prior to Session Communication Bedside nurse   General Comment Pt sleeping upon PT entry, increased time to increase alertness, required verbal and tactile cues.  Assisted pt to partial sitting to EOB and bed mobility tasks, unable to progress with further OOB activity as pt requested \"stop messing with my legs\".  Will continue to follow.   Precautions   Medical Precautions Fall precautions   Pain Assessment   Pain Assessment FACES   Nicholson-Saleh FACES Pain Rating 4   Pain Location   (? grimaces with some movements)   Cognition   Arousal/Alertness Delayed responses to stimuli   Following Commands   (requires increased time to respond to cue, repetition and some initiation of movements to complete tasks)   Postural Control   Postural Control Impaired   Trunk Control requires assist to maintain partial upright sitting posture   Therapeutic Exercise   Therapeutic Exercise Performed Yes   Therapeutic " Exercise Activity 1 (B) LE AAROM HS x 10 reps to increase alertness and participation   Bed Mobility   Bed Mobility Yes   Bed Mobility 1   Bed Mobility 1 Supine to sitting   Level of Assistance 1 Moderate assistance;Maximum verbal cues;Maximum tactile cues  (x1)   Bed Mobility Comments 1 HOB raised, use of draw sheet, partially completes with (B) LE mostly off bed and bringing trunk upright prior to patient attempting to return (B) LE into bed.  Unable to encourage pt to reattempt full sit.   Bed Mobility 2   Bed Mobility  2 Sitting to supine   Level of Assistance 2 Minimum assistance;Moderate verbal cues   Bed Mobility 3   Bed Mobility 3 Rolling right;Rolling left   Level of Assistance 3 Moderate assistance;Moderate verbal cues  (x1)   Ambulation/Gait Training   Ambulation/Gait Training Performed No  (per bedside nurse, pt did walk with nursing staff to bathroom yesterday.)   Transfers   Transfer No   Activity Tolerance   Endurance Decreased tolerance for upright activites   PT Assessment   PT Assessment Results Decreased strength;Decreased endurance;Impaired balance;Decreased mobility   End of Session Communication Bedside nurse   Assessment Comment Pt completes mobility tasks with assist at this time, will continue to follow to encourage OOB activity.   End of Session Patient Position Bed, 3 rail up;Alarm on     Outcome Measures:  [facility] Basic Mobility  Turning from your back to your side while in a flat bed without using bedrails: A lot  Moving from lying on your back to sitting on the side of a flat bed without using bedrails: A lot  Moving to and from bed to chair (including a wheelchair): A lot  Standing up from a chair using your arms (e.g. wheelchair or bedside chair): A lot  To walk in hospital room: A lot  Climbing 3-5 steps with railing: Total  Basic Mobility - Total Score: 11                       EDUCATION:     Education Documentation  Mobility Training, taught by Jenny Conti, PT at 5/14/2024  10:59 AM.  Learner: Patient  Readiness: Nonacceptance  Method: Explanation, Demonstration  Response: Needs Reinforcement  Comment: benefits of mobility and activity      GOALS:  Encounter Problems       Encounter Problems (Active)       Balance       Patient will complete static (stand by assist) and dynamic (contact guard assist) sitting balance activities using UE support as needed in order to maintain midline without acute LOB.  (Progressing)       Start:  05/06/24    Expected End:  05/27/24                        Mobility       Patient will ambulate >/=3' with Rolling Walker with MODx1 without acute LOB in order to complete functional transfers.  (Progressing)       Start:  05/06/24    Expected End:  05/27/24            Patient will complete bed mobility with MODx1  (Progressing)       Start:  05/06/24    Expected End:  05/27/24                        PT Transfers       Patient will complete STS with MODx1 using LRAD without acute LOB   (Progressing)       Start:  05/06/24    Expected End:  05/27/24            Patient will complete bed<->chair transfer with moderate assist x1 using LRD as needed without acute LOB  (Progressing)       Start:  05/06/24    Expected End:  05/27/24 05/14/24 at 11:00 AM - Jenny Conti, PT

## 2024-05-14 NOTE — CARE PLAN
The patient's goals for the shift include Pt will be free from falls  use call light  bed alarm remians on    The clinical goals for the shift include Patient free ffrom falls      Problem: Skin  Goal: Decreased wound size/increased tissue granulation at next dressing change  Flowsheets (Taken 5/14/2024 1812)  Decreased wound size/increased tissue granulation at next dressing change: Promote sleep for wound healing

## 2024-05-15 VITALS
TEMPERATURE: 96.6 F | OXYGEN SATURATION: 98 % | HEIGHT: 70 IN | BODY MASS INDEX: 19.57 KG/M2 | SYSTOLIC BLOOD PRESSURE: 114 MMHG | WEIGHT: 136.69 LBS | DIASTOLIC BLOOD PRESSURE: 64 MMHG | RESPIRATION RATE: 16 BRPM | HEART RATE: 99 BPM

## 2024-05-15 PROBLEM — J18.9 PNEUMONIA DUE TO INFECTIOUS ORGANISM, UNSPECIFIED LATERALITY, UNSPECIFIED PART OF LUNG: Status: RESOLVED | Noted: 2024-05-14 | Resolved: 2024-05-15

## 2024-05-15 LAB
ALBUMIN SERPL BCP-MCNC: 2.3 G/DL (ref 3.4–5)
ANION GAP SERPL CALC-SCNC: 14 MMOL/L (ref 10–20)
BACTERIA SPEC RESP CULT: ABNORMAL
BASOPHILS # BLD AUTO: 0.09 X10*3/UL (ref 0–0.1)
BASOPHILS NFR BLD AUTO: 0.7 %
BUN SERPL-MCNC: 62 MG/DL (ref 6–23)
CALCIUM SERPL-MCNC: 8.7 MG/DL (ref 8.6–10.6)
CHLORIDE SERPL-SCNC: 100 MMOL/L (ref 98–107)
CO2 SERPL-SCNC: 28 MMOL/L (ref 21–32)
CREAT SERPL-MCNC: 2.3 MG/DL (ref 0.5–1.3)
EGFRCR SERPLBLD CKD-EPI 2021: 30 ML/MIN/1.73M*2
EOSINOPHIL # BLD AUTO: 2.16 X10*3/UL (ref 0–0.4)
EOSINOPHIL NFR BLD AUTO: 16 %
ERYTHROCYTE [DISTWIDTH] IN BLOOD BY AUTOMATED COUNT: 20.8 % (ref 11.5–14.5)
GLUCOSE BLD MANUAL STRIP-MCNC: 157 MG/DL (ref 74–99)
GLUCOSE BLD MANUAL STRIP-MCNC: 278 MG/DL (ref 74–99)
GLUCOSE SERPL-MCNC: 302 MG/DL (ref 74–99)
GRAM STN SPEC: ABNORMAL
GRAM STN SPEC: ABNORMAL
HCT VFR BLD AUTO: 27.2 % (ref 41–52)
HGB BLD-MCNC: 8.5 G/DL (ref 13.5–17.5)
IMM GRANULOCYTES # BLD AUTO: 0.05 X10*3/UL (ref 0–0.5)
IMM GRANULOCYTES NFR BLD AUTO: 0.4 % (ref 0–0.9)
LYMPHOCYTES # BLD AUTO: 2.6 X10*3/UL (ref 0.8–3)
LYMPHOCYTES NFR BLD AUTO: 19.3 %
MAGNESIUM SERPL-MCNC: 2.11 MG/DL (ref 1.6–2.4)
MCH RBC QN AUTO: 26.4 PG (ref 26–34)
MCHC RBC AUTO-ENTMCNC: 31.3 G/DL (ref 32–36)
MCV RBC AUTO: 85 FL (ref 80–100)
MONOCYTES # BLD AUTO: 1.23 X10*3/UL (ref 0.05–0.8)
MONOCYTES NFR BLD AUTO: 9.1 %
NEUTROPHILS # BLD AUTO: 7.36 X10*3/UL (ref 1.6–5.5)
NEUTROPHILS NFR BLD AUTO: 54.5 %
NRBC BLD-RTO: 0 /100 WBCS (ref 0–0)
PHOSPHATE SERPL-MCNC: 4.4 MG/DL (ref 2.5–4.9)
PLATELET # BLD AUTO: 533 X10*3/UL (ref 150–450)
POLYCHROMASIA BLD QL SMEAR: NORMAL
POTASSIUM SERPL-SCNC: 4.2 MMOL/L (ref 3.5–5.3)
RBC # BLD AUTO: 3.22 X10*6/UL (ref 4.5–5.9)
RBC MORPH BLD: NORMAL
SODIUM SERPL-SCNC: 138 MMOL/L (ref 136–145)
STAPHYLOCOCCUS SPEC CULT: ABNORMAL
WBC # BLD AUTO: 13.5 X10*3/UL (ref 4.4–11.3)

## 2024-05-15 PROCEDURE — 83735 ASSAY OF MAGNESIUM: CPT

## 2024-05-15 PROCEDURE — 2500000002 HC RX 250 W HCPCS SELF ADMINISTERED DRUGS (ALT 637 FOR MEDICARE OP, ALT 636 FOR OP/ED)

## 2024-05-15 PROCEDURE — 2500000001 HC RX 250 WO HCPCS SELF ADMINISTERED DRUGS (ALT 637 FOR MEDICARE OP): Performed by: NURSE PRACTITIONER

## 2024-05-15 PROCEDURE — 82947 ASSAY GLUCOSE BLOOD QUANT: CPT

## 2024-05-15 PROCEDURE — 99239 HOSP IP/OBS DSCHRG MGMT >30: CPT

## 2024-05-15 PROCEDURE — 2500000002 HC RX 250 W HCPCS SELF ADMINISTERED DRUGS (ALT 637 FOR MEDICARE OP, ALT 636 FOR OP/ED): Performed by: NURSE PRACTITIONER

## 2024-05-15 PROCEDURE — 2500000004 HC RX 250 GENERAL PHARMACY W/ HCPCS (ALT 636 FOR OP/ED): Performed by: STUDENT IN AN ORGANIZED HEALTH CARE EDUCATION/TRAINING PROGRAM

## 2024-05-15 PROCEDURE — S4991 NICOTINE PATCH NONLEGEND: HCPCS | Performed by: NURSE PRACTITIONER

## 2024-05-15 PROCEDURE — 80069 RENAL FUNCTION PANEL: CPT | Performed by: NURSE PRACTITIONER

## 2024-05-15 PROCEDURE — 85025 COMPLETE CBC W/AUTO DIFF WBC: CPT

## 2024-05-15 PROCEDURE — 2500000004 HC RX 250 GENERAL PHARMACY W/ HCPCS (ALT 636 FOR OP/ED): Performed by: NURSE PRACTITIONER

## 2024-05-15 PROCEDURE — 2500000004 HC RX 250 GENERAL PHARMACY W/ HCPCS (ALT 636 FOR OP/ED)

## 2024-05-15 PROCEDURE — 87205 SMEAR GRAM STAIN: CPT

## 2024-05-15 RX ORDER — SODIUM BICARBONATE 650 MG/1
650 TABLET ORAL 2 TIMES DAILY
Start: 2024-05-15 | End: 2024-06-01 | Stop reason: HOSPADM

## 2024-05-15 RX ORDER — ESOMEPRAZOLE MAGNESIUM 40 MG/1
GRANULE, DELAYED RELEASE ORAL
Start: 2024-05-16 | End: 2024-06-01 | Stop reason: HOSPADM

## 2024-05-15 RX ORDER — ALBUTEROL SULFATE 0.83 MG/ML
2.5 SOLUTION RESPIRATORY (INHALATION) EVERY 6 HOURS PRN
Start: 2024-05-15 | End: 2024-06-01 | Stop reason: HOSPADM

## 2024-05-15 RX ORDER — LANOLIN ALCOHOL/MO/W.PET/CERES
100 CREAM (GRAM) TOPICAL DAILY
Start: 2024-05-16 | End: 2024-06-01 | Stop reason: HOSPADM

## 2024-05-15 RX ORDER — SODIUM CHLORIDE 9 MG/ML
5 INJECTION, SOLUTION INTRAVENOUS CONTINUOUS
Start: 2024-05-15 | End: 2024-06-01 | Stop reason: HOSPADM

## 2024-05-15 RX ORDER — DOXYCYCLINE 100 MG/1
100 CAPSULE ORAL EVERY 12 HOURS SCHEDULED
Start: 2024-05-15 | End: 2024-06-01 | Stop reason: HOSPADM

## 2024-05-15 RX ORDER — SODIUM CHLORIDE 9 MG/ML
5 INJECTION, SOLUTION INTRAVENOUS CONTINUOUS
Status: DISCONTINUED | OUTPATIENT
Start: 2024-05-15 | End: 2024-05-15 | Stop reason: HOSPADM

## 2024-05-15 RX ORDER — PENICILLIN G 3000000 [IU]/50ML
3 INJECTION, SOLUTION INTRAVENOUS EVERY 4 HOURS
Start: 2024-05-15 | End: 2024-06-01 | Stop reason: HOSPADM

## 2024-05-15 RX ADMIN — INSULIN LISPRO 6 UNITS: 100 INJECTION, SOLUTION INTRAVENOUS; SUBCUTANEOUS at 06:38

## 2024-05-15 RX ADMIN — DOXYCYCLINE HYCLATE 100 MG: 100 TABLET, COATED ORAL at 09:21

## 2024-05-15 RX ADMIN — INSULIN LISPRO 2 UNITS: 100 INJECTION, SOLUTION INTRAVENOUS; SUBCUTANEOUS at 13:11

## 2024-05-15 RX ADMIN — NICOTINE 7 MG/24 HR DAILY TRANSDERMAL PATCH 1 PATCH: at 09:21

## 2024-05-15 RX ADMIN — PENICILLIN G 3 MILLION UNITS: 3000000 INJECTION, SOLUTION INTRAVENOUS at 03:00

## 2024-05-15 RX ADMIN — PENICILLIN G 3 MILLION UNITS: 3000000 INJECTION, SOLUTION INTRAVENOUS at 09:21

## 2024-05-15 RX ADMIN — ESOMEPRAZOLE MAGNESIUM 40 MG: 40 FOR SUSPENSION ORAL at 06:04

## 2024-05-15 RX ADMIN — PENICILLIN G 3 MILLION UNITS: 3000000 INJECTION, SOLUTION INTRAVENOUS at 14:21

## 2024-05-15 RX ADMIN — SODIUM CHLORIDE 5 ML/HR: 9 INJECTION, SOLUTION INTRAVENOUS at 13:14

## 2024-05-15 RX ADMIN — THIAMINE HCL TAB 100 MG 100 MG: 100 TAB at 09:21

## 2024-05-15 RX ADMIN — SODIUM BICARBONATE 650 MG: 650 TABLET ORAL at 09:21

## 2024-05-15 RX ADMIN — Medication 15 ML: at 09:21

## 2024-05-15 RX ADMIN — PENICILLIN G 3 MILLION UNITS: 3000000 INJECTION, SOLUTION INTRAVENOUS at 06:04

## 2024-05-15 ASSESSMENT — PAIN SCALES - GENERAL: PAINLEVEL_OUTOF10: 0 - NO PAIN

## 2024-05-15 NOTE — DISCHARGE SUMMARY
Discharge Diagnosis  Pneumonia due to infectious organism, unspecified laterality, unspecified part of lung    Issues Requiring Follow-Up  Hidradenitis - recommended that the patient continue Doxycyline 100 mg for Hidradenitis ppx treatment and will follow up with dermatology.     Acute encephalopathy and possible neurosyphilis - continue treatment with IV Penicillin G for 2 weeks. Follow up with neurologist  Dysphagia/Aspiration - Repeat modified barium Swallow in 4 weeks  Follow up with PCP  - New onset afib - started on Eliquis  - Aspiration Pneumonia/Covid  - Dysphagia - needs MBS in 4 weeks      Test Results Pending At Discharge  Pending Labs       Order Current Status    Respiratory Culture/Smear In process            Hospital Course  Brian Rodriguez is a 69 y.o. male with PMH of CKD, DM2, HTN, sacral wound, bipolar disorder who was admitted on 4/17 from his SNF 2/2 encephalopathy. On the floor, the patient had several aspiration events. His pressures responded to fluids and IVC was collapsible. Presentation most consistent with aspiration pna 2/2 dysphagia and shock of unknown etiology which is improving. Was transferred to the MICU on 4/22/2024 following a rapid response with increased O2 to 12L and hypotension at 86/46, BP was responsive to fluids, and acute hypoxic respiratory failure requiring airvo support. 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 For his multifocal and aspiration pneumonia, he received azithromycin 4/18-4/20 and meropenem 4/26-5/2. Procal 2.95 Additionally, received Remdesevir 4/18-4/22 (covid + 4/18).  Fungitel 231. Bcx -ve, and Sputum culture was contaminated. Doxy 100mg restarted for Hydradenitis suppurativa ppx. NGT was placed for dysphagia, TF and FWF held for Brain MRI. Also had TERRI on CKD stage III (baseline Cr 1.7), likely prerenal azotemia 2/2 hypovolemia and hemodynamic instability. Hospital course  complicated by Afib found on 5/2/2024 without tachycardia, patient transitioned to heparin gtt for anticoagulation. Currently breathing in RA with stable BP, ready to be transferred to Stepdown.  Patient with increase loose stool on 5/6, C-diff PCR negative and FMS inserted.  Patient s/p PEG placement on 5/7; Eliquis started on 5/7 for new onset Afib.   ID c/s for Syphilis Total Ab Reactive on 5/2 >RPR Neg> Treponoma Reactive on 5/8; f.up rec's.  Continous enteral feeding was changed to bolus per nutrition.   Bedside LP attempted 5/11 to evaluate for neurosyphilis, given 2 mg IV Versed for LP.  Patient was agitated and not able to lay still and were unable to the LP. Patient was transferred to the floor to attempt to get a LP with anesthesia but unable to coordinate with anesthesia. After discussing with Infectious disease, patient obtained a PiCC and was started on high dose penicillin G.   It is recommended that the patient continue Doxycyline 100 mg for Hidradenitis ppx treatment and will follow up with dermatology.   Patient should continue treatment with IV Penicillin G for 2 weeks. He will continue to receive his tube feeds through his PEG and will continue to remain NPO but continue to receive aggressive oral care. He should follow up with SLP for a repeat MBBS in 4 weeks. He should follow up with a neurologist for his acute encephalopathy and neurosyphilis.       Pertinent Physical Exam At Time of Discharge  Physical Exam  Constitutional:       Appearance: He is cachectic.   HENT:      Head: Normocephalic and atraumatic.      Mouth/Throat:      Mouth: Mucous membranes are moist.      Comments: Poor dentition  Eyes:      Conjunctiva/sclera: Conjunctivae normal.      Pupils: Pupils are equal, round, and reactive to light.   Cardiovascular:      Rate and Rhythm: Normal rate and regular rhythm.      Heart sounds: Normal heart sounds.   Pulmonary:      Effort: Pulmonary effort is normal. No respiratory distress.       Breath sounds: Normal breath sounds.   Abdominal:      General: Abdomen is flat. Bowel sounds are normal.      Palpations: Abdomen is soft.      Comments: Cortrak   Musculoskeletal:         General: Normal range of motion.      Cervical back: Normal range of motion and neck supple.   Neurological:      Mental Status: He is alert.      Comments: A&Ox1, able to follow commands   Psychiatric:         Behavior: Behavior is cooperative.   Home Medications     Medication List      START taking these medications     albuterol 2.5 mg /3 mL (0.083 %) nebulizer solution; Take 3 mL (2.5 mg)   by nebulization every 6 hours if needed for wheezing.   apixaban 5 mg tablet; Commonly known as: Eliquis; Take 1 tablet (5 mg)   by mouth every 12 hours.   doxycycline 100 mg capsule; Commonly known as: Vibramycin; 1 capsule   (100 mg) by g-tube route every 12 hours. Take with a full glass of water   and do not lie down for at least 30 minutes after.   esomeprazole 40 mg packet; Commonly known as: NexIUM; Add 15mL water to   catheter-tipped syringe, add packet. Shake and let thicken. Administer   through NG tube within 30 minutes. Refill with 15mL of water, shake and   flush NG tube. Do not fill before May 16, 2024.; Start taking on: May 16,   2024   penicillin G potassium 3 million unit/50 mL IVPB; Infuse 50 mL (3   Million Units) at 100 mL/hr over 30 minutes into a venous catheter every 4   hours for 79 doses.   sodium bicarbonate 650 mg tablet; 1 tablet (650 mg) by g-tube route 2   times a day.   sodium chloride 0.9% solution; Infuse 5 mL/hr at 5 mL/hr into a venous   catheter continuously.   thiamine 100 mg tablet; Commonly known as: Vitamin B-1; 1 tablet (100   mg) by g-tube route once daily.; Start taking on: May 16, 2024     CONTINUE taking these medications     acetaminophen 325 mg tablet; Commonly known as: Tylenol   ACIDOPHILUS ORAL   ascorbic acid 500 mg tablet; Commonly known as: Vitamin C   atorvastatin 10 mg tablet;  Commonly known as: Lipitor   cholecalciferol 25 MCG (1000 UT) tablet; Commonly known as: Vitamin D-3   dextrose 5%-0.45 % sodium chloride infusion   ertapenem IV; Commonly known as: INVanz   ferrous sulfate (325 mg ferrous sulfate) tablet   folic acid 1 mg tablet; Commonly known as: Folvite   galantamine 4 mg tablet; Commonly known as: Razadyne   glucagon 1 mg injection; Commonly known as: Glucagen   guaiFENesin 100 mg/5 mL syrup; Commonly known as: Robitussin   Humira(CF) Pen 40 mg/0.4 mL pen injector kit pen-injector; Generic drug:   adalimumab; Inject 1 Pen (40 mg) under the skin 1 (one) time per week.   insulin lispro 100 unit/mL injection; Commonly known as: HumaLOG   Lantus U-100 Insulin 100 unit/mL injection; Generic drug: insulin   glargine   mirtazapine 7.5 mg tablet; Commonly known as: Remeron   multivitamin tablet   pantoprazole 40 mg EC tablet; Commonly known as: ProtoNix   PRO-STAT AWC ORAL   traMADol 50 mg tablet; Commonly known as: Ultram   zinc sulfate 220 (50 Zn) MG capsule; Commonly known as: Zincate     STOP taking these medications     minocycline 100 mg capsule       Outpatient Follow-Up  Future Appointments   Date Time Provider Department [place]   5/21/2024  9:40 AM RULA Gutierrez-CNP [id number] [facility]   6/7/2024  1:00 PM Shaun Pablo MD PhD [id number] [facility]   7/11/2024  2:30 PM Hudson Davalos MD [id number] [facility]   8/30/2024 10:00 AM Elbert Guerrero MD PhD [id number] [facility]   11/21/2024  8:00 AM Richy Taylor, PhD [id number] [facility]       This patient was seen and staffed with Dr. Watson.    Evelyn De La Cruz DO  PGY 1  Family Medicine

## 2024-05-15 NOTE — PROGRESS NOTES
05/15/24 1117   Discharge Planning   Home or Post Acute Services Post acute facilities (Rehab/SNF/etc)   Type of Post Acute Facility Services Long term care   Patient expects to be discharged to: [facility]   Does the patient need discharge transport arranged? Yes   RoundTrip coordination needed? Yes   Has discharge transport been arranged? Yes   What day is the transport expected? 05/15/24   What time is the transport expected? 1430       Patient is medically ready to discharge. PICC line placed. Final goldenrod and AVS sent to facility. Transportation is arranged for 2:30 pm via [facility]. Nurse given number for report.    [facility]: [phone]   Number for report: [phone] 3rd floor

## 2024-05-31 ENCOUNTER — CLINICAL SUPPORT (OUTPATIENT)
Dept: EMERGENCY MEDICINE | Facility: HOSPITAL | Age: 69
End: 2024-05-31
Payer: MEDICARE

## 2024-05-31 ENCOUNTER — HOSPITAL ENCOUNTER (OUTPATIENT)
Facility: HOSPITAL | Age: 69
Setting detail: OBSERVATION
End: 2024-06-01
Attending: EMERGENCY MEDICINE | Admitting: NURSE PRACTITIONER
Payer: MEDICARE

## 2024-05-31 VITALS
TEMPERATURE: 98.2 F | WEIGHT: 116 LBS | DIASTOLIC BLOOD PRESSURE: 55 MMHG | SYSTOLIC BLOOD PRESSURE: 93 MMHG | RESPIRATION RATE: 16 BRPM | OXYGEN SATURATION: 96 % | HEART RATE: 92 BPM | BODY MASS INDEX: 16.64 KG/M2

## 2024-05-31 DIAGNOSIS — K94.23 PEG TUBE MALFUNCTION (MULTI): Primary | ICD-10-CM

## 2024-05-31 LAB
ABO GROUP (TYPE) IN BLOOD: NORMAL
ANION GAP SERPL CALC-SCNC: 18 MMOL/L (ref 10–20)
ANTIBODY SCREEN: NORMAL
BASOPHILS # BLD AUTO: 0.03 X10*3/UL (ref 0–0.1)
BASOPHILS NFR BLD AUTO: 0.2 %
BUN SERPL-MCNC: 51 MG/DL (ref 6–23)
CALCIUM SERPL-MCNC: 10.7 MG/DL (ref 8.6–10.6)
CHLORIDE SERPL-SCNC: 114 MMOL/L (ref 98–107)
CO2 SERPL-SCNC: 20 MMOL/L (ref 21–32)
CREAT SERPL-MCNC: 2.36 MG/DL (ref 0.5–1.3)
EGFRCR SERPLBLD CKD-EPI 2021: 29 ML/MIN/1.73M*2
EOSINOPHIL # BLD AUTO: 1.13 X10*3/UL (ref 0–0.4)
EOSINOPHIL NFR BLD AUTO: 9.3 %
ERYTHROCYTE [DISTWIDTH] IN BLOOD BY AUTOMATED COUNT: 24.2 % (ref 11.5–14.5)
GLUCOSE BLD MANUAL STRIP-MCNC: 168 MG/DL (ref 74–99)
GLUCOSE BLD MANUAL STRIP-MCNC: 184 MG/DL (ref 74–99)
GLUCOSE BLD MANUAL STRIP-MCNC: 193 MG/DL (ref 74–99)
GLUCOSE BLD MANUAL STRIP-MCNC: 221 MG/DL (ref 74–99)
GLUCOSE BLD MANUAL STRIP-MCNC: 263 MG/DL (ref 74–99)
GLUCOSE BLD MANUAL STRIP-MCNC: 272 MG/DL (ref 74–99)
GLUCOSE BLD MANUAL STRIP-MCNC: 76 MG/DL (ref 74–99)
GLUCOSE SERPL-MCNC: 338 MG/DL (ref 74–99)
HCT VFR BLD AUTO: 24.4 % (ref 41–52)
HGB BLD-MCNC: 7.2 G/DL (ref 13.5–17.5)
HOLD SPECIMEN: NORMAL
HYPOCHROMIA BLD QL SMEAR: NORMAL
IMM GRANULOCYTES # BLD AUTO: 0.07 X10*3/UL (ref 0–0.5)
IMM GRANULOCYTES NFR BLD AUTO: 0.6 % (ref 0–0.9)
LYMPHOCYTES # BLD AUTO: 1.83 X10*3/UL (ref 0.8–3)
LYMPHOCYTES NFR BLD AUTO: 15 %
MAGNESIUM SERPL-MCNC: 2.22 MG/DL (ref 1.6–2.4)
MCH RBC QN AUTO: 26.5 PG (ref 26–34)
MCHC RBC AUTO-ENTMCNC: 29.5 G/DL (ref 32–36)
MCV RBC AUTO: 90 FL (ref 80–100)
MONOCYTES # BLD AUTO: 0.49 X10*3/UL (ref 0.05–0.8)
MONOCYTES NFR BLD AUTO: 4 %
NEUTROPHILS # BLD AUTO: 8.63 X10*3/UL (ref 1.6–5.5)
NEUTROPHILS NFR BLD AUTO: 70.9 %
NRBC BLD-RTO: 0.2 /100 WBCS (ref 0–0)
PHOSPHATE SERPL-MCNC: 4.5 MG/DL (ref 2.5–4.9)
PLATELET # BLD AUTO: 377 X10*3/UL (ref 150–450)
POTASSIUM SERPL-SCNC: 3.5 MMOL/L (ref 3.5–5.3)
RBC # BLD AUTO: 2.72 X10*6/UL (ref 4.5–5.9)
RBC MORPH BLD: NORMAL
RH FACTOR (ANTIGEN D): NORMAL
SODIUM SERPL-SCNC: 148 MMOL/L (ref 136–145)
WBC # BLD AUTO: 12.2 X10*3/UL (ref 4.4–11.3)

## 2024-05-31 PROCEDURE — 2500000004 HC RX 250 GENERAL PHARMACY W/ HCPCS (ALT 636 FOR OP/ED): Mod: JG,SE | Performed by: INTERNAL MEDICINE

## 2024-05-31 PROCEDURE — 84100 ASSAY OF PHOSPHORUS: CPT

## 2024-05-31 PROCEDURE — 99285 EMERGENCY DEPT VISIT HI MDM: CPT | Mod: 25

## 2024-05-31 PROCEDURE — 86901 BLOOD TYPING SEROLOGIC RH(D): CPT | Mod: 91

## 2024-05-31 PROCEDURE — 99497 ADVNCD CARE PLAN 30 MIN: CPT

## 2024-05-31 PROCEDURE — 99285 EMERGENCY DEPT VISIT HI MDM: CPT | Performed by: EMERGENCY MEDICINE

## 2024-05-31 PROCEDURE — 2500000001 HC RX 250 WO HCPCS SELF ADMINISTERED DRUGS (ALT 637 FOR MEDICARE OP): Mod: SE | Performed by: NURSE PRACTITIONER

## 2024-05-31 PROCEDURE — 96366 THER/PROPH/DIAG IV INF ADDON: CPT

## 2024-05-31 PROCEDURE — 2500000004 HC RX 250 GENERAL PHARMACY W/ HCPCS (ALT 636 FOR OP/ED): Mod: SE | Performed by: STUDENT IN AN ORGANIZED HEALTH CARE EDUCATION/TRAINING PROGRAM

## 2024-05-31 PROCEDURE — 83735 ASSAY OF MAGNESIUM: CPT

## 2024-05-31 PROCEDURE — G0378 HOSPITAL OBSERVATION PER HR: HCPCS

## 2024-05-31 PROCEDURE — 93005 ELECTROCARDIOGRAM TRACING: CPT

## 2024-05-31 PROCEDURE — 99222 1ST HOSP IP/OBS MODERATE 55: CPT | Performed by: NURSE PRACTITIONER

## 2024-05-31 PROCEDURE — 96365 THER/PROPH/DIAG IV INF INIT: CPT

## 2024-05-31 PROCEDURE — 2500000002 HC RX 250 W HCPCS SELF ADMINISTERED DRUGS (ALT 637 FOR MEDICARE OP, ALT 636 FOR OP/ED): Mod: SE | Performed by: NURSE PRACTITIONER

## 2024-05-31 PROCEDURE — 85025 COMPLETE CBC W/AUTO DIFF WBC: CPT

## 2024-05-31 PROCEDURE — 96372 THER/PROPH/DIAG INJ SC/IM: CPT | Performed by: INTERNAL MEDICINE

## 2024-05-31 PROCEDURE — 99223 1ST HOSP IP/OBS HIGH 75: CPT

## 2024-05-31 PROCEDURE — 80048 BASIC METABOLIC PNL TOTAL CA: CPT

## 2024-05-31 PROCEDURE — 92610 EVALUATE SWALLOWING FUNCTION: CPT | Mod: GN

## 2024-05-31 PROCEDURE — 99221 1ST HOSP IP/OBS SF/LOW 40: CPT | Performed by: STUDENT IN AN ORGANIZED HEALTH CARE EDUCATION/TRAINING PROGRAM

## 2024-05-31 PROCEDURE — 82947 ASSAY GLUCOSE BLOOD QUANT: CPT | Mod: 91,MUE

## 2024-05-31 PROCEDURE — 99498 ADVNCD CARE PLAN ADDL 30 MIN: CPT

## 2024-05-31 RX ORDER — ACETAMINOPHEN 650 MG/1
650 SUPPOSITORY RECTAL EVERY 4 HOURS PRN
Status: DISCONTINUED | OUTPATIENT
Start: 2024-05-31 | End: 2024-06-01 | Stop reason: HOSPADM

## 2024-05-31 RX ORDER — ACETAMINOPHEN 160 MG/5ML
650 SOLUTION ORAL EVERY 4 HOURS PRN
Status: DISCONTINUED | OUTPATIENT
Start: 2024-05-31 | End: 2024-06-01 | Stop reason: HOSPADM

## 2024-05-31 RX ORDER — INSULIN LISPRO 100 [IU]/ML
0-10 INJECTION, SOLUTION INTRAVENOUS; SUBCUTANEOUS EVERY 4 HOURS
Status: DISCONTINUED | OUTPATIENT
Start: 2024-05-31 | End: 2024-06-01 | Stop reason: HOSPADM

## 2024-05-31 RX ORDER — SODIUM CHLORIDE 450 MG/100ML
100 INJECTION, SOLUTION INTRAVENOUS CONTINUOUS
Status: DISCONTINUED | OUTPATIENT
Start: 2024-05-31 | End: 2024-06-01 | Stop reason: HOSPADM

## 2024-05-31 RX ORDER — DEXTROSE MONOHYDRATE 50 MG/ML
75 INJECTION, SOLUTION INTRAVENOUS CONTINUOUS
Status: DISCONTINUED | OUTPATIENT
Start: 2024-05-31 | End: 2024-06-01 | Stop reason: HOSPADM

## 2024-05-31 RX ORDER — OLANZAPINE 10 MG/2ML
2.5 INJECTION, POWDER, FOR SOLUTION INTRAMUSCULAR EVERY 6 HOURS PRN
Status: DISCONTINUED | OUTPATIENT
Start: 2024-05-31 | End: 2024-06-01 | Stop reason: HOSPADM

## 2024-05-31 RX ORDER — DIPHENHYDRAMINE HYDROCHLORIDE 50 MG/ML
25 INJECTION, SOLUTION INTRAMUSCULAR; INTRAVENOUS EVERY 8 HOURS PRN
Status: DISCONTINUED | OUTPATIENT
Start: 2024-05-31 | End: 2024-06-01 | Stop reason: HOSPADM

## 2024-05-31 RX ORDER — ACETAMINOPHEN 500 MG
10 TABLET ORAL NIGHTLY PRN
Status: DISCONTINUED | OUTPATIENT
Start: 2024-05-31 | End: 2024-06-01 | Stop reason: HOSPADM

## 2024-05-31 RX ORDER — DEXTROSE 50 % IN WATER (D50W) INTRAVENOUS SYRINGE
12.5
Status: DISCONTINUED | OUTPATIENT
Start: 2024-05-31 | End: 2024-06-01 | Stop reason: HOSPADM

## 2024-05-31 RX ORDER — POLYETHYLENE GLYCOL 3350 17 G/17G
17 POWDER, FOR SOLUTION ORAL DAILY PRN
Status: DISCONTINUED | OUTPATIENT
Start: 2024-05-31 | End: 2024-06-01 | Stop reason: HOSPADM

## 2024-05-31 RX ORDER — ACETAMINOPHEN 325 MG/1
650 TABLET ORAL EVERY 4 HOURS PRN
Status: DISCONTINUED | OUTPATIENT
Start: 2024-05-31 | End: 2024-06-01 | Stop reason: HOSPADM

## 2024-05-31 RX ORDER — DIPHENHYDRAMINE HCL 25 MG
25 CAPSULE ORAL EVERY 8 HOURS PRN
Status: DISCONTINUED | OUTPATIENT
Start: 2024-05-31 | End: 2024-06-01 | Stop reason: HOSPADM

## 2024-05-31 RX ADMIN — INSULIN LISPRO 0.2 UNITS: 100 INJECTION, SOLUTION INTRAVENOUS; SUBCUTANEOUS at 16:42

## 2024-05-31 RX ADMIN — SODIUM CHLORIDE 100 ML/HR: 4.5 INJECTION, SOLUTION INTRAVENOUS at 11:54

## 2024-05-31 RX ADMIN — INSULIN LISPRO 0.4 UNITS: 100 INJECTION, SOLUTION INTRAVENOUS; SUBCUTANEOUS at 11:55

## 2024-05-31 RX ADMIN — DEXTROSE MONOHYDRATE 75 ML/HR: 50 INJECTION, SOLUTION INTRAVENOUS at 07:10

## 2024-05-31 RX ADMIN — OLANZAPINE 2.5 MG: 10 INJECTION, POWDER, FOR SOLUTION INTRAMUSCULAR at 20:49

## 2024-05-31 RX ADMIN — INSULIN LISPRO 6 UNITS: 100 INJECTION, SOLUTION INTRAVENOUS; SUBCUTANEOUS at 20:51

## 2024-05-31 RX ADMIN — Medication 10 MG: at 20:42

## 2024-05-31 RX ADMIN — SODIUM CHLORIDE 100 ML/HR: 4.5 INJECTION, SOLUTION INTRAVENOUS at 20:43

## 2024-05-31 ASSESSMENT — COGNITIVE AND FUNCTIONAL STATUS - GENERAL
TOILETING: A LOT
MOBILITY SCORE: 12
MOBILITY SCORE: 6
EATING MEALS: A LOT
MOBILITY SCORE: 12
PERSONAL GROOMING: A LOT
PATIENT BASELINE BEDBOUND: UNABLE TO ASSESS AT THIS TIME
MOVING TO AND FROM BED TO CHAIR: A LOT
CLIMB 3 TO 5 STEPS WITH RAILING: TOTAL
HELP NEEDED FOR BATHING: A LOT
WALKING IN HOSPITAL ROOM: TOTAL
MOVING FROM LYING ON BACK TO SITTING ON SIDE OF FLAT BED WITH BEDRAILS: A LOT
PERSONAL GROOMING: A LOT
EATING MEALS: TOTAL
WALKING IN HOSPITAL ROOM: A LOT
CLIMB 3 TO 5 STEPS WITH RAILING: A LOT
DRESSING REGULAR LOWER BODY CLOTHING: A LOT
DRESSING REGULAR UPPER BODY CLOTHING: A LOT
MOVING FROM LYING ON BACK TO SITTING ON SIDE OF FLAT BED WITH BEDRAILS: A LOT
EATING MEALS: A LOT
STANDING UP FROM CHAIR USING ARMS: A LOT
CLIMB 3 TO 5 STEPS WITH RAILING: TOTAL
CLIMB 3 TO 5 STEPS WITH RAILING: A LOT
WALKING IN HOSPITAL ROOM: TOTAL
WALKING IN HOSPITAL ROOM: A LOT
TURNING FROM BACK TO SIDE WHILE IN FLAT BAD: TOTAL
DAILY ACTIVITIY SCORE: 12
PERSONAL GROOMING: TOTAL
DAILY ACTIVITIY SCORE: 6
DRESSING REGULAR UPPER BODY CLOTHING: A LOT
EATING MEALS: A LOT
STANDING UP FROM CHAIR USING ARMS: A LOT
TOILETING: A LOT
DRESSING REGULAR UPPER BODY CLOTHING: TOTAL
HELP NEEDED FOR BATHING: TOTAL
TOILETING: A LOT
STANDING UP FROM CHAIR USING ARMS: TOTAL
HELP NEEDED FOR BATHING: A LOT
DRESSING REGULAR LOWER BODY CLOTHING: TOTAL
DRESSING REGULAR UPPER BODY CLOTHING: TOTAL
DAILY ACTIVITIY SCORE: 12
TURNING FROM BACK TO SIDE WHILE IN FLAT BAD: A LOT
STANDING UP FROM CHAIR USING ARMS: TOTAL
TURNING FROM BACK TO SIDE WHILE IN FLAT BAD: A LOT
TURNING FROM BACK TO SIDE WHILE IN FLAT BAD: A LOT
TOILETING: TOTAL
MOVING TO AND FROM BED TO CHAIR: A LOT
MOVING TO AND FROM BED TO CHAIR: A LOT
PERSONAL GROOMING: A LOT
MOVING TO AND FROM BED TO CHAIR: TOTAL
MOVING FROM LYING ON BACK TO SITTING ON SIDE OF FLAT BED WITH BEDRAILS: A LOT
DRESSING REGULAR LOWER BODY CLOTHING: TOTAL
MOBILITY SCORE: 9
HELP NEEDED FOR BATHING: TOTAL
DRESSING REGULAR LOWER BODY CLOTHING: A LOT
DAILY ACTIVITIY SCORE: 9
MOVING FROM LYING ON BACK TO SITTING ON SIDE OF FLAT BED WITH BEDRAILS: TOTAL

## 2024-05-31 ASSESSMENT — LIFESTYLE VARIABLES
HAVE YOU EVER FELT YOU SHOULD CUT DOWN ON YOUR DRINKING: NO
EVER HAD A DRINK FIRST THING IN THE MORNING TO STEADY YOUR NERVES TO GET RID OF A HANGOVER: NO
HAVE PEOPLE ANNOYED YOU BY CRITICIZING YOUR DRINKING: NO
TOTAL SCORE: 0
EVER FELT BAD OR GUILTY ABOUT YOUR DRINKING: NO

## 2024-05-31 ASSESSMENT — PAIN SCALES - GENERAL
PAINLEVEL_OUTOF10: 0 - NO PAIN

## 2024-05-31 ASSESSMENT — COLUMBIA-SUICIDE SEVERITY RATING SCALE - C-SSRS
1. IN THE PAST MONTH, HAVE YOU WISHED YOU WERE DEAD OR WISHED YOU COULD GO TO SLEEP AND NOT WAKE UP?: NO
2. HAVE YOU ACTUALLY HAD ANY THOUGHTS OF KILLING YOURSELF?: NO
6. HAVE YOU EVER DONE ANYTHING, STARTED TO DO ANYTHING, OR PREPARED TO DO ANYTHING TO END YOUR LIFE?: NO

## 2024-05-31 ASSESSMENT — PAIN - FUNCTIONAL ASSESSMENT
PAIN_FUNCTIONAL_ASSESSMENT: 0-10
PAIN_FUNCTIONAL_ASSESSMENT: 0-10

## 2024-05-31 ASSESSMENT — PAIN SCALES - WONG BAKER: WONGBAKER_NUMERICALRESPONSE: NO HURT

## 2024-05-31 NOTE — CARE PLAN
Problem: Skin  Goal: Decreased wound size/increased tissue granulation at next dressing change  Outcome: Not Progressing  Goal: Participates in plan/prevention/treatment measures  Outcome: Not Progressing  Goal: Prevent/manage excess moisture  Outcome: Not Progressing  Goal: Prevent/minimize sheer/friction injuries  Outcome: Not Progressing  Goal: Promote/optimize nutrition  Outcome: Not Progressing  Goal: Promote skin healing  Outcome: Not Progressing   The patient's goals for the shift include      The clinical goals for the shift include pt will remain free from falls    Over the shift, the patient did not make progress toward the following goals. Barriers to progression include . Recommendations to address these barriers include .    Problem: Skin  Goal: Decreased wound size/increased tissue granulation at next dressing change  Outcome: Not Progressing  Goal: Participates in plan/prevention/treatment measures  Outcome: Not Progressing  Goal: Prevent/manage excess moisture  Outcome: Not Progressing  Goal: Prevent/minimize sheer/friction injuries  Outcome: Not Progressing  Goal: Promote/optimize nutrition  Outcome: Not Progressing  Goal: Promote skin healing  Outcome: Not Progressing

## 2024-05-31 NOTE — ED PROVIDER NOTES
HPI   Chief Complaint   Patient presents with    pegtube problem       69 y.o. male with PMH of CKD, DM2, HTN, sacral wound, bipolar disorder who was admitted on 4/17 from his SNF 2/2 encephalopathy, he had a complicated hospital stay requiring MICU admission for multiple episodes of hemodynamic instability requiring Airvo support and acute hypoxemic respiratory failure in the setting of likely aspiration pneumonia/pneumonitis covered with antibiotics, recent treatment for presumed neurosyphilis with IV penicillin course.  Dysphagia recently placed PEG tube on 5/7 with gastroenterology while inpatient for multiple aspiration events, discharged on 5/15 presents to the emergency department for PEG tube dislodgment.  Patient is ANO x 1 not able to provide history but according to EMS his PEG tube was pulled out by the patient.  Patient states no complaints.      History provided by:  EMS personnel and medical records  History limited by:  Dementia                      [place] Coma Scale Score: 15                     Patient History   No past medical history on file.  No past surgical history on file.  No family history on file.  Social History     Tobacco Use    Smoking status: Never     Passive exposure: Never    Smokeless tobacco: Not on file   Vaping Use    Vaping status: Unknown   Substance Use Topics    Alcohol use: Not Currently    Drug use: Defer       Physical Exam   ED Triage Vitals [05/31/24 0051]   Temperature Heart Rate Respirations BP   35.8 °C (96.5 °F) 78 18 97/55      Pulse Ox Temp Source Heart Rate Source Patient Position   97 % Temporal -- --      BP Location FiO2 (%)     -- --       Physical Exam  Constitutional:       General: He is not in acute distress.     Appearance: He is ill-appearing.   HENT:      Head: Normocephalic.      Mouth/Throat:      Mouth: Mucous membranes are moist.      Pharynx: Oropharynx is clear.   Eyes:      Extraocular Movements: Extraocular movements intact.      Pupils:  Pupils are equal, round, and reactive to light.   Cardiovascular:      Rate and Rhythm: Normal rate. Rhythm irregular.      Pulses: Normal pulses.   Pulmonary:      Effort: Pulmonary effort is normal.      Breath sounds: Normal breath sounds.   Abdominal:      General: Abdomen is flat. There is no distension.      Palpations: Abdomen is soft.      Tenderness: There is no abdominal tenderness. There is no guarding or rebound.      Comments: PEG tube site has some slight serous drainage from with no blood no purulence no tenderness around the site   Musculoskeletal:         General: No tenderness.      Cervical back: Neck supple. No rigidity.   Skin:     General: Skin is warm and dry.      Capillary Refill: Capillary refill takes less than 2 seconds.   Neurological:      Mental Status: He is alert. Mental status is at baseline.      Comments: Alert and oriented x 2, moves all 4 extremities spontaneously with no obvious deficits in strength or sensation cranial nerves grossly intact, no facial asymmetry no abnormalities of EOMs PERRLA no tongue deviation         ED Course & MDM   Diagnoses as of 05/31/24 0120   PEG tube malfunction (Multi)       Medical Decision Making  69-year-old male who presents borderline hypotensive but otherwise stable with no evidence of malperfusion to the emergency department after PEG tube dislodgment.  He is in no acute distress and his abdomen is nonperitoneal but there is scant amount of serous drainage from his PEG tube site there is no PEG tube accompanying him and the patient is unable to provide any supplemental information about the PEG tube.  Will obtain basic laboratory studies patient to be made n.p.o. gastroenterology consulted for PEG tube replacement as the patient is PEG tube was placed by gastroenterology on his recent admission.  Case discussed with gastroenterology fellow recommends admission for replacement of the PEG tube tomorrow.  Patient admitted to the medicine  service.  Patient seen and discussed with the attending of record.        Procedure  Procedures     Leighton Reid MD  Resident  05/31/24 9689

## 2024-05-31 NOTE — PROGRESS NOTES
Brian Rodriguez is a 69 y.o. male on day 0 of admission presenting with PEG tube malfunction (Multi).      Dietary Orders (From admission, onward)               NPO Diet; Effective now  Diet effective now                           Objective     Vitals  Temp:  [35.8 °C (96.5 °F)-36.7 °C (98.1 °F)] 36.7 °C (98.1 °F)  Heart Rate:  [76-83] 76  Resp:  [17-19] 19  BP: ()/(51-63) 95/51       Pain Score: 0 - No pain         Peripheral IV 05/31/24 Left;Proximal;Anterior Forearm (Active)   Number of days: 0       Urethral Catheter (Active)   Number of days: 27       Vent Settings       Intake/Output Summary (Last 24 hours) at 5/31/2024 0827  Last data filed at 5/31/2024 0507  Gross per 24 hour   Intake --   Output 400 ml   Net -400 ml       Relevant Results  Scheduled medications  insulin lispro, 0-10 Units, subcutaneous, q4h      Continuous medications  dextrose 5%, 75 mL/hr, Last Rate: 75 mL/hr (05/31/24 0710)  sodium chloride, 75 mL/hr      PRN medications  PRN medications: acetaminophen **OR** acetaminophen **OR** acetaminophen, dextrose, glucagon, melatonin, polyethylene glycol  Results for orders placed or performed during the hospital encounter of 05/31/24 (from the past 24 hour(s))   CBC and Auto Differential   Result Value Ref Range    WBC 12.2 (H) 4.4 - 11.3 x10*3/uL    nRBC 0.2 (H) 0.0 - 0.0 /100 WBCs    RBC 2.72 (L) 4.50 - 5.90 x10*6/uL    Hemoglobin 7.2 (L) 13.5 - 17.5 g/dL    Hematocrit 24.4 (L) 41.0 - 52.0 %    MCV 90 80 - 100 fL    MCH 26.5 26.0 - 34.0 pg    MCHC 29.5 (L) 32.0 - 36.0 g/dL    RDW 24.2 (H) 11.5 - 14.5 %    Platelets 377 150 - 450 x10*3/uL    Neutrophils % 70.9 40.0 - 80.0 %    Immature Granulocytes %, Automated 0.6 0.0 - 0.9 %    Lymphocytes % 15.0 13.0 - 44.0 %    Monocytes % 4.0 2.0 - 10.0 %    Eosinophils % 9.3 0.0 - 6.0 %    Basophils % 0.2 0.0 - 2.0 %    Neutrophils Absolute 8.63 (H) 1.60 - 5.50 x10*3/uL    Immature Granulocytes Absolute, Automated 0.07 0.00 - 0.50 x10*3/uL     Lymphocytes Absolute 1.83 0.80 - 3.00 x10*3/uL    Monocytes Absolute 0.49 0.05 - 0.80 x10*3/uL    Eosinophils Absolute 1.13 (H) 0.00 - 0.40 x10*3/uL    Basophils Absolute 0.03 0.00 - 0.10 x10*3/uL   Basic metabolic panel   Result Value Ref Range    Glucose 338 (H) 74 - 99 mg/dL    Sodium 148 (H) 136 - 145 mmol/L    Potassium 3.5 3.5 - 5.3 mmol/L    Chloride 114 (H) 98 - 107 mmol/L    Bicarbonate 20 (L) 21 - 32 mmol/L    Anion Gap 18 10 - 20 mmol/L    Urea Nitrogen 51 (H) 6 - 23 mg/dL    Creatinine 2.36 (H) 0.50 - 1.30 mg/dL    eGFR 29 (L) >60 mL/min/1.73m*2    Calcium 10.7 (H) 8.6 - 10.6 mg/dL   Type and screen   Result Value Ref Range    ABO TYPE O     Rh TYPE POS     ANTIBODY SCREEN NEG    SST TOP   Result Value Ref Range    Extra Tube Hold for add-ons.    Magnesium   Result Value Ref Range    Magnesium 2.22 1.60 - 2.40 mg/dL   Phosphorus   Result Value Ref Range    Phosphorus 4.5 2.5 - 4.9 mg/dL   Morphology   Result Value Ref Range    RBC Morphology See Below     Hypochromia Mild    POCT GLUCOSE   Result Value Ref Range    POCT Glucose 76 74 - 99 mg/dL   POCT GLUCOSE   Result Value Ref Range    POCT Glucose 184 (H) 74 - 99 mg/dL              Assessment/Plan       Mr. Brian Rodriguez is a 69 y.o. male CKD, DM2, HTN, sacral wound, bipolar disorder, encephalopathy, Neurosyphilis s/p IV penicillin in last admission and aspiration pneumonia. Admitted for dislodged PEG tube, GI consulted pending placement, PT/OT consulted.        S/  See and examined  No new complaints no new event over the night   ROS: Negative    O/   General Appearance:  baseline altered, not Cooperative, Not in Acute Distress  HEENT: no eye redness, not pale, no jaundice, Throat: not congested, no ulcers    Chest: Good AE bilateral, No crackles, no ronchies, no wheezes.   Heart: RHR, Normal heart sounds S1, S2, no murmur or gallop,   Abdomen: Soft, lax, no hepatosplenomegaly, intact hernia orifices, negative martínez sign, no tenderness,    Genitalia: no abnormal discharge, no ulcers, no swelling.  Lymphatics: no lymphadenopathy, supraclavicular, inguinal trochlear, or axilla.   Neurology: grossly intact but difficult to exam   Extremities: no signs of PAD, no swelling no ulcers   Back: no deformity, no SI tenderness, no ulcers.   Skin: no signs of dehydration, no Rash, Bruises, or purpura.      AS:    Brian Rodriguez is a 69 y.o. male CKD, DM2, HTN, sacral wound, bipolar disorder who notably was admitted on 4/17 from his SNF 2/2 encephalopathy, he had a complicated hospital stay requiring MICU admission for multiple episodes of hemodynamic instability requiring Airvo support and acute hypoxemic respiratory failure in the setting of likely aspiration pneumonia/pneumonitis covered with antibiotics, recent treatment for presumed neurosyphilis with IV penicillin course who presented to the ED with a PEG tube dislodgement.  He was apparently at home when he pulled out his PEG tube. On exam in ED38H, he remains confused and unable to provide additional history        #PEG tube malfunction  #Dysphagia  -Consult GI for PEG replacement  -Keep NPO  -s/p NS @ 75 mL/hr mIVF     #CKD  -baseline   -Avoid nephrotoxic agents where possible  -Renal dosing where indicated  -Follow RFP     #Hypernatremia  #Hypercalcemia  -due to dehydration    -Appears hypovolemic on exam       #T2DM  -Poorly controlled, hyperglycemic on arrival  -Mod SSI Q4H     #Leukocytosis  -Appears downtrending, likely dehydration concentrated   -Low concern for new infection  - c/w Monitor     #Normocytic anemia  -Hgb appears downtrending  -Follow, transfuse if Hgb < 7.0          Code Status: Full  DVT ppx:    Disp: PT/ot         Spoke to my patient, Discussed the medical, social conditions, the reason for this admission explained our plan and recommendations.  Time spent on the assessment of patient, gathering and interpreting data, review of medical record/patient history, personally  reviewing radiographic imaging. With greater than 50% spent in personal discussion with patient.  Time >         Adrián Watson MD  \

## 2024-05-31 NOTE — PROGRESS NOTES
"Speech-Language Pathology  Adult Inpatient Clinical Bedside Swallow Evaluation    Patient Name: Brian Rodriguez  MRN: [id number]  Today's Date: 5/31/2024   Start Time: 1130  Stop Time: 1200  Time Calculation (min): 30 minutes    History of Present Illness:   Per EMR: \"Brian Rodriguez is a 69 y.o. male CKD, DM2, HTN, sacral wound, bipolar disorder who notably was admitted on 4/17 from his SNF 2/2 encephalopathy, he had a complicated hospital stay requiring MICU admission for multiple episodes of hemodynamic instability requiring Airvo support and acute hypoxemic respiratory failure in the setting of likely aspiration pneumonia/pneumonitis covered with antibiotics, recent treatment for presumed neurosyphilis with IV penicillin course who presented to the ED with a PEG tube dislodgement.  He was apparently at home when he pulled out his PEG tube. On exam in ED38H, he remains confused and unable to provide additional history.\"    Assessment:   Clinical bedside swallow evaluation completed. Pt known to this SLP from previous admission. Pt w/ profoundly impaired oropharyngeal swallow per MBSS. PEG placed. Pt received awake/alert, positioned upright in bed. Noted pt to be restless and poor attention to task. Required multiple redirections to task for active participation in assessment. A&O x1 - self only. Delayed responses to questions after multiple repetitions. Overall, mentation/cognitive function worsened compared to last encounter per previous admission. Noted dysphonia w/ wet/gurgly vocal quality - ? Pt's ability to manage oropharyngeal secretions. Coughing prior to PO trials. OME limited 2/2 pt's decreased ability to follow commands - Noted reduced lingual ROM and strength. Pt given ice chips x3 and x1 1/2 tsp sip of water. Pt presented w/ prolonged oral phase. Multiple swallows per bolus. Immediate coughing/choking s/p ea. Given trial. Nonproductive cough w/ inability to clear. PO trials ceased 2/2 concern for " pharyngeal dysphagia/aspiration. Instrumental assessment deemed not appropriate at this time given severity of presentation at the bedside.   Given limited improvement compared to previous encounter as well as worsened mentation, ? Palliative care consult for GOC discussion vs. Replacement of long-term means of nutrition/hydration. RN and MD notified.      Recommendations:  NPO  Frequent, aggressive oral care is strongly recommended to improve infection control as well as reduce dental plaque and bacteria on oropharyngeal surfaces which may increase the risk nosocomial infections, including pneumonia.  Frequent, aggressive oral care is strongly recommended to improve infection control as well as reduce dental plaque and bacteria on oropharyngeal surfaces which may increase the risk nosocomial infections, including pneumonia.  Recommend continuation of an alternate form of nutrition/hydration vs. Palliative care consult for GOC discussion.   Recommend RT consult     Goal:   Pt will tolerate least restrictive diet and recall/utilize safe swallow guidelines independently with no clinical s/s of aspiration 100% of time        Plan:  SLP Services Indicated: Yes  Frequency: 2x week  Discussed POC with patient  SLP - OK to Discharge    Pain:   0-10  0 = No pain.     Inpatient Education:  Extensive education provided to patient regarding current swallow function, recommendations/results, and POC.      Consultations/Referrals/Coordination of Services:   RT

## 2024-05-31 NOTE — PROGRESS NOTES
Brian Rodriguez is a 69 y.o. male on day 0 of admission presenting with PEG tube malfunction (Multi).    Subjective   Chart reviewed; patient known to this SW from previous admission. Patient is a LTC resident at [facility]; return referral initiated for return. Patient pending PEG tube replacement.    SW will continue to follow.    UPDATE 1363 Received update from [facility] that patient is able to return whenever he is medically cleared for discharge. No precert required as he is a LTC Medicaid bed hold.    - Jacki GUSMAN, MA, LSW  Care Transitions   Epic Secure Chat or [phone]

## 2024-05-31 NOTE — H&P
History Of Present Illness  Brian Rodriguez is a 69 y.o. male CKD, DM2, HTN, sacral wound, bipolar disorder who notably was admitted on 4/17 from his SNF 2/2 encephalopathy, he had a complicated hospital stay requiring MICU admission for multiple episodes of hemodynamic instability requiring Airvo support and acute hypoxemic respiratory failure in the setting of likely aspiration pneumonia/pneumonitis covered with antibiotics, recent treatment for presumed neurosyphilis with IV penicillin course who presented to the ED with a PEG tube dislodgement.  He was apparently at home when he pulled out his PEG tube. On exam in ED38H, he remains confused and unable to provide additional history     Past Medical History  No past medical history on file.    Surgical History  No past surgical history on file.     Social History  He reports that he has never smoked. He has never been exposed to tobacco smoke. He does not have any smokeless tobacco history on file. He reports that he does not currently use alcohol. Drug use questions deferred to the physician.    Family History  No family history on file.     Allergies  Patient has no known allergies.    Review of Systems   Unable to perform ROS: Dementia        Physical Exam  Vitals reviewed.   HENT:      Head: Normocephalic and atraumatic.      Mouth/Throat:      Mouth: Mucous membranes are dry.   Cardiovascular:      Rate and Rhythm: Normal rate and regular rhythm.      Heart sounds: Normal heart sounds.   Pulmonary:      Effort: Pulmonary effort is normal.      Breath sounds: Normal air entry.   Abdominal:      General: Bowel sounds are normal.      Palpations: Abdomen is soft.      Tenderness: There is no abdominal tenderness.   Musculoskeletal:         General: No deformity.   Skin:     General: Skin is warm and dry.   Neurological:      General: No focal deficit present.      Mental Status: He is alert. Mental status is at baseline. He is disoriented.   Psychiatric:          Mood and Affect: Mood normal.         Behavior: Behavior normal.          Last Recorded Vitals  Blood pressure 103/63, pulse 83, temperature 35.8 °C (96.5 °F), temperature source Temporal, resp. rate 17, SpO2 97%.    Relevant Results      Lab Results   Component Value Date    WBC 12.2 (H) 05/31/2024    HGB 7.2 (L) 05/31/2024    HCT 24.4 (L) 05/31/2024    MCV 90 05/31/2024     05/31/2024     Lab Results   Component Value Date    GLUCOSE 338 (H) 05/31/2024    CALCIUM 10.7 (H) 05/31/2024     (H) 05/31/2024    K 3.5 05/31/2024    CO2 20 (L) 05/31/2024     (H) 05/31/2024    BUN 51 (H) 05/31/2024    CREATININE 2.36 (H) 05/31/2024            Assessment/Plan   Principal Problem:    PEG tube malfunction (Multi)      #PEG tube malfunction  #Dysphagia  -Consult GI in AM for replacement  -Keep NPO  -1/2 NS @ 75 mL/hr mIVF    #CKD  -Creatinine appears near recent values  -mIVF as above  -Avoid nephrotoxic agents where possible  -Renal dosing where indicated  -Follow RFP    #Hypernatremia  #Hypercalcemia  #Hyperchloremia  -Suspect 2/2 dehydration given #1 and hyperglycemic state  -Appears hypovolemic on exam  -mIVF as above  -Follow    #T2DM  -Poorly controlled, hyperglycemic on arrival  -Mod SSI Q4H    #Leukocytosis  -Appears downtrending  -Low concern for new infection  -Monitor    #Normocytic anemia  -Hgb appears downtrending  -Follow, transfuse if Hgb < 7.0         Gerardo Thomas, APRN-CNP

## 2024-05-31 NOTE — CARE PLAN
The patient's goals for the shift include  pt will remain free from falls by end of shift    The clinical goals for the shift include  pain free    Over the shift, the patient did not make progress toward the following goals. Barriers to progression include confusion. Recommendations to address these barriers include .

## 2024-05-31 NOTE — CARE PLAN
The patient's goals for the shift include      The clinical goals for the shift include Patient will remain free from fall and injury during this shift

## 2024-05-31 NOTE — CONSULTS
Wound Care Consult     Visit Date: 5/31/2024      Patient Name: Brian Rodriguez         MRN: [id number]           YOB: 1955     Reason for Consult: sacral/coccygeal wound (nurse request)     Wound History: hx of hidradenitis suppurativa    Wound Assessment:  Wound 04/18/24 Other (comment) Sacrum (Active)   Wound Image   05/31/24 1525   Site Assessment Pink 05/31/24 1525   Annia-Wound Assessment Hyperpigmented;Indurated;Scarred;Hypopigmented 05/31/24 1525   Wound Length (cm) 1 cm 05/31/24 1525   Wound Width (cm) 0.3 cm 05/31/24 1525   Wound Surface Area (cm^2) 0.3 cm^2 05/31/24 1525   Wound Depth (cm) 0.2 cm 05/31/24 1525   Wound Volume (cm^3) 0.06 cm^3 05/31/24 1525   Margins Well-defined edges;Attached edges 05/31/24 1525   Drainage Description Serosanguineous;Tan;Clear 05/31/24 1525   Drainage Amount Scant 05/31/24 1525   Dressing Hydrophilic;Moisture barrier 05/31/24 1525   Dressing Changed New 05/31/24 1525   Dressing Status Clean;Dry 05/31/24 1525       Wound 04/18/24 Other (comment) Scrotum (Active)       Wound 04/29/24 Other (comment) Buttocks Left (Active)       Wound 05/31/24 Heel Right (Active)         Wound Team Summary Assessment: Pt seen per primary nurse request for sacral wound. Pt thin, alert and able to turn self to reveal both hyper- and hypopigmented skin with evidence of many healed wounds. Skin is indurated with scarred over tunnels/sinus tracts. Tiny, shallow, non-draining pink wound noted to L buttocks. Presentation very suspicious for hidradenitis suppurativa (HS). This was later confirmed via chart review. Skin cleaned and coated with Triad barrier cream for protection. This is preferred over Mepilex foam which tends to trap moisture, especially close to perianal area.      Wound Team Recs: Offload sacrum/buttocks with EHOB waffle mat and turning. Keep pt clean and dry. No brief please. Gently clean buttocks/sacral/perianal skin PRN with warm bath wipes when soiled. Apply  Triad barrier cream (oracle #[id number]) BID PRN to soothe and keep skin dry. Pt should follow up outpatient with GI and/or Derm for long-term management of his HS.     Provider, please review and write orders accordingly.      Shannon Henry RN, CWON  5/31/2024  3:35 PM

## 2024-05-31 NOTE — CONSULTS
Inpatient consult to Palliative Care  Consult performed by: RULA Durand-CNP  Consult ordered by: Adrián Watson MD          Palliative Medicine Consult  Complex medical decision making, symptom management, patient/family support    History obtained from chart review including ED note, H&P, patient's daily progress notes, review of lab/test results, and discussion with primary team and bedside RN.    Subjective    History of Present Illness  Brian Rodriguez is a 69y gentleman with a history of bipolar disorder, alcohol use disorder, severe dementia, CKD, T2DM, HTN, sacral wound, dysphagia, ongoing encephalopathy, and neurosyphilis. Admitted for PEG tube dislodgement.     Introduction to Palliative Care  Met with pt at bedside.   Patient remains altered, does not have capacity to make their own medical decisions at this time. Unable to participate in ROS or goals of care discussion. Surrogate decision maker is brother Nam.  Staff present: Asia WHITMAN  Palliative Medicine was introduced as a specialty service for patients with serious illness to help with symptom management, improve quality of life, assist with goals of care conversations, navigate complex decision making, and provide support to patients and families. Support and empathy was provided throughout the encounter. Provided reflective listening and presence.     Symptoms  ROS limited, pt with [facility]    Palliative Medicine Social History:  Pt's legal guardian is serenity Avendano. 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 Used to be very active and independent until his dementia diagnosis 10 years ago,.     Objective    Last Recorded Vitals  /64 (BP Location: Left arm, Patient Position: Lying)   Pulse 88   Temp 36.3 °C (97.3 °F) (Temporal)   Resp 18   Wt 52.6 kg (116 lb)   SpO2 98%   BMI 16.64 kg/m²      Physical Exam  Constitutional:       Appearance: He is ill-appearing.      Comments: Pro darrell mal   HENGLYNN:       Mouth/Throat:      Mouth: Mucous membranes are dry.   Abdominal:      Palpations: Abdomen is soft.   Skin:     General: Skin is dry.      Comments: Sacral wound   Neurological:      Mental Status: He is disoriented.          Relevant Results  Results for orders placed or performed during the hospital encounter of 05/31/24 (from the past 24 hour(s))   CBC and Auto Differential   Result Value Ref Range    WBC 12.2 (H) 4.4 - 11.3 x10*3/uL    nRBC 0.2 (H) 0.0 - 0.0 /100 WBCs    RBC 2.72 (L) 4.50 - 5.90 x10*6/uL    Hemoglobin 7.2 (L) 13.5 - 17.5 g/dL    Hematocrit 24.4 (L) 41.0 - 52.0 %    MCV 90 80 - 100 fL    MCH 26.5 26.0 - 34.0 pg    MCHC 29.5 (L) 32.0 - 36.0 g/dL    RDW 24.2 (H) 11.5 - 14.5 %    Platelets 377 150 - 450 x10*3/uL    Neutrophils % 70.9 40.0 - 80.0 %    Immature Granulocytes %, Automated 0.6 0.0 - 0.9 %    Lymphocytes % 15.0 13.0 - 44.0 %    Monocytes % 4.0 2.0 - 10.0 %    Eosinophils % 9.3 0.0 - 6.0 %    Basophils % 0.2 0.0 - 2.0 %    Neutrophils Absolute 8.63 (H) 1.60 - 5.50 x10*3/uL    Immature Granulocytes Absolute, Automated 0.07 0.00 - 0.50 x10*3/uL    Lymphocytes Absolute 1.83 0.80 - 3.00 x10*3/uL    Monocytes Absolute 0.49 0.05 - 0.80 x10*3/uL    Eosinophils Absolute 1.13 (H) 0.00 - 0.40 x10*3/uL    Basophils Absolute 0.03 0.00 - 0.10 x10*3/uL   Basic metabolic panel   Result Value Ref Range    Glucose 338 (H) 74 - 99 mg/dL    Sodium 148 (H) 136 - 145 mmol/L    Potassium 3.5 3.5 - 5.3 mmol/L    Chloride 114 (H) 98 - 107 mmol/L    Bicarbonate 20 (L) 21 - 32 mmol/L    Anion Gap 18 10 - 20 mmol/L    Urea Nitrogen 51 (H) 6 - 23 mg/dL    Creatinine 2.36 (H) 0.50 - 1.30 mg/dL    eGFR 29 (L) >60 mL/min/1.73m*2    Calcium 10.7 (H) 8.6 - 10.6 mg/dL   Type and screen   Result Value Ref Range    ABO TYPE O     Rh TYPE POS     ANTIBODY SCREEN NEG    SST TOP   Result Value Ref Range    Extra Tube Hold for add-ons.    Magnesium   Result Value Ref Range    Magnesium 2.22 1.60 - 2.40 mg/dL   Phosphorus   Result  Value Ref Range    Phosphorus 4.5 2.5 - 4.9 mg/dL   Morphology   Result Value Ref Range    RBC Morphology See Below     Hypochromia Mild    POCT GLUCOSE   Result Value Ref Range    POCT Glucose 76 74 - 99 mg/dL   POCT GLUCOSE   Result Value Ref Range    POCT Glucose 184 (H) 74 - 99 mg/dL   POCT GLUCOSE   Result Value Ref Range    POCT Glucose 221 (H) 74 - 99 mg/dL   POCT GLUCOSE   Result Value Ref Range    POCT Glucose 263 (H) 74 - 99 mg/dL   POCT GLUCOSE   Result Value Ref Range    POCT Glucose 168 (H) 74 - 99 mg/dL              Allergies  Patient has no known allergies.    Scheduled medications  insulin lispro, 0-10 Units, subcutaneous, q4h      Continuous medications  dextrose 5%, 75 mL/hr, Last Rate: Stopped (05/31/24 1142)  sodium chloride, 100 mL/hr, Last Rate: 100 mL/hr (05/31/24 1154)      PRN medications  PRN medications: acetaminophen **OR** acetaminophen **OR** acetaminophen, dextrose, diphenhydrAMINE **OR** diphenhydrAMINE, glucagon, melatonin, OLANZapine, polyethylene glycol     Assessment/Plan    Brian Rodriguez is a 69y gentleman with a history of bipolar disorder, alcohol use disorder, severe dementia, CKD, T2DM, HTN, sacral wound, dysphagia, ongoing encephalopathy, and neurosyphilis. Admitted for PEG tube dislodgement.   ---------------------------------------------------------------------------------------------------------------------------------------------------------------------------  Advanced Care Planning  Family consented to a voluntary Advanced Care Planning meeting.   Serious Illness Assessment and Counseling:  Life Limiting Disease:   End-stage dementia posing threat to life or function.     Disease Specific Information Provided/Prognosis Discussed: Patient's current clinical condition, including diagnosis, prognosis, and management plan were discussed.   Counseling provided on poor prognosis and what to expect with disease progression of dementia.   Counseling provided on the  "irreversible and progressive nature of patient's diseases including dementia    Feeding Tube in Advanced Dementia: Education provided on dysphagia that can occur as dementia advances and on risks of feeding tube placement in advanced dementia such as infection, patient inadvertently removing tube, and aspiration without prolonging time or quality of life.     Understanding/Overall Impression: family expressing clear understanding of overall health status and severity of illness.     Goals/Hopes: Discussion ensued about patient's goals for their medical care going forward. Allowed family time to talk about his/her current quality of life, disease course/progression, and symptom and treatment burden. Discussed care plan to continue with aggressive hospital care despite symptom and treatment burden versus choosing to transition to comfort based plan of care that focuses on symptom management and quality of life. Family states that pt lives at [facility], and this past Wednesday, pt was enrolled with hospice care there. This was \"revoked\" when pt's PEG tube became dislodged. Family is understanding of poor prognosis, want to focus on comfort care at this time.    Resuscitation Assessment: Counseling provided on the benefit versus burden of CPR in the setting of patient's overall health    Code Status: Decision to change code status to DNRCC per family wishes.     Hospice Discussion/Eligibility: Counseling provided on the benefit of Hospice Services in the setting of patient's dementia to keep patient out of the hospital while supporting patient and family by providing counseling, aggressive symptom management,  prioritizing comfort and quality of life, alleviation of suffering, and allowing patient to pass with comfort and dignity. Patient is hospice-eligible.   All questions and concerns were addressed during encounter.     I spent 60 minutes in providing separately identifiable ACP services with the patient " and/or surrogate decision maker in a voluntary conversation discussing the patient's wishes and goals as detailed in the above note.   ------------------------------------------------------------------------------------------------------------------------------------------------------------------------------------    #Complex Medical Decision Making  #Goals of Care  - Code status: DNRCC  - Surrogate decision maker: Brother Nam states he is pt's legal guardian [phone], however he likes to make decisions with his sister Armen [phone]  - Goals are comfort and quality of life based: consult placed to hospice services -- Jacki BYRD notified, will initiate referral on 6/1    #comfort care orders   -dilaudid 0.2mg IV q15min PRN per RDOS  -lorazepam 0.5mg IV q4 PRN  -glycopyrrolate 0.2mg IV q4 PRN  -rectal acetaminophen 650mg PRN for fever   -comfort feeds as tolerated   -daily vitals PRN  -discontinue cardiac monitoring and lab draws    #Psychosocial Support  - Music Therapy - pt enjoys all music  - Spiritual Care Support    Plan of Care discussed with: Updated Dr. Watson and bedside RN on goals of care decision, medication adjustments, and code status     Medical Decision Making was high level due to high complexity of problems, extensive data review, and high risk of management/treatment.     - dementia posing threat to life and function /  - Parenteral controlled substances: IV dilaudid     Thank you for allowing us to participate in the care of this patient. Palliative will continue to follow as needed. Palliative medicine is available Monday-Friday, 8a-6p. Please contact team with any questions or concerns.  Team pager [phone] (weekdays)  Asia Daley DNP, CNP

## 2024-05-31 NOTE — CONSULTS
Gastroenterology Consult Service INITIAL CONSULT Note  Department of Gastroenterology & Hepatology  [facility]    [facility]  May 31, 2024   Patient: Brian Rodriguez    Medical Record: [id number]    Reason for Consult: PEG dislodgement   Requesting Service: hospital medicine    Subjective     Brian Rodriguez is a 69 y.o. male with CKD, T2DM, HTN, bipolar disorder, HS, dysphagia 2/2 likely neurodegernative process, encephalopathy 2/2 suspected neurosyphilis s/p PEG (5/7) admitted w/ PEG dislodgement. Gastroenterology is consulted for the same.     Pt was admitted in early May I/s/o encephalopathy. Hospital course was complicated by AHRF, aspiration PNA, AF w/ RVR. Etiology of encephalopathy thought to be 2/2 neurosyphilis, however pt never underwent LP for diagnosis. Pt underwent PEG placement on 5/7. Pt was discharged on 5/15 with plans to continue PCN G for 2 weeks (until 5/29). He was discharged on eliquis for AF. Recommend for repeat speech eval.     Pt was apparently at home when PEG was removed (had been discharged from SNF to home in interim). There are no notes indicated when the PEG was removed. Pt arrived to ED prior to MN on 5/31. No bunch was placed in tract. On arrival to ED, pt HDS. Labs with WBC 12.2 (downrending from discharge). Hb 7.2 (was 8.5 on discharge). Plts 377. Hypernatremia (148). BUN/Cr 51/2.36.     Last dose of eliquis was likely on 5/30. Pt does not have any records from NH.     12 point ROS otherwise negative, unless indicated above.     Past Medical History:  Per above; pt also apparently w/ EtOH use hx    Home Medications  Medications Prior to Admission   Medication Sig Dispense Refill Last Dose    0.9 % sodium chloride (sodium chloride 0.9%) solution Infuse 5 mL/hr at 5 mL/hr into a venous catheter continuously.       acetaminophen (Tylenol) 325 mg tablet Take 2 tablets (650 mg) by mouth every 4 hours if needed (pain/fever).        adalimumab (Humira,CF, Pen) 40 mg/0.4 mL pen injector kit pen-injector Inject 1 Pen (40 mg) under the skin 1 (one) time per week. 4 each 11     albuterol 2.5 mg /3 mL (0.083 %) nebulizer solution Take 3 mL (2.5 mg) by nebulization every 6 hours if needed for wheezing.       amino acids/protein hydrolys (PRO-STAT AWC ORAL) Take 30 mL by mouth 2 times a day.       apixaban (Eliquis) 5 mg tablet Take 1 tablet (5 mg) by mouth every 12 hours.       ascorbic acid (Vitamin C) 500 mg tablet Take 1 tablet (500 mg) by mouth 3 times a day.       atorvastatin (Lipitor) 10 mg tablet Take 1 tablet (10 mg) by mouth once daily at bedtime.       cholecalciferol (Vitamin D-3) 25 MCG (1000 UT) tablet Take 1 tablet (1,000 Units) by mouth once daily in the morning.       dextrose 5 %-0.45 % sod chlord (dextrose 5%-0.45 % sodium chloride) infusion Infuse 3,000 mL into a venous catheter. Every shift, Start 4/17/24       doxycycline (Vibramycin) 100 mg capsule 1 capsule (100 mg) by g-tube route every 12 hours. Take with a full glass of water and do not lie down for at least 30 minutes after.       ertapenem (INVanz) IV Infuse 25 mL (500 mg) into a venous catheter once daily. Start 4/17/24, End 4/21/24       esomeprazole (NexIUM) 40 mg packet Add 15mL water to catheter-tipped syringe, add packet. Shake and let thicken. Administer through NG tube within 30 minutes. Refill with 15mL of water, shake and flush NG tube. Do not fill before May 16, 2024.       ferrous sulfate, 325 mg ferrous sulfate, tablet Take 1 tablet by mouth 3 times a day.       folic acid (Folvite) 1 mg tablet Take 1 tablet (1 mg) by mouth once daily in the morning.       galantamine (Razadyne) 4 mg tablet Take 1 tablet (4 mg) by mouth 2 times a day.       glucagon (Glucagen) 1 mg injection Inject under the skin 1 time if needed (every hour as needed).       guaiFENesin (Robitussin) 100 mg/5 mL syrup Take 5 mL (100 mg) by mouth every 4 hours if needed for cough.        insulin glargine (Lantus U-100 Insulin) 100 unit/mL injection Inject 10 Units under the skin once daily in the morning.       insulin lispro (HumaLOG) 100 unit/mL injection Inject under the skin 3 times a day before meals. Per Sliding Scale: 111-150=1; 151-200=3; 201-250=6; 251-300=9; 301-350=12; 351-400=15; 401 or greater call MD.       Lactobacillus acidophilus (ACIDOPHILUS ORAL) Take 1 tablet by mouth 2 times a day. 0.5 mg (100 million cell) tablet       mirtazapine (Remeron) 7.5 mg tablet Take 1 tablet (7.5 mg) by mouth once daily at bedtime.       multivitamin tablet Take 1 tablet by mouth once daily in the morning.       pantoprazole (ProtoNix) 40 mg EC tablet Take 1 tablet (40 mg) by mouth once daily in the morning. Do not crush, chew, or split.       [] pen G pot/dextrose-water (penicillin G potassium) 3 million unit/50 mL IVPB Infuse 50 mL (3 Million Units) at 100 mL/hr over 30 minutes into a venous catheter every 4 hours for 79 doses.       sodium bicarbonate 650 mg tablet 1 tablet (650 mg) by g-tube route 2 times a day.       thiamine (Vitamin B-1) 100 mg tablet 1 tablet (100 mg) by g-tube route once daily.       traMADol (Ultram) 50 mg tablet Take 1 tablet (50 mg) by mouth once daily as needed (30 min prior to dressing changes).       zinc sulfate (Zincate) 220 (50 Zn) MG capsule Take 1 capsule (50 mg of elemental zinc) by mouth once daily in the morning.          Surgical History:  No abd surgeries    Allergies:  No Known Allergies    Social History:  Per chart review:   Social History     Socioeconomic History    Marital status: Single     Spouse name: Not on file    Number of children: Not on file    Years of education: Not on file    Highest education level: Not on file   Occupational History    Not on file   Tobacco Use    Smoking status: Never     Passive exposure: Never    Smokeless tobacco: Not on file   Vaping Use    Vaping status: Unknown   Substance and Sexual Activity    Alcohol use:  Not Currently    Drug use: Defer    Sexual activity: Defer   Other Topics Concern    Not on file   Social History Narrative    Not on file     Social Determinants of Health     Financial Resource Strain: Patient Unable To Answer (4/18/2024)    Overall Financial Resource Strain (CARDIA)     Difficulty of Paying Living Expenses: Patient unable to answer   Food Insecurity: Unknown (7/13/2020)    Received from [facility]    Hunger Vital Sign     Worried About Running Out of Food in the Last Year: Patient declined     Ran Out of Food in the Last Year: Patient declined   Transportation Needs: Patient Unable To Answer (4/18/2024)    PRAPARE - Transportation     Lack of Transportation (Medical): Patient unable to answer     Lack of Transportation (Non-Medical): Patient unable to answer   Physical Activity: Not on file   Stress: Not on file   Social Connections: Not on file   Intimate Partner Violence: Not on file   Housing Stability: Patient Unable To Answer (4/18/2024)    Housing Stability Vital Sign     Unable to Pay for Housing in the Last Year: Patient unable to answer     Number of Places Lived in the Last Year: 1     Unstable Housing in the Last Year: Patient unable to answer       Family History:  Pt not able to answer    Objective     Vitals:    Vitals:    05/31/24 0306 05/31/24 0307 05/31/24 0419 05/31/24 0500   BP: 103/63 103/63 95/51    BP Location:  Left arm Left arm    Patient Position:  Lying Lying    Pulse: 83 83 76    Resp: 17  19    Temp:   36.7 °C (98.1 °F)    TempSrc:   Temporal    SpO2: 97% 97% 98%    Weight:    52.6 kg (116 lb)     Failed to redirect to the Timeline version of the [facility].    Intake/Output Summary (Last 24 hours) at 5/31/2024 0915  Last data filed at 5/31/2024 0507  Gross per 24 hour   Intake --   Output 400 ml   Net -400 ml       Physical Exam  Gen: chronically ill appearing, A+Ox1, in no acute distress, picking at bunch  HEENT: PEERL, EOMI, dry MM  Resp: CTAB, on RA  CV:  RRR  GI: non-tender, non-distended, normal Bss, prior PEG site completely closed off, no drainage  MSK: warm and well perfused, no edema  Neuro: full exam deferred  Skin: warm and dry, no lesions, no rashes     Labs:   Lab Results   Component Value Date    WBC 12.2 (H) 05/31/2024    HGB 7.2 (L) 05/31/2024    HCT 24.4 (L) 05/31/2024    MCV 90 05/31/2024     05/31/2024     Lab Results   Component Value Date    GLUCOSE 338 (H) 05/31/2024    CALCIUM 10.7 (H) 05/31/2024     (H) 05/31/2024    K 3.5 05/31/2024    CO2 20 (L) 05/31/2024     (H) 05/31/2024    BUN 51 (H) 05/31/2024    CREATININE 2.36 (H) 05/31/2024     Lab Results   Component Value Date    ALT 10 04/28/2024    AST 15 04/28/2024    ALKPHOS 89 04/28/2024    BILITOT 0.3 04/28/2024     Lab Results   Component Value Date    INR 1.1 05/13/2024    INR 1.7 (H) 04/23/2024    INR 1.2 (H) 03/08/2023    PROTIME 12.5 05/13/2024    PROTIME 19.4 (H) 04/23/2024    PROTIME 13.6 (H) 03/08/2023       Imaging: no recent abd cross sectional imaging    GI procedures:    EGD 5/7:  Impression  Dubhoff noted in the esophagus and stomach, removed  The esophagus, stomach and duodenum appeared normal.  PEG tube placed in the body of the stomach        Findings  DHT noted in the esophagus and stomach  The esophagus, stomach and duodenum appeared normal.  A PEG tube was successfully placed in the body of the stomach using a deformable internal bolster via the pull technique after the site was identified via transillumination; distance from external bolster to external end of tube: 3 cm; scope reinserted to confirm placement    Assessment & Plan    Brian Rodriguez is a 69 y.o. male with CKD, T2DM, HTN, bipolar disorder, HS, dysphagia 2/2 likely neurodegernative process, encephalopathy 2/2 suspected neurosyphilis s/p PEG (5/7) admitted w/ PEG dislodgement. Gastroenterology is consulted for the same.      Palliative care team met with patient and brother Nam. End stage  dementia and education on dysphagia discussed with family. As per family, pt was enrolled w/ hospice case at [facility]. Family would like to focus on comfort measures at this time.    Patient seen and discussed with attending, Dr. Olivarez.     Char Rivera, GI fellow    Thank you for the consultation.  The consulting team will sign off now.  Please do not hesitate to contact us again on by paging the consultation team again between the weekday hours of 7 AM - 5 PM.  If there is an urgent concern during the weekend, after-hours, or holidays; then please page the on-call GI fellow at [phone]. Thank you.      SIGNATURE: Char Rivera MD PATIENT NAME: Brian Rodriguez   DATE: May 31, 2024 MRN: [id number]

## 2024-05-31 NOTE — ED TRIAGE NOTES
Pt BIBA c/c of pulling out his peg tube.    Pt A&Ox2, disoriented to time and situation pt alert calm cooperative with care. Pt resp even and unlabored.     PMH:

## 2024-06-01 LAB
ATRIAL RATE: 340 BPM
GLUCOSE BLD MANUAL STRIP-MCNC: 201 MG/DL (ref 74–99)
Q ONSET: 221 MS
QRS COUNT: 13 BEATS
QRS DURATION: 76 MS
QT INTERVAL: 390 MS
QTC CALCULATION(BAZETT): 444 MS
QTC FREDERICIA: 425 MS
R AXIS: 62 DEGREES
T AXIS: 30 DEGREES
T OFFSET: 416 MS
VENTRICULAR RATE: 78 BPM

## 2024-06-01 PROCEDURE — 82947 ASSAY GLUCOSE BLOOD QUANT: CPT

## 2024-06-01 PROCEDURE — 93010 ELECTROCARDIOGRAM REPORT: CPT

## 2024-06-01 PROCEDURE — 96375 TX/PRO/DX INJ NEW DRUG ADDON: CPT

## 2024-06-01 PROCEDURE — 2500000004 HC RX 250 GENERAL PHARMACY W/ HCPCS (ALT 636 FOR OP/ED): Mod: SE | Performed by: INTERNAL MEDICINE

## 2024-06-01 PROCEDURE — 99239 HOSP IP/OBS DSCHRG MGMT >30: CPT | Performed by: INTERNAL MEDICINE

## 2024-06-01 PROCEDURE — 96376 TX/PRO/DX INJ SAME DRUG ADON: CPT

## 2024-06-01 PROCEDURE — G0378 HOSPITAL OBSERVATION PER HR: HCPCS

## 2024-06-01 PROCEDURE — 2500000002 HC RX 250 W HCPCS SELF ADMINISTERED DRUGS (ALT 637 FOR MEDICARE OP, ALT 636 FOR OP/ED): Mod: SE | Performed by: NURSE PRACTITIONER

## 2024-06-01 PROCEDURE — 2500000004 HC RX 250 GENERAL PHARMACY W/ HCPCS (ALT 636 FOR OP/ED): Mod: SE | Performed by: STUDENT IN AN ORGANIZED HEALTH CARE EDUCATION/TRAINING PROGRAM

## 2024-06-01 RX ORDER — HYDROMORPHONE HYDROCHLORIDE 1 MG/ML
0.2 INJECTION, SOLUTION INTRAMUSCULAR; INTRAVENOUS; SUBCUTANEOUS
Status: DISCONTINUED | OUTPATIENT
Start: 2024-06-01 | End: 2024-06-01 | Stop reason: HOSPADM

## 2024-06-01 RX ORDER — GLYCOPYRROLATE 0.2 MG/ML
0.2 INJECTION INTRAMUSCULAR; INTRAVENOUS EVERY 4 HOURS PRN
Status: DISCONTINUED | OUTPATIENT
Start: 2024-06-01 | End: 2024-06-01 | Stop reason: HOSPADM

## 2024-06-01 RX ORDER — HYDROMORPHONE HYDROCHLORIDE 1 MG/ML
0.2 INJECTION, SOLUTION INTRAMUSCULAR; INTRAVENOUS; SUBCUTANEOUS EVERY 4 HOURS PRN
Status: DISCONTINUED | OUTPATIENT
Start: 2024-06-01 | End: 2024-06-01

## 2024-06-01 RX ORDER — LORAZEPAM 2 MG/ML
0.5 INJECTION INTRAMUSCULAR EVERY 4 HOURS PRN
Status: DISCONTINUED | OUTPATIENT
Start: 2024-06-01 | End: 2024-06-01 | Stop reason: HOSPADM

## 2024-06-01 RX ADMIN — INSULIN LISPRO 4 UNITS: 100 INJECTION, SOLUTION INTRAVENOUS; SUBCUTANEOUS at 02:01

## 2024-06-01 RX ADMIN — LORAZEPAM 0.5 MG: 2 INJECTION INTRAMUSCULAR; INTRAVENOUS at 05:21

## 2024-06-01 RX ADMIN — DIPHENHYDRAMINE HYDROCHLORIDE 25 MG: 50 INJECTION, SOLUTION INTRAMUSCULAR; INTRAVENOUS at 02:33

## 2024-06-01 RX ADMIN — HYDROMORPHONE HYDROCHLORIDE 0.2 MG: 1 INJECTION, SOLUTION INTRAMUSCULAR; INTRAVENOUS; SUBCUTANEOUS at 05:30

## 2024-06-01 RX ADMIN — HYDROMORPHONE HYDROCHLORIDE 0.2 MG: 1 INJECTION, SOLUTION INTRAMUSCULAR; INTRAVENOUS; SUBCUTANEOUS at 03:10

## 2024-06-01 RX ADMIN — HYDROMORPHONE HYDROCHLORIDE 0.2 MG: 1 INJECTION, SOLUTION INTRAMUSCULAR; INTRAVENOUS; SUBCUTANEOUS at 06:25

## 2024-06-01 RX ADMIN — GLYCOPYRROLATE 0.2 MG: 0.2 INJECTION, SOLUTION INTRAMUSCULAR; INTRAVENOUS at 04:30

## 2024-06-01 ASSESSMENT — PAIN SCALES - PAIN ASSESSMENT IN ADVANCED DEMENTIA (PAINAD)
BODYLANGUAGE: TENSE, DISTRESSED PACING, FIDGETING
NEGVOCALIZATION: OCCASIONAL MOAN/GROAN, LOW SPEECH, NEGATIVE/DISAPPROVING QUALITY
TOTALSCORE: MEDICATION (SEE MAR);REPOSITIONED
FACIALEXPRESSION: SAD, FRIGHTENED, FROWN
BREATHING: NOISY LABORED BREATHING, LONG PERIODS OF HYPERVENTILATION, CHEYNE-STOKES RESPIRATIONS

## 2024-06-01 NOTE — DISCHARGE SUMMARY
Discharge Diagnosis  PEG tube malfunction (Multi)    Issues Requiring Follow-Up  Patient      Test Results Pending At Discharge  Pending Labs       Order Current Status    Coagulation Screen Collected (24 0106)            Hospital Course  Mr. Brian Rodriguez is a 69 y.o. male CKD, DM2, HTN, sacral wound, bipolar disorder who notably was admitted on  from his SNF 2/2 encephalopathy, he had a complicated hospital stay requiring MICU admission for multiple episodes of hemodynamic instability requiring Airvo support and acute hypoxemic respiratory failure in the setting of likely aspiration pneumonia/pneumonitis covered with antibiotics, recent treatment for presumed neurosyphilis with IV penicillin course who presented to the ED with a PEG tube dislodgement. He was apparently at home when he pulled out his PEG tube.  After detailed discussion with family and palliative team  the decision made to change the code to DNR-ml Hospice approach.   On  the patient had cardiopulmonary arrest, and pronounced dead at  7:00 am.  Underlying disease : Advanced dementia, persistent encephalopathy, recurrent aspiration, DM2, HTN, sacral wound.      Pertinent Physical Exam At Time of Discharge  Physical Exam  Asystole   No breathing   No pulse   No reflexes   Dead  Home Medications     Medication List      STOP taking these medications     acetaminophen 325 mg tablet; Commonly known as: Tylenol   ACIDOPHILUS ORAL   albuterol 2.5 mg /3 mL (0.083 %) nebulizer solution   apixaban 5 mg tablet; Commonly known as: Eliquis   ascorbic acid 500 mg tablet; Commonly known as: Vitamin C   atorvastatin 10 mg tablet; Commonly known as: Lipitor   cholecalciferol 25 MCG (1000 UT) tablet; Commonly known as: Vitamin D-3   dextrose 5%-0.45 % sodium chloride infusion   doxycycline 100 mg capsule; Commonly known as: Vibramycin   ertapenem IV; Commonly known as: INVanz   esomeprazole 40 mg packet; Commonly known as: NexIUM    ferrous sulfate (325 mg ferrous sulfate) tablet   folic acid 1 mg tablet; Commonly known as: Folvite   galantamine 4 mg tablet; Commonly known as: Razadyne   glucagon 1 mg injection; Commonly known as: Glucagen   guaiFENesin 100 mg/5 mL syrup; Commonly known as: Robitussin   Humira(CF) Pen 40 mg/0.4 mL pen injector kit pen-injector; Generic drug:   adalimumab   insulin lispro 100 unit/mL injection; Commonly known as: HumaLOG   Lantus U-100 Insulin 100 unit/mL injection; Generic drug: insulin   glargine   mirtazapine 7.5 mg tablet; Commonly known as: Remeron   multivitamin tablet   pantoprazole 40 mg EC tablet; Commonly known as: ProtoNix   penicillin G potassium 3 million unit/50 mL IVPB   PRO-STAT AWC ORAL   sodium bicarbonate 650 mg tablet   sodium chloride 0.9% solution   thiamine 100 mg tablet; Commonly known as: Vitamin B-1   traMADol 50 mg tablet; Commonly known as: Ultram   zinc sulfate 220 (50 Zn) MG capsule; Commonly known as: Zincate       Outpatient Follow-Up  Future Appointments   Date Time Provider Department [place]   6/7/2024  1:00 PM Shaun Pablo MD PhD [id number] Academic   7/11/2024  2:30 PM Hudson Davalos MD [id number] [facility]   8/30/2024 10:00 AM Elbert Guerrero MD PhD [id number] [facility]   11/21/2024  8:00 AM Richy Taylor, PhD [id number] Academic     Time > 35 min   Adrián Watson MD

## 2024-06-01 NOTE — CARE PLAN
Problem: Fall/Injury  Goal: Not fall by end of shift  Outcome: Progressing  Goal: Be free from injury by end of the shift  Outcome: Progressing  Goal: Pace activities to prevent fatigue by end of the shift  Outcome: Progressing     Problem: Respiratory  Goal: Minimize anxiety/maximize coping throughout shift  Outcome: Progressing  Goal: Minimal/no exertional discomfort or dyspnea this shift  Outcome: Progressing  Goal: No signs of respiratory distress (eg. Use of accessory muscles. Peds grunting)  Outcome: Progressing  Goal: Patent airway maintained this shift  Outcome: Progressing  Goal: Increase self care and/or family involvement in next 24 hours  Outcome: Progressing

## 2024-06-01 NOTE — NURSING NOTE
Rn at the patients bedside at 0708. No heart or breath sounds noted.Uday Watson  attending notified( provider whom prounced death). The patient had an absence of a carotid pulse. Absence of heart sounds on auscultation and absence of breathing on auscultation.  Medical equipment left in place : peripheral iv, and indwelling catheter. Next of kin notified.

## 2024-06-01 NOTE — NURSING NOTE
Upon arrival at the patients bedside the patient was awake , alert tracked with his eyes, confused, AO+1, disoriented to time , situation and place. The patient was sampling items off of his food tray and had a cough each time he ate or had a something to drink. Rn repositioned the patient to ensure he was upright and educated on swallowing tucking chin to chest which the patient followed all commands. Head to toe assessment, no noted distress, chg performed, bunch care and complete bed change, patient observer at bedside. Rounding ensued, will continue to monitor.

## 2024-06-01 NOTE — NURSING NOTE
The above patient is coughing incessantly, audible rhonchorous lung sounds, using his abdominal muscles for breathing, hoarseness of voice, he is dnarcc but can you add on additional medications to help with the patient being unable to handle secretions.like robinul and add some morphine he looks uncomfortable.  Md placed orders for end of life care.

## 2024-06-07 ENCOUNTER — APPOINTMENT (OUTPATIENT)
Dept: DERMATOLOGY | Facility: CLINIC | Age: 69
End: 2024-06-07
Payer: MEDICARE

## 2024-06-07 ENCOUNTER — SPECIALTY PHARMACY (OUTPATIENT)
Dept: PHARMACY | Facility: CLINIC | Age: 69
End: 2024-06-07

## 2024-07-11 ENCOUNTER — APPOINTMENT (OUTPATIENT)
Dept: NEUROLOGY | Facility: CLINIC | Age: 69
End: 2024-07-11
Payer: MEDICARE

## 2024-08-30 ENCOUNTER — APPOINTMENT (OUTPATIENT)
Dept: NEUROLOGY | Facility: HOSPITAL | Age: 69
End: 2024-08-30
Payer: MEDICARE

## 2024-11-21 ENCOUNTER — APPOINTMENT (OUTPATIENT)
Dept: PSYCHOLOGY | Facility: HOSPITAL | Age: 69
End: 2024-11-21
Payer: MEDICARE